# Patient Record
Sex: MALE | Race: WHITE | Employment: OTHER | URBAN - METROPOLITAN AREA
[De-identification: names, ages, dates, MRNs, and addresses within clinical notes are randomized per-mention and may not be internally consistent; named-entity substitution may affect disease eponyms.]

---

## 2017-04-10 PROBLEM — G47.33 OBSTRUCTIVE SLEEP APNEA SYNDROME: Status: ACTIVE | Noted: 2017-04-10

## 2017-12-13 PROBLEM — E66.01 OBESITY, MORBID (HCC): Status: ACTIVE | Noted: 2017-12-13

## 2018-01-05 PROBLEM — J43.1 PANLOBULAR EMPHYSEMA (HCC): Status: ACTIVE | Noted: 2018-01-05

## 2019-01-01 ENCOUNTER — ANESTHESIA EVENT (OUTPATIENT)
Dept: SURGERY | Age: 54
End: 2019-01-01
Payer: COMMERCIAL

## 2019-01-01 ENCOUNTER — HOSPITAL ENCOUNTER (OUTPATIENT)
Dept: LAB | Age: 54
Discharge: HOME | End: 2019-04-11
Payer: COMMERCIAL

## 2019-01-01 ENCOUNTER — HOSPITAL ENCOUNTER (OUTPATIENT)
Dept: INFUSION THERAPY | Age: 54
Discharge: HOME | End: 2019-06-05
Payer: COMMERCIAL

## 2019-01-01 ENCOUNTER — APPOINTMENT (OUTPATIENT)
Dept: INFUSION THERAPY | Age: 54
End: 2019-01-01

## 2019-01-01 ENCOUNTER — ANESTHESIA (OUTPATIENT)
Dept: SURGERY | Age: 54
End: 2019-01-01
Payer: COMMERCIAL

## 2019-01-01 ENCOUNTER — APPOINTMENT (OUTPATIENT)
Dept: INFUSION THERAPY | Age: 54
End: 2019-01-01
Payer: COMMERCIAL

## 2019-01-01 ENCOUNTER — HOSPITAL ENCOUNTER (OUTPATIENT)
Dept: LAB | Age: 54
Discharge: HOME | End: 2019-08-12
Payer: COMMERCIAL

## 2019-01-01 ENCOUNTER — HOSPITAL ENCOUNTER (OUTPATIENT)
Dept: INFUSION THERAPY | Age: 54
Discharge: HOME | End: 2019-12-30
Payer: COMMERCIAL

## 2019-01-01 ENCOUNTER — HOSPITAL ENCOUNTER (OUTPATIENT)
Dept: LAB | Age: 54
Discharge: HOME | End: 2019-03-07
Payer: COMMERCIAL

## 2019-01-01 ENCOUNTER — HOSPITAL ENCOUNTER (OUTPATIENT)
Dept: INFUSION THERAPY | Age: 54
Discharge: HOME | End: 2019-10-02
Payer: COMMERCIAL

## 2019-01-01 ENCOUNTER — HOSPITAL ENCOUNTER (OUTPATIENT)
Dept: LAB | Age: 54
Discharge: HOME | End: 2019-07-29
Payer: COMMERCIAL

## 2019-01-01 ENCOUNTER — HOSPITAL ENCOUNTER (OUTPATIENT)
Dept: RADIATION ONCOLOGY | Age: 54
Discharge: HOME | End: 2019-12-30
Payer: COMMERCIAL

## 2019-01-01 ENCOUNTER — HOSPITAL ENCOUNTER (OUTPATIENT)
Dept: LAB | Age: 54
Discharge: HOME | End: 2019-07-15
Payer: COMMERCIAL

## 2019-01-01 ENCOUNTER — HOSPITAL ENCOUNTER (OUTPATIENT)
Dept: INFUSION THERAPY | Age: 54
Discharge: HOME | End: 2019-11-13
Payer: COMMERCIAL

## 2019-01-01 ENCOUNTER — HOSPITAL ENCOUNTER (OUTPATIENT)
Dept: RADIATION ONCOLOGY | Age: 54
Discharge: HOME | End: 2019-12-13
Payer: COMMERCIAL

## 2019-01-01 ENCOUNTER — HOSPITAL ENCOUNTER (OUTPATIENT)
Dept: LAB | Age: 54
Discharge: HOME | End: 2019-05-16
Payer: COMMERCIAL

## 2019-01-01 ENCOUNTER — HOSPITAL ENCOUNTER (OUTPATIENT)
Dept: INFUSION THERAPY | Age: 54
End: 2019-01-01

## 2019-01-01 ENCOUNTER — HOSPITAL ENCOUNTER (OUTPATIENT)
Dept: CT IMAGING | Age: 54
Discharge: HOME | End: 2019-09-03
Attending: INTERNAL MEDICINE
Payer: COMMERCIAL

## 2019-01-01 ENCOUNTER — HOSPITAL ENCOUNTER (OUTPATIENT)
Dept: INFUSION THERAPY | Age: 54
Discharge: HOME | End: 2019-08-14
Payer: COMMERCIAL

## 2019-01-01 ENCOUNTER — HOSPITAL ENCOUNTER (OUTPATIENT)
Dept: INFUSION THERAPY | Age: 54
Discharge: HOME | End: 2019-05-04
Payer: COMMERCIAL

## 2019-01-01 ENCOUNTER — HOSPITAL ENCOUNTER (OUTPATIENT)
Dept: LAB | Age: 54
Discharge: HOME | End: 2019-08-26
Payer: COMMERCIAL

## 2019-01-01 ENCOUNTER — HOSPITAL ENCOUNTER (OUTPATIENT)
Dept: INFUSION THERAPY | Age: 54
Discharge: HOME | End: 2019-06-07
Payer: COMMERCIAL

## 2019-01-01 ENCOUNTER — HOSPITAL ENCOUNTER (OUTPATIENT)
Dept: INFUSION THERAPY | Age: 54
Discharge: HOME | End: 2019-10-14
Payer: COMMERCIAL

## 2019-01-01 ENCOUNTER — HOSPITAL ENCOUNTER (OUTPATIENT)
Dept: INFUSION THERAPY | Age: 54
Discharge: HOME | End: 2019-07-15
Payer: COMMERCIAL

## 2019-01-01 ENCOUNTER — HOSPITAL ENCOUNTER (OUTPATIENT)
Dept: INFUSION THERAPY | Age: 54
Discharge: HOME | End: 2019-04-11
Payer: COMMERCIAL

## 2019-01-01 ENCOUNTER — HOSPITAL ENCOUNTER (OUTPATIENT)
Dept: INFUSION THERAPY | Age: 54
Discharge: HOME | End: 2019-11-25
Payer: COMMERCIAL

## 2019-01-01 ENCOUNTER — APPOINTMENT (OUTPATIENT)
Dept: GENERAL RADIOLOGY | Age: 54
DRG: 193 | End: 2019-01-01
Attending: EMERGENCY MEDICINE
Payer: COMMERCIAL

## 2019-01-01 ENCOUNTER — HOSPITAL ENCOUNTER (OUTPATIENT)
Dept: LAB | Age: 54
Discharge: HOME | DRG: 193 | End: 2019-11-25
Payer: COMMERCIAL

## 2019-01-01 ENCOUNTER — DOCUMENTATION ONLY (OUTPATIENT)
Dept: HEMATOLOGY | Age: 54
End: 2019-01-01

## 2019-01-01 ENCOUNTER — HOSPITAL ENCOUNTER (OUTPATIENT)
Dept: INFUSION THERAPY | Age: 54
Discharge: HOME | End: 2019-06-20
Payer: COMMERCIAL

## 2019-01-01 ENCOUNTER — HOSPITAL ENCOUNTER (OUTPATIENT)
Dept: RADIATION ONCOLOGY | Age: 54
Discharge: HOME | End: 2019-03-08
Payer: COMMERCIAL

## 2019-01-01 ENCOUNTER — HOSPITAL ENCOUNTER (OUTPATIENT)
Dept: RADIATION ONCOLOGY | Age: 54
Discharge: HOME | End: 2019-12-20
Payer: COMMERCIAL

## 2019-01-01 ENCOUNTER — HOSPITAL ENCOUNTER (OUTPATIENT)
Dept: LAB | Age: 54
Discharge: HOME | End: 2019-09-09
Payer: COMMERCIAL

## 2019-01-01 ENCOUNTER — HOSPITAL ENCOUNTER (OUTPATIENT)
Dept: INTERVENTIONAL RADIOLOGY/VASCULAR | Age: 54
Discharge: HOME | End: 2019-11-21
Attending: INTERNAL MEDICINE
Payer: COMMERCIAL

## 2019-01-01 ENCOUNTER — HOSPITAL ENCOUNTER (OUTPATIENT)
Dept: INFUSION THERAPY | Age: 54
Discharge: HOME | End: 2019-07-31
Payer: COMMERCIAL

## 2019-01-01 ENCOUNTER — HOSPITAL ENCOUNTER (OUTPATIENT)
Dept: LAB | Age: 54
Discharge: HOME | End: 2019-05-30
Payer: COMMERCIAL

## 2019-01-01 ENCOUNTER — HOSPITAL ENCOUNTER (OUTPATIENT)
Dept: INFUSION THERAPY | Age: 54
Discharge: HOME | End: 2019-05-02
Payer: COMMERCIAL

## 2019-01-01 ENCOUNTER — HOSPITAL ENCOUNTER (OUTPATIENT)
Age: 54
Setting detail: OUTPATIENT SURGERY
Discharge: HOME | End: 2019-03-18
Attending: SURGERY | Admitting: SURGERY
Payer: COMMERCIAL

## 2019-01-01 ENCOUNTER — HOSPITAL ENCOUNTER (OUTPATIENT)
Dept: INFUSION THERAPY | Age: 54
Discharge: HOME | End: 2019-11-19
Payer: COMMERCIAL

## 2019-01-01 ENCOUNTER — HOSPITAL ENCOUNTER (OUTPATIENT)
Dept: INFUSION THERAPY | Age: 54
Discharge: HOME | End: 2019-05-18
Payer: COMMERCIAL

## 2019-01-01 ENCOUNTER — HOSPITAL ENCOUNTER (OUTPATIENT)
Dept: INFUSION THERAPY | Age: 54
Discharge: HOME | End: 2019-11-27
Payer: COMMERCIAL

## 2019-01-01 ENCOUNTER — HOSPITAL ENCOUNTER (OUTPATIENT)
Dept: PET IMAGING | Age: 54
Discharge: HOME | End: 2019-03-05
Payer: COMMERCIAL

## 2019-01-01 ENCOUNTER — HOSPITAL ENCOUNTER (OUTPATIENT)
Dept: LAB | Age: 54
Discharge: HOME | End: 2019-09-23
Payer: COMMERCIAL

## 2019-01-01 ENCOUNTER — HOSPITAL ENCOUNTER (OUTPATIENT)
Dept: INFUSION THERAPY | Age: 54
End: 2019-01-01
Payer: COMMERCIAL

## 2019-01-01 ENCOUNTER — HOSPITAL ENCOUNTER (OUTPATIENT)
Dept: INFUSION THERAPY | Age: 54
Discharge: HOME | End: 2019-09-30
Payer: COMMERCIAL

## 2019-01-01 ENCOUNTER — HOSPITAL ENCOUNTER (OUTPATIENT)
Dept: INFUSION THERAPY | Age: 54
Discharge: HOME | End: 2019-09-11
Payer: COMMERCIAL

## 2019-01-01 ENCOUNTER — HOSPITAL ENCOUNTER (OUTPATIENT)
Dept: LAB | Age: 54
Discharge: HOME | End: 2019-06-20
Payer: COMMERCIAL

## 2019-01-01 ENCOUNTER — HOSPITAL ENCOUNTER (OUTPATIENT)
Dept: INFUSION THERAPY | Age: 54
Discharge: HOME | End: 2019-08-12
Payer: COMMERCIAL

## 2019-01-01 ENCOUNTER — HOSPITAL ENCOUNTER (OUTPATIENT)
Dept: INFUSION THERAPY | Age: 54
Discharge: HOME | End: 2019-05-30
Payer: COMMERCIAL

## 2019-01-01 ENCOUNTER — APPOINTMENT (OUTPATIENT)
Dept: PHYSICAL THERAPY | Age: 54
End: 2019-01-01

## 2019-01-01 ENCOUNTER — HOSPITAL ENCOUNTER (OUTPATIENT)
Dept: LAB | Age: 54
Discharge: HOME | End: 2019-10-14
Payer: COMMERCIAL

## 2019-01-01 ENCOUNTER — HOSPITAL ENCOUNTER (OUTPATIENT)
Dept: INFUSION THERAPY | Age: 54
Discharge: HOME | End: 2019-05-16
Payer: COMMERCIAL

## 2019-01-01 ENCOUNTER — HOSPITAL ENCOUNTER (OUTPATIENT)
Dept: INFUSION THERAPY | Age: 54
Discharge: HOME | End: 2019-07-29
Payer: COMMERCIAL

## 2019-01-01 ENCOUNTER — HOSPITAL ENCOUNTER (OUTPATIENT)
Dept: INFUSION THERAPY | Age: 54
Discharge: HOME | End: 2019-09-23
Payer: COMMERCIAL

## 2019-01-01 ENCOUNTER — HOSPITAL ENCOUNTER (OUTPATIENT)
Dept: LAB | Age: 54
Discharge: HOME | End: 2019-12-11
Payer: COMMERCIAL

## 2019-01-01 ENCOUNTER — HOSPITAL ENCOUNTER (OUTPATIENT)
Dept: LAB | Age: 54
Discharge: HOME | End: 2019-05-02
Payer: COMMERCIAL

## 2019-01-01 ENCOUNTER — HOSPITAL ENCOUNTER (OUTPATIENT)
Dept: INFUSION THERAPY | Age: 54
Discharge: HOME | End: 2019-11-11
Payer: COMMERCIAL

## 2019-01-01 ENCOUNTER — HOSPITAL ENCOUNTER (OUTPATIENT)
Dept: CT IMAGING | Age: 54
Discharge: HOME | End: 2019-12-09
Attending: INTERNAL MEDICINE

## 2019-01-01 ENCOUNTER — HOSPITAL ENCOUNTER (OUTPATIENT)
Dept: INFUSION THERAPY | Age: 54
Discharge: HOME | End: 2019-11-15
Payer: COMMERCIAL

## 2019-01-01 ENCOUNTER — HOSPITAL ENCOUNTER (OUTPATIENT)
Dept: INFUSION THERAPY | Age: 54
Discharge: HOME | End: 2019-09-09
Payer: COMMERCIAL

## 2019-01-01 ENCOUNTER — HOSPITAL ENCOUNTER (INPATIENT)
Age: 54
LOS: 2 days | Discharge: HOME | DRG: 193 | End: 2019-11-29
Attending: EMERGENCY MEDICINE | Admitting: INTERNAL MEDICINE
Payer: COMMERCIAL

## 2019-01-01 ENCOUNTER — HOSPITAL ENCOUNTER (OUTPATIENT)
Dept: INFUSION THERAPY | Age: 54
Discharge: HOME | End: 2019-03-28
Payer: COMMERCIAL

## 2019-01-01 ENCOUNTER — HOSPITAL ENCOUNTER (OUTPATIENT)
Dept: INFUSION THERAPY | Age: 54
Discharge: HOME | End: 2019-07-17
Payer: COMMERCIAL

## 2019-01-01 ENCOUNTER — APPOINTMENT (OUTPATIENT)
Dept: CT IMAGING | Age: 54
DRG: 193 | End: 2019-01-01
Attending: EMERGENCY MEDICINE
Payer: COMMERCIAL

## 2019-01-01 ENCOUNTER — HOSPITAL ENCOUNTER (OUTPATIENT)
Dept: LAB | Age: 54
Discharge: HOME | End: 2019-09-30
Payer: COMMERCIAL

## 2019-01-01 ENCOUNTER — HOSPITAL ENCOUNTER (OUTPATIENT)
Dept: RADIATION ONCOLOGY | Age: 54
Discharge: HOME | End: 2019-12-31
Payer: COMMERCIAL

## 2019-01-01 ENCOUNTER — HOSPITAL ENCOUNTER (OUTPATIENT)
Dept: RADIATION ONCOLOGY | Age: 54
Discharge: HOME | End: 2019-03-21

## 2019-01-01 ENCOUNTER — DOCUMENTATION ONLY (OUTPATIENT)
Dept: ONCOLOGY | Age: 54
End: 2019-01-01

## 2019-01-01 ENCOUNTER — HOSPITAL ENCOUNTER (OUTPATIENT)
Dept: RADIATION ONCOLOGY | Age: 54
Discharge: HOME | End: 2019-12-23
Payer: COMMERCIAL

## 2019-01-01 ENCOUNTER — HOSPITAL ENCOUNTER (OUTPATIENT)
Dept: INFUSION THERAPY | Age: 54
Discharge: HOME | End: 2019-12-13
Payer: COMMERCIAL

## 2019-01-01 ENCOUNTER — HOSPITAL ENCOUNTER (OUTPATIENT)
Dept: LAB | Age: 54
Discharge: HOME | End: 2019-12-30
Payer: COMMERCIAL

## 2019-01-01 ENCOUNTER — HOSPITAL ENCOUNTER (OUTPATIENT)
Dept: INFUSION THERAPY | Age: 54
Discharge: HOME | End: 2019-08-26
Payer: COMMERCIAL

## 2019-01-01 ENCOUNTER — PATIENT OUTREACH (OUTPATIENT)
Dept: CASE MANAGEMENT | Age: 54
End: 2019-01-01

## 2019-01-01 ENCOUNTER — HOSPITAL ENCOUNTER (OUTPATIENT)
Dept: CT IMAGING | Age: 54
Discharge: HOME | End: 2019-06-13
Attending: NURSE PRACTITIONER
Payer: COMMERCIAL

## 2019-01-01 ENCOUNTER — HOSPITAL ENCOUNTER (OUTPATIENT)
Dept: LAB | Age: 54
Discharge: HOME | End: 2019-11-11
Payer: COMMERCIAL

## 2019-01-01 ENCOUNTER — HOSPITAL ENCOUNTER (OUTPATIENT)
Dept: LAB | Age: 54
Discharge: HOME | End: 2019-03-28
Payer: COMMERCIAL

## 2019-01-01 ENCOUNTER — HOSPITAL ENCOUNTER (OUTPATIENT)
Dept: INFUSION THERAPY | Age: 54
Discharge: HOME | End: 2019-12-18
Payer: COMMERCIAL

## 2019-01-01 ENCOUNTER — HOSPITAL ENCOUNTER (OUTPATIENT)
Dept: RADIATION ONCOLOGY | Age: 54
Discharge: HOME | End: 2019-12-17
Payer: COMMERCIAL

## 2019-01-01 ENCOUNTER — HOSPITAL ENCOUNTER (OUTPATIENT)
Dept: INFUSION THERAPY | Age: 54
Discharge: HOME | End: 2019-04-13
Payer: COMMERCIAL

## 2019-01-01 ENCOUNTER — HOSPITAL ENCOUNTER (OUTPATIENT)
Dept: LAB | Age: 54
Discharge: HOME | End: 2019-12-03
Payer: COMMERCIAL

## 2019-01-01 ENCOUNTER — APPOINTMENT (OUTPATIENT)
Dept: GENERAL RADIOLOGY | Age: 54
End: 2019-01-01
Attending: SURGERY
Payer: COMMERCIAL

## 2019-01-01 ENCOUNTER — HOSPITAL ENCOUNTER (OUTPATIENT)
Dept: INFUSION THERAPY | Age: 54
Discharge: HOME | End: 2019-03-30
Payer: COMMERCIAL

## 2019-01-01 VITALS
SYSTOLIC BLOOD PRESSURE: 108 MMHG | RESPIRATION RATE: 18 BRPM | OXYGEN SATURATION: 95 % | DIASTOLIC BLOOD PRESSURE: 71 MMHG | HEART RATE: 93 BPM | TEMPERATURE: 98 F

## 2019-01-01 VITALS
WEIGHT: 311.8 LBS | DIASTOLIC BLOOD PRESSURE: 68 MMHG | HEART RATE: 68 BPM | RESPIRATION RATE: 20 BRPM | SYSTOLIC BLOOD PRESSURE: 119 MMHG | TEMPERATURE: 97.8 F | HEIGHT: 73 IN | BODY MASS INDEX: 41.32 KG/M2

## 2019-01-01 VITALS
RESPIRATION RATE: 18 BRPM | DIASTOLIC BLOOD PRESSURE: 72 MMHG | WEIGHT: 284.6 LBS | BODY MASS INDEX: 36.54 KG/M2 | OXYGEN SATURATION: 94 % | SYSTOLIC BLOOD PRESSURE: 117 MMHG | TEMPERATURE: 97.9 F | HEART RATE: 56 BPM

## 2019-01-01 VITALS
DIASTOLIC BLOOD PRESSURE: 58 MMHG | RESPIRATION RATE: 18 BRPM | SYSTOLIC BLOOD PRESSURE: 125 MMHG | OXYGEN SATURATION: 92 % | HEART RATE: 64 BPM | TEMPERATURE: 98.4 F

## 2019-01-01 VITALS
DIASTOLIC BLOOD PRESSURE: 66 MMHG | OXYGEN SATURATION: 90 % | TEMPERATURE: 98.1 F | RESPIRATION RATE: 16 BRPM | SYSTOLIC BLOOD PRESSURE: 133 MMHG | HEART RATE: 85 BPM

## 2019-01-01 VITALS
RESPIRATION RATE: 18 BRPM | WEIGHT: 267 LBS | SYSTOLIC BLOOD PRESSURE: 106 MMHG | DIASTOLIC BLOOD PRESSURE: 58 MMHG | TEMPERATURE: 97.3 F | HEART RATE: 59 BPM | OXYGEN SATURATION: 94 % | BODY MASS INDEX: 34.28 KG/M2

## 2019-01-01 VITALS
RESPIRATION RATE: 18 BRPM | OXYGEN SATURATION: 91 % | DIASTOLIC BLOOD PRESSURE: 91 MMHG | SYSTOLIC BLOOD PRESSURE: 127 MMHG | HEART RATE: 94 BPM | TEMPERATURE: 98.3 F

## 2019-01-01 VITALS
DIASTOLIC BLOOD PRESSURE: 62 MMHG | SYSTOLIC BLOOD PRESSURE: 109 MMHG | RESPIRATION RATE: 18 BRPM | HEIGHT: 74 IN | OXYGEN SATURATION: 92 % | HEART RATE: 73 BPM | WEIGHT: 223.9 LBS | BODY MASS INDEX: 28.73 KG/M2 | TEMPERATURE: 97.4 F

## 2019-01-01 VITALS
TEMPERATURE: 98 F | HEART RATE: 110 BPM | SYSTOLIC BLOOD PRESSURE: 124 MMHG | OXYGEN SATURATION: 96 % | RESPIRATION RATE: 18 BRPM | DIASTOLIC BLOOD PRESSURE: 73 MMHG | WEIGHT: 223 LBS | BODY MASS INDEX: 28.63 KG/M2

## 2019-01-01 VITALS
OXYGEN SATURATION: 98 % | DIASTOLIC BLOOD PRESSURE: 60 MMHG | SYSTOLIC BLOOD PRESSURE: 110 MMHG | RESPIRATION RATE: 20 BRPM | TEMPERATURE: 98 F | HEART RATE: 63 BPM

## 2019-01-01 VITALS
OXYGEN SATURATION: 97 % | DIASTOLIC BLOOD PRESSURE: 83 MMHG | RESPIRATION RATE: 18 BRPM | SYSTOLIC BLOOD PRESSURE: 130 MMHG | HEART RATE: 94 BPM | TEMPERATURE: 97.6 F

## 2019-01-01 VITALS
RESPIRATION RATE: 18 BRPM | TEMPERATURE: 98 F | HEART RATE: 59 BPM | OXYGEN SATURATION: 91 % | DIASTOLIC BLOOD PRESSURE: 69 MMHG | SYSTOLIC BLOOD PRESSURE: 107 MMHG

## 2019-01-01 VITALS
OXYGEN SATURATION: 93 % | BODY MASS INDEX: 41.92 KG/M2 | TEMPERATURE: 97.7 F | HEART RATE: 68 BPM | WEIGHT: 313.4 LBS | SYSTOLIC BLOOD PRESSURE: 138 MMHG | RESPIRATION RATE: 18 BRPM | DIASTOLIC BLOOD PRESSURE: 74 MMHG

## 2019-01-01 VITALS
BODY MASS INDEX: 28.04 KG/M2 | HEART RATE: 104 BPM | DIASTOLIC BLOOD PRESSURE: 62 MMHG | SYSTOLIC BLOOD PRESSURE: 122 MMHG | TEMPERATURE: 98.4 F | OXYGEN SATURATION: 97 % | WEIGHT: 218.4 LBS

## 2019-01-01 VITALS
HEIGHT: 74 IN | SYSTOLIC BLOOD PRESSURE: 140 MMHG | OXYGEN SATURATION: 95 % | WEIGHT: 315 LBS | RESPIRATION RATE: 17 BRPM | BODY MASS INDEX: 40.43 KG/M2 | TEMPERATURE: 98.5 F | HEART RATE: 59 BPM | DIASTOLIC BLOOD PRESSURE: 61 MMHG

## 2019-01-01 VITALS
TEMPERATURE: 98.2 F | OXYGEN SATURATION: 96 % | DIASTOLIC BLOOD PRESSURE: 75 MMHG | SYSTOLIC BLOOD PRESSURE: 117 MMHG | BODY MASS INDEX: 37.52 KG/M2 | HEART RATE: 62 BPM | WEIGHT: 292.2 LBS | RESPIRATION RATE: 18 BRPM

## 2019-01-01 VITALS
RESPIRATION RATE: 18 BRPM | OXYGEN SATURATION: 94 % | DIASTOLIC BLOOD PRESSURE: 71 MMHG | SYSTOLIC BLOOD PRESSURE: 126 MMHG | HEART RATE: 63 BPM | TEMPERATURE: 98.1 F

## 2019-01-01 VITALS
OXYGEN SATURATION: 95 % | WEIGHT: 315 LBS | HEART RATE: 55 BPM | BODY MASS INDEX: 49.58 KG/M2 | DIASTOLIC BLOOD PRESSURE: 74 MMHG | SYSTOLIC BLOOD PRESSURE: 123 MMHG | TEMPERATURE: 97.7 F

## 2019-01-01 VITALS
SYSTOLIC BLOOD PRESSURE: 112 MMHG | DIASTOLIC BLOOD PRESSURE: 67 MMHG | TEMPERATURE: 97.8 F | OXYGEN SATURATION: 94 % | HEART RATE: 89 BPM | RESPIRATION RATE: 18 BRPM

## 2019-01-01 VITALS
OXYGEN SATURATION: 93 % | SYSTOLIC BLOOD PRESSURE: 122 MMHG | TEMPERATURE: 98.3 F | RESPIRATION RATE: 18 BRPM | DIASTOLIC BLOOD PRESSURE: 77 MMHG | HEART RATE: 85 BPM

## 2019-01-01 VITALS
SYSTOLIC BLOOD PRESSURE: 104 MMHG | BODY MASS INDEX: 46.86 KG/M2 | RESPIRATION RATE: 20 BRPM | DIASTOLIC BLOOD PRESSURE: 57 MMHG | HEART RATE: 75 BPM | OXYGEN SATURATION: 95 % | TEMPERATURE: 97.8 F | WEIGHT: 315 LBS

## 2019-01-01 VITALS
BODY MASS INDEX: 45.59 KG/M2 | HEART RATE: 62 BPM | RESPIRATION RATE: 20 BRPM | WEIGHT: 315 LBS | TEMPERATURE: 98.9 F | SYSTOLIC BLOOD PRESSURE: 113 MMHG | OXYGEN SATURATION: 90 % | DIASTOLIC BLOOD PRESSURE: 66 MMHG

## 2019-01-01 VITALS — OXYGEN SATURATION: 96 %

## 2019-01-01 DIAGNOSIS — C15.5 MALIGNANT NEOPLASM OF LOWER THIRD OF ESOPHAGUS (HCC): ICD-10-CM

## 2019-01-01 DIAGNOSIS — C78.6 PERITONEAL METASTASES (HCC): ICD-10-CM

## 2019-01-01 DIAGNOSIS — C15.5 MALIGNANT NEOPLASM OF LOWER THIRD OF ESOPHAGUS (HCC): Primary | ICD-10-CM

## 2019-01-01 DIAGNOSIS — R73.09 ELEVATED GLUCOSE: ICD-10-CM

## 2019-01-01 DIAGNOSIS — J44.1 COPD EXACERBATION (HCC): ICD-10-CM

## 2019-01-01 DIAGNOSIS — C15.9 MALIGNANT NEOPLASM OF ESOPHAGUS, UNSPECIFIED LOCATION (HCC): ICD-10-CM

## 2019-01-01 DIAGNOSIS — D70.1 CHEMOTHERAPY-INDUCED NEUTROPENIA (HCC): Primary | ICD-10-CM

## 2019-01-01 DIAGNOSIS — T45.1X5A CHEMOTHERAPY-INDUCED NEUTROPENIA (HCC): ICD-10-CM

## 2019-01-01 DIAGNOSIS — T45.1X5A CHEMOTHERAPY-INDUCED NEUTROPENIA (HCC): Primary | ICD-10-CM

## 2019-01-01 DIAGNOSIS — I10 ESSENTIAL HYPERTENSION WITH GOAL BLOOD PRESSURE LESS THAN 140/90: ICD-10-CM

## 2019-01-01 DIAGNOSIS — J18.9 PNEUMONITIS: Primary | ICD-10-CM

## 2019-01-01 DIAGNOSIS — E78.00 PURE HYPERCHOLESTEROLEMIA: ICD-10-CM

## 2019-01-01 DIAGNOSIS — D70.1 CHEMOTHERAPY-INDUCED NEUTROPENIA (HCC): ICD-10-CM

## 2019-01-01 DIAGNOSIS — R13.10 DYSPHAGIA: ICD-10-CM

## 2019-01-01 DIAGNOSIS — R09.02 HYPOXIA: ICD-10-CM

## 2019-01-01 LAB
ALBUMIN SERPL-MCNC: 2.7 G/DL (ref 3.5–5)
ALBUMIN SERPL-MCNC: 2.7 G/DL (ref 3.5–5)
ALBUMIN SERPL-MCNC: 2.8 G/DL (ref 3.5–5)
ALBUMIN SERPL-MCNC: 2.9 G/DL (ref 3.5–5)
ALBUMIN SERPL-MCNC: 2.9 G/DL (ref 3.5–5)
ALBUMIN SERPL-MCNC: 3 G/DL (ref 3.5–5)
ALBUMIN SERPL-MCNC: 3.1 G/DL (ref 3.5–5)
ALBUMIN SERPL-MCNC: 3.2 G/DL (ref 3.5–5)
ALBUMIN SERPL-MCNC: 3.3 G/DL (ref 3.5–5)
ALBUMIN SERPL-MCNC: 3.3 G/DL (ref 3.5–5)
ALBUMIN SERPL-MCNC: 3.4 G/DL (ref 3.5–5)
ALBUMIN SERPL-MCNC: 3.4 G/DL (ref 3.5–5)
ALBUMIN SERPL-MCNC: 3.7 G/DL (ref 3.5–5)
ALBUMIN/GLOB SERPL: 0.7 {RATIO} (ref 1.2–3.5)
ALBUMIN/GLOB SERPL: 0.8 {RATIO} (ref 1.2–3.5)
ALBUMIN/GLOB SERPL: 0.9 {RATIO} (ref 1.2–3.5)
ALP SERPL-CCNC: 101 U/L (ref 50–136)
ALP SERPL-CCNC: 108 U/L (ref 50–136)
ALP SERPL-CCNC: 114 U/L (ref 50–136)
ALP SERPL-CCNC: 117 U/L (ref 50–136)
ALP SERPL-CCNC: 117 U/L (ref 50–136)
ALP SERPL-CCNC: 118 U/L (ref 50–136)
ALP SERPL-CCNC: 121 U/L (ref 50–136)
ALP SERPL-CCNC: 121 U/L (ref 50–136)
ALP SERPL-CCNC: 124 U/L (ref 50–136)
ALP SERPL-CCNC: 125 U/L (ref 50–136)
ALP SERPL-CCNC: 127 U/L (ref 50–136)
ALP SERPL-CCNC: 129 U/L (ref 50–136)
ALP SERPL-CCNC: 130 U/L (ref 50–136)
ALP SERPL-CCNC: 137 U/L (ref 50–136)
ALP SERPL-CCNC: 138 U/L (ref 50–136)
ALP SERPL-CCNC: 139 U/L (ref 50–136)
ALP SERPL-CCNC: 147 U/L (ref 50–136)
ALP SERPL-CCNC: 151 U/L (ref 50–136)
ALP SERPL-CCNC: 162 U/L (ref 50–136)
ALP SERPL-CCNC: 99 U/L (ref 50–136)
ALT SERPL-CCNC: 14 U/L (ref 12–65)
ALT SERPL-CCNC: 15 U/L (ref 12–65)
ALT SERPL-CCNC: 15 U/L (ref 12–65)
ALT SERPL-CCNC: 17 U/L (ref 12–65)
ALT SERPL-CCNC: 18 U/L (ref 12–65)
ALT SERPL-CCNC: 20 U/L (ref 12–65)
ALT SERPL-CCNC: 21 U/L (ref 12–65)
ALT SERPL-CCNC: 25 U/L (ref 12–65)
ALT SERPL-CCNC: 26 U/L (ref 12–65)
ALT SERPL-CCNC: 28 U/L (ref 12–65)
ALT SERPL-CCNC: 28 U/L (ref 12–65)
ALT SERPL-CCNC: 30 U/L (ref 12–65)
ALT SERPL-CCNC: 33 U/L (ref 12–65)
ALT SERPL-CCNC: 36 U/L (ref 12–65)
ALT SERPL-CCNC: 37 U/L (ref 12–65)
ALT SERPL-CCNC: 38 U/L (ref 12–65)
ALT SERPL-CCNC: 38 U/L (ref 12–65)
ALT SERPL-CCNC: 39 U/L (ref 12–65)
ALT SERPL-CCNC: 46 U/L (ref 12–65)
ALT SERPL-CCNC: 54 U/L (ref 12–65)
ALT SERPL-CCNC: 66 U/L (ref 12–65)
ANION GAP SERPL CALC-SCNC: 10 MMOL/L (ref 7–16)
ANION GAP SERPL CALC-SCNC: 10 MMOL/L (ref 7–16)
ANION GAP SERPL CALC-SCNC: 3 MMOL/L (ref 7–16)
ANION GAP SERPL CALC-SCNC: 4 MMOL/L (ref 7–16)
ANION GAP SERPL CALC-SCNC: 6 MMOL/L (ref 7–16)
ANION GAP SERPL CALC-SCNC: 7 MMOL/L (ref 7–16)
ANION GAP SERPL CALC-SCNC: 8 MMOL/L (ref 7–16)
ANION GAP SERPL CALC-SCNC: 9 MMOL/L (ref 7–16)
ANION GAP SERPL CALC-SCNC: 9 MMOL/L (ref 7–16)
ARTERIAL PATENCY WRIST A: YES
AST SERPL-CCNC: 18 U/L (ref 15–37)
AST SERPL-CCNC: 21 U/L (ref 15–37)
AST SERPL-CCNC: 22 U/L (ref 15–37)
AST SERPL-CCNC: 24 U/L (ref 15–37)
AST SERPL-CCNC: 25 U/L (ref 15–37)
AST SERPL-CCNC: 26 U/L (ref 15–37)
AST SERPL-CCNC: 27 U/L (ref 15–37)
AST SERPL-CCNC: 28 U/L (ref 15–37)
AST SERPL-CCNC: 29 U/L (ref 15–37)
AST SERPL-CCNC: 30 U/L (ref 15–37)
AST SERPL-CCNC: 30 U/L (ref 15–37)
AST SERPL-CCNC: 31 U/L (ref 15–37)
AST SERPL-CCNC: 33 U/L (ref 15–37)
AST SERPL-CCNC: 33 U/L (ref 15–37)
AST SERPL-CCNC: 34 U/L (ref 15–37)
AST SERPL-CCNC: 34 U/L (ref 15–37)
AST SERPL-CCNC: 36 U/L (ref 15–37)
AST SERPL-CCNC: 42 U/L (ref 15–37)
AST SERPL-CCNC: 44 U/L (ref 15–37)
AST SERPL-CCNC: 48 U/L (ref 15–37)
ATRIAL RATE: 101 BPM
BACTERIA SPEC CULT: NORMAL
BACTERIA SPEC CULT: NORMAL
BASE EXCESS BLD CALC-SCNC: 2 MMOL/L
BASOPHILS # BLD: 0 K/UL (ref 0–0.2)
BASOPHILS # BLD: 0.1 K/UL (ref 0–0.2)
BASOPHILS # BLD: 0.1 K/UL (ref 0–0.2)
BASOPHILS NFR BLD: 0 % (ref 0–2)
BASOPHILS NFR BLD: 1 % (ref 0–2)
BDY SITE: ABNORMAL
BILIRUB SERPL-MCNC: 0.3 MG/DL (ref 0.2–1.1)
BILIRUB SERPL-MCNC: 0.4 MG/DL (ref 0.2–1.1)
BILIRUB SERPL-MCNC: 0.5 MG/DL (ref 0.2–1.1)
BILIRUB SERPL-MCNC: 0.6 MG/DL (ref 0.2–1.1)
BILIRUB SERPL-MCNC: 0.7 MG/DL (ref 0.2–1.1)
BILIRUB SERPL-MCNC: 0.8 MG/DL (ref 0.2–1.1)
BILIRUB SERPL-MCNC: 0.9 MG/DL (ref 0.2–1.1)
BODY TEMPERATURE: 98.6
BUN SERPL-MCNC: 10 MG/DL (ref 6–23)
BUN SERPL-MCNC: 10 MG/DL (ref 6–23)
BUN SERPL-MCNC: 12 MG/DL (ref 6–23)
BUN SERPL-MCNC: 12 MG/DL (ref 6–23)
BUN SERPL-MCNC: 14 MG/DL (ref 6–23)
BUN SERPL-MCNC: 15 MG/DL (ref 6–23)
BUN SERPL-MCNC: 16 MG/DL (ref 6–23)
BUN SERPL-MCNC: 18 MG/DL (ref 6–23)
BUN SERPL-MCNC: 19 MG/DL (ref 6–23)
BUN SERPL-MCNC: 20 MG/DL (ref 6–23)
BUN SERPL-MCNC: 20 MG/DL (ref 6–23)
BUN SERPL-MCNC: 23 MG/DL (ref 6–23)
BUN SERPL-MCNC: 5 MG/DL (ref 6–23)
BUN SERPL-MCNC: 5 MG/DL (ref 6–23)
BUN SERPL-MCNC: 6 MG/DL (ref 6–23)
BUN SERPL-MCNC: 6 MG/DL (ref 6–23)
BUN SERPL-MCNC: 7 MG/DL (ref 6–23)
BUN SERPL-MCNC: 8 MG/DL (ref 6–23)
BUN SERPL-MCNC: 9 MG/DL (ref 6–23)
BUN SERPL-MCNC: 9 MG/DL (ref 6–23)
CALCIUM SERPL-MCNC: 8.7 MG/DL (ref 8.3–10.4)
CALCIUM SERPL-MCNC: 8.8 MG/DL (ref 8.3–10.4)
CALCIUM SERPL-MCNC: 9 MG/DL (ref 8.3–10.4)
CALCIUM SERPL-MCNC: 9.1 MG/DL (ref 8.3–10.4)
CALCIUM SERPL-MCNC: 9.2 MG/DL (ref 8.3–10.4)
CALCIUM SERPL-MCNC: 9.3 MG/DL (ref 8.3–10.4)
CALCIUM SERPL-MCNC: 9.4 MG/DL (ref 8.3–10.4)
CALCIUM SERPL-MCNC: 9.5 MG/DL (ref 8.3–10.4)
CALCIUM SERPL-MCNC: 9.6 MG/DL (ref 8.3–10.4)
CALCULATED P AXIS, ECG09: 88 DEGREES
CALCULATED R AXIS, ECG10: 75 DEGREES
CALCULATED T AXIS, ECG11: 87 DEGREES
CANCER AG19-9 SERPL-ACNC: 1243.3 U/ML (ref 2–37)
CANCER AG19-9 SERPL-ACNC: 1707.9 U/ML (ref 2–37)
CANCER AG19-9 SERPL-ACNC: 2040.8 U/ML (ref 2–37)
CANCER AG19-9 SERPL-ACNC: 2295 U/ML (ref 2–37)
CANCER AG19-9 SERPL-ACNC: 360.3 U/ML (ref 2–37)
CANCER AG19-9 SERPL-ACNC: 371 U/ML (ref 0–35)
CANCER AG19-9 SERPL-ACNC: 381.1 U/ML (ref 2–37)
CANCER AG19-9 SERPL-ACNC: 4015.7 U/ML (ref 2–37)
CANCER AG19-9 SERPL-ACNC: 456.9 U/ML (ref 2–37)
CANCER AG19-9 SERPL-ACNC: 564 U/ML (ref 2–37)
CANCER AG19-9 SERPL-ACNC: 587.7 U/ML (ref 2–37)
CANCER AG19-9 SERPL-ACNC: 643.4 U/ML (ref 2–37)
CANCER AG19-9 SERPL-ACNC: 697.9 U/ML (ref 2–37)
CANCER AG19-9 SERPL-ACNC: 698 U/ML (ref 2–37)
CANCER AG19-9 SERPL-ACNC: 809.8 U/ML (ref 2–37)
CEA SERPL-MCNC: 104.3 NG/ML (ref 0–3)
CEA SERPL-MCNC: 112 NG/ML (ref 0–3)
CEA SERPL-MCNC: 120.1 NG/ML (ref 0–3)
CEA SERPL-MCNC: 232.5 NG/ML (ref 0–3)
CEA SERPL-MCNC: 284.2 NG/ML (ref 0–3)
CEA SERPL-MCNC: 286.7 NG/ML (ref 0–3)
CEA SERPL-MCNC: 315.6 NG/ML (ref 0–3)
CEA SERPL-MCNC: 33.7 NG/ML (ref 0–3)
CEA SERPL-MCNC: 52.5 NG/ML (ref 0–3)
CEA SERPL-MCNC: 61.5 NG/ML (ref 0–3)
CEA SERPL-MCNC: 65.7 NG/ML (ref 0–3)
CEA SERPL-MCNC: 72 NG/ML (ref 0–3)
CEA SERPL-MCNC: 78.6 NG/ML (ref 0–3)
CEA SERPL-MCNC: 85.6 NG/ML (ref 0–3)
CEA SERPL-MCNC: 86.4 NG/ML (ref 0–3)
CEA SERPL-MCNC: 93.3 NG/ML (ref 0–3)
CHLORIDE SERPL-SCNC: 100 MMOL/L (ref 98–107)
CHLORIDE SERPL-SCNC: 101 MMOL/L (ref 98–107)
CHLORIDE SERPL-SCNC: 102 MMOL/L (ref 98–107)
CHLORIDE SERPL-SCNC: 103 MMOL/L (ref 98–107)
CHLORIDE SERPL-SCNC: 104 MMOL/L (ref 98–107)
CHLORIDE SERPL-SCNC: 105 MMOL/L (ref 98–107)
CHLORIDE SERPL-SCNC: 106 MMOL/L (ref 98–107)
CHLORIDE SERPL-SCNC: 109 MMOL/L (ref 98–107)
CHLORIDE SERPL-SCNC: 109 MMOL/L (ref 98–107)
CHLORIDE SERPL-SCNC: 111 MMOL/L (ref 98–107)
CO2 BLD-SCNC: 29 MMOL/L
CO2 SERPL-SCNC: 24 MMOL/L (ref 21–32)
CO2 SERPL-SCNC: 25 MMOL/L (ref 21–32)
CO2 SERPL-SCNC: 26 MMOL/L (ref 21–32)
CO2 SERPL-SCNC: 27 MMOL/L (ref 21–32)
CO2 SERPL-SCNC: 28 MMOL/L (ref 21–32)
CO2 SERPL-SCNC: 29 MMOL/L (ref 21–32)
CO2 SERPL-SCNC: 30 MMOL/L (ref 21–32)
CO2 SERPL-SCNC: 31 MMOL/L (ref 21–32)
CO2 SERPL-SCNC: 32 MMOL/L (ref 21–32)
CO2 SERPL-SCNC: 33 MMOL/L (ref 21–32)
COLLECT TIME,HTIME: 2027
CREAT SERPL-MCNC: 0.69 MG/DL (ref 0.8–1.5)
CREAT SERPL-MCNC: 0.77 MG/DL (ref 0.8–1.5)
CREAT SERPL-MCNC: 0.77 MG/DL (ref 0.8–1.5)
CREAT SERPL-MCNC: 0.78 MG/DL (ref 0.8–1.5)
CREAT SERPL-MCNC: 0.8 MG/DL (ref 0.8–1.5)
CREAT SERPL-MCNC: 0.83 MG/DL (ref 0.8–1.5)
CREAT SERPL-MCNC: 0.85 MG/DL (ref 0.8–1.5)
CREAT SERPL-MCNC: 0.86 MG/DL (ref 0.8–1.5)
CREAT SERPL-MCNC: 0.9 MG/DL (ref 0.8–1.5)
CREAT SERPL-MCNC: 0.92 MG/DL (ref 0.8–1.5)
CREAT SERPL-MCNC: 0.92 MG/DL (ref 0.8–1.5)
CREAT SERPL-MCNC: 0.93 MG/DL (ref 0.8–1.5)
CREAT SERPL-MCNC: 0.94 MG/DL (ref 0.8–1.5)
CREAT SERPL-MCNC: 0.95 MG/DL (ref 0.8–1.5)
CREAT SERPL-MCNC: 0.95 MG/DL (ref 0.8–1.5)
CREAT SERPL-MCNC: 0.96 MG/DL (ref 0.8–1.5)
CREAT SERPL-MCNC: 0.98 MG/DL (ref 0.8–1.5)
CREAT SERPL-MCNC: 0.99 MG/DL (ref 0.8–1.5)
CREAT SERPL-MCNC: 1 MG/DL (ref 0.8–1.5)
CREAT SERPL-MCNC: 1 MG/DL (ref 0.8–1.5)
CREAT SERPL-MCNC: 1.04 MG/DL (ref 0.8–1.5)
CREAT SERPL-MCNC: 1.07 MG/DL (ref 0.8–1.5)
CREAT SERPL-MCNC: 1.09 MG/DL (ref 0.8–1.5)
CREAT SERPL-MCNC: 1.28 MG/DL (ref 0.8–1.5)
DIAGNOSIS, 93000: NORMAL
DIFFERENTIAL METHOD BLD: ABNORMAL
EOSINOPHIL # BLD: 0 K/UL (ref 0–0.8)
EOSINOPHIL # BLD: 0.1 K/UL (ref 0–0.8)
EOSINOPHIL # BLD: 0.2 K/UL (ref 0–0.8)
EOSINOPHIL # BLD: 0.3 K/UL (ref 0–0.8)
EOSINOPHIL # BLD: 0.3 K/UL (ref 0–0.8)
EOSINOPHIL NFR BLD: 0 % (ref 0.5–7.8)
EOSINOPHIL NFR BLD: 1 % (ref 0.5–7.8)
EOSINOPHIL NFR BLD: 2 % (ref 0.5–7.8)
EOSINOPHIL NFR BLD: 3 % (ref 0.5–7.8)
EOSINOPHIL NFR BLD: 4 % (ref 0.5–7.8)
EOSINOPHIL NFR BLD: 4 % (ref 0.5–7.8)
ERYTHROCYTE [DISTWIDTH] IN BLOOD BY AUTOMATED COUNT: 12.7 % (ref 11.9–14.6)
ERYTHROCYTE [DISTWIDTH] IN BLOOD BY AUTOMATED COUNT: 12.8 % (ref 11.9–14.6)
ERYTHROCYTE [DISTWIDTH] IN BLOOD BY AUTOMATED COUNT: 13.3 % (ref 11.9–14.6)
ERYTHROCYTE [DISTWIDTH] IN BLOOD BY AUTOMATED COUNT: 14.6 % (ref 11.9–14.6)
ERYTHROCYTE [DISTWIDTH] IN BLOOD BY AUTOMATED COUNT: 14.9 % (ref 11.9–14.6)
ERYTHROCYTE [DISTWIDTH] IN BLOOD BY AUTOMATED COUNT: 14.9 % (ref 11.9–14.6)
ERYTHROCYTE [DISTWIDTH] IN BLOOD BY AUTOMATED COUNT: 15.1 % (ref 11.9–14.6)
ERYTHROCYTE [DISTWIDTH] IN BLOOD BY AUTOMATED COUNT: 15.1 % (ref 11.9–14.6)
ERYTHROCYTE [DISTWIDTH] IN BLOOD BY AUTOMATED COUNT: 15.2 % (ref 11.9–14.6)
ERYTHROCYTE [DISTWIDTH] IN BLOOD BY AUTOMATED COUNT: 15.3 % (ref 11.9–14.6)
ERYTHROCYTE [DISTWIDTH] IN BLOOD BY AUTOMATED COUNT: 15.3 % (ref 11.9–14.6)
ERYTHROCYTE [DISTWIDTH] IN BLOOD BY AUTOMATED COUNT: 15.4 % (ref 11.9–14.6)
ERYTHROCYTE [DISTWIDTH] IN BLOOD BY AUTOMATED COUNT: 15.5 % (ref 11.9–14.6)
ERYTHROCYTE [DISTWIDTH] IN BLOOD BY AUTOMATED COUNT: 15.7 % (ref 11.9–14.6)
ERYTHROCYTE [DISTWIDTH] IN BLOOD BY AUTOMATED COUNT: 15.8 % (ref 11.9–14.6)
ERYTHROCYTE [DISTWIDTH] IN BLOOD BY AUTOMATED COUNT: 15.9 % (ref 11.9–14.6)
ERYTHROCYTE [DISTWIDTH] IN BLOOD BY AUTOMATED COUNT: 16 % (ref 11.9–14.6)
ERYTHROCYTE [DISTWIDTH] IN BLOOD BY AUTOMATED COUNT: 16.1 % (ref 11.9–14.6)
ERYTHROCYTE [DISTWIDTH] IN BLOOD BY AUTOMATED COUNT: 17.6 % (ref 11.9–14.6)
ERYTHROCYTE [DISTWIDTH] IN BLOOD BY AUTOMATED COUNT: 17.7 % (ref 11.9–14.6)
ERYTHROCYTE [DISTWIDTH] IN BLOOD BY AUTOMATED COUNT: 18.2 % (ref 11.9–14.6)
EST. AVERAGE GLUCOSE BLD GHB EST-MCNC: NORMAL MG/DL
FERRITIN SERPL-MCNC: 311 NG/ML (ref 8–388)
FLOW RATE ISTAT,IFRATE: 5 L/MIN
FLUAV AG NPH QL IA: NEGATIVE
FLUBV AG NPH QL IA: NEGATIVE
GAS FLOW.O2 O2 DELIVERY SYS: ABNORMAL L/MIN
GLOBULIN SER CALC-MCNC: 3.5 G/DL (ref 2.3–3.5)
GLOBULIN SER CALC-MCNC: 3.7 G/DL (ref 2.3–3.5)
GLOBULIN SER CALC-MCNC: 3.8 G/DL (ref 2.3–3.5)
GLOBULIN SER CALC-MCNC: 3.8 G/DL (ref 2.3–3.5)
GLOBULIN SER CALC-MCNC: 3.9 G/DL (ref 2.3–3.5)
GLOBULIN SER CALC-MCNC: 4 G/DL (ref 2.3–3.5)
GLOBULIN SER CALC-MCNC: 4.1 G/DL (ref 2.3–3.5)
GLOBULIN SER CALC-MCNC: 4.2 G/DL (ref 2.3–3.5)
GLOBULIN SER CALC-MCNC: 4.3 G/DL (ref 2.3–3.5)
GLOBULIN SER CALC-MCNC: 4.3 G/DL (ref 2.3–3.5)
GLOBULIN SER CALC-MCNC: 4.4 G/DL (ref 2.3–3.5)
GLUCOSE SERPL-MCNC: 100 MG/DL (ref 65–100)
GLUCOSE SERPL-MCNC: 101 MG/DL (ref 65–100)
GLUCOSE SERPL-MCNC: 103 MG/DL (ref 65–100)
GLUCOSE SERPL-MCNC: 103 MG/DL (ref 65–100)
GLUCOSE SERPL-MCNC: 104 MG/DL (ref 65–100)
GLUCOSE SERPL-MCNC: 104 MG/DL (ref 65–100)
GLUCOSE SERPL-MCNC: 108 MG/DL (ref 65–100)
GLUCOSE SERPL-MCNC: 110 MG/DL (ref 65–100)
GLUCOSE SERPL-MCNC: 113 MG/DL (ref 65–100)
GLUCOSE SERPL-MCNC: 113 MG/DL (ref 65–100)
GLUCOSE SERPL-MCNC: 120 MG/DL (ref 65–100)
GLUCOSE SERPL-MCNC: 125 MG/DL (ref 65–100)
GLUCOSE SERPL-MCNC: 132 MG/DL (ref 65–100)
GLUCOSE SERPL-MCNC: 132 MG/DL (ref 65–100)
GLUCOSE SERPL-MCNC: 142 MG/DL (ref 65–100)
GLUCOSE SERPL-MCNC: 160 MG/DL (ref 65–100)
GLUCOSE SERPL-MCNC: 171 MG/DL (ref 65–100)
GLUCOSE SERPL-MCNC: 88 MG/DL (ref 65–100)
GLUCOSE SERPL-MCNC: 90 MG/DL (ref 65–100)
GLUCOSE SERPL-MCNC: 94 MG/DL (ref 65–100)
GLUCOSE SERPL-MCNC: 95 MG/DL (ref 65–100)
GLUCOSE SERPL-MCNC: 97 MG/DL (ref 65–100)
GLUCOSE SERPL-MCNC: 99 MG/DL (ref 65–100)
GLUCOSE SERPL-MCNC: 99 MG/DL (ref 65–100)
HBA1C MFR BLD: 4.9 % (ref 4.8–6)
HCO3 BLD-SCNC: 27.7 MMOL/L (ref 22–26)
HCT VFR BLD AUTO: 30.9 % (ref 41.1–50.3)
HCT VFR BLD AUTO: 32.7 % (ref 41.1–50.3)
HCT VFR BLD AUTO: 33.5 % (ref 41.1–50.3)
HCT VFR BLD AUTO: 33.5 % (ref 41.1–50.3)
HCT VFR BLD AUTO: 34.3 % (ref 41.1–50.3)
HCT VFR BLD AUTO: 34.8 % (ref 41.1–50.3)
HCT VFR BLD AUTO: 35.1 % (ref 41.1–50.3)
HCT VFR BLD AUTO: 35.3 % (ref 41.1–50.3)
HCT VFR BLD AUTO: 35.4 % (ref 41.1–50.3)
HCT VFR BLD AUTO: 35.9 % (ref 41.1–50.3)
HCT VFR BLD AUTO: 36 % (ref 41.1–50.3)
HCT VFR BLD AUTO: 36.7 % (ref 41.1–50.3)
HCT VFR BLD AUTO: 36.9 % (ref 41.1–50.3)
HCT VFR BLD AUTO: 37 % (ref 41.1–50.3)
HCT VFR BLD AUTO: 37.1 % (ref 41.1–50.3)
HCT VFR BLD AUTO: 37.2 % (ref 41.1–50.3)
HCT VFR BLD AUTO: 37.5 % (ref 41.1–50.3)
HCT VFR BLD AUTO: 37.5 % (ref 41.1–50.3)
HCT VFR BLD AUTO: 37.6 % (ref 41.1–50.3)
HCT VFR BLD AUTO: 37.8 % (ref 41.1–50.3)
HCT VFR BLD AUTO: 38 % (ref 41.1–50.3)
HCT VFR BLD AUTO: 38.2 % (ref 41.1–50.3)
HCT VFR BLD AUTO: 38.4 % (ref 41.1–50.3)
HCT VFR BLD AUTO: 38.6 % (ref 41.1–50.3)
HCT VFR BLD AUTO: 41.5 % (ref 41.1–50.3)
HCT VFR BLD AUTO: 43.6 % (ref 41.1–50.3)
HGB BLD-MCNC: 10.5 G/DL (ref 13.6–17.2)
HGB BLD-MCNC: 11 G/DL (ref 13.6–17.2)
HGB BLD-MCNC: 11.1 G/DL (ref 13.6–17.2)
HGB BLD-MCNC: 11.4 G/DL (ref 13.6–17.2)
HGB BLD-MCNC: 11.5 G/DL (ref 13.6–17.2)
HGB BLD-MCNC: 11.8 G/DL (ref 13.6–17.2)
HGB BLD-MCNC: 11.8 G/DL (ref 13.6–17.2)
HGB BLD-MCNC: 11.9 G/DL (ref 13.6–17.2)
HGB BLD-MCNC: 11.9 G/DL (ref 13.6–17.2)
HGB BLD-MCNC: 12.1 G/DL (ref 13.6–17.2)
HGB BLD-MCNC: 12.2 G/DL (ref 13.6–17.2)
HGB BLD-MCNC: 12.3 G/DL (ref 13.6–17.2)
HGB BLD-MCNC: 12.4 G/DL (ref 13.6–17.2)
HGB BLD-MCNC: 12.5 G/DL (ref 13.6–17.2)
HGB BLD-MCNC: 12.5 G/DL (ref 13.6–17.2)
HGB BLD-MCNC: 12.7 G/DL (ref 13.6–17.2)
HGB BLD-MCNC: 12.8 G/DL (ref 13.6–17.2)
HGB BLD-MCNC: 12.9 G/DL (ref 13.6–17.2)
HGB BLD-MCNC: 13.1 G/DL (ref 13.6–17.2)
HGB BLD-MCNC: 13.3 G/DL (ref 13.6–17.2)
HGB BLD-MCNC: 14 G/DL (ref 13.6–17.2)
HGB BLD-MCNC: 14.8 G/DL (ref 13.6–17.2)
HGB RETIC QN AUTO: 34 PG (ref 29–35)
IMM GRANULOCYTES # BLD AUTO: 0 K/UL (ref 0–0.5)
IMM GRANULOCYTES # BLD AUTO: 0.1 K/UL (ref 0–0.5)
IMM GRANULOCYTES # BLD AUTO: 0.1 K/UL (ref 0–0.5)
IMM GRANULOCYTES # BLD AUTO: 0.2 K/UL (ref 0–0.5)
IMM GRANULOCYTES # BLD AUTO: 0.2 K/UL (ref 0–0.5)
IMM GRANULOCYTES # BLD AUTO: 0.3 K/UL (ref 0–0.5)
IMM GRANULOCYTES # BLD AUTO: 0.5 K/UL (ref 0–0.5)
IMM GRANULOCYTES NFR BLD AUTO: 0 % (ref 0–5)
IMM GRANULOCYTES NFR BLD AUTO: 1 % (ref 0–5)
IMM GRANULOCYTES NFR BLD AUTO: 2 % (ref 0–5)
IMM GRANULOCYTES NFR BLD AUTO: 3 % (ref 0–5)
IMM GRANULOCYTES NFR BLD AUTO: 3 % (ref 0–5)
IMM GRANULOCYTES NFR BLD AUTO: 4 % (ref 0–5)
IMM GRANULOCYTES NFR BLD AUTO: 7 % (ref 0–5)
IMM RETICS NFR: 11.6 % (ref 2.3–13.4)
IRON SATN MFR SERPL: 21 %
IRON SERPL-MCNC: 55 UG/DL (ref 35–150)
LACTATE BLD-SCNC: 1.77 MMOL/L (ref 0.5–1.9)
LIPASE SERPL-CCNC: 197 U/L (ref 73–393)
LYMPHOCYTES # BLD: 0.6 K/UL (ref 0.5–4.6)
LYMPHOCYTES # BLD: 1.1 K/UL (ref 0.5–4.6)
LYMPHOCYTES # BLD: 1.1 K/UL (ref 0.5–4.6)
LYMPHOCYTES # BLD: 1.2 K/UL (ref 0.5–4.6)
LYMPHOCYTES # BLD: 1.3 K/UL (ref 0.5–4.6)
LYMPHOCYTES # BLD: 1.4 K/UL (ref 0.5–4.6)
LYMPHOCYTES # BLD: 1.6 K/UL (ref 0.5–4.6)
LYMPHOCYTES # BLD: 1.8 K/UL (ref 0.5–4.6)
LYMPHOCYTES # BLD: 1.8 K/UL (ref 0.5–4.6)
LYMPHOCYTES # BLD: 1.9 K/UL (ref 0.5–4.6)
LYMPHOCYTES # BLD: 2.1 K/UL (ref 0.5–4.6)
LYMPHOCYTES # BLD: 2.1 K/UL (ref 0.5–4.6)
LYMPHOCYTES # BLD: 2.2 K/UL (ref 0.5–4.6)
LYMPHOCYTES # BLD: 2.4 K/UL (ref 0.5–4.6)
LYMPHOCYTES # BLD: 2.5 K/UL (ref 0.5–4.6)
LYMPHOCYTES NFR BLD: 16 % (ref 13–44)
LYMPHOCYTES NFR BLD: 18 % (ref 13–44)
LYMPHOCYTES NFR BLD: 18 % (ref 13–44)
LYMPHOCYTES NFR BLD: 19 % (ref 13–44)
LYMPHOCYTES NFR BLD: 20 % (ref 13–44)
LYMPHOCYTES NFR BLD: 21 % (ref 13–44)
LYMPHOCYTES NFR BLD: 22 % (ref 13–44)
LYMPHOCYTES NFR BLD: 25 % (ref 13–44)
LYMPHOCYTES NFR BLD: 25 % (ref 13–44)
LYMPHOCYTES NFR BLD: 26 % (ref 13–44)
LYMPHOCYTES NFR BLD: 26 % (ref 13–44)
LYMPHOCYTES NFR BLD: 27 % (ref 13–44)
LYMPHOCYTES NFR BLD: 28 % (ref 13–44)
LYMPHOCYTES NFR BLD: 29 % (ref 13–44)
LYMPHOCYTES NFR BLD: 31 % (ref 13–44)
LYMPHOCYTES NFR BLD: 34 % (ref 13–44)
LYMPHOCYTES NFR BLD: 34 % (ref 13–44)
LYMPHOCYTES NFR BLD: 36 % (ref 13–44)
LYMPHOCYTES NFR BLD: 46 % (ref 13–44)
LYMPHOCYTES NFR BLD: 49 % (ref 13–44)
LYMPHOCYTES NFR BLD: 55 % (ref 13–44)
LYMPHOCYTES NFR BLD: 55 % (ref 13–44)
LYMPHOCYTES NFR BLD: 58 % (ref 13–44)
LYMPHOCYTES NFR BLD: 64 % (ref 13–44)
MAGNESIUM SERPL-MCNC: 1.5 MG/DL (ref 1.8–2.4)
MAGNESIUM SERPL-MCNC: 1.8 MG/DL (ref 1.8–2.4)
MAGNESIUM SERPL-MCNC: 1.9 MG/DL (ref 1.8–2.4)
MAGNESIUM SERPL-MCNC: 2 MG/DL (ref 1.8–2.4)
MAGNESIUM SERPL-MCNC: 2.1 MG/DL (ref 1.8–2.4)
MAGNESIUM SERPL-MCNC: 2.2 MG/DL (ref 1.8–2.4)
MAGNESIUM SERPL-MCNC: 2.3 MG/DL (ref 1.8–2.4)
MAGNESIUM SERPL-MCNC: 2.4 MG/DL (ref 1.8–2.4)
MAGNESIUM SERPL-MCNC: 2.4 MG/DL (ref 1.8–2.4)
MCH RBC QN AUTO: 30.1 PG (ref 26.1–32.9)
MCH RBC QN AUTO: 30.2 PG (ref 26.1–32.9)
MCH RBC QN AUTO: 30.4 PG (ref 26.1–32.9)
MCH RBC QN AUTO: 30.4 PG (ref 26.1–32.9)
MCH RBC QN AUTO: 30.7 PG (ref 26.1–32.9)
MCH RBC QN AUTO: 31 PG (ref 26.1–32.9)
MCH RBC QN AUTO: 31.3 PG (ref 26.1–32.9)
MCH RBC QN AUTO: 31.3 PG (ref 26.1–32.9)
MCH RBC QN AUTO: 31.5 PG (ref 26.1–32.9)
MCH RBC QN AUTO: 31.7 PG (ref 26.1–32.9)
MCH RBC QN AUTO: 31.8 PG (ref 26.1–32.9)
MCH RBC QN AUTO: 31.9 PG (ref 26.1–32.9)
MCH RBC QN AUTO: 32 PG (ref 26.1–32.9)
MCH RBC QN AUTO: 32 PG (ref 26.1–32.9)
MCH RBC QN AUTO: 32.1 PG (ref 26.1–32.9)
MCH RBC QN AUTO: 32.2 PG (ref 26.1–32.9)
MCH RBC QN AUTO: 32.3 PG (ref 26.1–32.9)
MCH RBC QN AUTO: 32.3 PG (ref 26.1–32.9)
MCH RBC QN AUTO: 32.4 PG (ref 26.1–32.9)
MCH RBC QN AUTO: 32.5 PG (ref 26.1–32.9)
MCH RBC QN AUTO: 32.6 PG (ref 26.1–32.9)
MCH RBC QN AUTO: 32.7 PG (ref 26.1–32.9)
MCHC RBC AUTO-ENTMCNC: 32.2 G/DL (ref 31.4–35)
MCHC RBC AUTO-ENTMCNC: 32.4 G/DL (ref 31.4–35)
MCHC RBC AUTO-ENTMCNC: 32.7 G/DL (ref 31.4–35)
MCHC RBC AUTO-ENTMCNC: 32.8 G/DL (ref 31.4–35)
MCHC RBC AUTO-ENTMCNC: 33.1 G/DL (ref 31.4–35)
MCHC RBC AUTO-ENTMCNC: 33.2 G/DL (ref 31.4–35)
MCHC RBC AUTO-ENTMCNC: 33.6 G/DL (ref 31.4–35)
MCHC RBC AUTO-ENTMCNC: 33.6 G/DL (ref 31.4–35)
MCHC RBC AUTO-ENTMCNC: 33.7 G/DL (ref 31.4–35)
MCHC RBC AUTO-ENTMCNC: 33.9 G/DL (ref 31.4–35)
MCHC RBC AUTO-ENTMCNC: 34 G/DL (ref 31.4–35)
MCHC RBC AUTO-ENTMCNC: 34 G/DL (ref 31.4–35)
MCHC RBC AUTO-ENTMCNC: 34.1 G/DL (ref 31.4–35)
MCHC RBC AUTO-ENTMCNC: 34.3 G/DL (ref 31.4–35)
MCHC RBC AUTO-ENTMCNC: 34.4 G/DL (ref 31.4–35)
MCHC RBC AUTO-ENTMCNC: 34.5 G/DL (ref 31.4–35)
MCHC RBC AUTO-ENTMCNC: 34.5 G/DL (ref 31.4–35)
MCHC RBC AUTO-ENTMCNC: 34.6 G/DL (ref 31.4–35)
MCHC RBC AUTO-ENTMCNC: 34.6 G/DL (ref 31.4–35)
MCHC RBC AUTO-ENTMCNC: 35.2 G/DL (ref 31.4–35)
MCV RBC AUTO: 87.4 FL (ref 79.6–97.8)
MCV RBC AUTO: 88.9 FL (ref 79.6–97.8)
MCV RBC AUTO: 89.5 FL (ref 79.6–97.8)
MCV RBC AUTO: 89.6 FL (ref 79.6–97.8)
MCV RBC AUTO: 89.6 FL (ref 79.6–97.8)
MCV RBC AUTO: 89.8 FL (ref 79.6–97.8)
MCV RBC AUTO: 91.3 FL (ref 79.6–97.8)
MCV RBC AUTO: 92 FL (ref 79.6–97.8)
MCV RBC AUTO: 94.1 FL (ref 79.6–97.8)
MCV RBC AUTO: 94.3 FL (ref 79.6–97.8)
MCV RBC AUTO: 94.5 FL (ref 79.6–97.8)
MCV RBC AUTO: 95 FL (ref 79.6–97.8)
MCV RBC AUTO: 95.1 FL (ref 79.6–97.8)
MCV RBC AUTO: 95.6 FL (ref 79.6–97.8)
MCV RBC AUTO: 95.9 FL (ref 79.6–97.8)
MCV RBC AUTO: 96.5 FL (ref 79.6–97.8)
MCV RBC AUTO: 97.2 FL (ref 79.6–97.8)
MCV RBC AUTO: 97.2 FL (ref 79.6–97.8)
MCV RBC AUTO: 97.7 FL (ref 79.6–97.8)
MCV RBC AUTO: 97.9 FL (ref 79.6–97.8)
MCV RBC AUTO: 98 FL (ref 79.6–97.8)
MCV RBC AUTO: 98.2 FL (ref 79.6–97.8)
MCV RBC AUTO: 98.5 FL (ref 79.6–97.8)
MCV RBC AUTO: 99.5 FL (ref 79.6–97.8)
MONOCYTES # BLD: 0.2 K/UL (ref 0.1–1.3)
MONOCYTES # BLD: 0.4 K/UL (ref 0.1–1.3)
MONOCYTES # BLD: 0.6 K/UL (ref 0.1–1.3)
MONOCYTES # BLD: 0.7 K/UL (ref 0.1–1.3)
MONOCYTES # BLD: 0.8 K/UL (ref 0.1–1.3)
MONOCYTES # BLD: 1 K/UL (ref 0.1–1.3)
MONOCYTES # BLD: 1.1 K/UL (ref 0.1–1.3)
MONOCYTES # BLD: 1.2 K/UL (ref 0.1–1.3)
MONOCYTES # BLD: 1.3 K/UL (ref 0.1–1.3)
MONOCYTES # BLD: 1.4 K/UL (ref 0.1–1.3)
MONOCYTES # BLD: 1.4 K/UL (ref 0.1–1.3)
MONOCYTES NFR BLD: 10 % (ref 4–12)
MONOCYTES NFR BLD: 11 % (ref 4–12)
MONOCYTES NFR BLD: 11 % (ref 4–12)
MONOCYTES NFR BLD: 12 % (ref 4–12)
MONOCYTES NFR BLD: 12 % (ref 4–12)
MONOCYTES NFR BLD: 13 % (ref 4–12)
MONOCYTES NFR BLD: 14 % (ref 4–12)
MONOCYTES NFR BLD: 16 % (ref 4–12)
MONOCYTES NFR BLD: 17 % (ref 4–12)
MONOCYTES NFR BLD: 17 % (ref 4–12)
MONOCYTES NFR BLD: 21 % (ref 4–12)
MONOCYTES NFR BLD: 22 % (ref 4–12)
MONOCYTES NFR BLD: 22 % (ref 4–12)
MONOCYTES NFR BLD: 23 % (ref 4–12)
MONOCYTES NFR BLD: 24 % (ref 4–12)
MONOCYTES NFR BLD: 5 % (ref 4–12)
MONOCYTES NFR BLD: 6 % (ref 4–12)
MONOCYTES NFR BLD: 9 % (ref 4–12)
NEUTS SEG # BLD: 0.2 K/UL (ref 1.7–8.2)
NEUTS SEG # BLD: 0.5 K/UL (ref 1.7–8.2)
NEUTS SEG # BLD: 0.5 K/UL (ref 1.7–8.2)
NEUTS SEG # BLD: 0.7 K/UL (ref 1.7–8.2)
NEUTS SEG # BLD: 0.8 K/UL (ref 1.7–8.2)
NEUTS SEG # BLD: 1.4 K/UL (ref 1.7–8.2)
NEUTS SEG # BLD: 1.6 K/UL (ref 1.7–8.2)
NEUTS SEG # BLD: 1.6 K/UL (ref 1.7–8.2)
NEUTS SEG # BLD: 2 K/UL (ref 1.7–8.2)
NEUTS SEG # BLD: 2.2 K/UL (ref 1.7–8.2)
NEUTS SEG # BLD: 2.3 K/UL (ref 1.7–8.2)
NEUTS SEG # BLD: 2.4 K/UL (ref 1.7–8.2)
NEUTS SEG # BLD: 2.4 K/UL (ref 1.7–8.2)
NEUTS SEG # BLD: 3.1 K/UL (ref 1.7–8.2)
NEUTS SEG # BLD: 3.4 K/UL (ref 1.7–8.2)
NEUTS SEG # BLD: 3.7 K/UL (ref 1.7–8.2)
NEUTS SEG # BLD: 4.4 K/UL (ref 1.7–8.2)
NEUTS SEG # BLD: 4.4 K/UL (ref 1.7–8.2)
NEUTS SEG # BLD: 5.1 K/UL (ref 1.7–8.2)
NEUTS SEG # BLD: 5.1 K/UL (ref 1.7–8.2)
NEUTS SEG # BLD: 5.8 K/UL (ref 1.7–8.2)
NEUTS SEG # BLD: 6.7 K/UL (ref 1.7–8.2)
NEUTS SEG # BLD: 8.9 K/UL (ref 1.7–8.2)
NEUTS SEG # BLD: 9.2 K/UL (ref 1.7–8.2)
NEUTS SEG NFR BLD: 12 % (ref 43–78)
NEUTS SEG NFR BLD: 16 % (ref 43–78)
NEUTS SEG NFR BLD: 19 % (ref 43–78)
NEUTS SEG NFR BLD: 20 % (ref 43–78)
NEUTS SEG NFR BLD: 25 % (ref 43–78)
NEUTS SEG NFR BLD: 40 % (ref 43–78)
NEUTS SEG NFR BLD: 42 % (ref 43–78)
NEUTS SEG NFR BLD: 43 % (ref 43–78)
NEUTS SEG NFR BLD: 43 % (ref 43–78)
NEUTS SEG NFR BLD: 54 % (ref 43–78)
NEUTS SEG NFR BLD: 55 % (ref 43–78)
NEUTS SEG NFR BLD: 58 % (ref 43–78)
NEUTS SEG NFR BLD: 58 % (ref 43–78)
NEUTS SEG NFR BLD: 62 % (ref 43–78)
NEUTS SEG NFR BLD: 62 % (ref 43–78)
NEUTS SEG NFR BLD: 67 % (ref 43–78)
NEUTS SEG NFR BLD: 68 % (ref 43–78)
NEUTS SEG NFR BLD: 68 % (ref 43–78)
NEUTS SEG NFR BLD: 70 % (ref 43–78)
NEUTS SEG NFR BLD: 70 % (ref 43–78)
NEUTS SEG NFR BLD: 73 % (ref 43–78)
NRBC # BLD: 0 K/UL (ref 0–0.2)
NRBC # BLD: 0.01 K/UL (ref 0–0.2)
NRBC # BLD: 0.02 K/UL (ref 0–0.2)
NRBC # BLD: 0.02 K/UL (ref 0–0.2)
NRBC # BLD: 0.03 K/UL (ref 0–0.2)
NRBC # BLD: 0.04 K/UL (ref 0–0.2)
P-R INTERVAL, ECG05: 132 MS
PCO2 BLD: 44.8 MMHG (ref 35–45)
PH BLD: 7.4 [PH] (ref 7.35–7.45)
PLATELET # BLD AUTO: 100 K/UL (ref 150–450)
PLATELET # BLD AUTO: 104 K/UL (ref 150–450)
PLATELET # BLD AUTO: 109 K/UL (ref 150–450)
PLATELET # BLD AUTO: 111 K/UL (ref 150–450)
PLATELET # BLD AUTO: 113 K/UL (ref 150–450)
PLATELET # BLD AUTO: 119 K/UL (ref 150–450)
PLATELET # BLD AUTO: 143 K/UL (ref 150–450)
PLATELET # BLD AUTO: 148 K/UL (ref 150–450)
PLATELET # BLD AUTO: 156 K/UL (ref 150–450)
PLATELET # BLD AUTO: 175 K/UL (ref 150–450)
PLATELET # BLD AUTO: 200 K/UL (ref 150–450)
PLATELET # BLD AUTO: 50 K/UL (ref 150–450)
PLATELET # BLD AUTO: 58 K/UL (ref 150–450)
PLATELET # BLD AUTO: 67 K/UL (ref 150–450)
PLATELET # BLD AUTO: 69 K/UL (ref 150–450)
PLATELET # BLD AUTO: 72 K/UL (ref 150–450)
PLATELET # BLD AUTO: 74 K/UL (ref 150–450)
PLATELET # BLD AUTO: 76 K/UL (ref 150–450)
PLATELET # BLD AUTO: 77 K/UL (ref 150–450)
PLATELET # BLD AUTO: 80 K/UL (ref 150–450)
PLATELET # BLD AUTO: 82 K/UL (ref 150–450)
PLATELET # BLD AUTO: 87 K/UL (ref 150–450)
PLATELET # BLD AUTO: 87 K/UL (ref 150–450)
PLATELET # BLD AUTO: 90 K/UL (ref 150–450)
PLATELET # BLD AUTO: 92 K/UL (ref 150–450)
PLATELET # BLD AUTO: 93 K/UL (ref 150–450)
PLATELET COMMENTS,PCOM: ABNORMAL
PMV BLD AUTO: 10.3 FL (ref 9.4–12.3)
PMV BLD AUTO: 10.5 FL (ref 9.4–12.3)
PMV BLD AUTO: 10.8 FL (ref 9.4–12.3)
PMV BLD AUTO: 10.8 FL (ref 9.4–12.3)
PMV BLD AUTO: 10.9 FL (ref 9.4–12.3)
PMV BLD AUTO: 10.9 FL (ref 9.4–12.3)
PMV BLD AUTO: 11 FL (ref 9.4–12.3)
PMV BLD AUTO: 11.1 FL (ref 9.4–12.3)
PMV BLD AUTO: 11.2 FL (ref 9.4–12.3)
PMV BLD AUTO: 11.3 FL (ref 9.4–12.3)
PMV BLD AUTO: 11.3 FL (ref 9.4–12.3)
PMV BLD AUTO: 11.5 FL (ref 9.4–12.3)
PMV BLD AUTO: 11.5 FL (ref 9.4–12.3)
PMV BLD AUTO: 11.7 FL (ref 9.4–12.3)
PMV BLD AUTO: 12 FL (ref 9.4–12.3)
PMV BLD AUTO: 12.3 FL (ref 9.4–12.3)
PMV BLD AUTO: 12.5 FL (ref 9.4–12.3)
PMV BLD AUTO: 12.6 FL (ref 9.4–12.3)
PMV BLD AUTO: 12.7 FL (ref 9.4–12.3)
PMV BLD AUTO: 12.8 FL (ref 9.4–12.3)
PMV BLD AUTO: 9.9 FL (ref 9.4–12.3)
PO2 BLD: 62 MMHG (ref 75–100)
POTASSIUM SERPL-SCNC: 3 MMOL/L (ref 3.5–5.1)
POTASSIUM SERPL-SCNC: 3 MMOL/L (ref 3.5–5.1)
POTASSIUM SERPL-SCNC: 3.1 MMOL/L (ref 3.5–5.1)
POTASSIUM SERPL-SCNC: 3.2 MMOL/L (ref 3.5–5.1)
POTASSIUM SERPL-SCNC: 3.3 MMOL/L (ref 3.5–5.1)
POTASSIUM SERPL-SCNC: 3.5 MMOL/L (ref 3.5–5.1)
POTASSIUM SERPL-SCNC: 3.6 MMOL/L (ref 3.5–5.1)
POTASSIUM SERPL-SCNC: 3.7 MMOL/L (ref 3.5–5.1)
POTASSIUM SERPL-SCNC: 3.8 MMOL/L (ref 3.5–5.1)
POTASSIUM SERPL-SCNC: 3.9 MMOL/L (ref 3.5–5.1)
POTASSIUM SERPL-SCNC: 3.9 MMOL/L (ref 3.5–5.1)
POTASSIUM SERPL-SCNC: 4 MMOL/L (ref 3.5–5.1)
POTASSIUM SERPL-SCNC: 4.1 MMOL/L (ref 3.5–5.1)
POTASSIUM SERPL-SCNC: 4.2 MMOL/L (ref 3.5–5.1)
POTASSIUM SERPL-SCNC: 4.6 MMOL/L (ref 3.5–5.1)
PROCALCITONIN SERPL-MCNC: 0.52 NG/ML
PROT SERPL-MCNC: 6.5 G/DL (ref 6.3–8.2)
PROT SERPL-MCNC: 6.5 G/DL (ref 6.3–8.2)
PROT SERPL-MCNC: 6.6 G/DL (ref 6.3–8.2)
PROT SERPL-MCNC: 6.8 G/DL (ref 6.3–8.2)
PROT SERPL-MCNC: 6.8 G/DL (ref 6.3–8.2)
PROT SERPL-MCNC: 6.9 G/DL (ref 6.3–8.2)
PROT SERPL-MCNC: 6.9 G/DL (ref 6.3–8.2)
PROT SERPL-MCNC: 7 G/DL (ref 6.3–8.2)
PROT SERPL-MCNC: 7.1 G/DL (ref 6.3–8.2)
PROT SERPL-MCNC: 7.2 G/DL (ref 6.3–8.2)
PROT SERPL-MCNC: 7.2 G/DL (ref 6.3–8.2)
PROT SERPL-MCNC: 7.3 G/DL (ref 6.3–8.2)
PROT SERPL-MCNC: 7.4 G/DL (ref 6.3–8.2)
PROT SERPL-MCNC: 7.6 G/DL (ref 6.3–8.2)
PROT SERPL-MCNC: 7.7 G/DL (ref 6.3–8.2)
PROT UR-MCNC: 114 MG/DL
Q-T INTERVAL, ECG07: 392 MS
QRS DURATION, ECG06: 86 MS
QTC CALCULATION (BEZET), ECG08: 508 MS
RBC # BLD AUTO: 3.36 M/UL (ref 4.23–5.67)
RBC # BLD AUTO: 3.41 M/UL (ref 4.23–5.6)
RBC # BLD AUTO: 3.43 M/UL (ref 4.23–5.6)
RBC # BLD AUTO: 3.53 M/UL (ref 4.23–5.67)
RBC # BLD AUTO: 3.68 M/UL (ref 4.23–5.67)
RBC # BLD AUTO: 3.69 M/UL (ref 4.23–5.67)
RBC # BLD AUTO: 3.72 M/UL (ref 4.23–5.67)
RBC # BLD AUTO: 3.73 M/UL (ref 4.23–5.67)
RBC # BLD AUTO: 3.76 M/UL (ref 4.23–5.67)
RBC # BLD AUTO: 3.78 M/UL (ref 4.23–5.67)
RBC # BLD AUTO: 3.79 M/UL (ref 4.23–5.67)
RBC # BLD AUTO: 3.81 M/UL (ref 4.23–5.67)
RBC # BLD AUTO: 3.87 M/UL (ref 4.23–5.67)
RBC # BLD AUTO: 3.88 M/UL (ref 4.23–5.6)
RBC # BLD AUTO: 3.89 M/UL (ref 4.23–5.67)
RBC # BLD AUTO: 3.89 M/UL (ref 4.23–5.67)
RBC # BLD AUTO: 3.91 M/UL (ref 4.23–5.67)
RBC # BLD AUTO: 3.94 M/UL (ref 4.23–5.67)
RBC # BLD AUTO: 3.94 M/UL (ref 4.23–5.67)
RBC # BLD AUTO: 4 M/UL (ref 4.23–5.67)
RBC # BLD AUTO: 4.03 M/UL (ref 4.23–5.67)
RBC # BLD AUTO: 4.07 M/UL (ref 4.23–5.67)
RBC # BLD AUTO: 4.11 M/UL (ref 4.23–5.67)
RBC # BLD AUTO: 4.29 M/UL (ref 4.23–5.67)
RBC # BLD AUTO: 4.63 M/UL (ref 4.23–5.67)
RBC # BLD AUTO: 4.87 M/UL (ref 4.23–5.67)
RBC MORPH BLD: ABNORMAL
RETICS # AUTO: 0.13 M/UL (ref 0.03–0.1)
RETICS/RBC NFR AUTO: 2.6 % (ref 0.3–2)
SAO2 % BLD: 91 % (ref 95–98)
SERVICE CMNT-IMP: ABNORMAL
SERVICE CMNT-IMP: ABNORMAL
SERVICE CMNT-IMP: NORMAL
SERVICE CMNT-IMP: NORMAL
SODIUM SERPL-SCNC: 135 MMOL/L (ref 136–145)
SODIUM SERPL-SCNC: 137 MMOL/L (ref 136–145)
SODIUM SERPL-SCNC: 138 MMOL/L (ref 136–145)
SODIUM SERPL-SCNC: 139 MMOL/L (ref 136–145)
SODIUM SERPL-SCNC: 140 MMOL/L (ref 136–145)
SODIUM SERPL-SCNC: 140 MMOL/L (ref 136–145)
SODIUM SERPL-SCNC: 141 MMOL/L (ref 136–145)
SODIUM SERPL-SCNC: 142 MMOL/L (ref 136–145)
SODIUM SERPL-SCNC: 142 MMOL/L (ref 136–145)
SODIUM SERPL-SCNC: 145 MMOL/L (ref 136–145)
SPECIMEN SOURCE: NORMAL
SPECIMEN TYPE: ABNORMAL
TIBC SERPL-MCNC: 259 UG/DL (ref 250–450)
TROPONIN I SERPL-MCNC: <0.02 NG/ML (ref 0.02–0.05)
VENTRICULAR RATE, ECG03: 101 BPM
WBC # BLD AUTO: 12.7 K/UL (ref 4.3–11.1)
WBC # BLD AUTO: 13.1 K/UL (ref 4.3–11.1)
WBC # BLD AUTO: 2 K/UL (ref 4.3–11.1)
WBC # BLD AUTO: 2.5 K/UL (ref 4.3–11.1)
WBC # BLD AUTO: 2.8 K/UL (ref 4.3–11.1)
WBC # BLD AUTO: 3.1 K/UL (ref 4.3–11.1)
WBC # BLD AUTO: 3.2 K/UL (ref 4.3–11.1)
WBC # BLD AUTO: 3.3 K/UL (ref 4.3–11.1)
WBC # BLD AUTO: 3.3 K/UL (ref 4.3–11.1)
WBC # BLD AUTO: 3.5 K/UL (ref 4.3–11.1)
WBC # BLD AUTO: 3.6 K/UL (ref 4.3–11.1)
WBC # BLD AUTO: 3.8 K/UL (ref 4.3–11.1)
WBC # BLD AUTO: 3.8 K/UL (ref 4.3–11.1)
WBC # BLD AUTO: 4 K/UL (ref 4.3–11.1)
WBC # BLD AUTO: 4.1 K/UL (ref 4.3–11.1)
WBC # BLD AUTO: 5.2 K/UL (ref 4.3–11.1)
WBC # BLD AUTO: 5.7 K/UL (ref 4.3–11.1)
WBC # BLD AUTO: 6 K/UL (ref 4.3–11.1)
WBC # BLD AUTO: 6.4 K/UL (ref 4.3–11.1)
WBC # BLD AUTO: 6.5 K/UL (ref 4.3–11.1)
WBC # BLD AUTO: 7 K/UL (ref 4.3–11.1)
WBC # BLD AUTO: 7.7 K/UL (ref 4.3–11.1)
WBC # BLD AUTO: 8 K/UL (ref 4.3–11.1)
WBC # BLD AUTO: 8.3 K/UL (ref 4.3–11.1)
WBC # BLD AUTO: 9.9 K/UL (ref 4.3–11.1)
WBC # BLD AUTO: 9.9 K/UL (ref 4.3–11.1)
WBC MORPH BLD: ABNORMAL

## 2019-01-01 PROCEDURE — 83690 ASSAY OF LIPASE: CPT

## 2019-01-01 PROCEDURE — 74011636320 HC RX REV CODE- 636/320: Performed by: SURGERY

## 2019-01-01 PROCEDURE — 96411 CHEMO IV PUSH ADDL DRUG: CPT

## 2019-01-01 PROCEDURE — 74011250636 HC RX REV CODE- 250/636: Performed by: NURSE PRACTITIONER

## 2019-01-01 PROCEDURE — 74011000258 HC RX REV CODE- 258: Performed by: NURSE PRACTITIONER

## 2019-01-01 PROCEDURE — 83735 ASSAY OF MAGNESIUM: CPT

## 2019-01-01 PROCEDURE — 96365 THER/PROPH/DIAG IV INF INIT: CPT | Performed by: EMERGENCY MEDICINE

## 2019-01-01 PROCEDURE — G0498 CHEMO EXTEND IV INFUS W/PUMP: HCPCS

## 2019-01-01 PROCEDURE — 80053 COMPREHEN METABOLIC PANEL: CPT

## 2019-01-01 PROCEDURE — 74011000258 HC RX REV CODE- 258: Performed by: INTERNAL MEDICINE

## 2019-01-01 PROCEDURE — 82378 CARCINOEMBRYONIC ANTIGEN: CPT

## 2019-01-01 PROCEDURE — 96415 CHEMO IV INFUSION ADDL HR: CPT

## 2019-01-01 PROCEDURE — 85025 COMPLETE CBC W/AUTO DIFF WBC: CPT

## 2019-01-01 PROCEDURE — 74011636320 HC RX REV CODE- 636/320: Performed by: INTERNAL MEDICINE

## 2019-01-01 PROCEDURE — 86301 IMMUNOASSAY TUMOR CA 19-9: CPT

## 2019-01-01 PROCEDURE — 77010033678 HC OXYGEN DAILY

## 2019-01-01 PROCEDURE — 77030035051: Performed by: SURGERY

## 2019-01-01 PROCEDURE — 74011250636 HC RX REV CODE- 250/636: Performed by: INTERNAL MEDICINE

## 2019-01-01 PROCEDURE — 84145 PROCALCITONIN (PCT): CPT

## 2019-01-01 PROCEDURE — 77030035220 HC TRCR ENDOSC BLNTPRT ANCHR COVD -B: Performed by: SURGERY

## 2019-01-01 PROCEDURE — 94640 AIRWAY INHALATION TREATMENT: CPT

## 2019-01-01 PROCEDURE — 96367 TX/PROPH/DG ADDL SEQ IV INF: CPT

## 2019-01-01 PROCEDURE — 96360 HYDRATION IV INFUSION INIT: CPT

## 2019-01-01 PROCEDURE — 77030035047 HC TRCR ENDOSC VRSPRT BLDLSS LNG COVD -C: Performed by: SURGERY

## 2019-01-01 PROCEDURE — 96375 TX/PRO/DX INJ NEW DRUG ADDON: CPT

## 2019-01-01 PROCEDURE — 77470 SPECIAL RADIATION TREATMENT: CPT

## 2019-01-01 PROCEDURE — 74011250636 HC RX REV CODE- 250/636

## 2019-01-01 PROCEDURE — 77301 RADIOTHERAPY DOSE PLAN IMRT: CPT

## 2019-01-01 PROCEDURE — 96413 CHEMO IV INFUSION 1 HR: CPT

## 2019-01-01 PROCEDURE — 85046 RETICYTE/HGB CONCENTRATE: CPT

## 2019-01-01 PROCEDURE — 74011250637 HC RX REV CODE- 250/637: Performed by: ANESTHESIOLOGY

## 2019-01-01 PROCEDURE — 74011000250 HC RX REV CODE- 250: Performed by: NURSE PRACTITIONER

## 2019-01-01 PROCEDURE — 82803 BLOOD GASES ANY COMBINATION: CPT

## 2019-01-01 PROCEDURE — 96375 TX/PRO/DX INJ NEW DRUG ADDON: CPT | Performed by: EMERGENCY MEDICINE

## 2019-01-01 PROCEDURE — 83036 HEMOGLOBIN GLYCOSYLATED A1C: CPT

## 2019-01-01 PROCEDURE — 74177 CT ABD & PELVIS W/CONTRAST: CPT

## 2019-01-01 PROCEDURE — 74011250637 HC RX REV CODE- 250/637

## 2019-01-01 PROCEDURE — 74011000250 HC RX REV CODE- 250: Performed by: SURGERY

## 2019-01-01 PROCEDURE — 83540 ASSAY OF IRON: CPT

## 2019-01-01 PROCEDURE — 99211 OFF/OP EST MAY X REQ PHY/QHP: CPT

## 2019-01-01 PROCEDURE — 36415 COLL VENOUS BLD VENIPUNCTURE: CPT

## 2019-01-01 PROCEDURE — 77030005103 HC CATH GASTMY BLN HALY -B

## 2019-01-01 PROCEDURE — 87040 BLOOD CULTURE FOR BACTERIA: CPT

## 2019-01-01 PROCEDURE — 74011250636 HC RX REV CODE- 250/636: Performed by: SURGERY

## 2019-01-01 PROCEDURE — 77030018846 HC SOL IRR STRL H20 ICUM -A

## 2019-01-01 PROCEDURE — 74011000258 HC RX REV CODE- 258: Performed by: EMERGENCY MEDICINE

## 2019-01-01 PROCEDURE — 74011000250 HC RX REV CODE- 250

## 2019-01-01 PROCEDURE — 77030019908 HC STETH ESOPH SIMS -A: Performed by: ANESTHESIOLOGY

## 2019-01-01 PROCEDURE — 96368 THER/DIAG CONCURRENT INF: CPT

## 2019-01-01 PROCEDURE — 71046 X-RAY EXAM CHEST 2 VIEWS: CPT

## 2019-01-01 PROCEDURE — 76210000016 HC OR PH I REC 1 TO 1.5 HR: Performed by: SURGERY

## 2019-01-01 PROCEDURE — 83605 ASSAY OF LACTIC ACID: CPT

## 2019-01-01 PROCEDURE — 85027 COMPLETE CBC AUTOMATED: CPT

## 2019-01-01 PROCEDURE — 96523 IRRIG DRUG DELIVERY DEVICE: CPT

## 2019-01-01 PROCEDURE — 74011250636 HC RX REV CODE- 250/636: Performed by: ANESTHESIOLOGY

## 2019-01-01 PROCEDURE — 77030021532 HC CATH ANGI DX IMPRS MRTM -B

## 2019-01-01 PROCEDURE — 74011636320 HC RX REV CODE- 636/320: Performed by: EMERGENCY MEDICINE

## 2019-01-01 PROCEDURE — 77338 DESIGN MLC DEVICE FOR IMRT: CPT

## 2019-01-01 PROCEDURE — 77030018846 HC SOL IRR STRL H20 ICUM -A: Performed by: SURGERY

## 2019-01-01 PROCEDURE — A9552 F18 FDG: HCPCS

## 2019-01-01 PROCEDURE — 74011250637 HC RX REV CODE- 250/637: Performed by: NURSE PRACTITIONER

## 2019-01-01 PROCEDURE — 77030020782 HC GWN BAIR PAWS FLX 3M -B: Performed by: ANESTHESIOLOGY

## 2019-01-01 PROCEDURE — 74011250636 HC RX REV CODE- 250/636: Performed by: EMERGENCY MEDICINE

## 2019-01-01 PROCEDURE — 77386 HC IMRT TRMT DLVR COMPL: CPT

## 2019-01-01 PROCEDURE — 99214 OFFICE O/P EST MOD 30 MIN: CPT | Performed by: INTERNAL MEDICINE

## 2019-01-01 PROCEDURE — 77030005268 HC CATH JEJU HALY -C: Performed by: SURGERY

## 2019-01-01 PROCEDURE — 74011000250 HC RX REV CODE- 250: Performed by: INTERNAL MEDICINE

## 2019-01-01 PROCEDURE — 84484 ASSAY OF TROPONIN QUANT: CPT

## 2019-01-01 PROCEDURE — C1769 GUIDE WIRE: HCPCS

## 2019-01-01 PROCEDURE — 94760 N-INVAS EAR/PLS OXIMETRY 1: CPT

## 2019-01-01 PROCEDURE — 88305 TISSUE EXAM BY PATHOLOGIST: CPT

## 2019-01-01 PROCEDURE — 74011250637 HC RX REV CODE- 250/637: Performed by: INTERNAL MEDICINE

## 2019-01-01 PROCEDURE — C1894 INTRO/SHEATH, NON-LASER: HCPCS

## 2019-01-01 PROCEDURE — 82728 ASSAY OF FERRITIN: CPT

## 2019-01-01 PROCEDURE — 74011000250 HC RX REV CODE- 250: Performed by: EMERGENCY MEDICINE

## 2019-01-01 PROCEDURE — 77030032490 HC SLV COMPR SCD KNE COVD -B: Performed by: SURGERY

## 2019-01-01 PROCEDURE — 77030000038 HC TIP SCIS LAPSCP SURI -B: Performed by: SURGERY

## 2019-01-01 PROCEDURE — 74011636320 HC RX REV CODE- 636/320: Performed by: NURSE PRACTITIONER

## 2019-01-01 PROCEDURE — 74011250636 HC RX REV CODE- 250/636: Performed by: RADIOLOGY

## 2019-01-01 PROCEDURE — 77030031139 HC SUT VCRL2 J&J -A: Performed by: SURGERY

## 2019-01-01 PROCEDURE — 99254 IP/OBS CNSLTJ NEW/EST MOD 60: CPT | Performed by: INTERNAL MEDICINE

## 2019-01-01 PROCEDURE — 77030002996 HC SUT SLK J&J -A: Performed by: SURGERY

## 2019-01-01 PROCEDURE — 77030039425 HC BLD LARYNG TRULITE DISP TELE -A: Performed by: ANESTHESIOLOGY

## 2019-01-01 PROCEDURE — 77030018836 HC SOL IRR NACL ICUM -A

## 2019-01-01 PROCEDURE — 77030035048 HC TRCR ENDOSC OPTCL COVD -B: Performed by: SURGERY

## 2019-01-01 PROCEDURE — 99285 EMERGENCY DEPT VISIT HI MDM: CPT | Performed by: EMERGENCY MEDICINE

## 2019-01-01 PROCEDURE — 77293 RESPIRATOR MOTION MGMT SIMUL: CPT

## 2019-01-01 PROCEDURE — 80048 BASIC METABOLIC PNL TOTAL CA: CPT

## 2019-01-01 PROCEDURE — 77030008522 HC TBNG INSUF LAPRO STRY -B: Performed by: SURGERY

## 2019-01-01 PROCEDURE — 84132 ASSAY OF SERUM POTASSIUM: CPT

## 2019-01-01 PROCEDURE — 77300 RADIATION THERAPY DOSE PLAN: CPT

## 2019-01-01 PROCEDURE — 77030011296 HC FCPS GRSP ENDOSC J&J -B: Performed by: SURGERY

## 2019-01-01 PROCEDURE — 96361 HYDRATE IV INFUSION ADD-ON: CPT | Performed by: EMERGENCY MEDICINE

## 2019-01-01 PROCEDURE — 87804 INFLUENZA ASSAY W/OPTIC: CPT

## 2019-01-01 PROCEDURE — 77030016570 HC BLNKT BAIR HGGR 3M -B: Performed by: ANESTHESIOLOGY

## 2019-01-01 PROCEDURE — 49440 PLACE GASTROSTOMY TUBE PERC: CPT

## 2019-01-01 PROCEDURE — 96417 CHEMO IV INFUS EACH ADDL SEQ: CPT

## 2019-01-01 PROCEDURE — 77030002933 HC SUT MCRYL J&J -A: Performed by: SURGERY

## 2019-01-01 PROCEDURE — 77030037088 HC TUBE ENDOTRACH ORAL NSL COVD-A: Performed by: ANESTHESIOLOGY

## 2019-01-01 PROCEDURE — 84156 ASSAY OF PROTEIN URINE: CPT

## 2019-01-01 PROCEDURE — 65270000029 HC RM PRIVATE

## 2019-01-01 PROCEDURE — 36591 DRAW BLOOD OFF VENOUS DEVICE: CPT

## 2019-01-01 PROCEDURE — 96372 THER/PROPH/DIAG INJ SC/IM: CPT

## 2019-01-01 PROCEDURE — 77399 UNLISTED PX MED RADJ PHYSICS: CPT

## 2019-01-01 PROCEDURE — 93005 ELECTROCARDIOGRAM TRACING: CPT | Performed by: EMERGENCY MEDICINE

## 2019-01-01 PROCEDURE — 76060000036 HC ANESTHESIA 2.5 TO 3 HR: Performed by: SURGERY

## 2019-01-01 PROCEDURE — C1788 PORT, INDWELLING, IMP: HCPCS | Performed by: SURGERY

## 2019-01-01 PROCEDURE — 74011000250 HC RX REV CODE- 250: Performed by: ANESTHESIOLOGY

## 2019-01-01 PROCEDURE — 77030003666 HC NDL SPINAL BD -A: Performed by: SURGERY

## 2019-01-01 PROCEDURE — 74011636320 HC RX REV CODE- 636/320: Performed by: RADIOLOGY

## 2019-01-01 PROCEDURE — 71045 X-RAY EXAM CHEST 1 VIEW: CPT

## 2019-01-01 PROCEDURE — 71260 CT THORAX DX C+: CPT

## 2019-01-01 PROCEDURE — 36600 WITHDRAWAL OF ARTERIAL BLOOD: CPT

## 2019-01-01 PROCEDURE — 74011000250 HC RX REV CODE- 250: Performed by: RADIOLOGY

## 2019-01-01 PROCEDURE — 76010000171 HC OR TIME 2 TO 2.5 HR INTENSV-TIER 1: Performed by: SURGERY

## 2019-01-01 PROCEDURE — 76210000020 HC REC RM PH II FIRST 0.5 HR: Performed by: SURGERY

## 2019-01-01 PROCEDURE — C1894 INTRO/SHEATH, NON-LASER: HCPCS | Performed by: SURGERY

## 2019-01-01 DEVICE — PORT INFUS OD8FR SGL LUMN PLAS FILL N VLV PG BIOFLO [H965440130] [ANGIODYNAMICS INC]: Type: IMPLANTABLE DEVICE | Site: SUBCLAVIAN | Status: FUNCTIONAL

## 2019-01-01 RX ORDER — SODIUM CHLORIDE 0.9 % (FLUSH) 0.9 %
5-40 SYRINGE (ML) INJECTION EVERY 8 HOURS
Status: DISCONTINUED | OUTPATIENT
Start: 2019-01-01 | End: 2019-01-01 | Stop reason: HOSPADM

## 2019-01-01 RX ORDER — POTASSIUM CHLORIDE 29.8 MG/ML
20 INJECTION INTRAVENOUS ONCE
Status: DISPENSED | OUTPATIENT
Start: 2019-01-01 | End: 2019-01-01

## 2019-01-01 RX ORDER — SODIUM CHLORIDE 0.9 % (FLUSH) 0.9 %
10 SYRINGE (ML) INJECTION AS NEEDED
Status: ACTIVE | OUTPATIENT
Start: 2019-01-01 | End: 2019-01-01

## 2019-01-01 RX ORDER — ONDANSETRON 2 MG/ML
8 INJECTION INTRAMUSCULAR; INTRAVENOUS ONCE
Status: COMPLETED | OUTPATIENT
Start: 2019-01-01 | End: 2019-01-01

## 2019-01-01 RX ORDER — DEXTROSE MONOHYDRATE 50 MG/ML
25 INJECTION, SOLUTION INTRAVENOUS CONTINUOUS
Status: ACTIVE | OUTPATIENT
Start: 2019-01-01 | End: 2019-01-01

## 2019-01-01 RX ORDER — DEXTROSE MONOHYDRATE 50 MG/ML
1000 INJECTION, SOLUTION INTRAVENOUS ONCE
Status: COMPLETED | OUTPATIENT
Start: 2019-01-01 | End: 2019-01-01

## 2019-01-01 RX ORDER — FOSAPREPITANT 150 MG/5ML
150 INJECTION, POWDER, LYOPHILIZED, FOR SOLUTION INTRAVENOUS ONCE
Status: DISCONTINUED | OUTPATIENT
Start: 2019-01-01 | End: 2019-01-01 | Stop reason: SDUPTHER

## 2019-01-01 RX ORDER — SODIUM CHLORIDE 0.9 % (FLUSH) 0.9 %
10 SYRINGE (ML) INJECTION AS NEEDED
Status: DISCONTINUED | OUTPATIENT
Start: 2019-01-01 | End: 2019-01-01 | Stop reason: HOSPADM

## 2019-01-01 RX ORDER — SODIUM CHLORIDE 0.9 % (FLUSH) 0.9 %
10 SYRINGE (ML) INJECTION
Status: COMPLETED | OUTPATIENT
Start: 2019-01-01 | End: 2019-01-01

## 2019-01-01 RX ORDER — SODIUM CHLORIDE 0.9 % (FLUSH) 0.9 %
10-40 SYRINGE (ML) INJECTION AS NEEDED
Status: DISCONTINUED | OUTPATIENT
Start: 2019-01-01 | End: 2019-01-01 | Stop reason: HOSPADM

## 2019-01-01 RX ORDER — LIDOCAINE HYDROCHLORIDE 10 MG/ML
0.1 INJECTION INFILTRATION; PERINEURAL AS NEEDED
Status: DISCONTINUED | OUTPATIENT
Start: 2019-01-01 | End: 2019-01-01 | Stop reason: HOSPADM

## 2019-01-01 RX ORDER — ONDANSETRON 2 MG/ML
4 INJECTION INTRAMUSCULAR; INTRAVENOUS
Status: DISCONTINUED | OUTPATIENT
Start: 2019-01-01 | End: 2019-01-01 | Stop reason: HOSPADM

## 2019-01-01 RX ORDER — OXYCODONE AND ACETAMINOPHEN 5; 325 MG/1; MG/1
1 TABLET ORAL
Qty: 25 TAB | Refills: 0 | Status: SHIPPED | OUTPATIENT
Start: 2019-01-01 | End: 2019-01-01

## 2019-01-01 RX ORDER — FLUOROURACIL 50 MG/ML
400 INJECTION, SOLUTION INTRAVENOUS ONCE
Status: COMPLETED | OUTPATIENT
Start: 2019-01-01 | End: 2019-01-01

## 2019-01-01 RX ORDER — HEPARIN 100 UNIT/ML
300-500 SYRINGE INTRAVENOUS AS NEEDED
Status: DISCONTINUED | OUTPATIENT
Start: 2019-01-01 | End: 2019-01-01 | Stop reason: HOSPADM

## 2019-01-01 RX ORDER — DEXAMETHASONE SODIUM PHOSPHATE 100 MG/10ML
10 INJECTION INTRAMUSCULAR; INTRAVENOUS ONCE
Status: COMPLETED | OUTPATIENT
Start: 2019-01-01 | End: 2019-01-01

## 2019-01-01 RX ORDER — DIPHENHYDRAMINE HYDROCHLORIDE 50 MG/ML
50 INJECTION, SOLUTION INTRAMUSCULAR; INTRAVENOUS AS NEEDED
Status: CANCELLED
Start: 2019-01-01

## 2019-01-01 RX ORDER — MIDAZOLAM HYDROCHLORIDE 1 MG/ML
.5-2 INJECTION, SOLUTION INTRAMUSCULAR; INTRAVENOUS
Status: DISCONTINUED | OUTPATIENT
Start: 2019-01-01 | End: 2019-01-01 | Stop reason: HOSPADM

## 2019-01-01 RX ORDER — PREDNISONE 20 MG/1
40 TABLET ORAL
Qty: 14 TAB | Refills: 0 | Status: SHIPPED | OUTPATIENT
Start: 2019-01-01 | End: 2019-01-01

## 2019-01-01 RX ORDER — DIPHENHYDRAMINE HYDROCHLORIDE 50 MG/ML
50 INJECTION, SOLUTION INTRAMUSCULAR; INTRAVENOUS ONCE
Status: COMPLETED | OUTPATIENT
Start: 2019-01-01 | End: 2019-01-01

## 2019-01-01 RX ORDER — SODIUM CHLORIDE 0.9 % (FLUSH) 0.9 %
10-30 SYRINGE (ML) INJECTION AS NEEDED
Status: DISCONTINUED | OUTPATIENT
Start: 2019-01-01 | End: 2019-01-01 | Stop reason: HOSPADM

## 2019-01-01 RX ORDER — ACETAMINOPHEN 325 MG/1
650 TABLET ORAL AS NEEDED
Status: CANCELLED
Start: 2019-01-01

## 2019-01-01 RX ORDER — MAGNESIUM SULFATE HEPTAHYDRATE 40 MG/ML
2 INJECTION, SOLUTION INTRAVENOUS ONCE
Status: COMPLETED | OUTPATIENT
Start: 2019-01-01 | End: 2019-01-01

## 2019-01-01 RX ORDER — HEPARIN 100 UNIT/ML
300 SYRINGE INTRAVENOUS AS NEEDED
Status: DISCONTINUED | OUTPATIENT
Start: 2019-01-01 | End: 2019-01-01 | Stop reason: HOSPADM

## 2019-01-01 RX ORDER — SODIUM CHLORIDE 0.9 % (FLUSH) 0.9 %
5-40 SYRINGE (ML) INJECTION EVERY 8 HOURS
Status: CANCELLED | OUTPATIENT
Start: 2019-01-01

## 2019-01-01 RX ORDER — BUPIVACAINE HYDROCHLORIDE AND EPINEPHRINE 5; 5 MG/ML; UG/ML
INJECTION, SOLUTION EPIDURAL; INTRACAUDAL; PERINEURAL AS NEEDED
Status: DISCONTINUED | OUTPATIENT
Start: 2019-01-01 | End: 2019-01-01 | Stop reason: HOSPADM

## 2019-01-01 RX ORDER — FLUMAZENIL 0.1 MG/ML
0.2 INJECTION INTRAVENOUS
Status: DISCONTINUED | OUTPATIENT
Start: 2019-01-01 | End: 2019-01-01 | Stop reason: HOSPADM

## 2019-01-01 RX ORDER — SODIUM CHLORIDE 0.9 % (FLUSH) 0.9 %
10 SYRINGE (ML) INJECTION EVERY 8 HOURS
Status: DISCONTINUED | OUTPATIENT
Start: 2019-01-01 | End: 2019-01-01 | Stop reason: HOSPADM

## 2019-01-01 RX ORDER — NEOSTIGMINE METHYLSULFATE 1 MG/ML
INJECTION INTRAVENOUS AS NEEDED
Status: DISCONTINUED | OUTPATIENT
Start: 2019-01-01 | End: 2019-01-01 | Stop reason: HOSPADM

## 2019-01-01 RX ORDER — IPRATROPIUM BROMIDE AND ALBUTEROL SULFATE 2.5; .5 MG/3ML; MG/3ML
3 SOLUTION RESPIRATORY (INHALATION)
Status: DISCONTINUED | OUTPATIENT
Start: 2019-01-01 | End: 2019-01-01 | Stop reason: HOSPADM

## 2019-01-01 RX ORDER — ACETAMINOPHEN 325 MG/1
650 TABLET ORAL
Status: DISCONTINUED | OUTPATIENT
Start: 2019-01-01 | End: 2019-01-01 | Stop reason: HOSPADM

## 2019-01-01 RX ORDER — DEXAMETHASONE SODIUM PHOSPHATE 4 MG/ML
INJECTION, SOLUTION INTRA-ARTICULAR; INTRALESIONAL; INTRAMUSCULAR; INTRAVENOUS; SOFT TISSUE AS NEEDED
Status: DISCONTINUED | OUTPATIENT
Start: 2019-01-01 | End: 2019-01-01 | Stop reason: HOSPADM

## 2019-01-01 RX ORDER — LEVOFLOXACIN 5 MG/ML
750 INJECTION, SOLUTION INTRAVENOUS EVERY 24 HOURS
Status: DISCONTINUED | OUTPATIENT
Start: 2019-01-01 | End: 2019-01-01 | Stop reason: HOSPADM

## 2019-01-01 RX ORDER — PROPOFOL 10 MG/ML
INJECTION, EMULSION INTRAVENOUS AS NEEDED
Status: DISCONTINUED | OUTPATIENT
Start: 2019-01-01 | End: 2019-01-01 | Stop reason: HOSPADM

## 2019-01-01 RX ORDER — SODIUM CHLORIDE 0.9 % (FLUSH) 0.9 %
5-40 SYRINGE (ML) INJECTION AS NEEDED
Status: CANCELLED | OUTPATIENT
Start: 2019-01-01

## 2019-01-01 RX ORDER — BUDESONIDE 0.5 MG/2ML
500 INHALANT ORAL
Status: DISCONTINUED | OUTPATIENT
Start: 2019-01-01 | End: 2019-01-01 | Stop reason: HOSPADM

## 2019-01-01 RX ORDER — NALOXONE HYDROCHLORIDE 0.4 MG/ML
0.1 INJECTION, SOLUTION INTRAMUSCULAR; INTRAVENOUS; SUBCUTANEOUS
Status: DISCONTINUED | OUTPATIENT
Start: 2019-01-01 | End: 2019-01-01 | Stop reason: HOSPADM

## 2019-01-01 RX ORDER — HYDROMORPHONE HYDROCHLORIDE 2 MG/ML
0.5 INJECTION, SOLUTION INTRAMUSCULAR; INTRAVENOUS; SUBCUTANEOUS
Status: COMPLETED | OUTPATIENT
Start: 2019-01-01 | End: 2019-01-01

## 2019-01-01 RX ORDER — FOSAPREPITANT 150 MG/5ML
150 INJECTION, POWDER, LYOPHILIZED, FOR SOLUTION INTRAVENOUS ONCE
Status: DISCONTINUED | OUTPATIENT
Start: 2019-01-01 | End: 2019-01-01

## 2019-01-01 RX ORDER — HYDROCORTISONE SODIUM SUCCINATE 100 MG/2ML
100 INJECTION, POWDER, FOR SOLUTION INTRAMUSCULAR; INTRAVENOUS AS NEEDED
Status: CANCELLED | OUTPATIENT
Start: 2019-01-01

## 2019-01-01 RX ORDER — HEPARIN 100 UNIT/ML
500 SYRINGE INTRAVENOUS AS NEEDED
Status: DISCONTINUED | OUTPATIENT
Start: 2019-01-01 | End: 2019-01-01 | Stop reason: HOSPADM

## 2019-01-01 RX ORDER — HYDROCORTISONE SODIUM SUCCINATE 100 MG/2ML
100 INJECTION, POWDER, FOR SOLUTION INTRAMUSCULAR; INTRAVENOUS AS NEEDED
Status: DISCONTINUED | OUTPATIENT
Start: 2019-01-01 | End: 2019-01-01 | Stop reason: HOSPADM

## 2019-01-01 RX ORDER — FENTANYL CITRATE 50 UG/ML
25-50 INJECTION, SOLUTION INTRAMUSCULAR; INTRAVENOUS
Status: DISCONTINUED | OUTPATIENT
Start: 2019-01-01 | End: 2019-01-01 | Stop reason: HOSPADM

## 2019-01-01 RX ORDER — GUAIFENESIN 100 MG/5ML
100 SOLUTION ORAL
Status: DISCONTINUED | OUTPATIENT
Start: 2019-01-01 | End: 2019-01-01 | Stop reason: HOSPADM

## 2019-01-01 RX ORDER — FLUOROURACIL 50 MG/ML
400 INJECTION, SOLUTION INTRAVENOUS ONCE
Status: DISCONTINUED | OUTPATIENT
Start: 2019-01-01 | End: 2019-01-01 | Stop reason: HOSPADM

## 2019-01-01 RX ORDER — SODIUM CHLORIDE 9 MG/ML
25 INJECTION, SOLUTION INTRAVENOUS CONTINUOUS
Status: ACTIVE | OUTPATIENT
Start: 2019-01-01 | End: 2019-01-01

## 2019-01-01 RX ORDER — OXYCODONE HYDROCHLORIDE 5 MG/1
5 TABLET ORAL
Status: COMPLETED | OUTPATIENT
Start: 2019-01-01 | End: 2019-01-01

## 2019-01-01 RX ORDER — SODIUM CHLORIDE 0.9 % (FLUSH) 0.9 %
5-40 SYRINGE (ML) INJECTION AS NEEDED
Status: DISCONTINUED | OUTPATIENT
Start: 2019-01-01 | End: 2019-01-01 | Stop reason: HOSPADM

## 2019-01-01 RX ORDER — ALBUTEROL SULFATE 0.83 MG/ML
2.5 SOLUTION RESPIRATORY (INHALATION) AS NEEDED
Status: DISCONTINUED | OUTPATIENT
Start: 2019-01-01 | End: 2019-01-01 | Stop reason: HOSPADM

## 2019-01-01 RX ORDER — LIDOCAINE HYDROCHLORIDE 20 MG/ML
15 SOLUTION OROPHARYNGEAL ONCE
Status: COMPLETED | OUTPATIENT
Start: 2019-01-01 | End: 2019-01-01

## 2019-01-01 RX ORDER — LEVOFLOXACIN 750 MG/1
750 TABLET ORAL DAILY
Qty: 7 TAB | Refills: 0 | Status: SHIPPED | OUTPATIENT
Start: 2019-01-01 | End: 2019-01-01

## 2019-01-01 RX ORDER — ONDANSETRON 2 MG/ML
INJECTION INTRAMUSCULAR; INTRAVENOUS AS NEEDED
Status: DISCONTINUED | OUTPATIENT
Start: 2019-01-01 | End: 2019-01-01 | Stop reason: HOSPADM

## 2019-01-01 RX ORDER — HEPARIN SODIUM 5000 [USP'U]/ML
INJECTION, SOLUTION INTRAVENOUS; SUBCUTANEOUS AS NEEDED
Status: DISCONTINUED | OUTPATIENT
Start: 2019-01-01 | End: 2019-01-01 | Stop reason: HOSPADM

## 2019-01-01 RX ORDER — DIPHENHYDRAMINE HYDROCHLORIDE 50 MG/ML
50 INJECTION, SOLUTION INTRAMUSCULAR; INTRAVENOUS AS NEEDED
Status: DISCONTINUED | OUTPATIENT
Start: 2019-01-01 | End: 2019-01-01 | Stop reason: HOSPADM

## 2019-01-01 RX ORDER — LIDOCAINE HYDROCHLORIDE 20 MG/ML
INJECTION, SOLUTION EPIDURAL; INFILTRATION; INTRACAUDAL; PERINEURAL AS NEEDED
Status: DISCONTINUED | OUTPATIENT
Start: 2019-01-01 | End: 2019-01-01 | Stop reason: HOSPADM

## 2019-01-01 RX ORDER — SODIUM CHLORIDE, SODIUM LACTATE, POTASSIUM CHLORIDE, CALCIUM CHLORIDE 600; 310; 30; 20 MG/100ML; MG/100ML; MG/100ML; MG/100ML
100 INJECTION, SOLUTION INTRAVENOUS CONTINUOUS
Status: DISCONTINUED | OUTPATIENT
Start: 2019-01-01 | End: 2019-01-01 | Stop reason: HOSPADM

## 2019-01-01 RX ORDER — ALBUTEROL SULFATE 0.83 MG/ML
2.5 SOLUTION RESPIRATORY (INHALATION) AS NEEDED
Status: CANCELLED
Start: 2019-01-01

## 2019-01-01 RX ORDER — SODIUM CHLORIDE 9 MG/ML
10 INJECTION INTRAMUSCULAR; INTRAVENOUS; SUBCUTANEOUS AS NEEDED
Status: DISCONTINUED | OUTPATIENT
Start: 2019-01-01 | End: 2019-01-01 | Stop reason: HOSPADM

## 2019-01-01 RX ORDER — FENTANYL CITRATE 50 UG/ML
INJECTION, SOLUTION INTRAMUSCULAR; INTRAVENOUS AS NEEDED
Status: DISCONTINUED | OUTPATIENT
Start: 2019-01-01 | End: 2019-01-01 | Stop reason: HOSPADM

## 2019-01-01 RX ORDER — EPINEPHRINE 1 MG/ML
0.3 INJECTION, SOLUTION, CONCENTRATE INTRAVENOUS AS NEEDED
Status: DISCONTINUED | OUTPATIENT
Start: 2019-01-01 | End: 2019-01-01 | Stop reason: HOSPADM

## 2019-01-01 RX ORDER — FLUOROURACIL 50 MG/ML
1000 INJECTION, SOLUTION INTRAVENOUS ONCE
Status: COMPLETED | OUTPATIENT
Start: 2019-01-01 | End: 2019-01-01

## 2019-01-01 RX ORDER — MIDAZOLAM HYDROCHLORIDE 1 MG/ML
2 INJECTION, SOLUTION INTRAMUSCULAR; INTRAVENOUS
Status: DISCONTINUED | OUTPATIENT
Start: 2019-01-01 | End: 2019-01-01 | Stop reason: HOSPADM

## 2019-01-01 RX ORDER — DEXTROSE MONOHYDRATE 50 MG/ML
25 INJECTION, SOLUTION INTRAVENOUS CONTINUOUS
Status: DISCONTINUED | OUTPATIENT
Start: 2019-01-01 | End: 2019-01-01 | Stop reason: HOSPADM

## 2019-01-01 RX ORDER — SODIUM CHLORIDE 9 MG/ML
10 INJECTION INTRAMUSCULAR; INTRAVENOUS; SUBCUTANEOUS AS NEEDED
Status: CANCELLED | OUTPATIENT
Start: 2019-01-01

## 2019-01-01 RX ORDER — ONDANSETRON 2 MG/ML
8 INJECTION INTRAMUSCULAR; INTRAVENOUS ONCE
Status: CANCELLED
Start: 2019-01-01

## 2019-01-01 RX ORDER — HYDROCODONE BITARTRATE AND ACETAMINOPHEN 5; 325 MG/1; MG/1
1 TABLET ORAL ONCE
Status: COMPLETED | OUTPATIENT
Start: 2019-01-01 | End: 2019-01-01

## 2019-01-01 RX ORDER — DIPHENHYDRAMINE HYDROCHLORIDE 50 MG/ML
12.5 INJECTION, SOLUTION INTRAMUSCULAR; INTRAVENOUS
Status: DISCONTINUED | OUTPATIENT
Start: 2019-01-01 | End: 2019-01-01 | Stop reason: HOSPADM

## 2019-01-01 RX ORDER — HEPARIN 100 UNIT/ML
500 SYRINGE INTRAVENOUS ONCE
Status: COMPLETED | OUTPATIENT
Start: 2019-01-01 | End: 2019-01-01

## 2019-01-01 RX ORDER — ONDANSETRON 2 MG/ML
8 INJECTION INTRAMUSCULAR; INTRAVENOUS AS NEEDED
Status: CANCELLED | OUTPATIENT
Start: 2019-01-01

## 2019-01-01 RX ORDER — EPINEPHRINE 1 MG/ML
0.3 INJECTION, SOLUTION, CONCENTRATE INTRAVENOUS AS NEEDED
Status: CANCELLED | OUTPATIENT
Start: 2019-01-01

## 2019-01-01 RX ORDER — FUROSEMIDE 10 MG/ML
20 INJECTION INTRAMUSCULAR; INTRAVENOUS ONCE
Status: COMPLETED | OUTPATIENT
Start: 2019-01-01 | End: 2019-01-01

## 2019-01-01 RX ORDER — HEPARIN 100 UNIT/ML
300-500 SYRINGE INTRAVENOUS AS NEEDED
Status: CANCELLED
Start: 2019-01-01

## 2019-01-01 RX ORDER — ALBUTEROL SULFATE 90 UG/1
AEROSOL, METERED RESPIRATORY (INHALATION) AS NEEDED
Status: DISCONTINUED | OUTPATIENT
Start: 2019-01-01 | End: 2019-01-01 | Stop reason: HOSPADM

## 2019-01-01 RX ORDER — SODIUM CHLORIDE 9 MG/ML
25 INJECTION, SOLUTION INTRAVENOUS ONCE
Status: COMPLETED | OUTPATIENT
Start: 2019-01-01 | End: 2019-01-01

## 2019-01-01 RX ORDER — IPRATROPIUM BROMIDE AND ALBUTEROL SULFATE 2.5; .5 MG/3ML; MG/3ML
3 SOLUTION RESPIRATORY (INHALATION)
Status: COMPLETED | OUTPATIENT
Start: 2019-01-01 | End: 2019-01-01

## 2019-01-01 RX ORDER — ALLOPURINOL 100 MG/1
100 TABLET ORAL DAILY
Status: DISCONTINUED | OUTPATIENT
Start: 2019-01-01 | End: 2019-01-01 | Stop reason: HOSPADM

## 2019-01-01 RX ORDER — HYDROCORTISONE SODIUM SUCCINATE 100 MG/2ML
100 INJECTION, POWDER, FOR SOLUTION INTRAMUSCULAR; INTRAVENOUS AS NEEDED
Status: ACTIVE | OUTPATIENT
Start: 2019-01-01 | End: 2019-01-01

## 2019-01-01 RX ORDER — LEVOFLOXACIN 5 MG/ML
750 INJECTION, SOLUTION INTRAVENOUS
Status: COMPLETED | OUTPATIENT
Start: 2019-01-01 | End: 2019-01-01

## 2019-01-01 RX ORDER — GLYCOPYRROLATE 0.2 MG/ML
INJECTION INTRAMUSCULAR; INTRAVENOUS AS NEEDED
Status: DISCONTINUED | OUTPATIENT
Start: 2019-01-01 | End: 2019-01-01 | Stop reason: HOSPADM

## 2019-01-01 RX ORDER — SODIUM CHLORIDE 9 MG/ML
1000 INJECTION INTRAMUSCULAR; INTRAVENOUS; SUBCUTANEOUS ONCE
Status: COMPLETED | OUTPATIENT
Start: 2019-01-01 | End: 2019-01-01

## 2019-01-01 RX ORDER — EZETIMIBE 10 MG/1
10 TABLET ORAL DAILY
Status: CANCELLED | OUTPATIENT
Start: 2019-01-01

## 2019-01-01 RX ORDER — MELATONIN
5000 DAILY
Status: DISCONTINUED | OUTPATIENT
Start: 2019-01-01 | End: 2019-01-01 | Stop reason: HOSPADM

## 2019-01-01 RX ORDER — SODIUM CHLORIDE, SODIUM LACTATE, POTASSIUM CHLORIDE, CALCIUM CHLORIDE 600; 310; 30; 20 MG/100ML; MG/100ML; MG/100ML; MG/100ML
75 INJECTION, SOLUTION INTRAVENOUS CONTINUOUS
Status: DISCONTINUED | OUTPATIENT
Start: 2019-01-01 | End: 2019-01-01 | Stop reason: HOSPADM

## 2019-01-01 RX ORDER — ROCURONIUM BROMIDE 10 MG/ML
INJECTION, SOLUTION INTRAVENOUS AS NEEDED
Status: DISCONTINUED | OUTPATIENT
Start: 2019-01-01 | End: 2019-01-01 | Stop reason: HOSPADM

## 2019-01-01 RX ORDER — IPRATROPIUM BROMIDE AND ALBUTEROL SULFATE 2.5; .5 MG/3ML; MG/3ML
3 SOLUTION RESPIRATORY (INHALATION)
Qty: 30 NEBULE | Refills: 1 | Status: SHIPPED | OUTPATIENT
Start: 2019-01-01 | End: 2020-01-01

## 2019-01-01 RX ORDER — ACETAMINOPHEN 10 MG/ML
1000 INJECTION, SOLUTION INTRAVENOUS
Status: COMPLETED | OUTPATIENT
Start: 2019-01-01 | End: 2019-01-01

## 2019-01-01 RX ORDER — LIDOCAINE HYDROCHLORIDE 20 MG/ML
1-10 INJECTION, SOLUTION INFILTRATION; PERINEURAL ONCE
Status: COMPLETED | OUTPATIENT
Start: 2019-01-01 | End: 2019-01-01

## 2019-01-01 RX ORDER — DIPHENHYDRAMINE HYDROCHLORIDE 50 MG/ML
50 INJECTION, SOLUTION INTRAMUSCULAR; INTRAVENOUS AS NEEDED
Status: ACTIVE | OUTPATIENT
Start: 2019-01-01 | End: 2019-01-01

## 2019-01-01 RX ADMIN — SODIUM CHLORIDE 150 MG: 900 INJECTION, SOLUTION INTRAVENOUS at 14:58

## 2019-01-01 RX ADMIN — ONDANSETRON 8 MG: 2 INJECTION INTRAMUSCULAR; INTRAVENOUS at 10:42

## 2019-01-01 RX ADMIN — DEXAMETHASONE SODIUM PHOSPHATE 12 MG: 4 INJECTION, SOLUTION INTRAMUSCULAR; INTRAVENOUS at 11:04

## 2019-01-01 RX ADMIN — HEPARIN 500 UNITS: 100 SYRINGE at 13:55

## 2019-01-01 RX ADMIN — IOPAMIDOL 100 ML: 755 INJECTION, SOLUTION INTRAVENOUS at 16:05

## 2019-01-01 RX ADMIN — ALBUTEROL SULFATE 2.5 MG: 2.5 SOLUTION RESPIRATORY (INHALATION) at 17:06

## 2019-01-01 RX ADMIN — IOPAMIDOL 8 ML: 612 INJECTION, SOLUTION INTRAVENOUS at 13:16

## 2019-01-01 RX ADMIN — PACLITAXEL 184 MG: 6 INJECTION, SOLUTION INTRAVENOUS at 11:45

## 2019-01-01 RX ADMIN — METHYLPREDNISOLONE SODIUM SUCCINATE 125 MG: 125 INJECTION, POWDER, FOR SOLUTION INTRAMUSCULAR; INTRAVENOUS at 19:55

## 2019-01-01 RX ADMIN — ACETAMINOPHEN 1000 MG: 10 INJECTION, SOLUTION INTRAVENOUS at 10:40

## 2019-01-01 RX ADMIN — Medication 10 ML: at 10:15

## 2019-01-01 RX ADMIN — FAMOTIDINE 20 MG: 10 INJECTION, SOLUTION INTRAVENOUS at 10:45

## 2019-01-01 RX ADMIN — HYDROMORPHONE HYDROCHLORIDE 0.5 MG: 2 INJECTION, SOLUTION INTRAMUSCULAR; INTRAVENOUS; SUBCUTANEOUS at 10:12

## 2019-01-01 RX ADMIN — FENTANYL CITRATE 50 MCG: 50 INJECTION, SOLUTION INTRAMUSCULAR; INTRAVENOUS at 12:58

## 2019-01-01 RX ADMIN — SODIUM CHLORIDE 25 ML/HR: 900 INJECTION, SOLUTION INTRAVENOUS at 12:51

## 2019-01-01 RX ADMIN — FENTANYL CITRATE 25 MCG: 50 INJECTION, SOLUTION INTRAMUSCULAR; INTRAVENOUS at 09:52

## 2019-01-01 RX ADMIN — DEXTROSE MONOHYDRATE 25 ML/HR: 5 INJECTION, SOLUTION INTRAVENOUS at 12:50

## 2019-01-01 RX ADMIN — FLUOROURACIL 1176 MG: 50 INJECTION, SOLUTION INTRAVENOUS at 14:40

## 2019-01-01 RX ADMIN — ONDANSETRON 8 MG: 2 INJECTION INTRAMUSCULAR; INTRAVENOUS at 13:16

## 2019-01-01 RX ADMIN — ONDANSETRON 8 MG: 2 INJECTION INTRAMUSCULAR; INTRAVENOUS at 11:54

## 2019-01-01 RX ADMIN — DEXAMETHASONE SODIUM PHOSPHATE 10 MG: 10 INJECTION INTRAMUSCULAR; INTRAVENOUS at 10:51

## 2019-01-01 RX ADMIN — Medication 10 ML: at 13:37

## 2019-01-01 RX ADMIN — DIATRIZOATE MEGLUMINE AND DIATRIZOATE SODIUM 15 ML: 660; 100 LIQUID ORAL; RECTAL at 10:14

## 2019-01-01 RX ADMIN — LEUCOVORIN CALCIUM 936 MG: 350 INJECTION, POWDER, LYOPHILIZED, FOR SUSPENSION INTRAMUSCULAR; INTRAVENOUS at 16:00

## 2019-01-01 RX ADMIN — Medication 10 ML: at 06:00

## 2019-01-01 RX ADMIN — DIATRIZOATE MEGLUMINE AND DIATRIZOATE SODIUM 10 ML: 660; 100 LIQUID ORAL; RECTAL at 13:30

## 2019-01-01 RX ADMIN — ONDANSETRON 8 MG: 2 INJECTION INTRAMUSCULAR; INTRAVENOUS at 11:05

## 2019-01-01 RX ADMIN — DEXTROSE MONOHYDRATE 25 ML/HR: 5 INJECTION, SOLUTION INTRAVENOUS at 10:38

## 2019-01-01 RX ADMIN — BENZOCAINE 2 SPRAY: 200 SPRAY DENTAL; ORAL; PERIODONTAL at 13:04

## 2019-01-01 RX ADMIN — DEXTROSE MONOHYDRATE 25 ML/HR: 5 INJECTION, SOLUTION INTRAVENOUS at 12:10

## 2019-01-01 RX ADMIN — FAMOTIDINE 20 MG: 10 INJECTION, SOLUTION INTRAVENOUS at 13:32

## 2019-01-01 RX ADMIN — FLUOROURACIL 7000 MG: 50 INJECTION, SOLUTION INTRAVENOUS at 14:50

## 2019-01-01 RX ADMIN — LEUCOVORIN CALCIUM 1036 MG: 350 INJECTION, POWDER, LYOPHILIZED, FOR SUSPENSION INTRAMUSCULAR; INTRAVENOUS at 12:32

## 2019-01-01 RX ADMIN — GLYCOPYRROLATE 0.8 MG: 0.2 INJECTION INTRAMUSCULAR; INTRAVENOUS at 09:31

## 2019-01-01 RX ADMIN — FLUOROURACIL 1092 MG: 50 INJECTION, SOLUTION INTRAVENOUS at 13:05

## 2019-01-01 RX ADMIN — FLUOROURACIL 6500 MG: 50 INJECTION, SOLUTION INTRAVENOUS at 13:50

## 2019-01-01 RX ADMIN — IPRATROPIUM BROMIDE AND ALBUTEROL SULFATE 3 ML: .5; 3 SOLUTION RESPIRATORY (INHALATION) at 20:12

## 2019-01-01 RX ADMIN — FLUOROURACIL 1092 MG: 50 INJECTION, SOLUTION INTRAVENOUS at 13:46

## 2019-01-01 RX ADMIN — ONDANSETRON 8 MG: 2 INJECTION INTRAMUSCULAR; INTRAVENOUS at 08:16

## 2019-01-01 RX ADMIN — Medication 10 ML: at 11:50

## 2019-01-01 RX ADMIN — FLUOROURACIL 1092 MG: 50 INJECTION, SOLUTION INTRAVENOUS at 11:11

## 2019-01-01 RX ADMIN — FENTANYL CITRATE 50 MCG: 50 INJECTION, SOLUTION INTRAMUSCULAR; INTRAVENOUS at 13:03

## 2019-01-01 RX ADMIN — DEXTROSE MONOHYDRATE 25 ML/HR: 5 INJECTION, SOLUTION INTRAVENOUS at 09:39

## 2019-01-01 RX ADMIN — Medication 10 ML: at 14:24

## 2019-01-01 RX ADMIN — SODIUM CHLORIDE 150 MG: 900 INJECTION, SOLUTION INTRAVENOUS at 10:45

## 2019-01-01 RX ADMIN — BUDESONIDE 500 MCG: 0.5 INHALANT RESPIRATORY (INHALATION) at 20:12

## 2019-01-01 RX ADMIN — SODIUM CHLORIDE 1000 ML: 900 INJECTION, SOLUTION INTRAVENOUS at 15:58

## 2019-01-01 RX ADMIN — IPRATROPIUM BROMIDE AND ALBUTEROL SULFATE 3 ML: .5; 3 SOLUTION RESPIRATORY (INHALATION) at 14:30

## 2019-01-01 RX ADMIN — LEUCOVORIN CALCIUM 1036 MG: 350 INJECTION, POWDER, LYOPHILIZED, FOR SUSPENSION INTRAMUSCULAR; INTRAVENOUS at 11:09

## 2019-01-01 RX ADMIN — FENTANYL CITRATE 50 MCG: 50 INJECTION, SOLUTION INTRAMUSCULAR; INTRAVENOUS at 12:50

## 2019-01-01 RX ADMIN — OXALIPLATIN 199 MG: 5 INJECTION, SOLUTION, CONCENTRATE INTRAVENOUS at 16:00

## 2019-01-01 RX ADMIN — SODIUM CHLORIDE 150 MG: 900 INJECTION, SOLUTION INTRAVENOUS at 11:26

## 2019-01-01 RX ADMIN — DEXAMETHASONE SODIUM PHOSPHATE 12 MG: 4 INJECTION, SOLUTION INTRAMUSCULAR; INTRAVENOUS at 11:55

## 2019-01-01 RX ADMIN — SODIUM CHLORIDE 1000 ML: 9 INJECTION, SOLUTION INTRAMUSCULAR; INTRAVENOUS; SUBCUTANEOUS at 16:59

## 2019-01-01 RX ADMIN — Medication 10 ML: at 05:27

## 2019-01-01 RX ADMIN — Medication 10 ML: at 11:00

## 2019-01-01 RX ADMIN — FLUOROURACIL 7000 MG: 50 INJECTION, SOLUTION INTRAVENOUS at 14:25

## 2019-01-01 RX ADMIN — PROPOFOL 200 MG: 10 INJECTION, EMULSION INTRAVENOUS at 07:23

## 2019-01-01 RX ADMIN — HEPARIN 500 UNITS: 100 SYRINGE at 13:35

## 2019-01-01 RX ADMIN — DEXAMETHASONE SODIUM PHOSPHATE 12 MG: 4 INJECTION, SOLUTION INTRAMUSCULAR; INTRAVENOUS at 15:19

## 2019-01-01 RX ADMIN — FLUOROURACIL 6000 MG: 50 INJECTION, SOLUTION INTRAVENOUS at 13:15

## 2019-01-01 RX ADMIN — MIDAZOLAM 1 MG: 1 INJECTION INTRAMUSCULAR; INTRAVENOUS at 12:58

## 2019-01-01 RX ADMIN — OXYCODONE HYDROCHLORIDE 5 MG: 5 TABLET ORAL at 10:50

## 2019-01-01 RX ADMIN — BUDESONIDE 500 MCG: 0.5 INHALANT RESPIRATORY (INHALATION) at 07:48

## 2019-01-01 RX ADMIN — IPRATROPIUM BROMIDE AND ALBUTEROL SULFATE 3 ML: .5; 3 SOLUTION RESPIRATORY (INHALATION) at 20:29

## 2019-01-01 RX ADMIN — Medication 10 ML: at 12:47

## 2019-01-01 RX ADMIN — BUDESONIDE 500 MCG: 0.5 INHALANT RESPIRATORY (INHALATION) at 23:57

## 2019-01-01 RX ADMIN — ONDANSETRON 8 MG: 2 INJECTION INTRAMUSCULAR; INTRAVENOUS at 09:49

## 2019-01-01 RX ADMIN — Medication 10 ML: at 10:05

## 2019-01-01 RX ADMIN — ONDANSETRON 8 MG: 2 INJECTION INTRAMUSCULAR; INTRAVENOUS at 10:04

## 2019-01-01 RX ADMIN — HYDROMORPHONE HYDROCHLORIDE 0.5 MG: 2 INJECTION, SOLUTION INTRAMUSCULAR; INTRAVENOUS; SUBCUTANEOUS at 10:20

## 2019-01-01 RX ADMIN — SODIUM CHLORIDE 800 MG: 900 INJECTION, SOLUTION INTRAVENOUS at 12:52

## 2019-01-01 RX ADMIN — MIDAZOLAM 1 MG: 1 INJECTION INTRAMUSCULAR; INTRAVENOUS at 12:50

## 2019-01-01 RX ADMIN — Medication 10 ML: at 14:45

## 2019-01-01 RX ADMIN — POTASSIUM BICARBONATE 20 MEQ: 782 TABLET, EFFERVESCENT ORAL at 08:39

## 2019-01-01 RX ADMIN — SODIUM CHLORIDE 10 ML: 9 INJECTION INTRAMUSCULAR; INTRAVENOUS; SUBCUTANEOUS at 13:40

## 2019-01-01 RX ADMIN — SODIUM CHLORIDE 25 ML/HR: 900 INJECTION, SOLUTION INTRAVENOUS at 12:22

## 2019-01-01 RX ADMIN — ALBUTEROL SULFATE 5 PUFF: 90 AEROSOL, METERED RESPIRATORY (INHALATION) at 07:26

## 2019-01-01 RX ADMIN — FLUOROURACIL 1176 MG: 50 INJECTION, SOLUTION INTRAVENOUS at 14:17

## 2019-01-01 RX ADMIN — Medication 10 ML: at 13:58

## 2019-01-01 RX ADMIN — LEUCOVORIN CALCIUM 1092 MG: 350 INJECTION, POWDER, LYOPHILIZED, FOR SOLUTION INTRAMUSCULAR; INTRAVENOUS at 11:00

## 2019-01-01 RX ADMIN — SODIUM CHLORIDE 100 ML: 900 INJECTION, SOLUTION INTRAVENOUS at 16:05

## 2019-01-01 RX ADMIN — LEUCOVORIN CALCIUM 1092 MG: 350 INJECTION, POWDER, LYOPHILIZED, FOR SOLUTION INTRAMUSCULAR; INTRAVENOUS at 09:11

## 2019-01-01 RX ADMIN — Medication 10 ML: at 09:38

## 2019-01-01 RX ADMIN — Medication 10 ML: at 10:14

## 2019-01-01 RX ADMIN — LEUCOVORIN CALCIUM 1176 MG: 350 INJECTION, POWDER, LYOPHILIZED, FOR SOLUTION INTRAMUSCULAR; INTRAVENOUS at 12:00

## 2019-01-01 RX ADMIN — METHYLPREDNISOLONE SODIUM SUCCINATE 40 MG: 40 INJECTION, POWDER, FOR SOLUTION INTRAMUSCULAR; INTRAVENOUS at 05:16

## 2019-01-01 RX ADMIN — ONDANSETRON 8 MG: 2 INJECTION INTRAMUSCULAR; INTRAVENOUS at 14:47

## 2019-01-01 RX ADMIN — FENTANYL CITRATE 50 MCG: 50 INJECTION, SOLUTION INTRAMUSCULAR; INTRAVENOUS at 13:06

## 2019-01-01 RX ADMIN — Medication 10 ML: at 09:40

## 2019-01-01 RX ADMIN — PACLITAXEL 184 MG: 6 INJECTION, SOLUTION INTRAVENOUS at 15:23

## 2019-01-01 RX ADMIN — FOSAPREPITANT: 150 INJECTION, POWDER, LYOPHILIZED, FOR SOLUTION INTRAVENOUS at 10:40

## 2019-01-01 RX ADMIN — FUROSEMIDE 20 MG: 10 INJECTION, SOLUTION INTRAMUSCULAR; INTRAVENOUS at 17:05

## 2019-01-01 RX ADMIN — SODIUM CHLORIDE: 900 INJECTION, SOLUTION INTRAVENOUS at 08:34

## 2019-01-01 RX ADMIN — ALBUTEROL SULFATE 4 PUFF: 90 AEROSOL, METERED RESPIRATORY (INHALATION) at 09:27

## 2019-01-01 RX ADMIN — IOPAMIDOL 100 ML: 755 INJECTION, SOLUTION INTRAVENOUS at 10:14

## 2019-01-01 RX ADMIN — Medication 10 ML: at 13:15

## 2019-01-01 RX ADMIN — Medication 20 ML: at 14:00

## 2019-01-01 RX ADMIN — METHYLPREDNISOLONE SODIUM SUCCINATE 40 MG: 40 INJECTION, POWDER, FOR SOLUTION INTRAMUSCULAR; INTRAVENOUS at 15:50

## 2019-01-01 RX ADMIN — FLUOROURACIL 6500 MG: 50 INJECTION, SOLUTION INTRAVENOUS at 13:15

## 2019-01-01 RX ADMIN — OXALIPLATIN 232 MG: 5 INJECTION, SOLUTION, CONCENTRATE INTRAVENOUS at 11:48

## 2019-01-01 RX ADMIN — DEXAMETHASONE SODIUM PHOSPHATE 12 MG: 4 INJECTION, SOLUTION INTRAMUSCULAR; INTRAVENOUS at 08:18

## 2019-01-01 RX ADMIN — MIDAZOLAM 1 MG: 1 INJECTION INTRAMUSCULAR; INTRAVENOUS at 13:06

## 2019-01-01 RX ADMIN — DEXAMETHASONE SODIUM PHOSPHATE 4 MG: 4 INJECTION, SOLUTION INTRA-ARTICULAR; INTRALESIONAL; INTRAMUSCULAR; INTRAVENOUS; SOFT TISSUE at 09:34

## 2019-01-01 RX ADMIN — Medication 10 ML: at 14:48

## 2019-01-01 RX ADMIN — ROCURONIUM BROMIDE 5 MG: 10 INJECTION, SOLUTION INTRAVENOUS at 09:15

## 2019-01-01 RX ADMIN — FLUOROURACIL 5500 MG: 50 INJECTION, SOLUTION INTRAVENOUS at 17:28

## 2019-01-01 RX ADMIN — HYDROMORPHONE HYDROCHLORIDE 0.5 MG: 2 INJECTION, SOLUTION INTRAMUSCULAR; INTRAVENOUS; SUBCUTANEOUS at 10:01

## 2019-01-01 RX ADMIN — SODIUM CHLORIDE 150 MG: 900 INJECTION, SOLUTION INTRAVENOUS at 14:35

## 2019-01-01 RX ADMIN — Medication 10 ML: at 13:30

## 2019-01-01 RX ADMIN — FLUOROURACIL 1092 MG: 50 INJECTION, SOLUTION INTRAVENOUS at 13:40

## 2019-01-01 RX ADMIN — ROCURONIUM BROMIDE 10 MG: 10 INJECTION, SOLUTION INTRAVENOUS at 08:16

## 2019-01-01 RX ADMIN — DEXTROSE MONOHYDRATE 25 ML/HR: 5 INJECTION, SOLUTION INTRAVENOUS at 10:28

## 2019-01-01 RX ADMIN — LEUCOVORIN CALCIUM 1092 MG: 350 INJECTION, POWDER, LYOPHILIZED, FOR SUSPENSION INTRAMUSCULAR; INTRAVENOUS at 11:48

## 2019-01-01 RX ADMIN — DIATRIZOATE MEGLUMINE AND DIATRIZOATE SODIUM 15 ML: 600; 100 SOLUTION ORAL; RECTAL at 16:05

## 2019-01-01 RX ADMIN — Medication 10 ML: at 09:45

## 2019-01-01 RX ADMIN — FLUOROURACIL 1000 MG: 50 INJECTION, SOLUTION INTRAVENOUS at 13:32

## 2019-01-01 RX ADMIN — LIDOCAINE HYDROCHLORIDE 60 MG: 20 INJECTION, SOLUTION EPIDURAL; INFILTRATION; INTRACAUDAL; PERINEURAL at 07:23

## 2019-01-01 RX ADMIN — Medication 10 ML: at 16:43

## 2019-01-01 RX ADMIN — ONDANSETRON 8 MG: 2 INJECTION INTRAMUSCULAR; INTRAVENOUS at 11:35

## 2019-01-01 RX ADMIN — MAGNESIUM SULFATE HEPTAHYDRATE 2 G: 40 INJECTION, SOLUTION INTRAVENOUS at 12:32

## 2019-01-01 RX ADMIN — OXALIPLATIN 250 MG: 5 INJECTION, SOLUTION, CONCENTRATE INTRAVENOUS at 12:35

## 2019-01-01 RX ADMIN — LIDOCAINE HYDROCHLORIDE 100 MG: 20 INJECTION, SOLUTION INFILTRATION; PERINEURAL at 13:04

## 2019-01-01 RX ADMIN — LEVOFLOXACIN 750 MG: 5 INJECTION, SOLUTION INTRAVENOUS at 21:09

## 2019-01-01 RX ADMIN — METHYLPREDNISOLONE SODIUM SUCCINATE 40 MG: 40 INJECTION, POWDER, FOR SOLUTION INTRAMUSCULAR; INTRAVENOUS at 22:24

## 2019-01-01 RX ADMIN — Medication 10 ML: at 16:36

## 2019-01-01 RX ADMIN — HEPARIN SODIUM (PORCINE) LOCK FLUSH IV SOLN 100 UNIT/ML 500 UNITS: 100 SOLUTION at 14:47

## 2019-01-01 RX ADMIN — DEXAMETHASONE SODIUM PHOSPHATE 12 MG: 4 INJECTION, SOLUTION INTRAMUSCULAR; INTRAVENOUS at 10:10

## 2019-01-01 RX ADMIN — HYDROCORTISONE SODIUM SUCCINATE 100 MG: 100 INJECTION, POWDER, FOR SOLUTION INTRAMUSCULAR; INTRAVENOUS at 13:40

## 2019-01-01 RX ADMIN — DEXAMETHASONE SODIUM PHOSPHATE 12 MG: 4 INJECTION, SOLUTION INTRAMUSCULAR; INTRAVENOUS at 09:51

## 2019-01-01 RX ADMIN — DEXTROSE MONOHYDRATE 25 ML/HR: 5 INJECTION, SOLUTION INTRAVENOUS at 11:30

## 2019-01-01 RX ADMIN — BUDESONIDE 500 MCG: 0.5 INHALANT RESPIRATORY (INHALATION) at 08:41

## 2019-01-01 RX ADMIN — SODIUM CHLORIDE 100 ML: 900 INJECTION, SOLUTION INTRAVENOUS at 10:14

## 2019-01-01 RX ADMIN — OXALIPLATIN 220 MG: 5 INJECTION, SOLUTION, CONCENTRATE INTRAVENOUS at 11:09

## 2019-01-01 RX ADMIN — HYDROCODONE BITARTRATE AND ACETAMINOPHEN 1 TABLET: 5; 325 TABLET ORAL at 10:03

## 2019-01-01 RX ADMIN — Medication 10 ML: at 13:55

## 2019-01-01 RX ADMIN — DEXAMETHASONE SODIUM PHOSPHATE 12 MG: 4 INJECTION, SOLUTION INTRAMUSCULAR; INTRAVENOUS at 14:16

## 2019-01-01 RX ADMIN — OXALIPLATIN 250 MG: 5 INJECTION, SOLUTION, CONCENTRATE INTRAVENOUS at 14:05

## 2019-01-01 RX ADMIN — FLUOROURACIL 6000 MG: 50 INJECTION, SOLUTION INTRAVENOUS at 14:38

## 2019-01-01 RX ADMIN — ONDANSETRON 8 MG: 2 INJECTION INTRAMUSCULAR; INTRAVENOUS at 11:02

## 2019-01-01 RX ADMIN — LEVOFLOXACIN 750 MG: 5 INJECTION, SOLUTION INTRAVENOUS at 19:38

## 2019-01-01 RX ADMIN — ONDANSETRON 8 MG: 2 INJECTION INTRAMUSCULAR; INTRAVENOUS at 10:06

## 2019-01-01 RX ADMIN — Medication 3 G: at 07:39

## 2019-01-01 RX ADMIN — ONDANSETRON 8 MG: 2 INJECTION INTRAMUSCULAR; INTRAVENOUS at 12:21

## 2019-01-01 RX ADMIN — DEXAMETHASONE SODIUM PHOSPHATE 12 MG: 4 INJECTION, SOLUTION INTRAMUSCULAR; INTRAVENOUS at 11:06

## 2019-01-01 RX ADMIN — LEUCOVORIN CALCIUM 936 MG: 350 INJECTION, POWDER, LYOPHILIZED, FOR SOLUTION INTRAMUSCULAR; INTRAVENOUS at 15:15

## 2019-01-01 RX ADMIN — FAMOTIDINE 20 MG: 10 INJECTION, SOLUTION INTRAVENOUS at 06:08

## 2019-01-01 RX ADMIN — LEUCOVORIN CALCIUM 1036 MG: 350 INJECTION, POWDER, LYOPHILIZED, FOR SOLUTION INTRAMUSCULAR; INTRAVENOUS at 11:22

## 2019-01-01 RX ADMIN — IPRATROPIUM BROMIDE AND ALBUTEROL SULFATE 3 ML: .5; 3 SOLUTION RESPIRATORY (INHALATION) at 07:48

## 2019-01-01 RX ADMIN — FLUOROURACIL 936 MG: 50 INJECTION, SOLUTION INTRAVENOUS at 17:22

## 2019-01-01 RX ADMIN — LEUCOVORIN CALCIUM 1176 MG: 350 INJECTION, POWDER, LYOPHILIZED, FOR SOLUTION INTRAMUSCULAR; INTRAVENOUS at 12:35

## 2019-01-01 RX ADMIN — SODIUM CHLORIDE 500 ML: 900 INJECTION, SOLUTION INTRAVENOUS at 13:59

## 2019-01-01 RX ADMIN — OXALIPLATIN 220 MG: 5 INJECTION, SOLUTION, CONCENTRATE INTRAVENOUS at 11:22

## 2019-01-01 RX ADMIN — METHYLPREDNISOLONE SODIUM SUCCINATE 40 MG: 40 INJECTION, POWDER, FOR SOLUTION INTRAMUSCULAR; INTRAVENOUS at 05:27

## 2019-01-01 RX ADMIN — OXALIPLATIN 232 MG: 5 INJECTION, SOLUTION, CONCENTRATE INTRAVENOUS at 11:45

## 2019-01-01 RX ADMIN — ACETAMINOPHEN 650 MG: 325 TABLET, FILM COATED ORAL at 22:24

## 2019-01-01 RX ADMIN — SODIUM CHLORIDE: 900 INJECTION, SOLUTION INTRAVENOUS at 12:10

## 2019-01-01 RX ADMIN — Medication 10 ML: at 11:30

## 2019-01-01 RX ADMIN — SODIUM CHLORIDE, SODIUM LACTATE, POTASSIUM CHLORIDE, AND CALCIUM CHLORIDE: 600; 310; 30; 20 INJECTION, SOLUTION INTRAVENOUS at 07:32

## 2019-01-01 RX ADMIN — Medication 10 ML: at 16:05

## 2019-01-01 RX ADMIN — DEXAMETHASONE SODIUM PHOSPHATE 10 MG: 10 INJECTION INTRAMUSCULAR; INTRAVENOUS at 13:31

## 2019-01-01 RX ADMIN — LEUCOVORIN CALCIUM 1092 MG: 50 INJECTION, POWDER, LYOPHILIZED, FOR SOLUTION INTRAMUSCULAR; INTRAVENOUS at 11:45

## 2019-01-01 RX ADMIN — DEXTROSE MONOHYDRATE 25 ML/HR: 5 INJECTION, SOLUTION INTRAVENOUS at 08:54

## 2019-01-01 RX ADMIN — Medication 10 ML: at 14:50

## 2019-01-01 RX ADMIN — SODIUM CHLORIDE 10 ML: 9 INJECTION, SOLUTION INTRAMUSCULAR; INTRAVENOUS; SUBCUTANEOUS at 13:45

## 2019-01-01 RX ADMIN — Medication 20 ML: at 15:20

## 2019-01-01 RX ADMIN — Medication 10 ML: at 20:03

## 2019-01-01 RX ADMIN — Medication 10 ML: at 22:33

## 2019-01-01 RX ADMIN — SODIUM CHLORIDE 500 ML: 900 INJECTION, SOLUTION INTRAVENOUS at 19:32

## 2019-01-01 RX ADMIN — OXALIPLATIN 220 MG: 5 INJECTION, SOLUTION, CONCENTRATE INTRAVENOUS at 12:32

## 2019-01-01 RX ADMIN — FLUOROURACIL 6500 MG: 50 INJECTION, SOLUTION INTRAVENOUS at 11:16

## 2019-01-01 RX ADMIN — FENTANYL CITRATE 25 MCG: 50 INJECTION, SOLUTION INTRAMUSCULAR; INTRAVENOUS at 08:23

## 2019-01-01 RX ADMIN — SODIUM CHLORIDE 100 ML: 900 INJECTION, SOLUTION INTRAVENOUS at 20:03

## 2019-01-01 RX ADMIN — FILGRASTIM-SNDZ 480 MCG: 480 INJECTION, SOLUTION INTRAVENOUS; SUBCUTANEOUS at 15:08

## 2019-01-01 RX ADMIN — SODIUM CHLORIDE 500 ML: 900 INJECTION, SOLUTION INTRAVENOUS at 13:42

## 2019-01-01 RX ADMIN — SODIUM CHLORIDE 1000 ML: 900 INJECTION, SOLUTION INTRAVENOUS at 09:40

## 2019-01-01 RX ADMIN — LEUCOVORIN CALCIUM 1176 MG: 350 INJECTION, POWDER, LYOPHILIZED, FOR SOLUTION INTRAMUSCULAR; INTRAVENOUS at 14:05

## 2019-01-01 RX ADMIN — Medication 10 ML: at 13:35

## 2019-01-01 RX ADMIN — DEXAMETHASONE SODIUM PHOSPHATE 12 MG: 4 INJECTION, SOLUTION INTRAMUSCULAR; INTRAVENOUS at 13:10

## 2019-01-01 RX ADMIN — Medication 10 ML: at 14:52

## 2019-01-01 RX ADMIN — FLUOROURACIL 1000 MG: 50 INJECTION, SOLUTION INTRAVENOUS at 13:10

## 2019-01-01 RX ADMIN — ROCURONIUM BROMIDE 50 MG: 10 INJECTION, SOLUTION INTRAVENOUS at 07:24

## 2019-01-01 RX ADMIN — IPRATROPIUM BROMIDE AND ALBUTEROL SULFATE 3 ML: .5; 3 SOLUTION RESPIRATORY (INHALATION) at 08:41

## 2019-01-01 RX ADMIN — FENTANYL CITRATE 50 MCG: 50 INJECTION, SOLUTION INTRAMUSCULAR; INTRAVENOUS at 09:44

## 2019-01-01 RX ADMIN — FENTANYL CITRATE 100 MCG: 50 INJECTION, SOLUTION INTRAMUSCULAR; INTRAVENOUS at 07:23

## 2019-01-01 RX ADMIN — Medication 10 ML: at 11:23

## 2019-01-01 RX ADMIN — DEXTROSE MONOHYDRATE 1000 ML: 5 INJECTION, SOLUTION INTRAVENOUS at 15:10

## 2019-01-01 RX ADMIN — ONDANSETRON 8 MG: 2 INJECTION INTRAMUSCULAR; INTRAVENOUS at 14:00

## 2019-01-01 RX ADMIN — SODIUM CHLORIDE 1000 ML: 9 INJECTION, SOLUTION INTRAMUSCULAR; INTRAVENOUS; SUBCUTANEOUS at 15:57

## 2019-01-01 RX ADMIN — DEXTROSE MONOHYDRATE 25 ML/HR: 5 INJECTION, SOLUTION INTRAVENOUS at 14:45

## 2019-01-01 RX ADMIN — SODIUM CHLORIDE 25 ML/HR: 900 INJECTION, SOLUTION INTRAVENOUS at 09:45

## 2019-01-01 RX ADMIN — Medication 20 ML: at 12:45

## 2019-01-01 RX ADMIN — DEXTROSE MONOHYDRATE 25 ML/HR: 5 INJECTION, SOLUTION INTRAVENOUS at 10:00

## 2019-01-01 RX ADMIN — SODIUM CHLORIDE, SODIUM LACTATE, POTASSIUM CHLORIDE, AND CALCIUM CHLORIDE 100 ML/HR: 600; 310; 30; 20 INJECTION, SOLUTION INTRAVENOUS at 06:07

## 2019-01-01 RX ADMIN — SODIUM CHLORIDE 1000 ML: 900 INJECTION, SOLUTION INTRAVENOUS at 10:05

## 2019-01-01 RX ADMIN — ROCURONIUM BROMIDE 5 MG: 10 INJECTION, SOLUTION INTRAVENOUS at 09:10

## 2019-01-01 RX ADMIN — Medication 10 ML: at 10:38

## 2019-01-01 RX ADMIN — DIPHENHYDRAMINE HYDROCHLORIDE 50 MG: 50 INJECTION, SOLUTION INTRAMUSCULAR; INTRAVENOUS at 11:00

## 2019-01-01 RX ADMIN — Medication 10 ML: at 13:40

## 2019-01-01 RX ADMIN — ONDANSETRON 8 MG: 2 INJECTION INTRAMUSCULAR; INTRAVENOUS at 13:00

## 2019-01-01 RX ADMIN — MIDAZOLAM 1 MG: 1 INJECTION INTRAMUSCULAR; INTRAVENOUS at 13:03

## 2019-01-01 RX ADMIN — FLUOROURACIL 7000 MG: 50 INJECTION, SOLUTION INTRAVENOUS at 16:15

## 2019-01-01 RX ADMIN — ONDANSETRON 4 MG: 2 INJECTION INTRAMUSCULAR; INTRAVENOUS at 09:34

## 2019-01-01 RX ADMIN — Medication 10 ML: at 17:07

## 2019-01-01 RX ADMIN — DEXAMETHASONE SODIUM PHOSPHATE 12 MG: 4 INJECTION, SOLUTION INTRAMUSCULAR; INTRAVENOUS at 10:20

## 2019-01-01 RX ADMIN — FLUOROURACIL 6000 MG: 50 INJECTION, SOLUTION INTRAVENOUS at 13:37

## 2019-01-01 RX ADMIN — IOPAMIDOL 100 ML: 755 INJECTION, SOLUTION INTRAVENOUS at 20:03

## 2019-01-01 RX ADMIN — OXALIPLATIN 232 MG: 5 INJECTION, SOLUTION, CONCENTRATE INTRAVENOUS at 09:11

## 2019-01-01 RX ADMIN — SODIUM CHLORIDE, PRESERVATIVE FREE 500 UNITS: 5 INJECTION INTRAVENOUS at 15:31

## 2019-01-01 RX ADMIN — FLUOROURACIL 1176 MG: 50 INJECTION, SOLUTION INTRAVENOUS at 16:08

## 2019-01-01 RX ADMIN — GLUCAGON HYDROCHLORIDE 1 MG: 1 INJECTION, POWDER, FOR SOLUTION INTRAMUSCULAR; INTRAVENOUS; SUBCUTANEOUS at 12:56

## 2019-01-01 RX ADMIN — LIDOCAINE HYDROCHLORIDE 15 ML: 20 SOLUTION ORAL; TOPICAL at 12:56

## 2019-01-01 RX ADMIN — HYDROMORPHONE HYDROCHLORIDE 0.5 MG: 2 INJECTION, SOLUTION INTRAMUSCULAR; INTRAVENOUS; SUBCUTANEOUS at 10:28

## 2019-01-01 RX ADMIN — OXALIPLATIN 250 MG: 5 INJECTION, SOLUTION, CONCENTRATE INTRAVENOUS at 12:00

## 2019-01-01 RX ADMIN — ROCURONIUM BROMIDE 5 MG: 10 INJECTION, SOLUTION INTRAVENOUS at 08:41

## 2019-01-01 RX ADMIN — SODIUM CHLORIDE 1000 ML: 900 INJECTION, SOLUTION INTRAVENOUS at 13:45

## 2019-01-01 RX ADMIN — FLUOROURACIL 6500 MG: 50 INJECTION, SOLUTION INTRAVENOUS at 13:46

## 2019-01-01 RX ADMIN — SODIUM CHLORIDE 150 MG: 900 INJECTION, SOLUTION INTRAVENOUS at 10:08

## 2019-01-01 RX ADMIN — SODIUM CHLORIDE 150 MG: 900 INJECTION, SOLUTION INTRAVENOUS at 15:40

## 2019-01-01 RX ADMIN — IPRATROPIUM BROMIDE AND ALBUTEROL SULFATE 3 ML: .5; 3 SOLUTION RESPIRATORY (INHALATION) at 23:57

## 2019-01-01 RX ADMIN — FLUOROURACIL 1000 MG: 50 INJECTION, SOLUTION INTRAVENOUS at 14:32

## 2019-01-01 RX ADMIN — NEOSTIGMINE METHYLSULFATE 5 MG: 1 INJECTION INTRAVENOUS at 09:31

## 2019-01-01 RX ADMIN — OXALIPLATIN 232 MG: 5 INJECTION, SOLUTION, CONCENTRATE INTRAVENOUS at 11:00

## 2019-01-01 RX ADMIN — OXALIPLATIN 199 MG: 5 INJECTION, SOLUTION, CONCENTRATE INTRAVENOUS at 15:15

## 2019-01-01 RX ADMIN — DIPHENHYDRAMINE HYDROCHLORIDE 50 MG: 50 INJECTION, SOLUTION INTRAMUSCULAR; INTRAVENOUS at 12:19

## 2019-01-01 RX ADMIN — DEXAMETHASONE SODIUM PHOSPHATE 12 MG: 4 INJECTION, SOLUTION INTRAMUSCULAR; INTRAVENOUS at 11:10

## 2019-01-01 RX ADMIN — DEXTROSE MONOHYDRATE 25 ML/HR: 5 INJECTION, SOLUTION INTRAVENOUS at 11:45

## 2019-01-01 RX ADMIN — Medication 10 ML: at 10:33

## 2019-02-05 ENCOUNTER — ANESTHESIA EVENT (OUTPATIENT)
Dept: ENDOSCOPY | Age: 54
End: 2019-02-05
Payer: COMMERCIAL

## 2019-02-05 ENCOUNTER — HOSPITAL ENCOUNTER (OUTPATIENT)
Age: 54
Setting detail: OUTPATIENT SURGERY
Discharge: HOME | End: 2019-02-05
Attending: INTERNAL MEDICINE | Admitting: INTERNAL MEDICINE
Payer: COMMERCIAL

## 2019-02-05 ENCOUNTER — ANESTHESIA (OUTPATIENT)
Dept: ENDOSCOPY | Age: 54
End: 2019-02-05
Payer: COMMERCIAL

## 2019-02-05 VITALS
OXYGEN SATURATION: 96 % | HEART RATE: 70 BPM | HEIGHT: 75 IN | BODY MASS INDEX: 39.17 KG/M2 | RESPIRATION RATE: 19 BRPM | DIASTOLIC BLOOD PRESSURE: 72 MMHG | SYSTOLIC BLOOD PRESSURE: 166 MMHG | WEIGHT: 315 LBS | TEMPERATURE: 96.8 F

## 2019-02-05 PROCEDURE — 74011250636 HC RX REV CODE- 250/636

## 2019-02-05 PROCEDURE — 76060000032 HC ANESTHESIA 0.5 TO 1 HR: Performed by: INTERNAL MEDICINE

## 2019-02-05 PROCEDURE — 76040000026: Performed by: INTERNAL MEDICINE

## 2019-02-05 PROCEDURE — 74011250636 HC RX REV CODE- 250/636: Performed by: ANESTHESIOLOGY

## 2019-02-05 PROCEDURE — 77030013991 HC SNR POLYP ENDOSC BSC -A: Performed by: INTERNAL MEDICINE

## 2019-02-05 PROCEDURE — 77030009426 HC FCPS BIOP ENDOSC BSC -B: Performed by: INTERNAL MEDICINE

## 2019-02-05 PROCEDURE — 88305 TISSUE EXAM BY PATHOLOGIST: CPT

## 2019-02-05 RX ORDER — SODIUM CHLORIDE 0.9 % (FLUSH) 0.9 %
5-40 SYRINGE (ML) INJECTION AS NEEDED
Status: DISCONTINUED | OUTPATIENT
Start: 2019-02-05 | End: 2019-02-05 | Stop reason: HOSPADM

## 2019-02-05 RX ORDER — MIDAZOLAM HYDROCHLORIDE 1 MG/ML
2 INJECTION, SOLUTION INTRAMUSCULAR; INTRAVENOUS
Status: CANCELLED | OUTPATIENT
Start: 2019-02-05 | End: 2019-02-06

## 2019-02-05 RX ORDER — SODIUM CHLORIDE 0.9 % (FLUSH) 0.9 %
5-40 SYRINGE (ML) INJECTION EVERY 8 HOURS
Status: DISCONTINUED | OUTPATIENT
Start: 2019-02-05 | End: 2019-02-05 | Stop reason: HOSPADM

## 2019-02-05 RX ORDER — SODIUM CHLORIDE, SODIUM LACTATE, POTASSIUM CHLORIDE, CALCIUM CHLORIDE 600; 310; 30; 20 MG/100ML; MG/100ML; MG/100ML; MG/100ML
100 INJECTION, SOLUTION INTRAVENOUS CONTINUOUS
Status: CANCELLED | OUTPATIENT
Start: 2019-02-05 | End: 2019-02-06

## 2019-02-05 RX ORDER — LIDOCAINE HYDROCHLORIDE 20 MG/ML
INJECTION, SOLUTION EPIDURAL; INFILTRATION; INTRACAUDAL; PERINEURAL AS NEEDED
Status: DISCONTINUED | OUTPATIENT
Start: 2019-02-05 | End: 2019-02-05 | Stop reason: HOSPADM

## 2019-02-05 RX ORDER — SODIUM CHLORIDE, SODIUM LACTATE, POTASSIUM CHLORIDE, CALCIUM CHLORIDE 600; 310; 30; 20 MG/100ML; MG/100ML; MG/100ML; MG/100ML
100 INJECTION, SOLUTION INTRAVENOUS CONTINUOUS
Status: DISCONTINUED | OUTPATIENT
Start: 2019-02-05 | End: 2019-02-05 | Stop reason: HOSPADM

## 2019-02-05 RX ORDER — LIDOCAINE HYDROCHLORIDE 10 MG/ML
0.3 INJECTION INFILTRATION; PERINEURAL ONCE
Status: CANCELLED | OUTPATIENT
Start: 2019-02-05 | End: 2019-02-05

## 2019-02-05 RX ORDER — PROPOFOL 10 MG/ML
INJECTION, EMULSION INTRAVENOUS AS NEEDED
Status: DISCONTINUED | OUTPATIENT
Start: 2019-02-05 | End: 2019-02-05 | Stop reason: HOSPADM

## 2019-02-05 RX ORDER — PROPOFOL 10 MG/ML
INJECTION, EMULSION INTRAVENOUS
Status: DISCONTINUED | OUTPATIENT
Start: 2019-02-05 | End: 2019-02-05 | Stop reason: HOSPADM

## 2019-02-05 RX ADMIN — SODIUM CHLORIDE, SODIUM LACTATE, POTASSIUM CHLORIDE, AND CALCIUM CHLORIDE 100 ML/HR: 600; 310; 30; 20 INJECTION, SOLUTION INTRAVENOUS at 12:00

## 2019-02-05 RX ADMIN — PROPOFOL 60 MG: 10 INJECTION, EMULSION INTRAVENOUS at 12:33

## 2019-02-05 RX ADMIN — LIDOCAINE HYDROCHLORIDE 100 MG: 20 INJECTION, SOLUTION EPIDURAL; INFILTRATION; INTRACAUDAL; PERINEURAL at 12:33

## 2019-02-05 RX ADMIN — PROPOFOL 180 MCG/KG/MIN: 10 INJECTION, EMULSION INTRAVENOUS at 12:33

## 2019-02-05 RX ADMIN — PROPOFOL 30 MG: 10 INJECTION, EMULSION INTRAVENOUS at 12:35

## 2019-02-05 NOTE — PROCEDURES
Endoscopic Gastroduodenoscopy Procedure Note Indications: Dysphagia, [facility] gastric cancer Anesthesia/Sedation: MAC IV  
 
Pre-Procedure Physical: 
 
Current Facility-Administered Medications Medication Dose Route Frequency  lactated Ringers infusion  100 mL/hr IntraVENous CONTINUOUS  
 sodium chloride (NS) flush 5-40 mL  5-40 mL IntraVENous Q8H  
 sodium chloride (NS) flush 5-40 mL  5-40 mL IntraVENous PRN Facility-Administered Medications Ordered in Other Encounters Medication Dose Route Frequency  propofol (DIPRIVAN) 10 mg/mL injection    PRN  propofol (DIPRIVAN) 10 mg/mL injection    CONTINUOUS  
 lidocaine (PF) (XYLOCAINE) 20 mg/mL (2 %) injection   IntraVENous PRN Patient has no known allergies. Patient Vitals for the past 8 hrs: 
 BP Temp Pulse Resp SpO2 Height Weight  
02/05/19 1146 112/53        
02/05/19 1140   67 18 93 %    
02/05/19 1106  98.7 °F (37.1 °C)    6' 2.5\" (1.892 m) (!) 176.9 kg (390 lb) Exam 
   
Airway: clear Heart: normal S1and S2 Lungs: clear bilateral 
Abdomen: soft, nontender, bowel sounds present and normal in all quads Mental Status: awake, alert and oriented to person, place and time Procedure Details Informed consent was obtained for the procedure, including conscious sedation. Risks of pancreatitis, infection, perforation, hemorrhage, adverse drug reaction and aspiration were discussed. The patient was placed in the left lateral decubitus position. Based on the pre-procedure assessment, including review of the patient's medical history, medications, allergies, and review of systems, he had been deemed to be an appropriate candidate for conscious sedation; he was therefore sedated with the medications listed below. He was monitored continuously with ECG tracing, pulse oximetry, blood pressure monitoring, and direct observation.  
   
The EGD gastroscope was inserted into the mouth and advanced under direct vision to the second portion of the duodenum. A careful inspection was made as the gastroscope was withdrawn, including a retroflexed view of the proximal stomach; findings and interventions are described below. Appropriate photodocumentation was obtained. Findings:  
Esophagus- Malignant-looking circumferential esophageal mass from 36 to 44 cm from the incisors. Scope could traverse fine. Mass was biopsied. Stomach- Normal. 
Duodenum- Normal. 
 
Therapies: Biopsy Specimens: Esophageal mass Estimated Blood Loss: 0 cc Complications:   None; patient tolerated the procedure well. Attending Attestation:  I performed the procedure. Impression:   
Malignant-looking circumferential distal esophageal mass. Recommendations: 
Await path results CT Chest 
EUS [facility]

## 2019-02-05 NOTE — H&P
[facility] Consult Note Referring Physician:  
 
Consult Date: 2/5/2019 Reason for Consult: Dysphagia, [address] gastric ca, CRC screening History of Present Illness:  Patient is a 62 y.o. male who is seen in consultation for Dysphagia, [address] gastric ca, CRC screening. Past Medical History:  
Diagnosis Date  Appendicitis  Chronic obstructive pulmonary disease (HCC)   
 nebulizer prn and rescue inhaler only  Chronic pain   
 hand, ankle  Depression   
 controlled with med  Erectile disorder due to medical condition in male patient  Gout   
 treated with Allopurinol. Last flareup 2013  Hyperlipidemia  Hypertension   
 managed with meds  Hypotestosteronemia  Morbid obesity ([facility] [address] 02/04/2019 BMI 49.4  Myocardial infarction [facility]) 2014  
 stents x2  RCA- 2014  LAD - 2016  Osteoarthritis  Unspecified sleep apnea   
 wears cpap with 2 LPM o2 Past Surgical History:  
Procedure Laterality Date  HX APPENDECTOMY  2003  HX CARPAL TUNNEL RELEASE Right 2014  HX CORONARY STENT PLACEMENT  2014 RCA x 1  
 HX CORONARY STENT PLACEMENT  2016 PCI of the lesion with a 4x8mm Xience Alpine RONN Family History Problem Relation Age of Onset  Heart Disease Mother  Hypertension Mother  Cancer Father  Heart Disease Father  Cancer Brother Social History Occupational History  Not on file Tobacco Use  Smoking status: Former Smoker Packs/day: 0.15 Years: 35.00 Pack years: 5.25 Last attempt to quit: 2016 Years since quitting: 3.0  Smokeless tobacco: Current User Substance and Sexual Activity  Alcohol use: No  
 Drug use: No  
 Sexual activity: Not on file Hospital Medications: 
Current Facility-Administered Medications Medication Dose Route Frequency  lactated Ringers infusion  100 mL/hr IntraVENous CONTINUOUS  
  sodium chloride (NS) flush 5-40 mL  5-40 mL IntraVENous Q8H  
 sodium chloride (NS) flush 5-40 mL  5-40 mL IntraVENous PRN Allergies: 
No Known Allergies Review of Systems: A comprehensive review of systems was negative except for that written in the History of Present Illness. Objective:  
 
Physical Exam: 
Vitals: 
Visit Vitals /53 Pulse 67 Temp 98.7 °F (37.1 °C) Resp 18 Ht 6' 2.5\" (1.892 m) Wt (!) 176.9 kg (390 lb) SpO2 93% BMI 49.40 kg/m² General: No acute distress. Skin:  Extremities and face reveal no rashes. No dennison erythema. No telangiectasias on the chest wall. HEENT: Sclerae anicteric. No oral ulcers. No abnormal pigmentation of the lips. The neck is supple. Cardiovascular: Regular rate and rhythm. No murmurs, gallops, or rubs. Respiratory:  Comfortable breathing  With no accessory muscle use. Clear breath sounds with no wheezes, rales, or rhonchi. GI:  Abdomen nondistended, soft, and nontender. Normal active bowel sounds. No enlargement of the liver or spleen. No masses palpable. Musculoskeletal:  No pitting edema of the lower legs. Extremities have good range of motion. Neurological:  Gross memory appears intact. Patient is alert and oriented. Psychiatric:  Mood appears appropriate with judgement intact. Lymphatic:  No cervical or supraclavicular adenopathy. Laboratory: No results for input(s): WBC, RBC, HGB, HCT, PLT, HGBEXT, HCTEXT, PLTEXT in the last 72 hours. No results for input(s): GLU, NA, K, CL, CO2, BUN, CREA, CA in the last 72 hours. No results for input(s): PTP, INR, APTT in the last 72 hours. No lab exists for component: INREXT No results for input(s): TBIL, CBIL, SGOT, GPT, AP, ALB, TP, AML, LPSE in the last 72 hours. Assessment: A 62 y.o. male with Dysphagia, [address] gastric ca, CRC screening Plan: 
  
 
EGD and colonoscopy Signed By: Kelley Jama MD   
 February 5, 2019

## 2019-02-05 NOTE — DISCHARGE INSTRUCTIONS
Gastrointestinal Esophagogastroduodenoscopy (EGD) - Upper Exam Discharge Instructions    1. Call Dr. Jackelin Barron for any problems or questions. 2. Contact the doctor's office for follow up appointment as directed. 3. Medication may cause drowsiness for several hours, therefore, do not drive or operate machinery for remainder of the day. 4. No alcohol today. 5. Ordinarily, you may resume regular diet and activity after exam unless otherwise specified by your physician. 6. For mild soreness in your throat you may use Cepacol throat lozenges or warm      Await path results  CT Chest  EUS  [facility]        Gastrointestinal Colonoscopy/Flexible Sigmoidoscopy - Lower Exam Discharge Instructions  1. Call Dr. Jackelin Barron at for any problems or questions. 2. Contact the doctors office for follow up appointment as directed  3. Medication may cause drowsiness for several hours, therefore, do not drive or operate machinery for remainder of the day. 4. No alcohol today. 5. Ordinarily, you may resume regular diet and activity after exam unless otherwise specified by your physician. 6. Because of air put into your colon during exam, you may experience some abdominal distension, relieved by the passage of gas, for several hours. 7. Contact your physician if you have any of the following:  a. Excessive amount of bleeding - large amount when having a bowel movement. Occasional specks of blood with bowel movement would not be unusual.  b. Severe abdominal pain  c. Fever or Chills  d. Polyp Removal - follow these additional instructions  e. No Aspirin, Advil, Aleve, Nuprin, Ibuprofen, or medications that contain these drugs for 2 weeks. Any additional instructions: Follow up with pathology on polyps.  Repeat coloscopy in 3 years

## 2019-02-05 NOTE — ANESTHESIA POSTPROCEDURE EVALUATION
Procedure(s): ESOPHAGOGASTRODUODENOSCOPY (EGD) WITH DILATION 
COLONOSCOPY/ 50 
ESOPHAGOGASTRODUODENAL (EGD) BIOPSY ENDOSCOPIC POLYPECTOMY. Anesthesia Post Evaluation Multimodal analgesia: multimodal analgesia not used between 6 hours prior to anesthesia start to PACU discharge Patient location during evaluation: PACU Patient participation: complete - patient participated Level of consciousness: awake Pain management: adequate Airway patency: patent Anesthetic complications: no 
Cardiovascular status: acceptable Respiratory status: acceptable Hydration status: acceptable Post anesthesia nausea and vomiting:  none Visit Vitals /72 Pulse 70 Temp 36 °C (96.8 °F) Resp 19 Ht 6' 2.5\" (1.892 m) Wt (!) 176.9 kg (390 lb) SpO2 96% BMI 49.40 kg/m²

## 2019-02-05 NOTE — ANESTHESIA PREPROCEDURE EVALUATION
Anesthetic History No history of anesthetic complications Review of Systems / Medical History Patient summary reviewed and pertinent labs reviewed Pulmonary COPD (quit smoking 2016): mild Sleep apnea: CPAP Neuro/Psych Cardiovascular Hypertension CAD and cardiac stents (>1 year, no chest pain, followed by his family physician) Exercise tolerance: >4 METS 
  
GI/Hepatic/Renal 
  
 
 
 
 
 
 Endo/Other Morbid obesity Other Findings Physical Exam 
 
Airway Mallampati: I 
TM Distance: > 6 cm Neck ROM: normal range of motion Mouth opening: Normal 
 
 Cardiovascular Rhythm: regular Rate: normal 
 
 
 
 Dental 
 
Dentition: Edentulous Pulmonary Decreased breath sounds: bilateral 
 
 
 
 
 Abdominal 
 
 
 
 Other Findings Anesthetic Plan ASA: 3 Anesthesia type: total IV anesthesia Induction: Intravenous Anesthetic plan and risks discussed with: Patient

## 2019-02-05 NOTE — PROCEDURES
Colonoscopy Procedure Note Indications: CRC screening Anesthesia/Sedation: MAC IV  
 
Pre-Procedure Physical: 
Current Facility-Administered Medications Medication Dose Route Frequency  lactated Ringers infusion  100 mL/hr IntraVENous CONTINUOUS  
 sodium chloride (NS) flush 5-40 mL  5-40 mL IntraVENous Q8H  
 sodium chloride (NS) flush 5-40 mL  5-40 mL IntraVENous PRN Facility-Administered Medications Ordered in Other Encounters Medication Dose Route Frequency  propofol (DIPRIVAN) 10 mg/mL injection    PRN  propofol (DIPRIVAN) 10 mg/mL injection    CONTINUOUS  
 lidocaine (PF) (XYLOCAINE) 20 mg/mL (2 %) injection   IntraVENous PRN Patient has no known allergies. Patient Vitals for the past 8 hrs: 
 BP Temp Pulse Resp SpO2 Height Weight  
02/05/19 1146 112/53        
02/05/19 1140   67 18 93 %    
02/05/19 1106  98.7 °F (37.1 °C)    6' 2.5\" (1.892 m) (!) 176.9 kg (390 lb) Exam: Airway: clear Heart: normal S1and S2 Lungs: clear bilateral 
Abdomen: soft, nontender, bowel sounds present and normal in all quads Mental Status: awake, alert and oriented to person, place and time Procedure Details Informed consent was obtained for the procedure, including sedation. Risks of perforation, hemorrhage, adverse drug reaction and aspiration were discussed. The patient was placed in the left lateral decubitus position. Based on the pre-procedure assessment, including review of the patient's medical history, medications, allergies, and review of systems, he had been deemed to be an appropriate candidate for conscious sedation; he was therefore sedated with the medications listed below. The patient was monitored continuously with ECG tracing, pulse oximetry, blood pressure monitoring, and direct observations. A rectal examination was performed.  The colonoscope was inserted into the rectum and advanced under direct vision to the cecum. The quality of the colonic preparation was good. A careful inspection was made as the colonoscope was withdrawn, including a retroflexed view of the rectum; findings and interventions are described below. Appropriate photodocumentation was obtained. Findings:  
Single 1 cm sessile polyp removed by hot snare from the ascending colon. Single 8 mm sessile polyp removed by hot snare from the rectum. Specimens: None Estimated Blood Loss: 0 cc Complications: None; patient tolerated the procedure well. Attending Attestation: I performed the procedure. Impression:   
Single 1 cm sessile polyp removed by hot snare from the ascending colon. Single 8 mm sessile polyp removed by hot snare from the rectum. Recommendations:  
Repeat colonoscopy in 3 years.

## 2019-02-12 ENCOUNTER — HOSPITAL ENCOUNTER (OUTPATIENT)
Dept: CT IMAGING | Age: 54
Discharge: HOME | End: 2019-02-12
Attending: INTERNAL MEDICINE
Payer: COMMERCIAL

## 2019-02-12 ENCOUNTER — ANESTHESIA EVENT (OUTPATIENT)
Dept: ENDOSCOPY | Age: 54
End: 2019-02-12
Payer: COMMERCIAL

## 2019-02-12 DIAGNOSIS — K22.89 ESOPHAGEAL MASS: ICD-10-CM

## 2019-02-12 LAB — CREAT BLD-MCNC: 1 MG/DL (ref 0.8–1.5)

## 2019-02-12 PROCEDURE — 74011636320 HC RX REV CODE- 636/320: Performed by: INTERNAL MEDICINE

## 2019-02-12 PROCEDURE — 71260 CT THORAX DX C+: CPT

## 2019-02-12 PROCEDURE — 74011000258 HC RX REV CODE- 258: Performed by: INTERNAL MEDICINE

## 2019-02-12 PROCEDURE — 82565 ASSAY OF CREATININE: CPT

## 2019-02-12 RX ORDER — SODIUM CHLORIDE, SODIUM LACTATE, POTASSIUM CHLORIDE, CALCIUM CHLORIDE 600; 310; 30; 20 MG/100ML; MG/100ML; MG/100ML; MG/100ML
100 INJECTION, SOLUTION INTRAVENOUS CONTINUOUS
Status: CANCELLED | OUTPATIENT
Start: 2019-02-12

## 2019-02-12 RX ORDER — SODIUM CHLORIDE 0.9 % (FLUSH) 0.9 %
5-40 SYRINGE (ML) INJECTION AS NEEDED
Status: CANCELLED | OUTPATIENT
Start: 2019-02-12

## 2019-02-12 RX ORDER — SODIUM CHLORIDE 0.9 % (FLUSH) 0.9 %
10 SYRINGE (ML) INJECTION
Status: COMPLETED | OUTPATIENT
Start: 2019-02-12 | End: 2019-02-12

## 2019-02-12 RX ORDER — SODIUM CHLORIDE 0.9 % (FLUSH) 0.9 %
5-40 SYRINGE (ML) INJECTION EVERY 8 HOURS
Status: CANCELLED | OUTPATIENT
Start: 2019-02-12

## 2019-02-12 RX ADMIN — SODIUM CHLORIDE 100 ML: 900 INJECTION, SOLUTION INTRAVENOUS at 16:31

## 2019-02-12 RX ADMIN — IOPAMIDOL 80 ML: 755 INJECTION, SOLUTION INTRAVENOUS at 16:31

## 2019-02-12 RX ADMIN — Medication 10 ML: at 16:31

## 2019-02-13 ENCOUNTER — HOSPITAL ENCOUNTER (OUTPATIENT)
Age: 54
Setting detail: OUTPATIENT SURGERY
Discharge: HOME | End: 2019-02-13
Attending: INTERNAL MEDICINE | Admitting: INTERNAL MEDICINE
Payer: COMMERCIAL

## 2019-02-13 ENCOUNTER — ANESTHESIA (OUTPATIENT)
Dept: ENDOSCOPY | Age: 54
End: 2019-02-13
Payer: COMMERCIAL

## 2019-02-13 VITALS
WEIGHT: 315 LBS | OXYGEN SATURATION: 94 % | SYSTOLIC BLOOD PRESSURE: 138 MMHG | DIASTOLIC BLOOD PRESSURE: 86 MMHG | TEMPERATURE: 96.8 F | HEART RATE: 63 BPM | RESPIRATION RATE: 18 BRPM | BODY MASS INDEX: 49.4 KG/M2

## 2019-02-13 PROCEDURE — 76060000032 HC ANESTHESIA 0.5 TO 1 HR: Performed by: INTERNAL MEDICINE

## 2019-02-13 PROCEDURE — 77030008969: Performed by: INTERNAL MEDICINE

## 2019-02-13 PROCEDURE — 76040000026: Performed by: INTERNAL MEDICINE

## 2019-02-13 PROCEDURE — 74011250636 HC RX REV CODE- 250/636: Performed by: ANESTHESIOLOGY

## 2019-02-13 PROCEDURE — 74011250636 HC RX REV CODE- 250/636

## 2019-02-13 RX ORDER — PROPOFOL 10 MG/ML
INJECTION, EMULSION INTRAVENOUS
Status: DISCONTINUED | OUTPATIENT
Start: 2019-02-13 | End: 2019-02-13 | Stop reason: HOSPADM

## 2019-02-13 RX ORDER — LIDOCAINE HYDROCHLORIDE 20 MG/ML
INJECTION, SOLUTION EPIDURAL; INFILTRATION; INTRACAUDAL; PERINEURAL AS NEEDED
Status: DISCONTINUED | OUTPATIENT
Start: 2019-02-13 | End: 2019-02-13 | Stop reason: HOSPADM

## 2019-02-13 RX ORDER — SODIUM CHLORIDE, SODIUM LACTATE, POTASSIUM CHLORIDE, CALCIUM CHLORIDE 600; 310; 30; 20 MG/100ML; MG/100ML; MG/100ML; MG/100ML
100 INJECTION, SOLUTION INTRAVENOUS CONTINUOUS
Status: DISCONTINUED | OUTPATIENT
Start: 2019-02-13 | End: 2019-02-13 | Stop reason: HOSPADM

## 2019-02-13 RX ORDER — PROPOFOL 10 MG/ML
INJECTION, EMULSION INTRAVENOUS AS NEEDED
Status: DISCONTINUED | OUTPATIENT
Start: 2019-02-13 | End: 2019-02-13 | Stop reason: HOSPADM

## 2019-02-13 RX ADMIN — PROPOFOL 50 MG: 10 INJECTION, EMULSION INTRAVENOUS at 14:03

## 2019-02-13 RX ADMIN — PROPOFOL 150 MCG/KG/MIN: 10 INJECTION, EMULSION INTRAVENOUS at 14:03

## 2019-02-13 RX ADMIN — SODIUM CHLORIDE, SODIUM LACTATE, POTASSIUM CHLORIDE, AND CALCIUM CHLORIDE 100 ML/HR: 600; 310; 30; 20 INJECTION, SOLUTION INTRAVENOUS at 12:51

## 2019-02-13 RX ADMIN — PROPOFOL 20 MG: 10 INJECTION, EMULSION INTRAVENOUS at 14:14

## 2019-02-13 RX ADMIN — LIDOCAINE HYDROCHLORIDE 100 MG: 20 INJECTION, SOLUTION EPIDURAL; INFILTRATION; INTRACAUDAL; PERINEURAL at 14:03

## 2019-02-13 RX ADMIN — PROPOFOL 30 MG: 10 INJECTION, EMULSION INTRAVENOUS at 14:06

## 2019-02-13 NOTE — DISCHARGE INSTRUCTIONS
Gastrointestinal Esophagogastroduodenoscopy (EGD)/ Endoscopic Ultrasound(EUS)- Upper Exam Discharge Instructions    1. Call Dr. Duane Bhat at [phone]  for any problems or questions. 2. Contact the doctor's office for follow up appointment as directed. 3. Medication may cause drowsiness for several hours, therefore, do not drive or operate machinery for remainder of the day. 4. No alcohol today. 5. Ordinarily, you may resume regular diet and activity after exam unless otherwise specified by your physician. 6. For mild soreness in your throat you may use Cepacol throat lozenges or warm  salt-water gargles as needed. Any additional instructions:   1. Resume diet and current medications.   2. Follow up with Surgery and Oncology as currently scheduled.               Instructions given to Selena Stark and other family members

## 2019-02-13 NOTE — ANESTHESIA POSTPROCEDURE EVALUATION
Procedure(s): ENDOSCOPIC ULTRASOUND (EUS). Anesthesia Post Evaluation Patient location during evaluation: bedside Patient participation: complete - patient participated Level of consciousness: responsive to verbal stimuli Pain management: satisfactory to patient Airway patency: patent Anesthetic complications: no 
Cardiovascular status: hemodynamically stable Respiratory status: spontaneous ventilation Hydration status: stable Visit Vitals /77 Pulse 65 Temp 36 °C (96.8 °F) Resp 20 Wt (!) 176.9 kg (390 lb) SpO2 94% BMI 49.40 kg/m²

## 2019-02-13 NOTE — H&P
History and Physical for Endoscopic Ultrasound Date: 2/13/2019 History of Present Illness:  Patient presents to undergo endoscopic ultrasound for locoregional staging of esophageal cancer. Patient reports dysphagia, but denies any diarrhea, jaundice, fevers or chills. Past Medical History:  
Diagnosis Date  Appendicitis  Chronic obstructive pulmonary disease (HCC)   
 nebulizer prn and rescue inhaler only  Chronic pain   
 hand, ankle  Depression   
 controlled with med  Erectile disorder due to medical condition in male patient  Gout   
 treated with Allopurinol. Last flareup 2013  Hyperlipidemia  Hypertension   
 managed with meds  Hypotestosteronemia  Morbid obesity ([address] 02/04/2019 BMI 49.4  Myocardial infarction [facility]) 2014  
 stents x2  RCA- 2014  LAD - 2016  Osteoarthritis  Unspecified sleep apnea   
 wears cpap with 2 LPM o2 Past Surgical History:  
Procedure Laterality Date  CARDIAC SURG PROCEDURE UNLIST Heart stent x 2  
 COLONOSCOPY N/A 2/5/2019 COLONOSCOPY/ 50 performed by Yash Estrada MD at [facility] HX APPENDECTOMY  2003  HX CARPAL TUNNEL RELEASE Right 2014  HX CORONARY STENT PLACEMENT  2014 RCA x 1  
 HX CORONARY STENT PLACEMENT  2016 PCI of the lesion with a 4x8mm Xience Alpine RONN Family History Problem Relation Age of Onset  Heart Disease Mother  Hypertension Mother  Cancer Father  Heart Disease Father  Cancer Brother Social History Tobacco Use  Smoking status: Former Smoker Packs/day: 0.15 Years: 35.00 Pack years: 5.25 Last attempt to quit: 2016 Years since quitting: 3.1  Smokeless tobacco: Current User Substance Use Topics  Alcohol use: No  
 
  
No Known Allergies Current Facility-Administered Medications Medication Dose Route Frequency  lactated Ringers infusion  100 mL/hr IntraVENous CONTINUOUS  
  
 
 Review of Systems: A detailed 10 organ review of systems is obtained with pertinent positives as listed in the History of Present Illness. All others are negative. Objective:  
 
Physical Exam: 
Visit Vitals /67 Pulse 62 Temp 99.2 °F (37.3 °C) Resp 18 Wt (!) 176.9 kg (390 lb) SpO2 94% BMI 49.40 kg/m² General:  Alert and oriented. Heart: Regular rate and rhythm Lungs:  Clear to auscultation bilaterally Abdomen: Soft, nontender, nondistended Impression/Plan:  
 
Proceed with EUS as planned. I have discussed with the patient the technique, benefits, alternatives, and risks of the procedure, including medication reaction, immediate or delayed bleeding, or perforation of the gastrointestinal tract. Signed By: Kendra Santo MD   
 February 13, 2019

## 2019-02-13 NOTE — ANESTHESIA PREPROCEDURE EVALUATION
Anesthetic History No history of anesthetic complications Review of Systems / Medical History Patient summary reviewed and pertinent labs reviewed Pulmonary COPD (quit smoking 2016): mild Sleep apnea: CPAP Neuro/Psych Psychiatric history Cardiovascular Hypertension Past MI, CAD and cardiac stents (>1 year, no chest pain, followed by his family physician) Exercise tolerance: >4 METS 
  
GI/Hepatic/Renal 
Within defined limits Endo/Other Morbid obesity and arthritis Other Findings Physical Exam 
 
Airway Mallampati: II 
TM Distance: > 6 cm Neck ROM: normal range of motion Mouth opening: Normal 
 
 Cardiovascular Rhythm: regular Rate: normal 
 
 
 
 Dental 
 
Dentition: Edentulous Pulmonary Decreased breath sounds: bilateral 
 
 
 
 
 Abdominal 
 
 
 
 Other Findings Anesthetic Plan ASA: 3 Anesthesia type: total IV anesthesia Induction: Intravenous Anesthetic plan and risks discussed with: Patient

## 2019-02-13 NOTE — OP NOTES
Gastroenterology Procedure Note Procedure:  Endoscopic ultrasound with Doppler Date of Procedure:  2/13/2019 Patient:  Ki Yun     1965 Indication:  Locoregional staging of esophageal cancer Sedation:  MAC Pre-Procedure Physical Exam: 
 
Mental status:  alert and oriented Airway:  normal oropharyngeal airway and neck mobility CV:  regular rate and rhythm Respiratory:  clear to auscultation Procedure: A History and Physical has been performed, and patient medication allergies have been 
reviewed. Risks of perforation, hemorrhage, adverse drug reaction, and aspiration were discussed. Informed consent was obtained for the procedure, including sedation. The patient was placed in the left lateral decubitus position. The heart rate, oxygen saturations, blood pressure, and response to care were monitored throughout the procedure. The radial echoendoscope was passed through the mouth and advanced under direct vision to 42 cm from the incisors. The scope could not safely traverse the mass and therefore was not further advanced. As the scope was slowly withdrawn, detailed endoscopic images were obtained from the surrounding organs. The patient tolerated the procedure well. Findings:  
 
ENDOSCOPIC FINDINGS:  Limited views of the mucosa with the echoendoscope reveals a malignant esophageal stricture. ESOPHAGUS:  At a distance 36 to 42 cm from the incisors, there is severe circumferential wall thickening to 17 mm maximally. There is a hypoechoic, heterogeneous mass that involves the esophageal mucosa, submucosa and muscularis propria. There are numerous small tumor pseudopodia extending through the muscularis propria into the adventitia. There is no visualized invasion of adjacent organs. There is only 1.3 mm of intervening echoplane between the mass and the aortic wall.  Adjacent to the mass there two suspicious lymph nodes. One is 10 x 7 mm, round, hypoechoic and with smooth margins. The other is 14 x 7 mm, oval-shaped, hypoechoic and with smooth margins. As the scope could not safely traverse the mass, further examination was not possible. Endoscopic imaging could not be performed of the distal aspect of the tumor and surrounding tissues. Transgastric imaging could not be performed. Specimen:  No 
 
Estimated Blood Loss:  None Implant:  None Impression: 1. Esophageal cancer. This is staged at least T3N1Mx based on endoscopic ultrasound criteria. As the tumor could not be traversed, the distal extent could not be evaluated for invasion of adjacent structures such as the diaphragm. Thus, the possibility of understaging exists. Plan: 1. Resume diet and current medications. 2. Follow up with Surgery and Oncology as currently scheduled.  
 
Signed: 
Pranav Story MD 
2/13/2019 
2:24 PM

## 2019-02-19 PROBLEM — C15.5 MALIGNANT NEOPLASM OF LOWER THIRD OF ESOPHAGUS (HCC): Status: ACTIVE | Noted: 2019-01-01

## 2019-03-08 NOTE — NURSE NAVIGATOR
Consult Esophageal cancer. Ct chest 2-12-19. Pet scan 3-5-19. Pt states has lost greater then 100 lbs since last year. He was trying. Now difficulty eating certain foods. Denies pain. CT SIM TO BE SCHEDULED AFTER FEEDING TUBE PLACEMENT PER DR. Gita Neil. PT WILL BE SIMMED AT [facility] DUE TO SIZE. Emailed Jodie GO to notify us when FT placement is scheduled.     Joel Max RN

## 2019-03-08 NOTE — PROGRESS NOTES
Patient: Elier Hebert MRN: [id number]  SSN: [id number]    YOB: 1965  Age: 62 y.o. 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 PREVIOUS TREATMENT:  1) None    HISTORY OF PRESENT ILLNESS:  Elier Hebert is a 62 y.o. male who I am seeing at the request of Dr. Elliot Whitman. He does have a pertinent family history of gastric cancer in his father ( at age 72) and brother with \"lymphoid and liver\" cancer ( at age 34). He originally presented to GI with dysphagia. He underwent EGD 19 which revealed a malignant appearing circumferential esophageal mass from 36 to 44 cm from incisors. Screening colonoscopy at the same time showed 2 benign polyps. 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 Her2 testing was negative. CT chest 19 showed a distal esophageal mass extending over approximately 7 cm to the GE junction. A single mildly large celiac axis node was considered suspicious for metastatic disease. EUS 19 showed the mass involving the mucosa, submucosa, and muscularis propria, along with 2 suspicious lymph nodes adjacent to mass seen, T3N1Mx disease. Tumor could not be traversed therefore the distal extend could not be evaluated for invasion below the diaphragm. PET CT 19 confrimed the findings seen on EUS with M0 disease. He then met with Dr. Ana Burris in surgery and referred to Dr. Elliot Whitman who met with him 3/7/19 in consultation. A course of concurrent chemotherapy and radiation preoperatively was recommended and he was referred to our office for discussion of radiation 3/8/19.        PAST MEDICAL HISTORY:    Past Medical History:   Diagnosis Date    Appendicitis     Cancer [facility] [address]     Chronic obstructive pulmonary disease (HCC)     nebulizer prn and rescue inhaler only    Chronic pain     hand, ankle    Depression     controlled with med    Erectile disorder due to medical condition in male patient     Gout     treated with Allopurinol. Last flareup 2013    Hyperlipidemia     Hypertension     managed with meds    Hypotestosteronemia     MI (myocardial infarction) ([facility] [address]     stent x 2    Morbid obesity ([facility] [address] 02/04/2019    BMI 49.4    Myocardial infarction [facility]) 2014    stents x2  RCA- 2014  LAD - 2016    Osteoarthritis     Unspecified sleep apnea     wears cpap with 2 LPM o2        The patient denies history of collagen vascular diseases, pacemaker insertion, prior radiation or prior chemotherapy. PAST SURGICAL HISTORY:   Past Surgical History:   Procedure Laterality Date    CARDIAC SURG PROCEDURE UNLIST      Heart stent x 2    COLONOSCOPY N/A 2/5/2019    COLONOSCOPY/ 50 performed by Duane Crum MD at [place] [address] HX APPENDECTOMY  2003    HX CARPAL TUNNEL RELEASE Right 2014    HX CORONARY STENT PLACEMENT  2014    RCA x 1    HX CORONARY STENT PLACEMENT  2016     PCI of the lesion with a 4x8mm Xience Alpine RONN       MEDICATIONS:     Current Outpatient Medications:     cpap machine kit, by Does Not Apply route., Disp: , Rfl:     umeclidinium-vilanterol (ANORO ELLIPTA) 62.5-25 mcg/actuation inhaler, Take 1 Puff by inhalation daily. , Disp: , Rfl:     Oxygen, Take  by inhalation. , Disp: , Rfl:     LYRICA 75 mg capsule, TAKE 1 CAPSULE BY MOUTH THREE TIMES DAILY. MAX DAILY AMOUNT: 225 MG., Disp: 90 Cap, Rfl: 5    atorvastatin (LIPITOR) 80 mg tablet, Take 80 mg by mouth daily. , Disp: , Rfl:     ezetimibe (ZETIA) 10 mg tablet, TAKE ONE TABLET BY MOUTH ONCE DAILY, Disp: 90 Tab, Rfl: 3    allopurinol (ZYLOPRIM) 100 mg tablet, Take 1 Tab by mouth daily. , Disp: 90 Tab, Rfl: 3    HYDROcodone-acetaminophen (NORCO) 5-325 mg per tablet, Take 1 Tab by mouth every eight (8) hours as needed for Pain for up to 30 days.  Max Daily Amount: 3 Tabs., Disp: 90 Tab, Rfl: 0    metoprolol tartrate (LOPRESSOR) 50 mg tablet, Take 1 Tab by mouth two (2) times a day., Disp: 180 Tab, Rfl: 3    albuterol (PROVENTIL VENTOLIN) 2.5 mg /3 mL (0.083 %) nebulizer solution, 3 mL by Nebulization route every six (6) hours. , Disp: 24 Each, Rfl: 1    lisinopril (PRINIVIL, ZESTRIL) 20 mg tablet, Take 1 Tab by mouth daily. , Disp: 90 Tab, Rfl: 3    albuterol (PROVENTIL HFA, VENTOLIN HFA, PROAIR HFA) 90 mcg/actuation inhaler, Take 1 Puff by inhalation every six (6) hours as needed for Wheezing., Disp: 1 Inhaler, Rfl: 11    aspirin delayed-release 81 mg tablet, Take 81 mg by mouth daily. , Disp: , Rfl:     cholecalciferol, VITAMIN D3, (VITAMIN D3) 5,000 unit tab tablet, Take 5,000 Units by mouth daily. , Disp: , Rfl:     ALLERGIES:   No Known Allergies    SOCIAL HISTORY:   Social History     Socioeconomic History    Marital status: LIFE PARTNER     Spouse name: Not on file    Number of children: Not on file    Years of education: Not on file    Highest education level: Not on file   Social Needs    Financial resource strain: Not on file    Food insecurity - worry: Not on file    Food insecurity - inability: Not on file    Transportation needs - medical: Not on file   [employer] needs - non-medical: Not on file   Occupational History    Not on file   Tobacco Use    Smoking status: Former Smoker     Packs/day: 0.15     Years: 35.00     Pack years: 5.25     Last attempt to quit: 2016     Years since quitting: 3.1    Smokeless tobacco: Current User   Substance and Sexual Activity    Alcohol use: No    Drug use: No    Sexual activity: Not on file   Other Topics Concern     Service Not Asked    Blood Transfusions Not Asked    Caffeine Concern Not Asked    Occupational Exposure Not Asked    Hobby Hazards Not Asked    Sleep Concern Not Asked    Stress Concern Not Asked    Weight Concern Not Asked    Special Diet Not Asked    Back Care Not Asked    Exercise Not Asked    Bike Helmet Not Asked    Seat Belt Not Asked    Self-Exams Not Asked   Social History Narrative    Not on file       FAMILY HISTORY:   Family History   Problem Relation Age of Onset    Heart Disease Mother     Hypertension Mother     Cancer Father     Heart Disease Father     Cancer Brother        REVIEW OF SYSTEMS: Please see the completed review of systems sheet in the chart that I have reviewed today. PHYSICAL EXAMINATION:   ECOG Performance status 1  VITAL SIGNS:   Visit Vitals  /74 (BP 1 Location: Left arm, BP Patient Position: Sitting)   Pulse (!) 55   Temp 97.7 °F (36.5 °C)   Wt (!) 170.5 kg (375 lb 12.8 oz)   SpO2 95%   BMI 49.58 kg/m²        GENERAL: The patient is morbidly obese, ambulatory, alert and in no acute distress. HEENT: Head is normocephalic, atraumatic. Pupils are equal, round and reactive to light and accommodation. Extraocular movement intact. Hearing is intact bilaterally to finger rub. Oral cavity reveals no lesions. Mucous membranes are moist. NECK: Neck is supple with no masses. CARDIOVASCULAR: Heart is regular rate and rhythm. There are no murmurs rubs or gallups. Radial pulses are 2+ RESPIRATORY: Lungs are clear to auscultation and percussion. There is normal respiratory effort. GASTROINTESTINAL: The abdomen is soft, non-tender, nondistended with no hepatospelnomagaly. Digital rectal examination: deferred LYMPHATIC: There is no cervical, supraclavicular or axillary lymphadenopathy bilaterally. MUSCULOSKELETAL: Extremities reveal no cyanosis, clubbing or edema.  is 5+/5. NEURO:  Cranial nerves II-XII grossly intact. Muscular strength and sensation are intact throughout all four extremities. PATHOLOGY:    2/5/19:     DIAGNOSIS   ESOPHAGEAL MASS: FRAGMENTS OF MUCINOUS ADENOCARCINOMA WITH FOCAL SIGNET RING FEATURES, FOCALLY POORLY DIFFERENTIATED.   sms/2/6/2019   Electronically signed out on 2/6/2019 10:20 by KOLBY Peguero M.D.    Procedures/Addenda   STF - ADDENDUM Status: Signed Out KOLBY Scott M.D. on 2/6/2019        Addendum Comment   Results communicated to Dr. Stephenie Barnard on 2/6/19 approximately 0730 hours. STF -MISCELLANEOUS PROCEDURE   Status: Signed Out KOLBY Scott M.D. on 2/13/2019   Interpretation   [employer] HER2 Non-Breast IHC   Results:   HER2 Non-Breast: NEGATIVE   Score: 0       LABORATORY:   Lab Results   Component Value Date/Time    Sodium 137 03/07/2019 01:20 PM    Potassium 4.6 03/07/2019 01:20 PM    Chloride 102 03/07/2019 01:20 PM    CO2 31 03/07/2019 01:20 PM    Anion gap 4 (L) 03/07/2019 01:20 PM    Glucose 90 03/07/2019 01:20 PM    BUN 10 03/07/2019 01:20 PM    Creatinine 0.95 03/07/2019 01:20 PM    GFR est AA >60 03/07/2019 01:20 PM    GFR est non-AA >60 03/07/2019 01:20 PM    Calcium 9.5 03/07/2019 01:20 PM    Albumin 3.7 03/07/2019 01:20 PM    Protein, total 7.7 03/07/2019 01:20 PM    Globulin 4.0 (H) 03/07/2019 01:20 PM    A-G Ratio 0.9 (L) 03/07/2019 01:20 PM    AST (SGOT) 24 03/07/2019 01:20 PM    ALT (SGPT) 37 03/07/2019 01:20 PM     Lab Results   Component Value Date/Time    WBC 9.9 03/07/2019 01:20 PM    HGB 14.8 03/07/2019 01:20 PM    HCT 43.6 03/07/2019 01:20 PM    PLATELET 540 64/45/0766 01:20 PM       RADIOLOGY:    Ct Chest W Cont    Result Date: 2/12/2019  CHEST CT WITH CONTRAST:  CLINICAL HISTORY:  Esophageal mass with dysphagia. TECHNIQUE:  During bolus injection of nonionic intravenous contrast, the chest was scanned with spiral technique, and coronal reformats were produced. Radiation dose reduction was achieved using one or all of the following techniques: automated exposure control, weight-based dosing, iterative reconstruction. COMPARISON:  None. FINDINGS: No pulmonary parenchymal mass is evident. Mass in the distal esophagus with maximum transverse diameter of 4.9 cm which extends over approximately 7 cm in the distal esophagus to the gastroesophageal junction.   No discontinuous mediastinal mass or lymphadenopathy is evident. There is no definite pneumothorax. No abnormal fluid collection. A single celiac axis node measures approximately 2.4 x 1.1 cm. The epigastrium otherwise appears unremarkable as imaged. IMPRESSION:   1. DISTAL ESOPHAGEAL MASS EXTENDS OVER APPROXIMATELY 7 CM TO THE GASTROESOPHAGEAL JUNCTION WITHOUT EVIDENCE OF DISCONTINUOUS MEDIASTINAL METASTATIC DISEASE. 2.  A SINGLE MILDLY-ENLARGED CELIAC AXIS NODE MUST BE CONSIDERED SUSPICIOUS FOR METASTATIC DISEASE UNTIL PROVEN OTHERWISE.  IF FURTHER IMAGING EVALUATION IS CLINICALLY INDICATED AT THIS TIME, THEN PET/CT WOULD BE MOST USEFUL. Pet/ct Tumor Image Skull Thigh W (ini)    Result Date: 3/5/2019  PET/CT INDICATION: Esophagus cancer TECHNIQUE: After oral administration of gastroview and intravenous administration of 19.6 mCi of F18 FDG, noncontrast CT images were obtained for attenuation correction and for fusion with emission PET images. A series of overlapping emission PET images were then obtained beginning 60 minutes after injection of FDG. The area imaged spanned the region from the skull base to the mid thighs. COMPARISON: Chest CT dated 02/12/2019 FINDINGS: Head/Neck: There is no abnormal uptake in the neck. There is no significant adenopathy. Chest: There is focal uptake in the distal esophagus corresponding to the area of abnormal wall thickening noted on the CT images. 9.9 SUV Max. There is no significant mediastinal or axillary adenopathy. There are no pulmonary masses or infiltrates. Abdomen: There is ill-defined hypermetabolic soft tissue in the supraceliac region consistent with local extension of tumor and celiac adenopathy. There are no other areas of abnormal uptake in the abdomen. Specifically, there are no hypermetabolic liver lesions. Pelvis: There is no significant abnormal uptake in the pelvis. There is no significant adenopathy. There are no bony lesions.      IMPRESSION: Distal esophageal tumor with associated gastrohepatic adenopathy. No evidence of distant metastatic disease. IMPRESSION:  Chucky Purcell is a 62 y.o. male with esophageal cancer. We discussed the natural history of esophageal cancer and the implications of various prognostic factors including invasion through the esophageal wall, lymph nodes, surgical resectability and histology. We discussed the early trials including RTOG 8501, which demonstrated the benefit of concurrent chemoradiation compared to radiation alone for patients not surgically resectable. We discussed the negative study published by Woodson Leyden al evaluating the role of dose escalation for these patients. We then discussed theCALGB trial published by Luis et al. and the more recent CROSS trial evaluating the role of try modality with preoperative chemotherapy and radiation followed by esophagectomy. This has shown to improve survival in this patient cohort, specifically mid to distal esophageal cancers with probable squamous cell and adenocarcinoma histology. The pathologic complete response rate, however, was essentially higher with squamous cell carcinomas, nearly 50%. With Mr. Apoorva Ordoñez case, I would advocate for trimodality. I would plan to treat with 45 Gy in 25 fractions to the primary esophagus and to any involved lymph nodes with wide circumferential and longitudinal margins. The gross disease would then be boosted for a further 5.4 Gy for a total of 50.4 Gy. I will coordinate his care with Dr. Paz Izquierdo in medical oncology and Dr. Criss Carlin in surgery. He is planning to have a feeding tube placed along with a PORT. Informed consent will be obtained after reviewing the risks and benefits of therapy and the anticipated toxicities prior to simulation which will be necessary to duplicate his setup and treatment fields over the several weeks of therapy.    With his weight we will have to complete this at [facility] which we will arrange immediately after his feeding tube placement. His treatments however will be delivered at [facility]. Thank you for allowing me to participate in this very pleasant patient's care. I spent 60 minutes with the patient, more than 50% was spent in counseling and coordination of care. Plan:  1. Genetic testing-not indicated  2. Smoking cessation-not indicated  3. Patient will be simulated shortly after feeding tube placement at [facility].   4.  His start date will be coordinated with Dr. Virgie Oakley in medical oncology.       Cecil De Dios MD   March 8, 2019

## 2019-03-18 NOTE — OP NOTES
[facility]  OPERATIVE REPORT    Name:  Elijah Schuster  MR#:  [id number]  :  1965  ACCOUNT #:  [de-identified]  DATE OF SERVICE:  2019      PREOPERATIVE DIAGNOSIS:  Esophageal carcinoma. POSTOPERATIVE DIAGNOSIS:  Esophageal carcinoma. PROCEDURE PERFORMED:  1. LifePort placement under fluoroscopy. 2.  Laparoscopic feeding jejunostomy placement. 3.  Peritoneal biopsy. SURGEON:  Eros Rivas MD    ASSISTANT SURGEON:  Jennifer Waters MD    ASSISTANT:  DUY Centeno    ANESTHESIA:  General.    COMPLICATIONS:  None. COUNTS:  Correct. ESTIMATED BLOOD LOSS:  Minimal.    SPECIMENS REMOVED:  Peritoneal biopsy. IMPLANTS:  LifePort. OPERATIVE PROCEDURE:  After the patient was sedated, the chest and neck regions were prepped and draped in a sterile fashion and 1% Xylocaine was used to infiltrate the subclavicular region on the left. A stick was made into the left subclavian vein with good return of dark pulsatile venous blood. Guidewire was placed under fluoroscopy and seen to enter the superior vena cava. At this time, I infiltrated the chest wall with 1% Xylocaine With Epinephrine. A vertical incision was made from the guidewire insertion site and a pocket was created. Under direct visualization with fluoroscopy, I then placed the dilator and sheath over the guidewire. Dilator and guidewire were removed. The catheter was placed through the sheath, the sheath peeled away externally to the skin. The catheter was cut to the appropriate length as determined by fluoroscopy and hooked to the LifePort. This was seen in the superior vena cava to be in good position. The locking mechanism was instituted. A single 3-0 GI silk used to secure the port to the chest wall. This had excellent blood returned on aspiration. This was flushed with heparinized saline. A 3-0 Vicryl was used to close the subcutaneous tissue and 5-0 subcuticular Vicryl was used to close the skin. Sterile dressing applied. The patient tolerated the procedure well. The patient is stable and will be sent to the recovery room, where a chest x-ray will be performed to rule out pneumothorax and to confirm correct placement. After this was completed, sterile dressing applied and the abdomen was then reprepped and draped in sterile fashion. Timeout was carried out and all were in agreement. An extended length of 5 mm trocar was used to gain entry into the abdominal cavity with Optiview in the right lower quadrant. Insufflation was required. Scope was introduced. A left upper quadrant 5 mm extended trocar was placed under direct visualization; two; one in the midline suprapubic 5 mm trocar placed as well as another midway between the camera port and the suprapubic port was placed as well. At this point, the ligament of Treitz was identified in approximately 25-30 cm from this site was selected. The laparoscopic feeding jejunostomy tube was utilized. The small incision made in the anterior abdominal wall at the appropriate site, left mid to upper quadrant. Needle was inserted as the securing tabs were placed at four quadrants on the jejunum and brought under the skin. At this point, the needle was inserted in the center of this and to the jejunum and guidewire placed. Dilator was then utilized to dilate this up. The dilator was removed leaving the sheath. A 12-Sol[place] Islander feeding jejunostomy tube was then placed into the bowel all the way to the level of the balloon, which was then insufflated with 2 mL of saline. This was then brought up to the anterior abdominal wall and secured. Fasteners were secured. At this point, visualization of the abdomen was carried out, saw a obvious metastatic disease in the left peritoneal sidewall. This was biopsied and this was sent to pathology. At this point, the procedure was terminated. The pneumoperitoneum was released and all trocars were removed.   The trocar site once again was closed with 4-0 Monocryl and Exofin glue. The patient tolerated the procedure well. Jacqueline Cohn was present and assisted during the entire case.       Dayne Varghese MD      [id number]  D:  03/18/2019 10:03  T:  03/18/2019 11:59  JOB #:  [id number]

## 2019-03-18 NOTE — ANESTHESIA PREPROCEDURE EVALUATION
Anesthetic History   No history of anesthetic complications            Review of Systems / Medical History  Patient summary reviewed and pertinent labs reviewed    Pulmonary    COPD: moderate    Sleep apnea (Trilegy and O2 @ 2L q HS):  BiPAP           Neuro/Psych   Within defined limits           Cardiovascular    Hypertension: well controlled          Past MI, CAD, cardiac stents (last in 2016) and hyperlipidemia    Exercise tolerance: >4 METS  Comments: Denies recent CP, SOB or Palpitations   GI/Hepatic/Renal  Within defined limits              Endo/Other        Morbid obesity, arthritis (OA) and cancer (Esophageal Cancer)     Other Findings            Physical Exam    Airway  Mallampati: III  TM Distance: 4 - 6 cm  Neck ROM: normal range of motion, short neck   Mouth opening: Normal     Cardiovascular  Regular rate and rhythm,  S1 and S2 normal,  no murmur, click, rub, or gallop             Dental  No notable dental hx    Comments: No upper teeth   Pulmonary  Breath sounds clear to auscultation               Abdominal  GI exam deferred       Other Findings            Anesthetic Plan    ASA: 3  Anesthesia type: general          Induction: Intravenous  Anesthetic plan and risks discussed with: Patient and Spouse

## 2019-03-18 NOTE — PERIOP NOTES
Pt requesting water at this time, informed pt that he had to remain NPO until after his chest xray results. Explained pt reasoning for this, pt verbalized understanding at this time.

## 2019-03-18 NOTE — DISCHARGE INSTRUCTIONS
Discharge Instructions/Follow-up Plans:   MD Instructions:     Follow-up with Aline Renteria, Physician Assistant for Dr. Asaf Perry in 1 week. Keep incisions clean and dry, may remain uncovered. Do not apply lotions, creams or ointments to incisions.     Diet - as tolerated. You may start tube feeding on 3/19/19 with home health care. No crushed medication through feeding tube. Tube feeds only. Activity - ambulate - as tolerated - no heavy lifting >10lb. May shower - no tub baths or soaking/submerging.     Stop Norco, start Percocet for post op pain. No driving while taking narcotics. Do not drink alcohol while taking narcotics. Resume other home medications.      If problems or questions arise, please call our office at [phone].     After general anesthesia or intravenous sedation, for 24 hours or while taking prescription Narcotics:  · Limit your activities  · Do not drive and operate hazardous machinery  · Do not make important personal or business decisions  · Do  not drink alcoholic beverages  · If you have not urinated within 8 hours after discharge, please contact your surgeon on call. *  Please give a list of your current medications to your Primary Care Provider. *  Please update this list whenever your medications are discontinued, doses are      changed, or new medications (including over-the-counter products) are added. *  Please carry medication information at all times in case of emergency situations. These are general instructions for a healthy lifestyle:  No smoking/ No tobacco products/ Avoid exposure to second hand smoke  Surgeon General's Warning:  Quitting smoking now greatly reduces serious risk to your health.   Obesity, smoking, and sedentary lifestyle greatly increases your risk for illness  A healthy diet, regular physical exercise & weight monitoring are important for maintaining a healthy lifestyle    You may be retaining fluid if you have a history of heart failure or if you experience any of the following symptoms:  Weight gain of 3 pounds or more overnight or 5 pounds in a week, increased swelling in our hands or feet or shortness of breath while lying flat in bed. Please call your doctor as soon as you notice any of these symptoms; do not wait until your next office visit. Recognize signs and symptoms of STROKE:  F-face looks uneven  A-arms unable to move or move unevenly  S-speech slurred or non-existent  T-time-call 911 as soon as signs and symptoms begin-DO NOT go       Back to bed or wait to see if you get better-TIME IS BRAIN.

## 2019-03-18 NOTE — ANESTHESIA POSTPROCEDURE EVALUATION
Procedure(s): INFUSAPORT INSERTION  JEJUNOSTOMY TUBE INSERTION LAPAROSCOPIC.     Anesthesia Post Evaluation        Patient location during evaluation: PACU  Patient participation: complete - patient participated  Level of consciousness: awake  Pain management: adequate  Airway patency: patent  Anesthetic complications: no  Cardiovascular status: acceptable  Respiratory status: spontaneous ventilation and acceptable  Hydration status: acceptable  Post anesthesia nausea and vomiting:  none      Visit Vitals  /61   Pulse (!) 59   Temp 36.9 °C (98.5 °F)   Resp 17   Ht 6' 1.5\" (1.867 m)   Wt (!) 166.7 kg (367 lb 8 oz)   SpO2 95%   BMI 47.83 kg/m²

## 2019-03-18 NOTE — H&P
Surgery History and Physical    Subjective:      Nelda Painter is a 62 y.o. male who presents placement of a feeding jejunostomy and Lifeport. Past Medical History:   Diagnosis Date    Appendicitis     resulted in appendectomy    Chewing tobacco use     Chronic obstructive pulmonary disease (HCC)     nebulizer prn and rescue inhaler only    Chronic pain     pt wife denies any pain    Depression     pt wife denies, no current meds    Erectile disorder due to medical condition in male patient     Esophageal [facility] [address]     Former cigarette smoker     Gout     treated with Allopurinol. Last flareup 2013    H/O heart artery stent     X2- last stent placed 2016    History of MI (myocardial infarction) 2014    stents x2  RCA- 2014  LAD - 2016    Hyperlipidemia     Hypertension     managed with meds    Hypotestosteronemia     Morbid obesity ([facility] [address]     47.6    KORINA (obstructive sleep apnea)     Trilegy and oxygen 2L    Osteoarthritis      Past Surgical History:   Procedure Laterality Date    COLONOSCOPY N/A 2/5/2019    COLONOSCOPY/ 50 performed by Nora Colon MD at [facility] HX APPENDECTOMY  2003    HX CARPAL TUNNEL RELEASE Right 2014    HX CORONARY STENT PLACEMENT  2014    RCA x 1    HX CORONARY STENT PLACEMENT  2016     PCI of the lesion with a 4x8mm Xience Alpine RONN      Family History   Problem Relation Age of Onset    Heart Disease Mother     Hypertension Mother     Cancer Father     Heart Disease Father     Cancer Brother      Social History     Tobacco Use    Smoking status: Former Smoker     Packs/day: 0.15     Years: 35.00     Pack years: 5.25     Last attempt to quit: 2016     Years since quitting: 3.2    Smokeless tobacco: Current User   Substance Use Topics    Alcohol use: No      Prior to Admission medications    Medication Sig Start Date End Date Taking? Authorizing Provider   pregabalin (LYRICA) 75 mg capsule Take 75 mg by mouth two (2) times a day.    Yes Provider, Historical   allopurinol (ZYLOPRIM) 100 mg tablet Take 1 Tab by mouth daily. 19  Yes Mikie King MD   metoprolol tartrate (LOPRESSOR) 50 mg tablet Take 1 Tab by mouth two (2) times a day. 18  Yes Mikie King MD   albuterol (PROVENTIL VENTOLIN) 2.5 mg /3 mL (0.083 %) nebulizer solution 3 mL by Nebulization route every six (6) hours. 18  Yes Mikie King MD   aspirin delayed-release 81 mg tablet Take 81 mg by mouth daily. Yes Provider, Historical   ezetimibe (ZETIA) 10 mg tablet Take 10 mg by mouth daily. Provider, Historical   HYDROcodone-acetaminophen (NORCO) 5-325 mg per tablet Take 1 Tab by mouth every eight (8) hours as needed for Pain for up to 30 days. Max Daily Amount: 3 Tabs. 3/13/19 4/12/19  Mikie King MD   umeclidinium-vilanterol [facility] ELLIPTA) 62.5-25 mcg/actuation inhaler Take 1 Puff by inhalation as needed. Provider, Historical   atorvastatin (LIPITOR) 80 mg tablet Take 80 mg by mouth nightly. Provider, Historical   lisinopril (PRINIVIL, ZESTRIL) 20 mg tablet Take 1 Tab by mouth daily. 18   Mikie King MD   cholecalciferol, VITAMIN D3, (VITAMIN D3) 5,000 unit tab tablet Take 5,000 Units by mouth daily. Provider, Historical      No Known Allergies    Review of Systems   Constitutional: Positive for unexpected weight change. HENT: Positive for trouble swallowing. All other systems reviewed and are negative. Objective:     Patient Vitals for the past 8 hrs:   BP Temp Pulse Resp SpO2 Height Weight   19 0557 114/57 98.2 °F (36.8 °C) 61 18 92 % 6' 1.5\" (1.867 m) (!) 367 lb 8 oz (166.7 kg)       Temp (24hrs), Av.2 °F (36.8 °C), Min:98.2 °F (36.8 °C), Max:98.2 °F (36.8 °C)      Physical Exam   Constitutional: He is oriented to person, place, and time. He appears well-developed and well-nourished. No distress. HENT:   Head: Normocephalic and atraumatic.    Eyes: Conjunctivae and EOM are normal. Pupils are equal, round, and reactive to light. No scleral icterus. Neck: Normal range of motion. Neck supple. No JVD present. Cardiovascular: Normal rate, regular rhythm and normal heart sounds. Pulmonary/Chest: Effort normal and breath sounds normal. No respiratory distress. He has no wheezes. Abdominal: Soft. Bowel sounds are normal. He exhibits no distension and no mass. There is no tenderness. There is no rebound and no guarding. Musculoskeletal: Normal range of motion. Lymphadenopathy:     He has no cervical adenopathy. Neurological: He is alert and oriented to person, place, and time. Skin: Skin is warm and dry. He is not diaphoretic. Psychiatric: He has a normal mood and affect. His behavior is normal. Judgment and thought content normal.       Assessment:     Esophageal cancer    Plan:     Discussed the risk of surgery including but not limited to bleeding,infection,possible open procedure,  and the risks of general anesthetic. The patient understands the risks; any and all questions were answered to the patient's satisfaction. Proceed with laparoscopic feeding jejunostomy placement and Lifeport placement.     Signed By: Yulisa Burnett MD     March 18, 2019

## 2019-03-18 NOTE — BRIEF OP NOTE
BRIEF OPERATIVE NOTE    Date of Procedure: 3/18/2019   Preoperative Diagnosis: Cancer of lower third of esophagus ([facility] [address] [facility] [C15.5]  Postoperative Diagnosis: Cancer of lower third of esophagus ([facility] [address] [C15.5]    Procedure(s): INFUSAPORT INSERTION  JEJUNOSTOMY TUBE INSERTION LAPAROSCOPIC  Surgeon(s) and Role:     * Donna ePck MD - Primary     * Lucila Mcdonald MD - Co-Surgeon         Surgical Assistant: DUY Wei      Surgical Staff:  Maddison-1: Derek Araujo  Physician Assistant: DUY Lama  Radiology Technician: JESUS Blackwell, RT, R, CT  Scrub Tech-1: Andree Ríos  Event Time In Time Out   Incision Start 9613    Incision Close 0935      Anesthesia: General   Estimated Blood Loss: minimal  Specimens:   ID Type Source Tests Collected by Time Destination   1 : Peritoneal Biopsy Preservative Other                  Donna Peck MD 3/18/2019 9254 Pathology      Findings: none   Complications: none  Implants:   Implant Name Type Inv.  Item Serial No.  Lot No. LRB No. Used Action   PORT INFUS SGL PLAS 8FR UNFIL -- BIOFLO - [id number]  PORT INFUS SGL PLAS 8FR UNFIL -- BIOFLO  [employer] [id number] N/A 1 Implanted

## 2019-03-19 NOTE — PROGRESS NOTES
I spoke with Rey Rowland regarding his [employer] coverage. Patient has met his Ded and has  $2,022 remaining on OOP Max. There are no   foundation grants available for his diagnosis. I encouraged him to apply for financial assistance through the hospital.                  Next, I spoke with Mr. Raman Salgado regarding potential oral medication authorizations. I told him that if he ever had any problems getting his oral medications filled              to give the dedicated [address] coordinator Alycia Cheatham a call. Most of the time, it is simply an authorization that needs to be done with the insurance company    Next, I spoke with Mr. Raman Salgado regarding enrolling with [facility] and [facility]. I went over some of the services that [facility] and [facility] offers and the enrollment process. Next, I gave Mr. Chaney a form with various resource organizations that could assist with specific needs (example:  transportation, lodging, preparing meals, home   cleaning)    Lastly, I gave Mr. Chaney  flyers about the free Yoga classes for cancer survivors and the Oncology Massage program.  I let him know that he can get a free 30   minute massage. Faxed Patient Referral form to the [facility] at [phone]. Phone [phone]. Form scanned into chart. Faxed Physician's Statement to the [address] at [phone]. Phone [phone]. Form scanned into chart.

## 2019-03-28 NOTE — PROGRESS NOTES
Reviewed chemotherapy side effects, signs and sx of infection, and instructions to avoid cold while taking oxaliplatin. Pt verbalized understanding.

## 2019-03-28 NOTE — PROGRESS NOTES
3/28/19 saw pt today with Nolan Ruzi NP for pre chemo c1d1 FOLFOX. See pt instructions for symptom management. Chemo education completed. Zofran authorization has been approved for #90, they will  today on the way home. Provided opportunity to ask questions and all were discussed. Encouraged to call with any concerns. Follow up in 2 weeks. We will delay cycle 3 to 3 weeks due to a planned vacation. Navigation will continue to follow.

## 2019-03-28 NOTE — PROGRESS NOTES
Arrived to the [facility]. Folfox completed. Patient tolerated well. Any issues or concerns during appointment: Reinforced chemotherapy education discussing chemotherapy drugs side effects. Discussed neutropenia, neuropathy, and cold sensitivity secondary to Oxaliplatin. Discussed Adrucil chemo bulb and provided written information. Allowed patient and wife to ask questions. Both verbalize understanding of information given. Patient aware of next infusion appointment on 3/30/19 at 1400.   Discharged in [place] [id number]

## 2019-03-30 NOTE — PROGRESS NOTES
Pt. Arrived to [facility] for: chemotherapy discontinuation Any issues or concerns during this appointment: Chemo ball appears completed and removed. Patient aware of next appointment on: 4-3-19 @ 4654 Pt. Discharged: ambulatory home with wife

## 2019-04-11 NOTE — PROGRESS NOTES
Arrived to the [facility]. Assessment completed. Labs reviewed. Patient tolerated chemotherapy well today. The fluorouracil pump was connected to his port today, before discharge. All clamps on the tubing are open. Any issues or concerns during appointment: none. Patient aware of next infusion appointment on 4/13/19 (date) at 56 (time) with IV infusion center, [facility]  Discharged via [address], with wife. Patient instructed to call Dr. Sybil Keene' office immediately for any problems or concerns. He verbalizes understanding.

## 2019-04-13 NOTE — PROGRESS NOTES
Pt arrived ambulatory today at 1254, to have fluorouracil pump removed. Pt tolerated without difficulty. Patient discharged via ambulatory accompanied by spouse. Instructed to notify physician of any problems, questions or concerns. Allowed opportunity for patient/family to ask questions. Verbalized understanding. Next appointment is May 2 at 36 with Keisha Rainey.

## 2019-05-02 NOTE — PROGRESS NOTES
Patient tolerated chemotherapy well. No reactions. Fluorouracil in Elastomeric infusion pump connected to port to infuse over 46 hrs. See MAR. Verified placement of pump with Zak Goncalves RN. Patient/family instructed to notify [facility] on call number [phone] of any problems, questions or concerns with pump. Patient/family reminded to use chemotherapy precautions at home. Pt confirmed that Spill kit is available at home in the event of a chemo spill. Allowed opportunity for patient/family to ask questions. Verbalized understanding of instructions. Patient discharged ambulatory with family. Next appointment is 05/04/19 at 1400 with Goldy.

## 2019-05-04 NOTE — PROGRESS NOTES
Pt arrived ambulatory today at 1337, to have fluorouracil pump removed. Pt tolerated without difficulty. Patient discharged via ambulatory accompanied by spouse. Instructed to notify physician of any problems, questions or concerns. Allowed opportunity for patient/family to ask questions. Verbalized understanding. Next appointment is May 16 at 0930 with Keisha Rainey.

## 2019-05-16 NOTE — PROGRESS NOTES
Patient tolerated chemotherapy well. No reactions. Fluorouracil in Elastomeric infusion pump connected to port to infuse over 46 hrs. See MAR. Patient/family instructed to notify [facility] on call number [phone] of any problems, questions or concerns with pump. Patient/family reminded to use chemotherapy precautions at home. Pt confirmed he has a spill kit at home if needed. Allowed opportunity for patient/family to ask questions. Verbalized understanding of instructions. Patient discharged ambulatory. Next appointment is 05/18/19 at 1400 with Goldy.

## 2019-05-18 NOTE — PROGRESS NOTES
Arrived to the [facility] ambulatory. CI 5fu pump dc'd completed. Patient tolerated well. Any issues or concerns during appointment: no.  Patient aware of next infusion appointment on 5/30 at 0830  . Discharged to home ambulatory ambulatory.

## 2019-06-05 NOTE — PROGRESS NOTES
Pt arrived ambulatory, chemo completed, pt tolerated well, discharged home ambulatory, aware of appointment on Friday at 1800

## 2019-06-07 NOTE — PROGRESS NOTES
Arrived to the [facility]. Chemotherapy pump completed & discontinued. Patient tolerated well. Any issues or concerns during appointment: none. Patient aware of next infusion appointment on 6-20-19 (date) at 56 (time). Discharged via ambulatory.

## 2019-07-17 NOTE — PROGRESS NOTES
Arrived to the [facility]. 5 FU Elastomeric pump completed.  Patient tolerated well  Any issues or concerns during appointment: No  Patient aware of next infusion appointment on [facility] 29th @ 1100  Discharged home ambulatory

## 2019-07-29 NOTE — PROGRESS NOTES
Pt. Discharged ambulatory. Tolerated chemotherapy well. No complaints or distress noted. To call physician with any problems or concerns. Understanding voiced. Elastomeric infusion pump in place and infusing without difficulty. To return to Infusions on 7/31/19.

## 2019-07-31 NOTE — PROGRESS NOTES
[facility] Assessment and d/c pump completed. Patient tolerated well. Any issues or concerns during appointment: none. Patient aware of next infusion appointment on 08/12/2019 (date) at 0900 (time) with infusion center. Discharged ambulatory, with spouse. Patient advise to call Dr. Phani Glaser' office if he has any problems or concerns. Patient verbalized understanding.

## 2019-08-12 NOTE — PROGRESS NOTES
Pt arrived ambulatory to [facility] with port previously accessed. D5 infusing. Pre meds given IV as ordered. Oxaliplatin and leucovorin infused. 5FU given IV push. Pump infusing. Pt aware of next appt on 8/26/19 at 1500. Pt discharged ambulatory.

## 2019-08-14 NOTE — PROGRESS NOTES
Arrived to the [facility]. Continuous chemo pump infused. Chemo pump disconnected and discarded into chemogator. Port flushed and de-access completed. Patient tolerated well. Any issues or concerns during appointment: None. Patient aware of next infusion appointment on 8/26 (date) at 0930 (time). Discharged ambulatory in stable condition.

## 2019-09-09 NOTE — PROGRESS NOTES
Arrived to the [facility]. Chemotherapy completed. Patient tolerated well. Any issues or concerns during appointment: K+ 3.1, orders for patient to get 20 mEq IV. Pt states that he was unaware that it would be 2 extra hours and that he did not have time for it. States that he has oral K+ at home that he has not been taking, but will start taking it today. Yady Martinez RN navigator notified. Per Dr. Phani Glaser, okay to hold K+ today and pt can take oral dose. Instructed pt he needs to be taking it 2x a day. Pt verbalized understanding. Patient aware of next infusion appointment on September 11th at 1400. Discharged ambulatory accompanied by spouse.

## 2019-09-11 NOTE — PROGRESS NOTES
Arrived to the [facility]. 5 FU Elastomeric pump completed.  Patient tolerated well  Any issues or concerns during appointment: No  Patient aware of next infusion appointment on Monday,September 23rd @ 0930  Discharged home ambulatory

## 2019-09-23 NOTE — PROGRESS NOTES
Saw patient with Dr Vincent Ricardo prior to C10 Folfox. We will hold chemo due to platelet count 97O. Pt denies any uncontrolled symptoms at this time. Pt will return in 1 week.  Navigation is following

## 2019-09-30 NOTE — PROGRESS NOTES
Tolerated chemotherapy without difficulty. Back pain decreased to 8-6 on pain scale since Norco given per order this am.  Fluorouracil elastomeric pump connected and secured to skin on chest wall- infusing at 5ml/hr over 46 hours. All clamp s verified as unclamped. Patient discharged via ambulation accompanied by wife. Instructed to notify physician of any problems, questions or concerns after discharge. Next appointment is 10/02/19 at [phone] with Goldy. Patient insistent on arriving prior to scheduled appointment as pump will finish earlier. Instructed to keep appointment time and that he may possibly have to wait as staff may be unable to accommodate an early unscheduled visit.

## 2019-10-02 NOTE — PROGRESS NOTES
Arrived to the [facility]. 5 FU Elastomeric pump completed. Patient tolerated well Any issues or concerns during appointment: No 
Patient aware of next infusion appointment on Monday,October 14th @ 56 Discharged home ambulatory

## 2019-11-11 NOTE — PROGRESS NOTES
Pt arrived ambulatory to [facility] with port previously accessed with good blood return. NS infusing for emend. Pre meds given as ordered. D5 1L infusing. Oxaliplatin infusing. Leucovorin infusing. 5FU given iv push. Hydration complete. Pump infusing. Pt is aware of next appt on 11/13/19 at 1730. Pt wants to come at 1600 due to fluid needs. No slot available. Informed may have to wait if come in earlier. Discharged ambulatory.

## 2019-11-13 NOTE — PROGRESS NOTES
Arrived to the [facility]. Assessment, pump d/c and hydration completed. Patient tolerated well. Any issues or concerns during appointment: none. Patient aware of next infusion appointment on 11/15/2019 (date) at 1430 (time) with infusion center. Discharged ambulatory, with self. Patient advised to call Dr. Yvonne Stewart' office if he has any problems or concerns. Patient verbalized understanding.

## 2019-11-15 NOTE — PROGRESS NOTES
Pt. Discharged ambulatory. Tolerated infusion well. No distress noted. To call physician with any problems or concerns. Understanding voiced. Pt. States that his wife is going to call on Monday to set up future hydration appointments.

## 2019-11-19 NOTE — PROGRESS NOTES
Arrived to the [facility]. 1 liter of NS completed.  Patient tolerated well   Any issues or concerns during appointment: No  Patient aware of next infusion appointment on Thursday,November 21st @ 0930  Discharged home ambulatory

## 2019-11-21 NOTE — PROCEDURES
Department of Interventional Radiology  [phone]        Interventional Radiology Brief Procedure Note    Patient: Thee Coffey MRN: [id number]  SSN: [id number]    YOB: 1965  Age: 62 y.o.   Sex: male      Date of Procedure: 11/21/2019    Pre-Procedure Diagnosis: esophageal cancer    Post-Procedure Diagnosis: SAME    Procedure(s): Gastrostomy    Brief Description of Procedure: as above    Performed By: Nain Rinaldi MD     Assistants: None    Anesthesia:Moderate Sedation    Estimated Blood Loss: Less than 10ml    Specimens:  None    Implants:  18 F feeding tube    Findings: G-tube into body of stomach    Complications: None    Recommendations: Ok to use by tomorrow     Follow Up: in 6 months for exchange    Signed By: Nain Rinaldi MD     November 21, 2019

## 2019-11-21 NOTE — DISCHARGE INSTRUCTIONS
[address]  Department of Interventional Radiology  [phone] Office  [phone] Fax       FEEDING TUBE DISCHARGE INSTRUCTIONS    General Information:      Your doctor has asked us to put a feeding tube in your abdomen. The tube can be used to give you medications or supplemental nutrition. Home Care Instructions: You can resume your regular diet and medication regimen. Do not drink alcohol, drive, or make any important legal decisions in the next 24 hours. Do not lift anything heavier than a gallon of milk, or do anything strenuous for the next 24 hours. You will notice a dressing on your abdomen. Keep the site covered for 24-48 hours. Do no immerse yourself in water while the tube is in place. You will be asked to flush the feeding tube through the gastric and/or jejunal port daily after the first two days. You will flush the  port with 20ml of tap water. You must also flush the tube before and after any medication administration. ALWAYS check with your physician or pharmacist before crushing or dissolving medications. Clean around the tube with warm water or hydrogen peroxide diluted with warm water using a Q-tip. Always rinse the area with warm water after cleaning and pat dry. Call If:     You should call your Physician and/or the Radiology Nurse if you have any bleeding other than a small spot on your bandage. Call if you have any signs of infection including fever, swelling, or increased pain at the site. Call if you have any questions. Follow-Up Instructions: Please see your ordering doctor as he/she has requested.    Interventional Radiology General Nurse Discharge    After general anesthesia or intravenous sedation, for 24 hours or while taking prescription Narcotics:  · Limit your activities  · Do not drive and operate hazardous machinery  · Do not make important personal or business decisions  · Do  not drink alcoholic beverages  · If you have not urinated within 8 hours after discharge, please contact your surgeon on call. * Please give a list of your current medications to your Primary Care Provider. * Please update this list whenever your medications are discontinued, doses are     changed, or new medications (including over-the-counter products) are added. * Please carry medication information at all times in case of emergency situations. These are general instructions for a healthy lifestyle:    No smoking/ No tobacco products/ Avoid exposure to second hand smoke  Surgeon General's Warning:  Quitting smoking now greatly reduces serious risk to your health. Obesity, smoking, and sedentary lifestyle greatly increases your risk for illness  A healthy diet, regular physical exercise & weight monitoring are important for maintaining a healthy lifestyle    You may be retaining fluid if you have a history of heart failure or if you experience any of the following symptoms:  Weight gain of 3 pounds or more overnight or 5 pounds in a week, increased swelling in our hands or feet or shortness of breath while lying flat in bed. Please call your doctor as soon as you notice any of these symptoms; do not wait until your next office visit. Recognize signs and symptoms of STROKE:  F-face looks uneven    A-arms unable to move or move unevenly    S-speech slurred or non-existent    T-time-call 911 as soon as signs and symptoms begin-DO NOT go       Back to bed or wait to see if you get better-TIME IS BRAIN. To Reach Us: If you have any questions about your procedure, please call the Interventional Radiology department at [phone]. After business hours (5pm) and weekends, call the answering service at [phone] and ask for the Radiologist on call to be paged. Si tiene Preguntas acerca del procedimiento, por favor llame al departamento de Radiología Intervencional al [phone].  Después de horas de oficina (5 pm) y los fines de [place], llamar al Yifan Marcelino Reynolds al [phone] y pregunte por el Radiologo de [facility].

## 2019-11-21 NOTE — H&P
Department of Interventional Radiology  [phone]    History and Physical    Patient:  Chantel Lepe MRN:  [id number]  SSN:  [id number]    YOB: 1965  Age:  62 y.o. Sex:  male      Primary Care Provider:  Sherrell Lambert MD  Referring Physician:  Partha Roberts MD    Subjective:     Chief Complaint: feeding difficulties    History of the Present Illness: The patient is a 62 y.o. male who presents for placement of percutaneous feeding tube. Esophageal cancer. NPO. Past Medical History:   Diagnosis Date    Appendicitis     resulted in appendectomy    Chewing tobacco use     Chronic obstructive pulmonary disease (HCC)     nebulizer prn and rescue inhaler only    Chronic pain     pt wife denies any pain    Depression     pt wife denies, no current meds    Erectile disorder due to medical condition in male patient     Es[facility] ([facility] [address]     Former cigarette smoker     Gout     treated with Allopurinol. Last flareup 2013    H/O heart artery stent     X2- last stent placed 2016    History of MI (myocardial infarction) 2014    stents x2  RCA- 2014  LAD - 2016    Hyperlipidemia     Hypertension     managed with meds    Hypotestosteronemia     Morbid obesity ([facility] [address]     47.6    KORINA (obstructive sleep apnea)     Trilegy and oxygen 2L    Osteoarthritis      Past Surgical History:   Procedure Laterality Date    COLONOSCOPY N/A 2/5/2019    COLONOSCOPY/ 50 performed by Guero Nava MD at [facility] HX APPENDECTOMY  2003    HX CARPAL TUNNEL RELEASE Right 2014    HX CORONARY STENT PLACEMENT  2014    RCA x 1    HX CORONARY STENT PLACEMENT  2016     PCI of the lesion with a 4x8mm Xience [place] RONN    HX VASCULAR ACCESS          Review of Systems:    Pertinent items are noted in the History of Present Illness. Prior to Admission medications    Medication Sig Start Date End Date Taking?  Authorizing Provider   HYDROcodone-acetaminophen (NORCO) 5-325 mg per tablet Take 1 Tab by mouth every eight (8) hours as needed for Pain for up to 30 days. Max Daily Amount: 3 Tabs. 10/24/19 11/23/19  Froy Coppola P, DO   potassium chloride (K-DUR, KLOR-CON) 20 mEq tablet Take 1 Tab by mouth two (2) times a day. Indications: low amount of potassium in the blood 9/10/19   Hilda Mcdonald MD   cpap machine kit by Does Not Apply route. Provider, Historical   Oxygen at bedtime as directed for dose pack. Provider, Historical   ezetimibe (ZETIA) 10 mg tablet Take 10 mg by mouth daily. Provider, Historical   allopurinol (ZYLOPRIM) 100 mg tablet Take 1 Tab by mouth daily. 1/29/19   Bruno Hennessy MD   metoprolol tartrate (LOPRESSOR) 50 mg tablet Take 1 Tab by mouth two (2) times a day. 7/5/18   Bruno Hennessy MD   cholecalciferol, VITAMIN D3, (VITAMIN D3) 5,000 unit tab tablet Take 5,000 Units by mouth daily.     Provider, Historical        No Known Allergies    Family History   Problem Relation Age of Onset    Heart Disease Mother     Hypertension Mother     Cancer Father     Heart Disease Father     Cancer Brother      Social History     Tobacco Use    Smoking status: Former Smoker     Packs/day: 0.15     Years: 35.00     Pack years: 5.25     Last attempt to quit: 2016     Years since quitting: 3.8    Smokeless tobacco: Current User   Substance Use Topics    Alcohol use: No        Objective:       Physical Examination:    Vitals:    11/21/19 1127   BP: 143/88   Pulse: 86   Resp: 18   Temp: 98.1 °F (36.7 °C)   SpO2: 97%       Pain Assessment                  HEART: regular rate and rhythm  LUNG: coarse bilaterally  ABDOMEN: soft, nontender, obese  EXTREMITIES: warm, no edema    Laboratory:     Lab Results   Component Value Date/Time    Sodium 139 11/11/2019 12:33 PM    Sodium 140 10/14/2019 09:15 AM    Potassium 3.5 11/11/2019 12:33 PM    Potassium 3.2 (L) 10/14/2019 09:15 AM    Chloride 104 11/11/2019 12:33 PM    Chloride 105 10/14/2019 09:15 AM    CO2 27 11/11/2019 12:33 PM    CO2 29 10/14/2019 09:15 AM    Anion gap 8 11/11/2019 12:33 PM    Anion gap 6 (L) 10/14/2019 09:15 AM    Glucose 94 11/11/2019 12:33 PM    Glucose 99 10/14/2019 09:15 AM    BUN 8 11/11/2019 12:33 PM    BUN 7 10/14/2019 09:15 AM    Creatinine 0.93 11/11/2019 12:33 PM    Creatinine 0.96 10/14/2019 09:15 AM    GFR est AA >60 11/11/2019 12:33 PM    GFR est AA >60 10/14/2019 09:15 AM    GFR est non-AA >60 11/11/2019 12:33 PM    GFR est non-AA >60 10/14/2019 09:15 AM    Calcium 9.3 11/11/2019 12:33 PM    Calcium 9.1 10/14/2019 09:15 AM    Magnesium 1.9 11/11/2019 12:33 PM    Magnesium 1.9 10/14/2019 09:15 AM    Albumin 3.1 (L) 11/11/2019 12:33 PM    Albumin 3.1 (L) 10/14/2019 09:15 AM    Protein, total 7.2 11/11/2019 12:33 PM    Protein, total 7.1 10/14/2019 09:15 AM    Globulin 4.1 (H) 11/11/2019 12:33 PM    Globulin 4.0 (H) 10/14/2019 09:15 AM    A-G Ratio 0.8 (L) 11/11/2019 12:33 PM    A-G Ratio 0.8 (L) 10/14/2019 09:15 AM    AST (SGOT) 24 11/11/2019 12:33 PM    AST (SGOT) 30 10/14/2019 09:15 AM    ALT (SGPT) 15 11/11/2019 12:33 PM    ALT (SGPT) 21 10/14/2019 09:15 AM     Lab Results   Component Value Date/Time    WBC 7.7 11/11/2019 12:33 PM    WBC 2.0 (LL) 10/14/2019 09:15 AM    HGB 12.7 (L) 11/11/2019 12:33 PM    HGB 12.2 (L) 10/14/2019 09:15 AM    HCT 37.8 (L) 11/11/2019 12:33 PM    HCT 35.4 (L) 10/14/2019 09:15 AM    PLATELET 479 (L) 81/40/4784 12:33 PM    PLATELET 69 (L) 90/39/2646 09:15 AM     No results found for: APTT, PTP, INR, INREXT    Assessment:     Esophageal cancer    Hospital Problems  Date Reviewed: 11/11/2019    None          Plan:     Planned Procedure:  G tube placement    Risks, benefits, and alternatives reviewed with patient and he agrees to proceed with the procedure.       Signed By: John Mojica PA-C     November 21, 2019

## 2019-11-25 PROBLEM — T45.1X5A CHEMOTHERAPY-INDUCED NEUTROPENIA (HCC): Status: ACTIVE | Noted: 2019-01-01

## 2019-11-25 PROBLEM — D70.1 CHEMOTHERAPY-INDUCED NEUTROPENIA (HCC): Status: ACTIVE | Noted: 2019-01-01

## 2019-11-25 NOTE — PROGRESS NOTES
Arrived to the [facility]. Zarxio injection completed. Patient and family member educated on use of Claritin with Zarxio. Patient tolerated injection well. Any issues or concerns during appointment: Spoke with Catie Chang RN regarding patient's potassium level of 3.0. Pt to receive IV potassium during Wednesday's appt. Pt requested to leave port needle in for treatment Wednesday. Patient aware of next infusion appointment on 11/27/19 at 1400. Discharged ambulatory from Infusion with a family member.

## 2019-11-27 PROBLEM — J96.01 ACUTE RESPIRATORY FAILURE WITH HYPOXIA (HCC): Status: ACTIVE | Noted: 2019-01-01

## 2019-11-27 PROBLEM — J15.9 BACTERIAL PNEUMONIA: Status: ACTIVE | Noted: 2019-01-01

## 2019-11-27 PROBLEM — J44.1 COPD EXACERBATION (HCC): Status: ACTIVE | Noted: 2019-01-01

## 2019-11-27 NOTE — PROGRESS NOTES
Called to rapid response for acute hypoxia. 62 y.o. M stage IV GEJ adenocarcinoma receiving FOLFOX and developed acute hypoxia with O2 sat in 79' without recovery, exam showed respiratory distress and b/l wheezes, received less than 1000 ml volume, reported h/o COPD but no inhalers at home, h/o CAD but no herceptin exposure and no h/o CHF, noted aspiration on record, no history of PE, no oxygen at home, discussed above ddx need to be addressed but he can not go home with the persistent hypoxia, send to ER to evaluate above issues and possible need admission.

## 2019-11-27 NOTE — ED TRIAGE NOTES
Pt BIB EMS for SOB/productive cough. States that he was receiving chemo and became acutely SOB. EMS found patient to have sats of 85%. [facility] states that he has sats in the 70's. Given duoneb and albuterol with EMS. Afebrile

## 2019-11-27 NOTE — PROGRESS NOTES
Arrived ambulatory to the [facility]. Upon arrival patient with O2 sat 85-88% with reports of continued congestion. Sergio Leslie NP and Catie Chang, [facility] navigator notified and came tto patient chairside to evaluate patient. Orders received to proceed with treatment today and patient to begin taking antibiotic and congestion medication at home. Approximately shelter through chemotherapy treatment, RN went to check on patient and patient was leaned over in the chair. Patient's spouse reports that patient was struggling to breathe but he asked her not to say anything. O2 sats 73% at this time. Chemotherapy treatment stopped. Rapid response called and patients' vitals obtained at this time. Patient placed on 10L non-rebreather. Dr. Babita Mulligan arrived to patient chairside. Orders received to administer Lasix 20 mg IV X1 and administer nebulizer treatment. Patient tolerated well. Per Dr. Babita Mulligan, patient to go to the ER to be evaluated.   911 called and patient transported via ambulance to the hospital.

## 2019-11-28 NOTE — CONSULTS
[facility] Inpatient Hematology / Oncology Consult Note Reason for Consult:  COPD exacerbation ([facility] [address] [J44.1] Referring Physician:  Romelia Villa DO History of Present Illness: 
Mr. Chadwick Merrill is a 62 y.o. male admitted on 11/27/2019 with a primary diagnosis of The primary encounter diagnosis was Pneumonitis. Diagnoses of Hypoxia and Malignant neoplasm of esophagus, unspecified location [facility]) were also pertinent to this visit. 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 He presented to the office yesterday to start FOLFOX. He complained of congestion and infusion nurse noted his oxygen saturations to be in the 80's. Walking sats were 90-91%. He refused oxygen at home and did not want to be admitted to the hospital. He was told to start Mucinex, augmentin, and azithromycin and to go to the ER for further evaluation if his symptoms worsened. During his infusion time, a rapid response was called and he was found to have an oxygen sat in the 70's ,wheezing, and respiratory distress. He was sent to to the ER for further evaluation. In the ER, a CT of the chest was obtained which was concerning for tumor recurrence and multiple pulmonary nodules suspicious for bronchiolitis/pneumonitis. He was admitted by the hospitalist for further management. We are consulted for recommendations for our patient. Review of Systems: 
Constitutional Denies fever, chills, weight loss, appetite changes, fatigue, night sweats. HEENT Denies trauma, blurry vision, hearing loss, ear pain, nosebleeds, sore throat, neck pain and ear discharge. Skin Denies lesions or rashes. Lungs Denies  hemoptysis. + shortness of breath and congestion which is much improved today Cardiovascular Denies chest pain, palpitations, or lower extremity edema.   
Gastrointestinal Denies nausea, vomiting, changes in bowel habits, bloody or black stools, abdominal pain.  Denies dysuria, frequency or hesitancy of urination. Neuro Denies headaches, visual changes or ataxia. Denies dizziness, tingling, tremors, sensory change, speech change, focal weakness or headaches. Hematology Denies easy bruising or bleeding, denies gingival bleeding or epistaxis. Endo Denies heat/cold intolerance, denies diabetes or thyroid abnormalities. MSK Denies back pain, arthralgias, myalgias or frequent falls. Psychiatric/Behavioral Denies depression and substance abuse. The patient is not nervous/anxious. No Known Allergies Past Medical History:  
Diagnosis Date  Appendicitis   
 resulted in appendectomy  Chewing tobacco use  Chronic obstructive pulmonary disease (HCC)   
 nebulizer prn and rescue inhaler only  Chronic pain   
 pt wife denies any pain  Depression   
 pt wife denies, no current meds  Erectile disorder due to medical condition in male patient  Esophageal cancer ([facility] [address]  Former cigarette smoker  Gout   
 treated with Allopurinol. Last flareup 2013  H/O heart artery stent X2- last stent placed 2016  History of MI (myocardial infarction) 2014  
 stents x2  RCA- 2014  LAD - 2016  Hyperlipidemia  Hypertension   
 managed with meds  Hypotestosteronemia  Morbid obesity ([facility] [address] 47.6  KORINA (obstructive sleep apnea) Trilegy and oxygen 2L  Osteoarthritis Past Surgical History:  
Procedure Laterality Date  COLONOSCOPY N/A 2/5/2019 COLONOSCOPY/ 50 performed by Jarred Alcaraz MD at [facility] HX APPENDECTOMY  2003  HX CARPAL TUNNEL RELEASE Right 2014  HX CORONARY STENT PLACEMENT  2014 RCA x 1  
 HX CORONARY STENT PLACEMENT  2016 PCI of the lesion with a 4x8mm Xience Alpine RONN  
 HX VASCULAR ACCESS    
 IR INSERT GASTROSTOMY TUBE [facility]  11/21/2019 Family History Problem Relation Age of Onset  Heart Disease Mother  Hypertension Mother  Cancer Father  Heart Disease Father  Cancer Brother Social History Socioeconomic History  Marital status:  Spouse name: Not on file  Number of children: Not on file  Years of education: Not on file  Highest education level: Not on file Occupational History  Not on file Social Needs  Financial resource strain: Not on file  Food insecurity:  
  Worry: Not on file Inability: Not on file  Transportation needs:  
  Medical: Not on file Non-medical: Not on file Tobacco Use  Smoking status: Former Smoker Packs/day: 0.15 Years: 35.00 Pack years: 5.25 Last attempt to quit: 2016 Years since quitting: 3.9  Smokeless tobacco: Current User Substance and Sexual Activity  Alcohol use: No  
 Drug use: No  
 Sexual activity: Not on file Lifestyle  Physical activity:  
  Days per week: Not on file Minutes per session: Not on file  Stress: Not on file Relationships  Social connections:  
  Talks on phone: Not on file Gets together: Not on file Attends [employer] service: Not on file Active member of club or organization: Not on file Attends meetings of clubs or organizations: Not on file Relationship status: Not on file  Intimate partner violence:  
  Fear of current or ex partner: Not on file Emotionally abused: Not on file Physically abused: Not on file Forced sexual activity: Not on file Other Topics Concern [address] Service Not Asked  Blood Transfusions Not Asked  Caffeine Concern Not Asked  Occupational Exposure Not Asked [place] Levels Hazards Not Asked  Sleep Concern Not Asked  Stress Concern Not Asked  Weight Concern Not Asked  Special Diet Not Asked  Back Care Not Asked  Exercise Not Asked  Bike Helmet Not Asked  Seat Belt Not Asked  Self-Exams Not Asked Social History Narrative  Not on file Current Facility-Administered Medications Medication Dose Route Frequency Provider Last Rate Last Dose  allopurinol (ZYLOPRIM) tablet 100 mg  100 mg Oral DAILY Jasvir Schulte DO      
 cholecalciferol (VITAMIN D3) (1000 Units /25 mcg) tablet 5 Tab  5,000 Units Oral DAILY Jasvir Schulte DO      
 potassium bicarb-citric acid (EFFER-K) tablet 20 mEq  20 mEq Per G Tube DAILY Yulisa Schulte,       
 sodium chloride (NS) flush 5-40 mL  5-40 mL IntraVENous Q8H Yulisa Schulte, DO   10 mL at 19 0600  
 sodium chloride (NS) flush 5-40 mL  5-40 mL IntraVENous PRN Golden OLIVEIRA,       
 acetaminophen (TYLENOL) tablet 650 mg  650 mg Oral Q4H PRN Yulisa Schulte, DO      
 ondansetron (ZOFRAN) injection 4 mg  4 mg IntraVENous Q4H PRN Yulisa Schulte, DO      
 methylPREDNISolone (PF) (SOLU-MEDROL) injection 40 mg  40 mg IntraVENous Q8H Yulisa Schulte, DO   40 mg at 19 0318  levoFLOXacin (LEVAQUIN) 750 mg in D5W IVPB  750 mg IntraVENous Q24H Yulisa Schulte,       
 albuterol-ipratropium (DUO-NEB) 2.5 MG-0.5 MG/3 ML  3 mL Nebulization Q6H RT Yulisa Schulte, DO   3 mL at 19 0841  
 budesonide (PULMICORT) 500 mcg/2 ml nebulizer suspension  500 mcg Nebulization BID RT Prabha Schulte, DO   500 mcg at 19 0841  
 guaiFENesin (ROBITUSSIN) 100 mg/5 mL oral liquid 100 mg  100 mg Per G Tube Q4H PRN Eros Lewis,  OBJECTIVE: 
Patient Vitals for the past 8 hrs: 
 BP Temp Pulse Resp SpO2  
19 1137 107/77 97.8 °F (36.6 °C) 98 18 91 % 19 0841     90 % 19 0736 129/67 97.9 °F (36.6 °C) 85 18 90 % Temp (24hrs), Av.8 °F (36.6 °C), Min:97.6 °F (36.4 °C), Max:98 °F (36.7 °C) No intake/output data recorded. Physical Exam: 
Constitutional: Well developed, well nourished male in no acute distress, sitting comfortably in the hospital bed. HEENT: Normocephalic and atraumatic.  Oropharynx is clear, mucous membranes are moist.  Pupils are equal, round, and reactive to light. Extraocular muscles are intact. Sclerae anicteric. Neck supple without JVD. No thyromegaly present. Lymph node Deferred Skin Warm and dry. No bruising and no rash noted. No erythema. No pallor. Respiratory Wheezing noted on the right side; normal air exchange without accessory muscle use. CVS Normal rate, regular rhythm and normal S1 and S2. No murmurs, gallops, or rubs. Abdomen Soft, nontender and nondistended, normoactive bowel sounds. No palpable mass. No hepatosplenomegaly. Neuro Grossly nonfocal with no obvious sensory or motor deficits. MSK Normal range of motion in general.  No edema and no tenderness. Psych Appropriate mood and affect. Labs:   
Recent Results (from the past 24 hour(s)) CBC WITH AUTOMATED DIFF Collection Time: 11/27/19  1:36 PM  
Result Value Ref Range WBC 5.7 4.3 - 11.1 K/uL  
 RBC 3.87 (L) 4.23 - 5.67 M/uL  
 HGB 12.2 (L) 13.6 - 17.2 g/dL HCT 37.6 (L) 41.1 - 50.3 % MCV 97.2 79.6 - 97.8 FL  
 MCH 31.5 26.1 - 32.9 PG  
 MCHC 32.4 31.4 - 35.0 g/dL  
 RDW 15.4 (H) 11.9 - 14.6 % PLATELET 901 (L) 005 - 450 K/uL MPV 12.8 (H) 9.4 - 12.3 FL ABSOLUTE NRBC 0.03 0.0 - 0.2 K/uL NEUTROPHILS 43 43 - 78 % LYMPHOCYTES 34 13 - 44 % MONOCYTES 17 (H) 4.0 - 12.0 % EOSINOPHILS 2 0.5 - 7.8 % BASOPHILS 1 0.0 - 2.0 % IMMATURE GRANULOCYTES 3 0.0 - 5.0 %  
 ABS. NEUTROPHILS 2.4 1.7 - 8.2 K/UL  
 ABS. LYMPHOCYTES 1.9 0.5 - 4.6 K/UL  
 ABS. MONOCYTES 1.0 0.1 - 1.3 K/UL  
 ABS. EOSINOPHILS 0.1 0.0 - 0.8 K/UL  
 ABS. BASOPHILS 0.0 0.0 - 0.2 K/UL  
 ABS. IMM. GRANS. 0.2 0.0 - 0.5 K/UL  
 DF AUTOMATED    
POTASSIUM Collection Time: 11/27/19  1:53 PM  
Result Value Ref Range Potassium 3.6 3.5 - 5.1 mmol/L PROCALCITONIN Collection Time: 11/27/19  6:39 PM  
Result Value Ref Range Procalcitonin 0.52 ng/mL CBC WITH AUTOMATED DIFF  Collection Time: 11/27/19  6:40 PM  
 Result Value Ref Range WBC 4.1 (L) 4.3 - 11.1 K/uL  
 RBC 3.88 (L) 4.23 - 5.6 M/uL  
 HGB 12.5 (L) 13.6 - 17.2 g/dL HCT 38.2 (L) 41.1 - 50.3 % MCV 98.5 (H) 79.6 - 97.8 FL  
 MCH 32.2 26.1 - 32.9 PG  
 MCHC 32.7 31.4 - 35.0 g/dL  
 RDW 15.3 (H) 11.9 - 14.6 % PLATELET 87 (L) 453 - 450 K/uL MPV 12.3 9.4 - 12.3 FL ABSOLUTE NRBC 0.03 0.0 - 0.2 K/uL NEUTROPHILS 55 43 - 78 % LYMPHOCYTES 27 13 - 44 % MONOCYTES 9 4.0 - 12.0 % EOSINOPHILS 1 0.5 - 7.8 % BASOPHILS 1 0.0 - 2.0 % IMMATURE GRANULOCYTES 7 (H) 0.0 - 5.0 %  
 ABS. NEUTROPHILS 2.3 1.7 - 8.2 K/UL  
 ABS. LYMPHOCYTES 1.1 0.5 - 4.6 K/UL  
 ABS. MONOCYTES 0.4 0.1 - 1.3 K/UL  
 ABS. EOSINOPHILS 0.0 0.0 - 0.8 K/UL  
 ABS. BASOPHILS 0.0 0.0 - 0.2 K/UL  
 ABS. IMM. GRANS. 0.3 0.0 - 0.5 K/UL  
 RBC COMMENTS SLIGHT 
ANISOCYTOSIS + POIKILOCYTOSIS 
    
 RBC COMMENTS OCCASIONAL 
STOMATOCYTES 
    
 RBC COMMENTS OCCASIONAL 
POLYCHROMASIA 
    
 WBC COMMENTS WBC'S APPEAR ABNORMAL/IMMATURE/ATYPICAL PLATELET COMMENTS MARKED    
 DF AUTOMATED METABOLIC PANEL, COMPREHENSIVE Collection Time: 11/27/19  6:40 PM  
Result Value Ref Range Sodium 141 136 - 145 mmol/L Potassium 3.5 3.5 - 5.1 mmol/L Chloride 104 98 - 107 mmol/L  
 CO2 29 21 - 32 mmol/L Anion gap 8 7 - 16 mmol/L Glucose 142 (H) 65 - 100 mg/dL BUN 14 6 - 23 MG/DL Creatinine 0.77 (L) 0.8 - 1.5 MG/DL  
 GFR est AA >60 >60 ml/min/1.73m2 GFR est non-AA >60 >60 ml/min/1.73m2 Calcium 8.7 8.3 - 10.4 MG/DL Bilirubin, total 0.4 0.2 - 1.1 MG/DL  
 ALT (SGPT) 14 12 - 65 U/L  
 AST (SGOT) 24 15 - 37 U/L Alk. phosphatase 121 50 - 136 U/L Protein, total 6.9 6.3 - 8.2 g/dL Albumin 2.8 (L) 3.5 - 5.0 g/dL Globulin 4.1 (H) 2.3 - 3.5 g/dL A-G Ratio 0.7 (L) 1.2 - 3.5 LIPASE Collection Time: 11/27/19  6:40 PM  
Result Value Ref Range Lipase 197 73 - 393 U/L  
TROPONIN I Collection Time: 11/27/19  6:40 PM  
Result Value Ref Range Troponin-I, Qt. <0.02 (L) 0.02 - 0.05 NG/ML  
POC LACTIC ACID Collection Time: 11/27/19  6:47 PM  
Result Value Ref Range Lactic Acid (POC) 1.77 0.5 - 1.9 mmol/L  
CULTURE, BLOOD Collection Time: 11/27/19  7:23 PM  
Result Value Ref Range Special Requests: PORT Culture result: NO GROWTH AFTER 10 HOURS    
CULTURE, BLOOD Collection Time: 11/27/19  7:24 PM  
Result Value Ref Range Special Requests: RIGHT Antecubital 
    
 Culture result: NO GROWTH AFTER 10 HOURS    
EKG, 12 LEAD, INITIAL Collection Time: 11/27/19  7:24 PM  
Result Value Ref Range Ventricular Rate 101 BPM  
 Atrial Rate 101 BPM  
 P-R Interval 132 ms QRS Duration 86 ms  
 Q-T Interval 392 ms QTC Calculation (Bezet) 508 ms Calculated P Axis 88 degrees Calculated R Axis 75 degrees Calculated T Axis 87 degrees Diagnosis    
  !! AGE AND GENDER SPECIFIC ECG ANALYSIS !! Sinus tachycardia with frequent Premature ventricular complexes Otherwise normal ECG No previous ECGs available POC G3 Collection Time: 11/27/19  8:28 PM  
Result Value Ref Range Device: NASAL CANNULA pH (POC) 7.400 7.35 - 7.45    
 pCO2 (POC) 44.8 35 - 45 MMHG  
 pO2 (POC) 62 (L) 75 - 100 MMHG  
 HCO3 (POC) 27.7 (H) 22 - 26 MMOL/L  
 sO2 (POC) 91 (L) 95 - 98 % Base excess (POC) 2 mmol/L Allens test (POC) YES Site LEFT RADIAL Patient temp. 98.6 Specimen type (POC) ARTERIAL Performed by Melchor   
 CO2, POC 29 MMOL/L Flow rate (POC) 5.000 L/min Respiratory comment: PhysicianNotified COLLECT TIME 2,027 INFLUENZA A & B AG (RAPID TEST) Collection Time: 11/27/19 10:04 PM  
Result Value Ref Range Influenza A Ag NEGATIVE  NEG Influenza B Ag NEGATIVE  NEG Source NASOPHARYNGEAL    
CBC W/O DIFF Collection Time: 11/28/19  3:14 AM  
Result Value Ref Range WBC 3.1 (L) 4.3 - 11.1 K/uL  
 RBC 3.43 (L) 4.23 - 5.6 M/uL  
 HGB 11.0 (L) 13.6 - 17.2 g/dL HCT 33.5 (L) 41.1 - 50.3 % MCV 97.7 79.6 - 97.8 FL  
 MCH 32.1 26.1 - 32.9 PG  
 MCHC 32.8 31.4 - 35.0 g/dL  
 RDW 15.2 (H) 11.9 - 14.6 % PLATELET 90 (L) 969 - 450 K/uL MPV 11.7 9.4 - 12.3 FL ABSOLUTE NRBC 0.02 0.0 - 0.2 K/uL METABOLIC PANEL, BASIC Collection Time: 11/28/19  3:14 AM  
Result Value Ref Range Sodium 142 136 - 145 mmol/L Potassium 3.7 3.5 - 5.1 mmol/L Chloride 104 98 - 107 mmol/L  
 CO2 31 21 - 32 mmol/L Anion gap 7 7 - 16 mmol/L Glucose 160 (H) 65 - 100 mg/dL BUN 15 6 - 23 MG/DL Creatinine 0.99 0.8 - 1.5 MG/DL  
 GFR est AA >60 >60 ml/min/1.73m2 GFR est non-AA >60 >60 ml/min/1.73m2 Calcium 8.7 8.3 - 10.4 MG/DL Imaging: 
Study Result HISTORY: Shortness of breath and productive cough. History of esophageal cancer. 
  
Exam: CT chest, PE protocol 
  
Technique: Thin section axial CT images are obtained from the thoracic inlet 
through the upper abdomen. Coronal reformatted images are obtained based on the 
axial data. 100 cc Isovue 370 is administered intraveneously without incident. Radiation dose reduction techniques were used for this study. Our CT scanners 
use one or all of the following: Automated exposure control, adjustment of the 
mA and/or kV according to patient size, use of iterative reconstruction. 
  
Comparison: 9/3/2019 
  
Findings: There is a poor bolus. No central pulmonary embolus demonstrated. There is a new enlarged subcarinal lymph node present, adjacent to the anterior 
margin of the mid-distal esophagus. This measures 2.5 x 5.3 cm (image 71). In 
addition, there has been significant interval enlargement of the distal 
esophagus just above the GE junction, which now measures 3.8 x 4.7 cm 
(previously measuring 3.8 x 3.1 cm). There are groundglass nodular opacities 
scattered within the right lower lobe. There is a subpleural nodule in the right 
middle lobe which is unchanged in size. Stable scarring noted within the right middle lobe and lingula. Clustered pulmonary nodules present within the left 
lower lobe. These are new since the prior exam. There are multiple subpleural 
nodules in the right upper lobe. 
  
Evaluation of the upper abdomen demonstrates a PEG tube. Ill-defined mass within 
the lower pole the right kidney again noted. Cannot exclude malignancy at this 
site. 
  
Bone window evaluation demonstrates no aggressive osseous lesions. 
  
IMPRESSIONS: 
  
1. Findings concerning for tumor recurrence, with interval enlargement of the 
distal esophagus, which now appears as a large soft tissue mass. In addition, 
there is new enlargement of a subcarinal lymph node. 2. Multiple groundglass and clustered pulmonary nodules present within the 
lungs. Findings felt to represent bronchiolitis/pneumonitis. 3. No evidence of pulmonary embolus. 4. Right renal mass. Cannot exclude malignancy. 
  
   
 
 
ASSESSMENT: 
Problem List  Date Reviewed: 11/27/2019 Codes Class Noted COPD exacerbation ([facility] ICD-10-CM: J44.1 ICD-9-CM: 491.21  11/27/2019 Acute respiratory failure with hypoxia [facility]) ICD-10-CM: J96.01 
ICD-9-CM: 518.81  11/27/2019 Bacterial pneumonia ICD-10-CM: J15.9 ICD-9-CM: 482.9  11/27/2019 Chemotherapy-induced neutropenia (HCC) ICD-10-CM: D70.1, T45.1X5A 
ICD-9-CM: 288.03, E933.1  11/25/2019 Malignant neoplasm of lower third of esophagus (HCC) ICD-10-CM: C15.5 ICD-9-CM: 150.5  2/19/2019 Panlobular emphysema ([facility] ICD-10-CM: J43.1 ICD-9-CM: 492.8  1/5/2018 Obesity, morbid ([facility] ICD-10-CM: E66.01 
ICD-9-CM: 278.01  12/13/2017 Obstructive sleep apnea syndrome ICD-10-CM: G47.33 
ICD-9-CM: 327.23  4/10/2017 Pure hypercholesterolemia ICD-10-CM: E78.00 ICD-9-CM: 272.0  8/17/2016 Chronic gout without tophus ICD-10-CM: M1A. 9XX0 
ICD-9-CM: 274.02  8/17/2016 Peripheral polyneuropathy ICD-10-CM: G62.9 ICD-9-CM: 356.9  8/17/2016 Essential hypertension with goal blood pressure less than 140/90 ICD-10-CM: I10 
ICD-9-CM: 401.9  8/17/2016 Hypotestosteronemia ICD-10-CM: E34.9 ICD-9-CM: 259.9  12/29/2015 RECOMMENDATIONS: 
COPD exacerbation 
-on nebs/pulmicort. IV steroids 
-Continue management per primary team 
 
GE junction adenocarcinoma 
-on FOLFOX, last received yesterday. -CT scan with concern for potential pneumonitis/bronchiolitis and progression. Follow up with Dr. Benton Boxer after discharge for further treatment plans. Goals and plan of care reviewed with the patient. All questions answered to the best of our ability. Ok to discharge from hem/onc perspective when ok with primary team. We will sign off at this time. He will need follow up with Dr. Benton Boxer after discharge. Thank you for allowing us to participate in the care of Mr. Chaney. Mely Sandoval, NP [facility] [address] [place] [address] Office : [phone] Fax : [phone] I personally saw, exammed and counselled the patient, and discussed with NP, agree with above history/assessment/plan. 62 y.o.male metastatic GEJ carcinoma admitted for hypoxia, CT ruled out PE but showed disease progression, had COPD exacerbation rx with significant improvement, need to continue with outpt inhalers, follow Dr. Benton Boxer in office to discuss CT result and further plan, call as needed. Veronica Noe M.D. [place] [address] Office : [phone] Fax : [phone]

## 2019-11-28 NOTE — PROGRESS NOTES
Problem: Falls - Risk of 
Goal: *Absence of Falls Description Document Jorge A Foleynce Fall Risk and appropriate interventions in the flowsheet. Outcome: Progressing Towards Goal 
Note: Fall Risk Interventions: 
Mobility Interventions: Communicate number of staff needed for ambulation/transfer, Patient to call before getting OOB Medication Interventions: Patient to call before getting OOB, Teach patient to arise slowly

## 2019-11-28 NOTE — PROGRESS NOTES
11/27/19 5621 Dual Skin Pressure Injury Assessment Dual Skin Pressure Injury Assessment WDL Second Care Provider (Based on [address] [place] [address]) Damon Borges RN Skin Integumentary Skin Integumentary (WDL) WDL Skin Color Appropriate for ethnicity Skin Condition/Temp Warm;Dry Skin Integrity Intact

## 2019-11-28 NOTE — ED NOTES
TRANSFER - OUT REPORT: 
 
Verbal report given to ARTEM Cope(name) on Nurys Haile  being transferred to [facility](unit) for routine progression of care Report consisted of patients Situation, Background, Assessment and  
Recommendations(SBAR). Information from the following report(s) SBAR and ED Summary was reviewed with the receiving nurse. Lines:  
Venous Access Device port Upper chest (subclavicular area), left (Active) Central Line Being Utilized Yes 11/27/2019  1:53 PM  
Criteria for Appropriate Use Long term IV/antibiotic administration 11/27/2019  1:53 PM  
Site Assessment Clean, dry, & intact 11/27/2019  1:53 PM  
Date of Last Dressing Change 11/25/19 11/27/2019  1:53 PM  
Dressing Status Clean, dry, & intact; Occlusive 11/27/2019  1:53 PM  
Dressing Type Disk with Chlorhexadine gluconate (CHG); Transparent 11/27/2019  1:53 PM  
Positive Blood Return (Medial Site) Yes 11/27/2019  1:53 PM  
Action Taken (Medial Site) Flushed 11/27/2019  5:07 PM  
   
Peripheral IV 11/27/19 Right Antecubital (Active) Site Assessment Clean, dry, & intact 11/27/2019  9:20 PM  
Phlebitis Assessment 0 11/27/2019  9:20 PM  
Infiltration Assessment 0 11/27/2019  9:20 PM  
Dressing Status Clean, dry, & intact 11/27/2019  9:20 PM  
Dressing Type 4 X 4 11/27/2019  9:20 PM  
Hub Color/Line Status Pink 11/27/2019  9:20 PM  
Alcohol Cap Used Yes 11/27/2019  9:20 PM  
  
 
Opportunity for questions and clarification was provided. Patient transported with: 
 O2 @ 4 liters

## 2019-11-28 NOTE — PROGRESS NOTES
Hospitalist Progress Note   
2019 Admit Date: 2019  6:19 PM  
NAME: Daisha Grier [facility] :  1965 MRN:  [id number] Attending: Dionisio Martínez DO 
PCP:  Ina Marvin MD 
 
SUBJECTIVE:  
Patient has history of COPD, esophageal cancer extending into the GE J juncture on chemotherapy. He has a G-tube in place for feeding. Overnight shortness of breath is better, cough has improved. No fevers or chills. Still on oxygen supplementation. Review of Systems negative with exception of pertinent positives noted above. PHYSICAL EXAM  
 
Visit Vitals /77 (BP 1 Location: Right arm, BP Patient Position: At rest) Pulse 98 Temp 97.8 °F (36.6 °C) Resp 18 Ht 6' 2\" (1.88 m) Wt 103.2 kg (227 lb 9.6 oz) SpO2 91% BMI 29.22 kg/m² Temp (24hrs), Av.8 °F (36.6 °C), Min:97.6 °F (36.4 °C), Max:98 °F (36.7 °C) Oxygen Therapy O2 Sat (%): 91 % (19 1137) Pulse via Oximetry: 94 beats per minute (19 0841) O2 Device: Nasal cannula (19 0841) O2 Flow Rate (L/min): 4 l/min (19 0841) Intake/Output Summary (Last 24 hours) at 2019 1405 Last data filed at 2019 0374 Gross per 24 hour Intake 0 ml Output 250 ml Net -250 ml General: No acute distress. Alert.   
Lungs:  Diminished breath sounds in the lower lobes. Minimal bibasilar crackles. Heart:  RRR, no murmur, rub, or gallop Abdomen: Soft, non-distended, non-tender, +bs Extremities: No cyanosis, clubbing or edema Neurologic:  No focal deficits. Moves all extremities. LABS AND STUDIES: 
Recent Results (from the past 24 hour(s)) PROCALCITONIN Collection Time: 19  6:39 PM  
Result Value Ref Range Procalcitonin 0.52 ng/mL CBC WITH AUTOMATED DIFF Collection Time: 19  6:40 PM  
Result Value Ref Range WBC 4.1 (L) 4.3 - 11.1 K/uL  
 RBC 3.88 (L) 4.23 - 5.6 M/uL  
 HGB 12.5 (L) 13.6 - 17.2 g/dL HCT 38.2 (L) 41.1 - 50.3 % MCV 98.5 (H) 79.6 - 97.8 FL  
 MCH 32.2 26.1 - 32.9 PG  
 MCHC 32.7 31.4 - 35.0 g/dL  
 RDW 15.3 (H) 11.9 - 14.6 % PLATELET 87 (L) 968 - 450 K/uL MPV 12.3 9.4 - 12.3 FL ABSOLUTE NRBC 0.03 0.0 - 0.2 K/uL NEUTROPHILS 55 43 - 78 % LYMPHOCYTES 27 13 - 44 % MONOCYTES 9 4.0 - 12.0 % EOSINOPHILS 1 0.5 - 7.8 % BASOPHILS 1 0.0 - 2.0 % IMMATURE GRANULOCYTES 7 (H) 0.0 - 5.0 %  
 ABS. NEUTROPHILS 2.3 1.7 - 8.2 K/UL  
 ABS. LYMPHOCYTES 1.1 0.5 - 4.6 K/UL  
 ABS. MONOCYTES 0.4 0.1 - 1.3 K/UL  
 ABS. EOSINOPHILS 0.0 0.0 - 0.8 K/UL  
 ABS. BASOPHILS 0.0 0.0 - 0.2 K/UL  
 ABS. IMM. GRANS. 0.3 0.0 - 0.5 K/UL  
 RBC COMMENTS SLIGHT 
ANISOCYTOSIS + POIKILOCYTOSIS 
    
 RBC COMMENTS OCCASIONAL 
STOMATOCYTES 
    
 RBC COMMENTS OCCASIONAL 
POLYCHROMASIA 
    
 WBC COMMENTS WBC'S APPEAR ABNORMAL/IMMATURE/ATYPICAL PLATELET COMMENTS MARKED    
 DF AUTOMATED METABOLIC PANEL, COMPREHENSIVE Collection Time: 11/27/19  6:40 PM  
Result Value Ref Range Sodium 141 136 - 145 mmol/L Potassium 3.5 3.5 - 5.1 mmol/L Chloride 104 98 - 107 mmol/L  
 CO2 29 21 - 32 mmol/L Anion gap 8 7 - 16 mmol/L Glucose 142 (H) 65 - 100 mg/dL BUN 14 6 - 23 MG/DL Creatinine 0.77 (L) 0.8 - 1.5 MG/DL  
 GFR est AA >60 >60 ml/min/1.73m2 GFR est non-AA >60 >60 ml/min/1.73m2 Calcium 8.7 8.3 - 10.4 MG/DL Bilirubin, total 0.4 0.2 - 1.1 MG/DL  
 ALT (SGPT) 14 12 - 65 U/L  
 AST (SGOT) 24 15 - 37 U/L Alk. phosphatase 121 50 - 136 U/L Protein, total 6.9 6.3 - 8.2 g/dL Albumin 2.8 (L) 3.5 - 5.0 g/dL Globulin 4.1 (H) 2.3 - 3.5 g/dL A-G Ratio 0.7 (L) 1.2 - 3.5 LIPASE Collection Time: 11/27/19  6:40 PM  
Result Value Ref Range Lipase 197 73 - 393 U/L  
TROPONIN I Collection Time: 11/27/19  6:40 PM  
Result Value Ref Range Troponin-I, Qt. <0.02 (L) 0.02 - 0.05 NG/ML  
POC LACTIC ACID Collection Time: 11/27/19  6:47 PM  
Result Value Ref Range Lactic Acid (POC) 1.77 0.5 - 1.9 mmol/L  
CULTURE, BLOOD Collection Time: 11/27/19  7:23 PM  
Result Value Ref Range Special Requests: PORT Culture result: NO GROWTH AFTER 10 HOURS    
CULTURE, BLOOD Collection Time: 11/27/19  7:24 PM  
Result Value Ref Range Special Requests: RIGHT Antecubital 
    
 Culture result: NO GROWTH AFTER 10 HOURS    
EKG, 12 LEAD, INITIAL Collection Time: 11/27/19  7:24 PM  
Result Value Ref Range Ventricular Rate 101 BPM  
 Atrial Rate 101 BPM  
 P-R Interval 132 ms QRS Duration 86 ms  
 Q-T Interval 392 ms QTC Calculation (Bezet) 508 ms Calculated P Axis 88 degrees Calculated R Axis 75 degrees Calculated T Axis 87 degrees Diagnosis    
  !! AGE AND GENDER SPECIFIC ECG ANALYSIS !! Sinus tachycardia with frequent Premature ventricular complexes Otherwise normal ECG No previous ECGs available POC G3 Collection Time: 11/27/19  8:28 PM  
Result Value Ref Range Device: NASAL CANNULA pH (POC) 7.400 7.35 - 7.45    
 pCO2 (POC) 44.8 35 - 45 MMHG  
 pO2 (POC) 62 (L) 75 - 100 MMHG  
 HCO3 (POC) 27.7 (H) 22 - 26 MMOL/L  
 sO2 (POC) 91 (L) 95 - 98 % Base excess (POC) 2 mmol/L Allens test (POC) YES Site LEFT RADIAL Patient temp. 98.6 Specimen type (POC) ARTERIAL Performed by Melchor   
 CO2, POC 29 MMOL/L Flow rate (POC) 5.000 L/min Respiratory comment: PhysicianNotified COLLECT TIME 2,027 INFLUENZA A & B AG (RAPID TEST) Collection Time: 11/27/19 10:04 PM  
Result Value Ref Range Influenza A Ag NEGATIVE  NEG Influenza B Ag NEGATIVE  NEG Source NASOPHARYNGEAL    
CBC W/O DIFF Collection Time: 11/28/19  3:14 AM  
Result Value Ref Range WBC 3.1 (L) 4.3 - 11.1 K/uL  
 RBC 3.43 (L) 4.23 - 5.6 M/uL  
 HGB 11.0 (L) 13.6 - 17.2 g/dL HCT 33.5 (L) 41.1 - 50.3 % MCV 97.7 79.6 - 97.8 FL  
 MCH 32.1 26.1 - 32.9 PG  
 MCHC 32.8 31.4 - 35.0 g/dL  
 RDW 15.2 (H) 11.9 - 14.6 % PLATELET 90 (L) 923 - 450 K/uL MPV 11.7 9.4 - 12.3 FL ABSOLUTE NRBC 0.02 0.0 - 0.2 K/uL METABOLIC PANEL, BASIC Collection Time: 11/28/19  3:14 AM  
Result Value Ref Range Sodium 142 136 - 145 mmol/L Potassium 3.7 3.5 - 5.1 mmol/L Chloride 104 98 - 107 mmol/L  
 CO2 31 21 - 32 mmol/L Anion gap 7 7 - 16 mmol/L Glucose 160 (H) 65 - 100 mg/dL BUN 15 6 - 23 MG/DL Creatinine 0.99 0.8 - 1.5 MG/DL  
 GFR est AA >60 >60 ml/min/1.73m2 GFR est non-AA >60 >60 ml/min/1.73m2 Calcium 8.7 8.3 - 10.4 MG/DL Personally reviewed all labs, meds, and studies for past 24hrs. ASSESSMENT Active Hospital Problems Diagnosis Date Noted  COPD exacerbation ([facility] [address] 11/27/2019  Acute respiratory failure with hypoxia ([facility] [address] 11/27/2019  Bacterial pneumonia 11/27/2019  Malignant neoplasm of lower third of esophagus ([facility] [address] 02/19/2019  Essential hypertension with goal blood pressure less than 140/90 08/17/2016 1. Acute COPD exacerbation with suspected associated community-acquired pneumonia and  Hypoxia 
-CT of the chest reviewed, showing multiple groundglass and clustered pulmonary nodules present within the lungs. Findings felt to represent bronchiolitis/pneumonitis. No evidence of pulmonary embolus. 
-Continue Levaquin, systemic steroids with bronchodilators - Wean him off oxygen 2. Esophageal cancer extending into his GEJ on chemotherapy 
- CT is also showing renal mass and progression of his esophageal cancer - Consult hematology oncology.  
 
 
Sam King MD

## 2019-11-28 NOTE — H&P
HOSPITALIST H&P/CONSULT 
NAME:  Jennifer Badillo [facility] Age:  62 y.o. 
:   1965 MRN:   [id number] PCP: Tung Pickard MD 
Consulting MD: Treatment Team: Primary Nurse: Kamille Jonas HPI:  
62M with pmhx of tob abuse (quit in 2016), COPD, KORINA on CPAP (hasn't worn in last two nights), CAD s/p PCI, DLP, HTN, gout, depression, adenocarcinoma of esophagus/stomach s/p Gtube (pt is NPO except he does drink water) followed by oncology currently on chemo FOLFOX seen in infusion center today for chemo and noted to be hypoxic to 70% on RA. Pt reports 2 weeks of increasing nasal and chest congestion with progressive cough of white sputum. ED workup notable for CT of lungs negative for PE but concerning for scattered ground glass and pulmonary nodules thought to represent bronchiolitis/pneumonitis. WBC 5.7K, procal 0.52. ABG 7.4/44/62/27 Pt denies fever, chills, nausea, vomiting or recent choking episodes. He is currently on 4LNC in ED with o2 sat 97% and conversational w/o dyspnea. Wife at bedside. Hospitalist asked to admit for acute hypoxic resp failure. Complete ROS done and is as stated in HPI or otherwise negative Past Medical History:  
Diagnosis Date  Appendicitis   
 resulted in appendectomy  Chewing tobacco use  Chronic obstructive pulmonary disease (HCC)   
 nebulizer prn and rescue inhaler only  Chronic pain   
 pt wife denies any pain  Depression   
 pt wife denies, no current meds  Erectile disorder due to medical condition in male patient  Esophageal cancer ([facility] [address]  Former cigarette smoker  Gout   
 treated with Allopurinol. Last flareup   H/O heart artery stent X2- last stent placed 2016  History of MI (myocardial infarction) 2014  
 stents x2  RCA- 2014  LAD - 2016  Hyperlipidemia  Hypertension   
 managed with meds  Hypotestosteronemia  Morbid obesity ([address] 47.6  KORINA (obstructive sleep apnea) Trilegy and oxygen 2L  Osteoarthritis Past Surgical History:  
Procedure Laterality Date  COLONOSCOPY N/A 2019 COLONOSCOPY/ 50 performed by Hilda Johns MD at [facility] HX APPENDECTOMY  2003  HX CARPAL TUNNEL RELEASE Right 2014  HX CORONARY STENT PLACEMENT  2014 RCA x 1  
 HX CORONARY STENT PLACEMENT   PCI of the lesion with a 4x8mm Xience Alpine RONN  
 HX VASCULAR ACCESS    
 IR INSERT GASTROSTOMY TUBE [facility]  2019 Prior to Admission Medications Prescriptions Last Dose Informant Patient Reported? Taking? HYDROcodone-acetaminophen (NORCO) 5-325 mg per tablet   No No  
Sig: Take 1 Tab by mouth every eight (8) hours as needed for Pain for up to 30 days. Max Daily Amount: 3 Tabs. Lactose-Free Food with Fiber (JEVITY 1.5 MELITON) 0.06 gram-1.5 kcal/mL liqd   No No  
Si Bottles by PEG Tube route daily for 100 days. Bolus Feeds, goal 6/day to provide 2130 kcal, 91 gm pro, 1080 ml. Needs additional 36 oz/day for hydration. JESSICA > 3 months. Indications: blockage of the esophagus, dysphagia Oxygen   Yes No  
Sig: at bedtime as directed for dose pack. allopurinol (ZYLOPRIM) 100 mg tablet   No No  
Sig: Take 1 Tab by mouth daily. amoxicillin-clavulanate (AUGMENTIN) 250-62.5 mg/5 mL suspension   No No  
Sig: Take 17.5 mL by mouth two (2) times a day for 10 days. azithromycin (ZITHROMAX) 200 mg/5 mL suspension   No No  
Sig: Take 12.5 mL by mouth daily for 3 days. cholecalciferol, VITAMIN D3, (VITAMIN D3) 5,000 unit tab tablet   Yes No  
Sig: Take 5,000 Units by mouth daily. cpap machine kit   Yes No  
Sig: by Does Not Apply route.  
ezetimibe (ZETIA) 10 mg tablet   Yes No  
Sig: Take 10 mg by mouth daily. metoprolol tartrate (LOPRESSOR) 50 mg tablet   No No  
Sig: Take 1 Tab by mouth two (2) times a day.   
potassium chloride (KAON 20%) 40 mEq/15 mL liqd   No No  
Sig: Daily X 2 days through his feeding tube  Indications: prevention of low potassium in the blood Facility-Administered Medications: None No Known Allergies Social History Tobacco Use  Smoking status: Former Smoker Packs/day: 0.15 Years: 35.00 Pack years: 5.25 Last attempt to quit: 2016 Years since quitting: 3.9  Smokeless tobacco: Current User Substance Use Topics  Alcohol use: No  
  
Family History Problem Relation Age of Onset  Heart Disease Mother  Hypertension Mother  Cancer Father  Heart Disease Father  Cancer Brother Objective:  
 
Visit Vitals /65 Pulse 92 Temp 98 °F (36.7 °C) Resp 20 Ht 6' 2\" (1.88 m) Wt 104.8 kg (231 lb) SpO2 97% BMI 29.66 kg/m² Temp (24hrs), Av.8 °F (36.6 °C), Min:97.6 °F (36.4 °C), Max:98 °F (36.7 °C) Oxygen Therapy O2 Sat (%): 97 % (19) Pulse via Oximetry: 77 beats per minute (19) O2 Device: Nasal cannula (19) O2 Flow Rate (L/min): 4 l/min (19) Physical Exam: 
General:    Alert, cooperative, no distress, appears chronically ill Nose:  Nares normal. No drainage or sinus tenderness. Lungs:   Diffuse expiratory wheezing, bibasilar rales Heart:   Regular rate and rhythm,  no murmur, rub or gallop. Abdomen:   Soft, non-tender. Not distended. Bowel sounds normal.  
Extremities: No cyanosis. No edema. No clubbing Skin:     Texture, turgor normal. No rashes or lesions. Not Jaundiced Neurologic: Alert and oriented x 3, no focal deficits Data Review:  
Recent Results (from the past 24 hour(s)) CBC WITH AUTOMATED DIFF Collection Time: 19  1:36 PM  
Result Value Ref Range WBC 5.7 4.3 - 11.1 K/uL  
 RBC 3.87 (L) 4.23 - 5.67 M/uL  
 HGB 12.2 (L) 13.6 - 17.2 g/dL HCT 37.6 (L) 41.1 - 50.3 % MCV 97.2 79.6 - 97.8 FL  
 MCH 31.5 26.1 - 32.9 PG  
 MCHC 32.4 31.4 - 35.0 g/dL  
 RDW 15.4 (H) 11.9 - 14.6 % PLATELET 398 (L) 821 - 450 K/uL  MPV 12.8 (H) 9.4 - 12.3 FL  
 ABSOLUTE NRBC 0.03 0.0 - 0.2 K/uL NEUTROPHILS 43 43 - 78 % LYMPHOCYTES 34 13 - 44 % MONOCYTES 17 (H) 4.0 - 12.0 % EOSINOPHILS 2 0.5 - 7.8 % BASOPHILS 1 0.0 - 2.0 % IMMATURE GRANULOCYTES 3 0.0 - 5.0 %  
 ABS. NEUTROPHILS 2.4 1.7 - 8.2 K/UL  
 ABS. LYMPHOCYTES 1.9 0.5 - 4.6 K/UL  
 ABS. MONOCYTES 1.0 0.1 - 1.3 K/UL  
 ABS. EOSINOPHILS 0.1 0.0 - 0.8 K/UL  
 ABS. BASOPHILS 0.0 0.0 - 0.2 K/UL  
 ABS. IMM. GRANS. 0.2 0.0 - 0.5 K/UL  
 DF AUTOMATED    
POTASSIUM Collection Time: 11/27/19  1:53 PM  
Result Value Ref Range Potassium 3.6 3.5 - 5.1 mmol/L PROCALCITONIN Collection Time: 11/27/19  6:39 PM  
Result Value Ref Range Procalcitonin 0.52 ng/mL CBC WITH AUTOMATED DIFF Collection Time: 11/27/19  6:40 PM  
Result Value Ref Range WBC 4.1 (L) 4.3 - 11.1 K/uL  
 RBC 3.88 (L) 4.23 - 5.6 M/uL  
 HGB 12.5 (L) 13.6 - 17.2 g/dL HCT 38.2 (L) 41.1 - 50.3 % MCV 98.5 (H) 79.6 - 97.8 FL  
 MCH 32.2 26.1 - 32.9 PG  
 MCHC 32.7 31.4 - 35.0 g/dL  
 RDW 15.3 (H) 11.9 - 14.6 % PLATELET 87 (L) 787 - 450 K/uL MPV 12.3 9.4 - 12.3 FL ABSOLUTE NRBC 0.03 0.0 - 0.2 K/uL NEUTROPHILS 55 43 - 78 % LYMPHOCYTES 27 13 - 44 % MONOCYTES 9 4.0 - 12.0 % EOSINOPHILS 1 0.5 - 7.8 % BASOPHILS 1 0.0 - 2.0 % IMMATURE GRANULOCYTES 7 (H) 0.0 - 5.0 %  
 ABS. NEUTROPHILS 2.3 1.7 - 8.2 K/UL  
 ABS. LYMPHOCYTES 1.1 0.5 - 4.6 K/UL  
 ABS. MONOCYTES 0.4 0.1 - 1.3 K/UL  
 ABS. EOSINOPHILS 0.0 0.0 - 0.8 K/UL  
 ABS. BASOPHILS 0.0 0.0 - 0.2 K/UL  
 ABS. IMM. GRANS. 0.3 0.0 - 0.5 K/UL  
 RBC COMMENTS SLIGHT 
ANISOCYTOSIS + POIKILOCYTOSIS 
    
 RBC COMMENTS OCCASIONAL 
STOMATOCYTES 
    
 RBC COMMENTS OCCASIONAL 
POLYCHROMASIA 
    
 WBC COMMENTS WBC'S APPEAR ABNORMAL/IMMATURE/ATYPICAL PLATELET COMMENTS MARKED    
 DF AUTOMATED METABOLIC PANEL, COMPREHENSIVE Collection Time: 11/27/19  6:40 PM  
Result Value Ref Range Sodium 141 136 - 145 mmol/L  Potassium 3.5 3.5 - 5.1 mmol/L  
 Chloride 104 98 - 107 mmol/L  
 CO2 29 21 - 32 mmol/L Anion gap 8 7 - 16 mmol/L Glucose 142 (H) 65 - 100 mg/dL BUN 14 6 - 23 MG/DL Creatinine 0.77 (L) 0.8 - 1.5 MG/DL  
 GFR est AA >60 >60 ml/min/1.73m2 GFR est non-AA >60 >60 ml/min/1.73m2 Calcium 8.7 8.3 - 10.4 MG/DL Bilirubin, total 0.4 0.2 - 1.1 MG/DL  
 ALT (SGPT) 14 12 - 65 U/L  
 AST (SGOT) 24 15 - 37 U/L Alk. phosphatase 121 50 - 136 U/L Protein, total 6.9 6.3 - 8.2 g/dL Albumin 2.8 (L) 3.5 - 5.0 g/dL Globulin 4.1 (H) 2.3 - 3.5 g/dL A-G Ratio 0.7 (L) 1.2 - 3.5 LIPASE Collection Time: 11/27/19  6:40 PM  
Result Value Ref Range Lipase 197 73 - 393 U/L  
TROPONIN I Collection Time: 11/27/19  6:40 PM  
Result Value Ref Range Troponin-I, Qt. <0.02 (L) 0.02 - 0.05 NG/ML  
POC LACTIC ACID Collection Time: 11/27/19  6:47 PM  
Result Value Ref Range Lactic Acid (POC) 1.77 0.5 - 1.9 mmol/L  
EKG, 12 LEAD, INITIAL Collection Time: 11/27/19  7:24 PM  
Result Value Ref Range Ventricular Rate 101 BPM  
 Atrial Rate 101 BPM  
 P-R Interval 132 ms QRS Duration 86 ms  
 Q-T Interval 392 ms QTC Calculation (Bezet) 508 ms Calculated P Axis 88 degrees Calculated R Axis 75 degrees Calculated T Axis 87 degrees Diagnosis    
  !! AGE AND GENDER SPECIFIC ECG ANALYSIS !! Sinus tachycardia with frequent Premature ventricular complexes Otherwise normal ECG No previous ECGs available POC G3 Collection Time: 11/27/19  8:28 PM  
Result Value Ref Range Device: NASAL CANNULA pH (POC) 7.400 7.35 - 7.45    
 pCO2 (POC) 44.8 35 - 45 MMHG  
 pO2 (POC) 62 (L) 75 - 100 MMHG  
 HCO3 (POC) 27.7 (H) 22 - 26 MMOL/L  
 sO2 (POC) 91 (L) 95 - 98 % Base excess (POC) 2 mmol/L Allens test (POC) YES Site LEFT RADIAL Patient temp. 98.6 Specimen type (POC) ARTERIAL Performed by Melchor   
 CO2, POC 29 MMOL/L Flow rate (POC) 5.000 L/min Respiratory comment: PhysicianNotified COLLECT TIME 2,027 INFLUENZA A & B AG (RAPID TEST) Collection Time: 11/27/19 10:04 PM  
Result Value Ref Range Influenza A Ag NEGATIVE  NEG Influenza B Ag NEGATIVE  NEG Source NASOPHARYNGEAL Imaging Elsa Morales Presume Final [99] 11/27/2019 20:02 11/27/2019 20:14 Study Result HISTORY: Shortness of breath and productive cough. History of esophageal cancer. 
  
Exam: CT chest, PE protocol 
  
Technique: Thin section axial CT images are obtained from the thoracic inlet 
through the upper abdomen. Coronal reformatted images are obtained based on the 
axial data. 100 cc Isovue 370 is administered intraveneously without incident. Radiation dose reduction techniques were used for this study. Our CT scanners 
use one or all of the following: Automated exposure control, adjustment of the 
mA and/or kV according to patient size, use of iterative reconstruction. 
  
Comparison: 9/3/2019 
  
Findings: There is a poor bolus. No central pulmonary embolus demonstrated. There is a new enlarged subcarinal lymph node present, adjacent to the anterior 
margin of the mid-distal esophagus. This measures 2.5 x 5.3 cm (image 71). In 
addition, there has been significant interval enlargement of the distal 
esophagus just above the GE junction, which now measures 3.8 x 4.7 cm 
(previously measuring 3.8 x 3.1 cm). There are groundglass nodular opacities 
scattered within the right lower lobe. There is a subpleural nodule in the right 
middle lobe which is unchanged in size. Stable scarring noted within the right 
middle lobe and lingula. Clustered pulmonary nodules present within the left 
lower lobe. These are new since the prior exam. There are multiple subpleural 
nodules in the right upper lobe. 
  
Evaluation of the upper abdomen demonstrates a PEG tube. Ill-defined mass within 
the lower pole the right kidney again noted.  Cannot exclude malignancy at this 
site. 
  
Bone window evaluation demonstrates no aggressive osseous lesions. 
  
IMPRESSIONS: 
  
1. Findings concerning for tumor recurrence, with interval enlargement of the 
distal esophagus, which now appears as a large soft tissue mass. In addition, 
there is new enlargement of a subcarinal lymph node. 2. Multiple groundglass and clustered pulmonary nodules present within the 
lungs. Findings felt to represent bronchiolitis/pneumonitis. 3. No evidence of pulmonary embolus. 4. Right renal mass. Cannot exclude malignancy. 
   
 
 
Assessment and Plan: Active Hospital Problems Diagnosis Date Noted  COPD exacerbation ([facility] [address] 11/27/2019  Acute respiratory failure with hypoxia ([facility] [address] 11/27/2019  Bacterial pneumonia 11/27/2019  Malignant neoplasm of lower third of esophagus ([facility] [address] 02/19/2019  Essential hypertension with goal blood pressure less than 140/90 08/17/2016 A/P 
- Acute on chronic respiratory failure - wean oxygen as tolerated for o2 sats >92%. - Pneumonia - continue levaquin as started in the ED. Sputum/blood cx's 
 
- COPD exacerbation - scheduled duonebs/pulmicort. IV steroids to wean. mucinex through g-tube. - Esophageal/stomach CA - follows with heme/onc on chemotherapy. Has G-tube for nutrition and meds. Code Status: Full code Anticipated discharge: 2-3 days. Signed By: Chalo Bradley DO November 27, 2019

## 2019-11-28 NOTE — ED PROVIDER NOTES
Patient with a history of esophageal cancer with spread to the abdomen. Chemo with last chemo treatment prior to arrival.  Has had 2-week history of shortness of breath. Had a PEG tube placed last week. Has had cough for the past 2 weeks with slight white-yellow sputum. Has any chest pain. While getting chemo tonight became more short of breath with hypoxia and drop of pulse ox to 70%. Placed on oxygen with improvement to 92%. Here for further evaluation. Oncologist is Dr. Ramón Torres. Past history of COPD. The history is provided by the patient. No  was used. Cough This is a new problem. The current episode started more than 1 week ago. The problem occurs constantly. The problem has been gradually worsening. The cough is productive of sputum. There has been no fever. Associated symptoms include shortness of breath. Pertinent negatives include no chest pain, no chills, no sweats, no eye redness, no ear pain, no headaches, no rhinorrhea, no sore throat, no myalgias, no wheezing, no nausea, no vomiting and no confusion. He has tried nothing for the symptoms. His past medical history is significant for COPD and cancer (esophageal). His past medical history does not include CHF. Past Medical History:  
Diagnosis Date  Appendicitis   
 resulted in appendectomy  Chewing tobacco use  Chronic obstructive pulmonary disease (HCC)   
 nebulizer prn and rescue inhaler only  Chronic pain   
 pt wife denies any pain  Depression   
 pt wife denies, no current meds  Erectile disorder due to medical condition in male patient  Esophageal cancer ([facility] [address]  Former cigarette smoker  Gout   
 treated with Allopurinol. Last flareup 2013  H/O heart artery stent X2- last stent placed 2016  History of MI (myocardial infarction) 2014  
 stents x2  RCA- 2014  LAD - 2016  Hyperlipidemia  Hypertension   
 managed with meds  Hypotestosteronemia  Morbid obesity ([facility] [address] 47.6  KORINA (obstructive sleep apnea) Trilegy and oxygen 2L  Osteoarthritis Past Surgical History:  
Procedure Laterality Date  COLONOSCOPY N/A 2/5/2019 COLONOSCOPY/ 50 performed by Kim Navarro MD at [facility] HX APPENDECTOMY  2003  HX CARPAL TUNNEL RELEASE Right 2014  HX CORONARY STENT PLACEMENT  2014 RCA x 1  
 HX CORONARY STENT PLACEMENT  2016 PCI of the lesion with a 4x8mm Xience Alpine RONN  
 HX VASCULAR ACCESS    
 IR INSERT GASTROSTOMY TUBE [facility]  11/21/2019 Family History:  
Problem Relation Age of Onset  Heart Disease Mother  Hypertension Mother  Cancer Father  Heart Disease Father  Cancer Brother Social History Socioeconomic History  Marital status:  Spouse name: Not on file  Number of children: Not on file  Years of education: Not on file  Highest education level: Not on file Occupational History  Not on file Social Needs  Financial resource strain: Not on file  Food insecurity:  
  Worry: Not on file Inability: Not on file  Transportation needs:  
  Medical: Not on file Non-medical: Not on file Tobacco Use  Smoking status: Former Smoker Packs/day: 0.15 Years: 35.00 Pack years: 5.25 Last attempt to quit: 2016 Years since quitting: 3.9  Smokeless tobacco: Current User Substance and Sexual Activity  Alcohol use: No  
 Drug use: No  
 Sexual activity: Not on file Lifestyle  Physical activity:  
  Days per week: Not on file Minutes per session: Not on file  Stress: Not on file Relationships  Social connections:  
  Talks on phone: Not on file Gets together: Not on file Attends Anabaptism service: Not on file Active member of club or organization: Not on file Attends meetings of clubs or organizations: Not on file Relationship status: Not on file  Intimate partner violence:  
  Fear of current or ex partner: Not on file Emotionally abused: Not on file Physically abused: Not on file Forced sexual activity: Not on file Other Topics Concern [address] Service Not Asked  Blood Transfusions Not Asked  Caffeine Concern Not Asked  Occupational Exposure Not Asked [place] Lav[employer] Hazards Not Asked  Sleep Concern Not Asked  Stress Concern Not Asked  Weight Concern Not Asked  Special Diet Not Asked  Back Care Not Asked  Exercise Not Asked  Bike Helmet Not Asked  Seat Belt Not Asked  Self-Exams Not Asked Social History Narrative  Not on file ALLERGIES: Patient has no known allergies. Review of Systems Constitutional: Negative for chills and fever. HENT: Negative for ear pain, rhinorrhea and sore throat. Eyes: Negative for pain and redness. Respiratory: Positive for cough and shortness of breath. Negative for chest tightness and wheezing. Cardiovascular: Negative for chest pain and leg swelling. Gastrointestinal: Negative for abdominal pain, diarrhea, nausea and vomiting. Genitourinary: Negative for dysuria and hematuria. Musculoskeletal: Negative for back pain, gait problem, myalgias, neck pain and neck stiffness. Skin: Negative for color change and rash. Neurological: Negative for weakness, numbness and headaches. Psychiatric/Behavioral: Negative for confusion. Vitals:  
 11/27/19 1825 11/27/19 1901 BP: 144/71 Pulse: 93 (!) 105 Resp: 22 19 Temp: 98 °F (36.7 °C) SpO2: (!) 88% 93% Weight: 104.8 kg (231 lb) Height: 6' 2\" (1.88 m) Physical Exam 
Constitutional:   
   Appearance: Normal appearance. He is well-developed. HENT:  
   Head: Normocephalic and atraumatic. Neck: Musculoskeletal: Normal range of motion and neck supple. Cardiovascular:  
   Rate and Rhythm: Regular rhythm. Tachycardia present. Pulmonary: Effort: Pulmonary effort is normal.  
   Breath sounds: No wheezing. Comments: Mild coarse bilaterally Abdominal:  
   General: Bowel sounds are normal.  
   Palpations: Abdomen is soft. Tenderness: There is no tenderness. Musculoskeletal: Normal range of motion. General: No tenderness. Skin: 
   General: Skin is warm and dry. Neurological:  
   General: No focal deficit present. Mental Status: He is alert and oriented to person, place, and time. MDM Number of Diagnoses or Management Options Diagnosis management comments: Patient with hypoxia and pneumonitis on CT.  5 L of oxygen here in the ER. Esophageal cancer with metastatic spread to the abdomen. Will admit for further treatment and evaluation. Amount and/or Complexity of Data Reviewed Clinical lab tests: ordered and reviewed Tests in the radiology section of CPT®: ordered and reviewed Tests in the medicine section of CPT®: ordered and reviewed Patient Progress Patient progress: stable Procedures EKG: normal sinus rhythm, nonspecific ST and T waves changes. Rate 101. CT CHEST W CONT (Final result) Result time 11/27/19 20:26:33 Final result by Billie Daly MD (11/27/19 20:26:33) Narrative:  
 HISTORY: Shortness of breath and productive cough. History of esophageal cancer. Exam: CT chest, PE protocol Technique: Thin section axial CT images are obtained from the thoracic inlet 
through the upper abdomen. Coronal reformatted images are obtained based on the 
axial data. 100 cc Isovue 370 is administered intraveneously without incident. Radiation dose reduction techniques were used for this study.  Our CT scanners 
use one or all of the following: Automated exposure control, adjustment of the 
mA and/or kV according to patient size, use of iterative reconstruction. Comparison: 9/3/2019 Findings: There is a poor bolus.  No central pulmonary embolus demonstrated. There is a new enlarged subcarinal lymph node present, adjacent to the anterior 
margin of the mid-distal esophagus. This measures 2.5 x 5.3 cm (image 71). In 
addition, there has been significant interval enlargement of the distal 
esophagus just above the GE junction, which now measures 3.8 x 4.7 cm 
(previously measuring 3.8 x 3.1 cm). There are groundglass nodular opacities 
scattered within the right lower lobe. There is a subpleural nodule in the right 
middle lobe which is unchanged in size. Stable scarring noted within the right 
middle lobe and lingula. Clustered pulmonary nodules present within the left 
lower lobe. These are new since the prior exam. There are multiple subpleural 
nodules in the right upper lobe. Evaluation of the upper abdomen demonstrates a PEG tube. Ill-defined mass within 
the lower pole the right kidney again noted. Cannot exclude malignancy at this 
site. Bone window evaluation demonstrates no aggressive osseous lesions. IMPRESSIONS: 
 
1. Findings concerning for tumor recurrence, with interval enlargement of the 
distal esophagus, which now appears as a large soft tissue mass. In addition, 
there is new enlargement of a subcarinal lymph node. 2. Multiple groundglass and clustered pulmonary nodules present within the 
lungs. Findings felt to represent bronchiolitis/pneumonitis. 3. No evidence of pulmonary embolus. 4. Right renal mass. Cannot exclude malignancy. 
 
  
  
  
  
   
   
XR CHEST PA LAT (Final result) Result time 11/27/19 18:56:53 Final result by Twila Santiago MD (11/27/19 18:56:53) Impression:  
 Impression: Stable two-view chest. 
  
  
  
  
Narrative:  
 History: sob cough Two views chest 
 
Comparison: 3/18/2019 Findings: The lungs are well expanded and clear. The cardiac silhouette, and 
mediastinal contour, and osseous structures are stable.  A port overlies left 
chest wall, stable in position.  
  
  
  
   
 Results Include: 
 
Recent Results (from the past 24 hour(s)) CBC WITH AUTOMATED DIFF Collection Time: 11/27/19  1:36 PM  
Result Value Ref Range WBC 5.7 4.3 - 11.1 K/uL  
 RBC 3.87 (L) 4.23 - 5.67 M/uL  
 HGB 12.2 (L) 13.6 - 17.2 g/dL HCT 37.6 (L) 41.1 - 50.3 % MCV 97.2 79.6 - 97.8 FL  
 MCH 31.5 26.1 - 32.9 PG  
 MCHC 32.4 31.4 - 35.0 g/dL  
 RDW 15.4 (H) 11.9 - 14.6 % PLATELET 057 (L) 006 - 450 K/uL MPV 12.8 (H) 9.4 - 12.3 FL ABSOLUTE NRBC 0.03 0.0 - 0.2 K/uL NEUTROPHILS 43 43 - 78 % LYMPHOCYTES 34 13 - 44 % MONOCYTES 17 (H) 4.0 - 12.0 % EOSINOPHILS 2 0.5 - 7.8 % BASOPHILS 1 0.0 - 2.0 % IMMATURE GRANULOCYTES 3 0.0 - 5.0 %  
 ABS. NEUTROPHILS 2.4 1.7 - 8.2 K/UL  
 ABS. LYMPHOCYTES 1.9 0.5 - 4.6 K/UL  
 ABS. MONOCYTES 1.0 0.1 - 1.3 K/UL  
 ABS. EOSINOPHILS 0.1 0.0 - 0.8 K/UL  
 ABS. BASOPHILS 0.0 0.0 - 0.2 K/UL  
 ABS. IMM. GRANS. 0.2 0.0 - 0.5 K/UL  
 DF AUTOMATED    
POTASSIUM Collection Time: 11/27/19  1:53 PM  
Result Value Ref Range Potassium 3.6 3.5 - 5.1 mmol/L PROCALCITONIN Collection Time: 11/27/19  6:39 PM  
Result Value Ref Range Procalcitonin 0.52 ng/mL CBC WITH AUTOMATED DIFF Collection Time: 11/27/19  6:40 PM  
Result Value Ref Range WBC 4.1 (L) 4.3 - 11.1 K/uL  
 RBC 3.88 (L) 4.23 - 5.6 M/uL  
 HGB 12.5 (L) 13.6 - 17.2 g/dL HCT 38.2 (L) 41.1 - 50.3 % MCV 98.5 (H) 79.6 - 97.8 FL  
 MCH 32.2 26.1 - 32.9 PG  
 MCHC 32.7 31.4 - 35.0 g/dL  
 RDW 15.3 (H) 11.9 - 14.6 % PLATELET 87 (L) 684 - 450 K/uL MPV 12.3 9.4 - 12.3 FL ABSOLUTE NRBC 0.03 0.0 - 0.2 K/uL NEUTROPHILS 55 43 - 78 % LYMPHOCYTES 27 13 - 44 % MONOCYTES 9 4.0 - 12.0 % EOSINOPHILS 1 0.5 - 7.8 % BASOPHILS 1 0.0 - 2.0 % IMMATURE GRANULOCYTES 7 (H) 0.0 - 5.0 %  
 ABS. NEUTROPHILS 2.3 1.7 - 8.2 K/UL  
 ABS. LYMPHOCYTES 1.1 0.5 - 4.6 K/UL  
 ABS. MONOCYTES 0.4 0.1 - 1.3 K/UL  
 ABS. EOSINOPHILS 0.0 0.0 - 0.8 K/UL  
 ABS. BASOPHILS 0.0 0.0 - 0.2 K/UL ABS. IMM. GRANS. 0.3 0.0 - 0.5 K/UL  
 RBC COMMENTS SLIGHT 
ANISOCYTOSIS + POIKILOCYTOSIS 
    
 RBC COMMENTS OCCASIONAL 
STOMATOCYTES 
    
 RBC COMMENTS OCCASIONAL 
POLYCHROMASIA 
    
 WBC COMMENTS WBC'S APPEAR ABNORMAL/IMMATURE/ATYPICAL PLATELET COMMENTS MARKED    
 DF AUTOMATED METABOLIC PANEL, COMPREHENSIVE Collection Time: 11/27/19  6:40 PM  
Result Value Ref Range Sodium 141 136 - 145 mmol/L Potassium 3.5 3.5 - 5.1 mmol/L Chloride 104 98 - 107 mmol/L  
 CO2 29 21 - 32 mmol/L Anion gap 8 7 - 16 mmol/L Glucose 142 (H) 65 - 100 mg/dL BUN 14 6 - 23 MG/DL Creatinine 0.77 (L) 0.8 - 1.5 MG/DL  
 GFR est AA >60 >60 ml/min/1.73m2 GFR est non-AA >60 >60 ml/min/1.73m2 Calcium 8.7 8.3 - 10.4 MG/DL Bilirubin, total 0.4 0.2 - 1.1 MG/DL  
 ALT (SGPT) 14 12 - 65 U/L  
 AST (SGOT) 24 15 - 37 U/L Alk. phosphatase 121 50 - 136 U/L Protein, total 6.9 6.3 - 8.2 g/dL Albumin 2.8 (L) 3.5 - 5.0 g/dL Globulin 4.1 (H) 2.3 - 3.5 g/dL A-G Ratio 0.7 (L) 1.2 - 3.5 LIPASE Collection Time: 11/27/19  6:40 PM  
Result Value Ref Range Lipase 197 73 - 393 U/L  
TROPONIN I Collection Time: 11/27/19  6:40 PM  
Result Value Ref Range Troponin-I, Qt. <0.02 (L) 0.02 - 0.05 NG/ML  
POC LACTIC ACID Collection Time: 11/27/19  6:47 PM  
Result Value Ref Range Lactic Acid (POC) 1.77 0.5 - 1.9 mmol/L  
POC G3 Collection Time: 11/27/19  8:28 PM  
Result Value Ref Range Device: NASAL CANNULA pH (POC) 7.400 7.35 - 7.45    
 pCO2 (POC) 44.8 35 - 45 MMHG  
 pO2 (POC) 62 (L) 75 - 100 MMHG  
 HCO3 (POC) 27.7 (H) 22 - 26 MMOL/L  
 sO2 (POC) 91 (L) 95 - 98 % Base excess (POC) 2 mmol/L Allens test (POC) YES Site LEFT RADIAL Patient temp. 98.6 Specimen type (POC) ARTERIAL Performed by Melchor   
 CO2, POC 29 MMOL/L Flow rate (POC) 5.000 L/min Respiratory comment: PhysicianNotified COLLECT TIME 2027

## 2019-11-28 NOTE — PROGRESS NOTES
END OF SHIFT NOTE: 
 
Intake/Output No intake/output data recorded. Voiding: YES Catheter: NO 
Drain: PEG/Gastrostomy Tube (Active) Site Assessment Clean, dry, & intact 11/27/2019 11:45 PM  
Dressing Status Clean, dry, & intact 11/27/2019 11:45 PM  
G Port Status Clamped 11/27/2019 11:45 PM  
 
 
 
 
 
 
Stool:  0 occurrences. Emesis:  0 occurrences. VITAL SIGNS Patient Vitals for the past 12 hrs: 
 Temp Pulse Resp BP SpO2  
11/28/19 0330 97.8 °F (36.6 °C) 98 18 142/76 92 % 11/27/19 2357     96 % 11/27/19 2345 97.6 °F (36.4 °C) 95 18 137/78 94 % 11/27/19 2239  92 20  97 % 11/27/19 2119     92 % 11/27/19 2040  (!) 103 16 124/65 90 % 11/27/19 2029     91 % Pain Assessment Pain 1 Pain Scale 1: Numeric (0 - 10) (11/27/19 2340) Pain Intensity 1: 0 (11/27/19 2340) Patient Stated Pain Goal: 0 (11/27/19 2340) Ambulating Yes Additional Information: VSS. Afebrile. No needs voiced at this time Shift report given to oncoming nurse at the bedside.  
 
Jaison Cabello RN

## 2019-11-28 NOTE — PROGRESS NOTES
Nutrition: 
Reason for assessment: Consult for TF management per nutritional support protocols. ( Dr Bernarda Gasca) Also referral received from Malnutrition Screening Tool for recently lost weight  ( >33 pounds) without trying. BPA received for EN PTA Assessment:  
Diet order: 11-28: NPO, then regular Food/Nutrition History: PMH: COPD, esophageal extending to GEJ. JT placed March 2019 but did not need to be used, was replaced in June 2019, then kept falling out in July so it was not replaced. Pt jusitied weight loss as intentional. Had aspiration noted on CT in August but declined MBS. GT placed by IR 11-21-19. TF started on 11-22-19. Goal was: Bolus feedings of Jevity 1.5, goal of 6 cartons per day to provide 2130 kcal (20 kcal/kg BW), 91 gm protein (1.1 gm/kg IBW), 1080 ml (Free water from TF). Pt will need an additional 34 oz free water daily to meet hydration needs Pt presented from cancer center after rapid response for SOB with  Hypoxia while receiving chemotherapy. Findings of acute on chronic respiratory failure, COPD exacerbation and PNA Pt reports for the first 2 days of his tube feeding he administered 1 can three time a day and then increased to 2 cans three times a day. He flushes his tube with water after each feedings with bottled water and administers a whole bottle over the course of the three feeding. He is unsure what size bottle but thinks it is less than the 20 ounce bottle he has at his bedside. He also administers muscle milk and CIB leroy his GT as he really likes the muscle milk because he feels that because it has more protein that it is superior in nutrition to the Jevity 1.5. he mixes it with the Jevity 1.5 as part of his three feedings. He does have bloating with hs feedings which he says resolves before the best feeding 4 hrs later. Rd explained that Jevity 1.2 is our formulary substitute and he would require 8 bottles per day.  He doesn't feel like he can tolerate any more volume d/t bloating even though RD pointed out that he is getting more volume than 6 cans per day since he also administerr the muscle milk and CIB via his tube. He usually alos drinks 32 ounces of water buy mouth and also eats oatmeal po. He says he consumed oatmeal and coffee this morning so he is good and doesn't need any tube feeding. He also says he is supposed to be going home today and he will resume his TF at home. He has ordered turkey, mashed potatoes and green beans for lunch and thinks if he cuts the turkey into tiny pieces that he can eat all those items. Pertinent Medications: Solumedrol, Effer K, Levaquin Pertinent labs: BMP Glucoses:  142-160 mg/dl, likely d/t steroids. No noted hx of DM. Anthropometrics:Height: 6' 2\" (188 cm), Weight Source: Standing scale (comment), Weight: 103.2 kg (227 lb 9.6 oz), Body mass index is 29.22 kg/m². BMI class of overweight. Weight hx per EMR ( Based on connect care functionality, RD cannot know if these weight are actual versus stated): WT / BMI WEIGHT  
11/25/2019 229 lb 3.2 oz  
11/11/2019 231 lb 8 oz  
10/14/2019 245 lb  
9/30/2019 255 lb 9.6 oz  
9/23/2019 259 lb 6.4 oz  
9/11/2019 267 lb  
9/9/2019 264 lb 8 oz  
8/26/2019 275 lb 4.8 oz  
8/14/2019 284 lb 9.6 oz  
8/12/2019 282 lb 12.8 oz  
7/29/2019 295 lb 9.6 oz  
7/17/2019 299 lb  
7/17/2019 299 lb  
7/17/2019 292 lb 3.2 oz  
7/15/2019 296 lb  
6/20/2019 302 lb 7 oz  
6/13/2019 313 lb  
6/7/2019 313 lb 6.4 oz  
6/5/2019 311 lb 12.8 oz  
6/5/2019 311 lb  
5/30/2019 313 lb  
5/16/2019 321 lb 6.4 oz  
5/4/2019 350 lb 5 oz  
5/2/2019 334 lb  
4/13/2019 350 lb 4.8 oz  
4/11/2019 350 lb 4.8 oz  
3/28/2019 358 lb 14.4 oz  
3/21/2019 368 lb 3.2 oz  
3/18/2019 367 lb 8 oz  
3/14/2019 366 lb  
3/8/2019 375 lb 12.8 oz  
3/7/2019 376 lb 9.6 oz  
2/19/2019 386 lb 4.8 oz  
2/13/2019 390 lb  
2/12/2019 390 lb  
2/5/2019 390 lb  
2/4/2019 390 lb  
1/23/2019 396 lb  
1/3/2019 402 lb 8 oz  
11/6/2018 414 lb  
 45% change in wt within 1 year c/w severe change. Macronutrient needs:using ABW of 103.2kg and IBW of 86.4 kg EER:  9202-3767 kcal /day (20-25 kcal/kg ABW)-overweight, cancer, weight loss, COPD 
EPR:  104-130 grams protein/day (1.2-1.5 grams/kg IBW)-malnutrition, cancer Max CHO:  355 grams/day (55% kcal) Fluid:  1ml/kcal 
Intake/Comparative standards: Intake of only oatmeal and cofee po paul not meet kcal or protein needs. Nutrition Diagnosis: Difficulty swallowing r/t esophageal cancer as evidenced by TF dependent PTA. Intervention: 
Meals and snacks: Regular diet as tolerated. EN: As pt allows, start bolus TF of 2 brick packs of Jevity 1.2 with 60 ml water flush after each feeding 4 times a day to provide 2275 kcal/day (100% of needs), 105 grams protein/day (100% of needs), 319 grams CHO/day (does not exceed max CHO),  and ~ 1800ml free water/day (88% of needs). Discharge nutrition plan: Resume home TF. F/U by oncology RD. Mere Chavez, [address], [facility] [address] [place] [address], [address] [place] [address]

## 2019-11-29 NOTE — DISCHARGE SUMMARY
Hospitalist Discharge Summary Patient ID: 
Dany Ibanez [id number] 
62 y.o. 
1965 Admit date: 11/27/2019  6:19 PM 
Discharge date and time: 11/29/2019 Attending: No att. providers found PCP:  Hever Vera MD 
 
Principal Diagnosis 1. Acute COPD exacerbation 2. Suspected community acquired pneumonia 3. Acute hypoxic respiratory failure likely due to combination of COPD exacerbation and pneumonia 4. Known history of esophageal cancer extending into the GEJ on chemotherapy 5. History of hypertension 6. Renal mass this is being worked up by his oncologist 
7. Dysphagia with G tube in place HPI:  62M with pmhx of tob abuse (quit in 2016), COPD, KORINA on CPAP (hasn't worn in last two nights), CAD s/p PCI, DLP, HTN, gout, depression, adenocarcinoma of esophagus/stomach s/p Gtube (pt is NPO except he does drink water) followed by oncology currently on chemo FOLFOX seen in infusion center today for chemo and noted to be hypoxic to 70% on RA. Pt reports 2 weeks of increasing nasal and chest congestion with progressive cough of white sputum. ED workup notable for CT of lungs negative for PE but concerning for scattered ground glass and pulmonary nodules thought to represent bronchiolitis/pneumonitis. WBC 5.7K, procal 0.52. ABG 7.4/44/62/27. Pt denies fever, chills, nausea, vomiting or recent choking episodes. He is currently on 4LNC in ED with o2 sat 97% and conversational w/o dyspnea. Wife at bedside. Hospital Course: Patient was admitted to the hospital for further evaluation and management. He was treated with empiric antibiotics utilizing Levaquin, bronchodilators and oxygen. In addition he was given systemic steroids with IV methylprednisone. He did better however he remained hypoxic requiring 3 to 4 L via the nasal cannula.   His CT angiogram did not show any PE however there were multiple groundglass and clustered pulmonary nodules within the lungs. Findings felt to represent bronchiolitis/pneumonitis. His CT also showed interval enlargement of his esophageal tumor, patient is to follow-up with his oncologist with regard to these findings currently on chemotherapy. Although he remained hypoxic requiring oxygen supplementation he claimed to have felt better and he was adamant about going home. He stated he has oxygen at home which he does not normally using, and he will now start using it. He was instructed to use oxygen supplementation at least at 3 to 4 L at rest, 5 L with exertion. He was given prescription for nebulizers, oral prednisone and oral antibiotics with Levaquin. Significant Diagnostic Imagin2019 IMPRESSIONS: 
 1. Findings concerning for tumor recurrence, with interval enlargement of the 
distal esophagus, which now appears as a large soft tissue mass. In addition, 
there is new enlargement of a subcarinal lymph node. 2. Multiple groundglass and clustered pulmonary nodules present within the 
lungs. Findings felt to represent bronchiolitis/pneumonitis. 3. No evidence of pulmonary embolus. 4. Right renal mass. Cannot exclude malignancy. Labs: Results:  
   
Chemistry Recent Labs  
  19 
184 * 160* 142*  142 141  
K 4.0 3.7 3.5  104 104 CO2 33* 31 29 BUN 16 15 14 CREA 0.78* 0.99 0.77* CA 8.7 8.7 8.7 AGAP 6* 7 8 AP  --   --  121 TP  --   --  6.9 ALB  --   --  2.8*  
GLOB  --   --  4.1* AGRAT  --   --  0.7* CBC w/Diff Recent Labs  
  19 
1840 19 
1336 WBC 7.0 3.1* 4.1* 5.7  
RBC 3.41* 3.43* 3.88* 3.87* HGB 11.1* 11.0* 12.5* 12.2* HCT 33.5* 33.5* 38.2* 37.6* PLT 92* 90* 87* 100* GRANS  --   --  55 43 LYMPH  --   --  27 34 EOS  --   --  1 2 Cardiac Enzymes No results for input(s): CPK, CKND1, DELL in the last 72 hours. No lab exists for component: [place] Coagulation No results for input(s): PTP, INR, APTT, INREXT in the last 72 hours. Lipid Panel Lab Results Component Value Date/Time Cholesterol, total 93 (L) 01/03/2019 09:11 AM  
 HDL Cholesterol 29 (L) 01/03/2019 09:11 AM  
 LDL, calculated 37 01/03/2019 09:11 AM  
 VLDL, calculated 27 01/03/2019 09:11 AM  
 Triglyceride 133 01/03/2019 09:11 AM  
  
BNP No results for input(s): BNPP in the last 72 hours. Liver Enzymes Recent Labs  
  11/27/19 
1840 TP 6.9 ALB 2.8*  SGOT 24 Thyroid Studies Lab Results Component Value Date/Time TSH 0.889 07/27/2016 10:16 AM  
    
 
 
Discharge Exam: 
Visit Junior Doherty /62 (BP 1 Location: Right arm, BP Patient Position: At rest) Pulse 73 Temp 97.4 °F (36.3 °C) Resp 18 Ht 6' 2\" (1.88 m) Wt 101.6 kg (223 lb 14.4 oz) SpO2 92% BMI 28.75 kg/m² General appearance: alert, cooperative, no distress,  
Lungs: Diminished breath sounds. Scattered wheezing. Extremities: no cyanosis or edema Neurologic: Grossly normal 
 
Disposition: home Discharge Condition: stable Patient Instructions:  
Discharge Medication List as of 11/29/2019 10:08 AM  
  
START taking these medications Details  
levoFLOXacin (LEVAQUIN) 750 mg tablet 1 Tab by Per G Tube route daily for 7 days. , Print, Disp-7 Tab, R-0  
  
predniSONE (DELTASONE) 20 mg tablet 40 mg by Per G Tube route daily (with breakfast) for 7 days. , Print, Disp-14 Tab, R-0  
  
albuterol-ipratropium (DUO-NEB) 2.5 mg-0.5 mg/3 ml nebu 3 mL by Nebulization route every four (4) hours as needed (SOB or wheezing) for up to 60 days. , Print, Disp-30 Nebule, R-1  
  
albuterol sulfate (PROAIR RESPICLICK) 90 mcg/actuation aepb Take 2 Puffs by inhalation every six (6) hours as needed (SOB or wheezing) for up to 60 days. , Print, Disp-1 Inhaler, R-1  
  
  
CONTINUE these medications which have NOT CHANGED Details potassium chloride (KAON 20%) 40 mEq/15 mL liqd Daily X 2 days through his feeding tube  Indications: prevention of low potassium in the blood, Normal, Disp-120 mL, R-0 Lactose-Free Food with Fiber (JEVITY 1.5 MELITON) 0.06 gram-1.5 kcal/mL liqd 6 Bottles by PEG Tube route daily for 100 days. Bolus Feeds, goal 6/day to provide 2130 kcal, 91 gm pro, 1080 ml. Needs additional 36 oz/day for hydration. JESSICA > 3 months. Indications: blockage of the esophagus, dysphagia, Print, Disp-180 Bottle, R-4  
  
HYDROcodone-acetaminophen (NORCO) 5-325 mg per tablet Take 1 Tab by mouth every eight (8) hours as needed for Pain for up to 30 days. Max Daily Amount: 3 Tabs., Print, Disp-90 Tab, R-0  
  
cpap machine kit by Does Not Apply route., Historical Med  
  
Oxygen at bedtime as directed for dose pack., Historical Med  
  
ezetimibe (ZETIA) 10 mg tablet Take 10 mg by mouth daily. , Historical Med  
  
allopurinol (ZYLOPRIM) 100 mg tablet Take 1 Tab by mouth daily. , Normal, Disp-90 Tab, R-3  
  
metoprolol tartrate (LOPRESSOR) 50 mg tablet Take 1 Tab by mouth two (2) times a day., Normal, Disp-180 Tab, R-3  
  
cholecalciferol, VITAMIN D3, (VITAMIN D3) 5,000 unit tab tablet Take 5,000 Units by mouth daily. , Historical Med  
  
  
STOP taking these medications  
  
 amoxicillin-clavulanate (AUGMENTIN) 250-62.5 mg/5 mL suspension Comments:  
Reason for Stopping:   
   
 azithromycin (ZITHROMAX) 200 mg/5 mL suspension Comments:  
Reason for Stopping:   
   
  
 
 
Activity: Activity as tolerated Diet: Regular Diet Follow-up Information Follow up With Specialties Details Why Contact Info Chalino Albarran MD Family Practice  OFFICE [address] 
[address] [facility] [address] 
[phone] James Bean MD Hematology and Oncology, Internal Medicine, Hematology, Oncology On 12/3/2019 LABS @ 9:30 AM  PER SYEDA AND APPT TIME10:00 AM. [id number] [facility] [address] [phone] Time spent in patient discharge planning and coordination 32 minutes.  
Signed: 
Sam King MD 
11/29/2019 
10:38 AM

## 2019-11-29 NOTE — PROGRESS NOTES
Patient was admitted on 11- under inpatient status by hospitalist service due to COPD exacerbation. Patient discharged to home today by hospitalist, Dr. Evelin Marcum, with no needs identified. Care Management Interventions Mode of Transport at Discharge: Other (see comment) Transition of Care Consult (CM Consult): Discharge Planning Discharge Durable Medical Equipment: No 
Physical Therapy Consult: No 
Occupational Therapy Consult: No 
Speech Therapy Consult: No 
Plan discussed with Pt/Family/Caregiver: No(Chart screened.) Discharge Location Discharge Placement: Home

## 2019-11-29 NOTE — DISCHARGE INSTRUCTIONS
Patient Education        Chronic Obstructive Pulmonary Disease (COPD): Care Instructions  Your Care Instructions    Chronic obstructive pulmonary disease (COPD) is a general term for a group of lung diseases, including emphysema and chronic bronchitis. People with COPD have decreased airflow in and out of the lungs, which makes it hard to breathe. The airways also can get clogged with thick mucus. Cigarette smoking is a major cause of COPD. Although there is no cure for COPD, you can slow its progress. Following your treatment plan and taking care of yourself can help you feel better and live longer. Follow-up care is a key part of your treatment and safety. Be sure to make and go to all appointments, and call your doctor if you are having problems. It's also a good idea to know your test results and keep a list of the medicines you take. How can you care for yourself at home?   Staying healthy    · Do not smoke. This is the most important step you can take to prevent more damage to your lungs. If you need help quitting, talk to your doctor about stop-smoking programs and medicines. These can increase your chances of quitting for good.     · Avoid colds and flu. Get a pneumococcal vaccine shot. If you have had one before, ask your doctor whether you need a second dose. Get the flu vaccine every fall. If you must be around people with colds or the flu, wash your hands often.     · Avoid secondhand smoke, air pollution, and high altitudes. Also avoid cold, dry air and hot, humid air. Stay at home with your windows closed when air pollution is bad.    Medicines and oxygen therapy    · Take your medicines exactly as prescribed. Call your doctor if you think you are having a problem with your medicine.     · You may be taking medicines such as:  ? Bronchodilators. These help open your airways and make breathing easier. Bronchodilators are either short-acting (work for 6 to 9 hours) or long-acting (work for 24 hours). You inhale most bronchodilators, so they start to act quickly. Always carry your quick-relief inhaler with you in case you need it while you are away from home. ? Corticosteroids (prednisone, budesonide). These reduce airway inflammation. They come in pill or inhaled form. You must take these medicines every day for them to work well.     · A spacer may help you get more inhaled medicine to your lungs. Ask your doctor or pharmacist if a spacer is right for you. If it is, ask how to use it properly.     · Do not take any vitamins, over-the-counter medicine, or herbal products without talking to your doctor first.     · If your doctor prescribed antibiotics, take them as directed. Do not stop taking them just because you feel better. You need to take the full course of antibiotics.     · Oxygen therapy boosts the amount of oxygen in your blood and helps you breathe easier. Use the flow rate your doctor has recommended, and do not change it without talking to your doctor first.   Activity    · Get regular exercise. Walking is an easy way to get exercise. Start out slowly, and walk a little more each day.     · Pay attention to your breathing. You are exercising too hard if you cannot talk while you are exercising.     · Take short rest breaks when doing household chores and other activities.     · Learn breathing methods--such as breathing through pursed lips--to help you become less short of breath.     · If your doctor has not set you up with a pulmonary rehabilitation program, talk to him or her about whether rehab is right for you. Rehab includes exercise programs, education about your disease and how to manage it, help with diet and other changes, and emotional support. Diet    · Eat regular, healthy meals. Use bronchodilators about 1 hour before you eat to make it easier to eat. Eat several small meals instead of three large ones.  Drink beverages at the end of the meal. Avoid foods that are hard to chew.     · Eat foods that contain protein so that you do not lose muscle mass.     · Talk with your doctor if you gain too much weight or if you lose weight without trying.    Mental health    · Talk to your family, friends, or a therapist about your feelings. It is normal to feel frightened, angry, hopeless, helpless, and even guilty. Talking openly about bad feelings can help you cope. If these feelings last, talk to your doctor. When should you call for help? Call 911 anytime you think you may need emergency care. For example, call if:    · You have severe trouble breathing.    Call your doctor now or seek immediate medical care if:    · You have new or worse trouble breathing.     · You cough up blood.     · You have a fever.    Watch closely for changes in your health, and be sure to contact your doctor if:    · You cough more deeply or more often, especially if you notice more mucus or a change in the color of your mucus.     · You have new or worse swelling in your legs or belly.     · You are not getting better as expected. Where can you learn more? Go to http://reg-marilyn.info/. Yariel Healy in the search box to learn more about \"Chronic Obstructive Pulmonary Disease (COPD): Care Instructions. \"  Current as of: June 9, 2019  Content Version: 12.2  © 5777-5764 [employer]. Care instructions adapted under license by [employer] (which disclaims liability or warranty for this information). If you have questions about a medical condition or this instruction, always ask your healthcare professional. [place] [address] any warranty or liability for your use of this information.          DISCHARGE SUMMARY from Nurse    PATIENT INSTRUCTIONS:    After general anesthesia or intravenous sedation, for 24 hours or while taking prescription Narcotics:  · Limit your activities  · Do not drive and operate hazardous machinery  · Do not make important personal or business decisions  · Do  not drink alcoholic beverages  · If you have not urinated within 8 hours after discharge, please contact your surgeon on call. Report the following to your surgeon:  · Excessive pain, swelling, redness or odor of or around the surgical area  · Temperature over 100.5  · Nausea and vomiting lasting longer than 4 hours or if unable to take medications  · Any signs of decreased circulation or nerve impairment to extremity: change in color, persistent  numbness, tingling, coldness or increase pain  · Any questions    What to do at Home:  Recommended activity: Activity as tolerated,     If you experience any of the following symptoms fever 100.5 or greater, unrelieved pain, uncontrolled nausea/vomiting please follow up with Dr. Evaristo Colin. *  Please give a list of your current medications to your Primary Care Provider. *  Please update this list whenever your medications are discontinued, doses are      changed, or new medications (including over-the-counter products) are added. *  Please carry medication information at all times in case of emergency situations. These are general instructions for a healthy lifestyle:    No smoking/ No tobacco products/ Avoid exposure to second hand smoke  Surgeon General's Warning:  Quitting smoking now greatly reduces serious risk to your health. Obesity, smoking, and sedentary lifestyle greatly increases your risk for illness    A healthy diet, regular physical exercise & weight monitoring are important for maintaining a healthy lifestyle    You may be retaining fluid if you have a history of heart failure or if you experience any of the following symptoms:  Weight gain of 3 pounds or more overnight or 5 pounds in a week, increased swelling in our hands or feet or shortness of breath while lying flat in bed. Please call your doctor as soon as you notice any of these symptoms; do not wait until your next office visit.         The discharge information has been reviewed with the patient and spouse. The patient and spouse verbalized understanding. Discharge medications reviewed with the patient and spouse and appropriate educational materials and side effects teaching were provided.   ___________________________________________________________________________________________________________________________________

## 2019-11-29 NOTE — PROGRESS NOTES
Pt has refused to take any medicine. States I dont take that any more. PEG care done and new dressing applied. Brought in tube feeding supplies and jevity. PT and wife stated we know how to do it. RN watched for a few minutes pt and wife seemed to be doing well. Pt also stated it depended on how much he was able to eat on his own.

## 2019-11-29 NOTE — PROGRESS NOTES
END OF SHIFT NOTE: 
 
Patient rested well until around 4am when, attempting to cluster care, this RN and PCT, Rick Dobbs, went to room for labs and vitals. Patient was angry, using profanity, threatening to leave ama. Refused meds for previous RN. Provides self care for PEG tube and bolus feeding. Remains on 5L NC to maintain sats above 90%. Patient unwilling/unable to provide sputum sample - continues to use smokeless tobacco products in room. Intake/Output 11/28 1901 - 11/29 0700 In: 360 [P.O.:360] Out: 250 [Urine:250] Voiding: YES Catheter: NO 
Drain: PEG/Gastrostomy Tube (Active) Site Assessment Clean, dry, & intact 11/29/2019  1:34 AM  
Dressing Status Clean, dry, & intact 11/29/2019  1:34 AM  
G Port Status Clamped 11/29/2019  1:34 AM  
Gastric Residual (mL) 0 ml 11/28/2019  7:35 PM  
Tube Feeding/Formula Options Jevity 1.2 11/28/2019  7:35 PM  
Water Flush Volume (mL) 60 mL 11/28/2019  2:30 PM  
Intake (ml) 360 ml 11/28/2019  2:30 PM  
 
Stool:  0 occurrences. Emesis:  0 occurrences. VITAL SIGNS Patient Vitals for the past 12 hrs: 
 Temp Pulse Resp BP SpO2  
11/29/19 0416 97.5 °F (36.4 °C) 74 18 117/75 92 % 11/28/19 2224 97.8 °F (36.6 °C) 93 20 115/73 90 % 11/28/19 1934 98.1 °F (36.7 °C) 91 20 118/69 91 % Pain Assessment Pain 1 Pain Scale 1: Visual (11/29/19 0134) Pain Intensity 1: 0 (11/29/19 0134) Patient Stated Pain Goal: 0 (11/29/19 0134) Pain Reassessment 1: Patient resting w/respiratory rate greater than 10 (11/29/19 0134) Pain Onset 1: now (11/28/19 2224) Pain Location 1: Head (11/28/19 2224) Pain Orientation 1: Mid (11/28/19 2224) Pain Description 1: Aching (11/28/19 2224) Pain Intervention(s) 1: Medication (see MAR) (11/28/19 6487) Ambulating Yes Shift report given to oncoming nurse at the bedside. Emerson Fernández

## 2019-11-29 NOTE — PROGRESS NOTES
Problem: Falls - Risk of 
Goal: *Absence of Falls Description Document Jorge Ajaylyn Foleynce Fall Risk and appropriate interventions in the flowsheet. Outcome: Progressing Towards Goal 
Note: Fall Risk Interventions: 
Mobility Interventions: Patient to call before getting OOB Medication Interventions: Patient to call before getting OOB Problem: Patient Education: Go to Patient Education Activity Goal: Patient/Family Education Outcome: Progressing Towards Goal

## 2019-12-13 NOTE — PROGRESS NOTES
Called into pt's room, pt complaining of itching to abdomen and BUE. Pt denies SOB. No angioedema noted, see flow chart for vitals. O2 applied to pt via NC. Cyramza stopped, NS bolus initiated, 10 mg of Decadron and 20 mg of Pepcid administered once, IV.  Chioma Cisneros NP made aware. Verbal order received from LEN Macedo NP to administered an additional 100 mg of Solu-cortef. 100 mg of Solu-cortef administered once IV. Will continue to monitor pt. Addendum 1411: Pt states itching has subsided, VSS (see flowchart), no other complaints at this time. Pt rechallenged at half the ordered rate, per protocol.

## 2019-12-13 NOTE — CONSULTS
Patient: Yelitza Sloan MRN: [id number]  SSN: [id number] YOB: 1965  Age: 62 y.o. Sex: male Other Providers:  Melba Ruano MD 
 
CHIEF COMPLAINT: Dysphagia DIAGNOSIS: Esophageal adenocarcinoma with mucinous features, T3N1M1, Stage IV, GE Junction with peritoneal disease. PREVIOUS TREATMENT: 
1) 3/19:  mFOLFOX x 6 cycles. HISTORY OF PRESENT ILLNESS:  Yelitza Sloan is a 62 y.o. male who I am seeing at the request of Dr. Yulia Martin. He is previously known to me from consultation at initial diagnosis in March 2019 before he was found to be metastatic. He originally presented to GI with the complain of dysphagia. He underwent EGD 2/5/19 revealing malignant appearing circumferential esophageal mass from 36 to 44 cm from incisors, stomach and duodenum described as normal. Screening colonoscopy same time with 2 benign polyps. 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 Her2 testing was negative. CT chest 2/12/19 showed a distal esophageal mass extending over approximately 7 cm to the GE junction. A single mildly large celiac axis node was considered suspicious for metastatic disease also seen. He went on to get EUS 2/13/19 showing a mass involving the mucosa, submucosa, and muscularis propria, 2 suspicious lymph nodes adjacent to mass were seen and he was staged as T3N1Mx disease. Tumor could not be traversed therefore distal extend could not be evaluated for invasion below the diaphragm. Patient has since seen surgery Dr. Katy Muñoz. Given locally advance disease has been referred to us and radiation oncology. PET 2/19/19 showed distal esophageal tumor with associated gastrohepatic adenopathy, no distant metastases seen. He went on to have a J tube placed and actually found to have peritoneal disease at that time and therefore he was considered metastatic and after meeting with Dr. Yulia Martin, went on to begin mFOLFOX.   After 6 cycles he had a favorable response in June 2019. He did reasonably well until November 2019 when he began to lose weight with poor oral intake. He had missed several cycles of chemotherapy. He was admitted in December with hypoxia and CT imaging suggested clear local progression of disease. Systemic therapy was being changed but a referral was made to us in radiation to consider local options considering the local only progression. PAST MEDICAL HISTORY:   
Past Medical History:  
Diagnosis Date  Appendicitis   
 resulted in appendectomy  Chewing tobacco use  Chronic obstructive pulmonary disease (HCC)   
 nebulizer prn and rescue inhaler only  Chronic pain   
 pt wife denies any pain  Depression   
 pt wife denies, no current meds  Erectile disorder due to medical condition in male patient  Esophageal cancer ([address]  Former cigarette smoker  Gout   
 treated with Allopurinol. Last flareup 2013  H/O heart artery stent X2- last stent placed 2016  History of MI (myocardial infarction) 2014  
 stents x2  RCA- 2014  LAD - 2016  Hyperlipidemia  Hypertension   
 managed with meds  Hypotestosteronemia  Morbid obesity ([address] 47.6  KORINA (obstructive sleep apnea) Trilegy and oxygen 2L  Osteoarthritis The patient denies history of collagen vascular diseases, pacemaker insertion, prior radiation or prior chemotherapy predating his esophageal cancer. PAST SURGICAL HISTORY:  
Past Surgical History:  
Procedure Laterality Date  COLONOSCOPY N/A 2/5/2019 COLONOSCOPY/ 50 performed by Addis Moreland MD at [facility] HX APPENDECTOMY  2003  HX CARPAL TUNNEL RELEASE Right 2014  HX CORONARY STENT PLACEMENT  2014 RCA x 1  
 HX CORONARY STENT PLACEMENT  2016 PCI of the lesion with a 4x8mm Xience Alpine RONN  
 HX VASCULAR ACCESS    
 IR INSERT GASTROSTOMY TUBE [facility]  11/21/2019 MEDICATIONS:  
 
Current Outpatient Medications:   albuterol-ipratropium (DUO-NEB) 2.5 mg-0.5 mg/3 ml nebu, 3 mL by Nebulization route every four (4) hours as needed (SOB or wheezing) for up to 60 days. , Disp: 30 Nebule, Rfl: 1 
  albuterol sulfate (PROAIR RESPICLICK) 90 mcg/actuation aepb, Take 2 Puffs by inhalation every six (6) hours as needed (SOB or wheezing) for up to 60 days. , Disp: 1 Inhaler, Rfl: 1 
  potassium chloride (KAON 20%) 40 mEq/15 mL liqd, Daily X 2 days through his feeding tube  Indications: prevention of low potassium in the blood, Disp: 120 mL, Rfl: 0 
  Lactose-Free Food with Fiber (JEVITY 1.5 MELITON) 0.06 gram-1.5 kcal/mL liqd, 6 Bottles by PEG Tube route daily for 100 days. Bolus Feeds, goal 6/day to provide 2130 kcal, 91 gm pro, 1080 ml. Needs additional 36 oz/day for hydration. JESSICA > 3 months. Indications: blockage of the esophagus, dysphagia, Disp: 180 Bottle, Rfl: 4 
  HYDROcodone-acetaminophen (NORCO) 5-325 mg per tablet, Take 1 Tab by mouth every eight (8) hours as needed for Pain for up to 30 days. Max Daily Amount: 3 Tabs., Disp: 90 Tab, Rfl: 0 
  cpap machine kit, by Does Not Apply route., Disp: , Rfl:  
  Oxygen, at bedtime as directed for dose pack. , Disp: , Rfl:  
  ezetimibe (ZETIA) 10 mg tablet, Take 10 mg by mouth daily. , Disp: , Rfl:  
  allopurinol (ZYLOPRIM) 100 mg tablet, Take 1 Tab by mouth daily. , Disp: 90 Tab, Rfl: 3 
  metoprolol tartrate (LOPRESSOR) 50 mg tablet, Take 1 Tab by mouth two (2) times a day., Disp: 180 Tab, Rfl: 3   cholecalciferol, VITAMIN D3, (VITAMIN D3) 5,000 unit tab tablet, Take 5,000 Units by mouth daily. , Disp: , Rfl: ALLERGIES:  
No Known Allergies SOCIAL HISTORY:  
Social History Socioeconomic History  Marital status:  Spouse name: Not on file  Number of children: Not on file  Years of education: Not on file  Highest education level: Not on file Occupational History  Not on file Social Needs  Financial resource strain: Not on file  Food insecurity:  
  Worry: Not on file Inability: Not on file  Transportation needs:  
  Medical: Not on file Non-medical: Not on file Tobacco Use  Smoking status: Former Smoker Packs/day: 0.15 Years: 35.00 Pack years: 5.25 Last attempt to quit: 2016 Years since quitting: 3.9  Smokeless tobacco: Current User Substance and Sexual Activity  Alcohol use: No  
 Drug use: No  
 Sexual activity: Not on file Lifestyle  Physical activity:  
  Days per week: Not on file Minutes per session: Not on file  Stress: Not on file Relationships  Social connections:  
  Talks on phone: Not on file Gets together: Not on file Attends Congregation service: Not on file Active member of club or organization: Not on file Attends meetings of clubs or organizations: Not on file Relationship status: Not on file  Intimate partner violence:  
  Fear of current or ex partner: Not on file Emotionally abused: Not on file Physically abused: Not on file Forced sexual activity: Not on file Other Topics Concern [address] Service Not Asked  Blood Transfusions Not Asked  Caffeine Concern Not Asked  Occupational Exposure Not Asked [place] Hazards Not Asked  Sleep Concern Not Asked  Stress Concern Not Asked  Weight Concern Not Asked  Special Diet Not Asked  Back Care Not Asked  Exercise Not Asked  Bike Helmet Not Asked  Seat Belt Not Asked  Self-Exams Not Asked Social History Narrative  Not on file FAMILY HISTORY:  
Family History Problem Relation Age of Onset  Heart Disease Mother  Hypertension Mother  Cancer Father  Heart Disease Father  Cancer Brother REVIEW OF SYSTEMS: Please see the completed review of systems sheet in the chart that I have reviewed today. PHYSICAL EXAMINATION:  
ECOG Performance status 1 
VITAL SIGNS:  
Visit Vitals /62 (BP 1 Location: Left arm, BP Patient Position: Sitting) Pulse (!) 104 Temp 98.4 °F (36.9 °C) Wt 99.1 kg (218 lb 6.4 oz) SpO2 97% BMI 28.04 kg/m² GENERAL: The patient is well-developed, ambulatory, alert and in no acute distress. HEENT: Head is normocephalic, atraumatic. Pupils are equal, round and reactive to light and accommodation. Extraocular movement intact. Hearing is intact bilaterally to finger rub. Oral cavity reveals no lesions. Mucous membranes are moist. NECK: Neck is supple with no masses. CARDIOVASCULAR: Heart is regular rate and rhythm. There are no murmurs rubs or gallups. Radial pulses are 2+ RESPIRATORY: Lungs are clear to auscultation and percussion. There is normal respiratory effort. GASTROINTESTINAL: The abdomen is soft, non-tender, nondistended with no hepatospelnomagaly. Digital rectal examination: deferred LYMPHATIC: There is no cervical, supraclavicular or axillary lymphadenopathy bilaterally. MUSCULOSKELETAL: Extremities reveal no cyanosis, clubbing or edema.  is 5+/5. NEURO:  Cranial nerves II-XII grossly intact. Muscular strength and sensation are intact throughout all four extremities. PATHOLOGY:   
See HPI 
 
LABORATORY:  
Lab Results Component Value Date/Time Sodium 139 12/11/2019 01:52 PM  
 Potassium 3.6 12/11/2019 01:52 PM  
 Chloride 104 12/11/2019 01:52 PM  
 CO2 31 12/11/2019 01:52 PM  
 Anion gap 4 (L) 12/11/2019 01:52 PM  
 Glucose 110 (H) 12/11/2019 01:52 PM  
 BUN 19 12/11/2019 01:52 PM  
 Creatinine 0.83 12/11/2019 01:52 PM  
 GFR est AA >60 12/11/2019 01:52 PM  
 GFR est non-AA >60 12/11/2019 01:52 PM  
 Calcium 9.3 12/11/2019 01:52 PM  
 Magnesium 2.4 12/11/2019 01:52 PM  
 Albumin 3.1 (L) 12/11/2019 01:52 PM  
 Protein, total 7.4 12/11/2019 01:52 PM  
 Globulin 4.3 (H) 12/11/2019 01:52 PM  
 A-G Ratio 0.7 (L) 12/11/2019 01:52 PM  
 AST (SGOT) 36 12/11/2019 01:52 PM  
 ALT (SGPT) 36 12/11/2019 01:52 PM  
 
Lab Results Component Value Date/Time WBC 9.9 12/11/2019 01:52 PM  
 HGB 12.3 (L) 12/11/2019 01:52 PM  
 HCT 37.1 (L) 12/11/2019 01:52 PM  
 PLATELET 74 (L) 76/35/1210 01:52 PM  
 
 
RADIOLOGY:   
I have personally reviewed the imaging and agree with the reports below. Ct Chest W Cont Result Date: 11/27/2019 HISTORY: Shortness of breath and productive cough. History of esophageal cancer. Exam: CT chest, PE protocol Technique: Thin section axial CT images are obtained from the thoracic inlet through the upper abdomen. Coronal reformatted images are obtained based on the axial data. 100 cc Isovue 370 is administered intraveneously without incident. Radiation dose reduction techniques were used for this study. Our CT scanners use one or all of the following: Automated exposure control, adjustment of the mA and/or kV according to patient size, use of iterative reconstruction. Comparison: 9/3/2019 Findings: There is a poor bolus. No central pulmonary embolus demonstrated. There is a new enlarged subcarinal lymph node present, adjacent to the anterior margin of the mid-distal esophagus. This measures 2.5 x 5.3 cm (image 71). In addition, there has been significant interval enlargement of the distal esophagus just above the GE junction, which now measures 3.8 x 4.7 cm (previously measuring 3.8 x 3.1 cm). There are groundglass nodular opacities scattered within the right lower lobe. There is a subpleural nodule in the right middle lobe which is unchanged in size. Stable scarring noted within the right middle lobe and lingula. Clustered pulmonary nodules present within the left lower lobe. These are new since the prior exam. There are multiple subpleural nodules in the right upper lobe. Evaluation of the upper abdomen demonstrates a PEG tube. Ill-defined mass within the lower pole the right kidney again noted. Cannot exclude malignancy at this site.  Bone window evaluation demonstrates no aggressive osseous lesions. IMPRESSIONS: 1. Findings concerning for tumor recurrence, with interval enlargement of the distal esophagus, which now appears as a large soft tissue mass. In addition, there is new enlargement of a subcarinal lymph node. 2. Multiple groundglass and clustered pulmonary nodules present within the lungs. Findings felt to represent bronchiolitis/pneumonitis. 3. No evidence of pulmonary embolus. 4. Right renal mass. Cannot exclude malignancy. Ct Abd Pelv W Cont Result Date: 12/9/2019 CT ABDOMEN AND PELVIS WITH INTRAVENOUS CONTRAST DATED 12/9/2019. History: Recurrence of distal esophageal cancer. Comparison: CT chest, abdomen, and pelvis 9/3/2019, and CT chest 11/27/2019 Technique:   Multiple contiguous helical CT images reconstructed at 5 mm intervals were obtained from above the diaphragms through the ischial tuberosities following oral and 100 cc Isovue-370 without acute complication. All CT scans performed at this facility use one or all of the following: Automated exposure control, adjustment of the mA and/or kVp according to patient's size, iterative reconstruction. Findings: CT ABDOMEN:  Limited evaluation of the lung bases and base of the mediastinum demonstrates is remarkable for clearly abnormal wall thickening of the distal esophagus as well as paraesophageal nodular densities likely representing metastatic lymph nodes which are not clearly changed from the recent CT scan of the chest. A stable pleural-based nodule is seen in the right middle lobe on image 9 which is unchanged even when compared to the earliest CT scan of the chest at this institution dated 2/12/2019. However, evolving clustered nodules are seen in the right lower lobe best appreciated on axial image 10 which demonstrate associated groundglass opacity which are favored to represent inflammatory nodules. The Liver is homogeneous in attenuation.   The spleen is homogeneous in attenuation. No contour deforming or enhancing mass lesions are seen of the pancreas or adrenal glands. The gallbladder has an unremarkable CT appearance without radiopaque stones or pericholecystic fluid/inflammatory changes. The kidneys enhance symmetrically and no evidence of hydronephrosis is seen. A 6.1 cm x 5.5 cm enhancing mass is seen of the lower pole cortex of the right kidney is appearance is highly concerning for neoplasm such as renal cell carcinoma. A percutaneous feeding tube is seen extending to the distal stomach. Abnormal masslike wall thickening of the distal esophagus is felt to extend down to and involve the gastric cardia best appreciated on axial image 14. The visualized loops of small bowel and colon are normal in caliber. No free air, or abnormal fluid collection is seen. However, inflammatory stranding is seen centered about the cecum and proximal ascending colon best appreciated on axial image 71. Abnormal soft tissue density is seen adjacent to the celiac axis on axial image 16 which could either represent direct extension of distal esophageal tumor, or mesenteric lymphadenopathy at this level. No potential adenopathy is otherwise suggested. The abdominal aorta demonstrates moderate atherosclerotic calcification and mild mid ectatic changes. No aggressive appearing osseous lesion is seen. CT PELVIS: No abnormal pelvic fluid collections or inflammatory changes are present. No pelvic adenopathy is seen. The urinary bladder is unremarkable. No aggressive appearing osseous lesion is seen. IMPRESSION:  1. Abnormal masslike wall thickening of the distal esophagus consistent with recurrent esophageal cancer. Abnormal wall thickening is seen of the directly adjacent gastric cardia for which involvement of the gastric cardia cannot be excluded.  2. Nodular densities adjacent to the distal esophagus whose appearance is concerning for metastatic mediastinal lymph nodes. Abnormal soft tissue density is seen in the upper abdomen adjacent to the celiac axis which could either represent direct extension of distal esophageal tumor or early mesenteric adenopathy. No potential metastatic disease is otherwise seen. 3. 6.1 cm x 5.5 cm enhancing mass off the lower pole cortex of the right kidney series appearance is highly concerning for renal neoplasm such as renal cell carcinoma. 4. Inflammatory mesenteric stranding about the cecum and proximal ascending colon whose appearance is most consistent with a limited colitis. 5.  Evolving cluster nodules in the right lower lobe which are favored to represent benign inflammatory nodules given their appearance, associated ground glass changes, clustered distribution, and rapid evolution with these being new even when compared to the very recent CT scan of the chest dated 11/27/2019. This study has been referred to the Imaging Navigator to communicate the unexpected finding of acute appearing colon inflammatory changes, as well as restate the concern for right renal cortical neoplasm to the ordering physician. DC4 IMPRESSION:  Doreen Little is a 62 y.o. male with esophageal cancer with M1 disease based on peritoneal cancer found during J tube placement with with local only progression after systemic chemotherapy. We discussed the natural history of esophageal cancer and the implications of various prognostic factors including invasion through the esophageal wall, lymph nodes, surgical resectability and histology. We discussed the early trials including RTOG 8501, which demonstrated the benefit of concurrent chemoradiation compared to radiation alone for patients not surgically resectable. With Mr. Allie Linder case, he was felt initially to be a good trimodality candidate but peritoneal disease was found therefore not advisable.   Nonetheless, he has experienced local only progression and therefore an option that provides more durable relief is advisable and what I have recommended. I would plan to treat with 45 Gy in 25 fractions to the primary esophagus and to any involved lymph nodes with wide circumferential and longitudinal margins. The gross disease would then be boosted for a further 5.4 Gy for a total of 50.4 Gy. I will coordinate his care with medical oncology for planned concurrent therapy if Dr. Sidney Castrejon agrees. Informed consent was obtained after reviewing the risks and benefits of therapy and the anticipated toxicities prior to simulation which will be necessary to duplicate his setup and treatment fields over the several weeks of therapy. We will use the same works as done previously. He will be getting chemotherapy today so we will start in 1-2 weeks. Thank you for allowing me to participate in this very pleasant patient's care. I spent 60 minutes with the patient, more than 50% was spent in counseling and coordination of care. Plan: 1. Genetic testing-not indicated 2. Smoking cessation-not indicated 3. Patient will be simulated shortly. 4.  His start date will be coordinated with medical oncology. Sukhdeep Sanchez MD  
December 13, 2019

## 2019-12-13 NOTE — PROGRESS NOTES
Arrived to the [facility]. Assessment complete, labs reviewed. Consent obtained/verified for chemotherapy. D1C1 Cyramza and Taxol completed. Any issues or concerns during appointment: Please see previous note. Pt educated on how to take prescribed antiemetics. Pt instructed to call with any concerns or issues. Pt verbalized understanding. Time for questions allowed. Pt denies any questions at this time. Noted pt's next scheduled appointment did not align with next treatment plan dose. Nicole Harris RN navigator and Carlita root RN emailed and made aware.  to make pt aware of next appointment. Discharged ambulatory with spouse.

## 2019-12-13 NOTE — NURSE NAVIGATOR
Consult for esophageal cancer recurrence. Ct chest 11-27-19. CT A/P 12-9-19. Consult with Dr. Tanmay Staley 3-8-19. Spouse states pt was Inpatient for pneumonia over Thanksgiving. Scheduled for chemo today. Spouse states pt had diarrhea yesterday. Has not taken imodium. No diarrhea today per pt. CONSENTS SIGNED FOR RT. [facility] SCHEDULED Tuesday, 12-17-19 AT 9 AM. APT GIVEN TO PT. 
CONCURRENT CHEMO/RT PLANNED. Adia Frey RN

## 2019-12-18 NOTE — PROGRESS NOTES
Arrived to the [facility] for Taxol. Labs drawn, plt count 58,  Taxol held per Kely Mena NP. Pt with c/o diarrhea since the last treatment. K+ 3.0, Leeanne Santamaria NP notified. Pt instructed to start taking immodium OTC and K+ at home. Discharged ambulatory accompanied by spouse.

## 2019-12-30 NOTE — PROGRESS NOTES
Arrived to the [facility]. Taxol infusion completed. Patient tolerated well. Any issues or concerns during appointment: none. Patient aware of next infusion appointment on 01.10.2020 (date) at 0930 (time). Discharged ambulatory to radiation treatment.

## 2020-01-01 ENCOUNTER — HOSPITAL ENCOUNTER (OUTPATIENT)
Dept: RADIATION ONCOLOGY | Age: 55
Discharge: HOME | End: 2020-01-30
Payer: COMMERCIAL

## 2020-01-01 ENCOUNTER — HOSPITAL ENCOUNTER (OUTPATIENT)
Dept: RADIATION ONCOLOGY | Age: 55
Discharge: HOME | End: 2020-01-24
Payer: COMMERCIAL

## 2020-01-01 ENCOUNTER — APPOINTMENT (OUTPATIENT)
Dept: CT IMAGING | Age: 55
DRG: 102 | End: 2020-01-01
Attending: EMERGENCY MEDICINE
Payer: COMMERCIAL

## 2020-01-01 ENCOUNTER — HOSPITAL ENCOUNTER (OUTPATIENT)
Dept: LAB | Age: 55
Discharge: HOME | End: 2020-01-30
Payer: COMMERCIAL

## 2020-01-01 ENCOUNTER — APPOINTMENT (OUTPATIENT)
Dept: RADIATION ONCOLOGY | Age: 55
End: 2020-01-01
Payer: COMMERCIAL

## 2020-01-01 ENCOUNTER — APPOINTMENT (OUTPATIENT)
Dept: MRI IMAGING | Age: 55
DRG: 102 | End: 2020-01-01
Attending: INTERNAL MEDICINE
Payer: COMMERCIAL

## 2020-01-01 ENCOUNTER — HOSPITAL ENCOUNTER (OUTPATIENT)
Dept: RADIATION ONCOLOGY | Age: 55
Discharge: HOME | End: 2020-01-15
Payer: COMMERCIAL

## 2020-01-01 ENCOUNTER — HOSPITAL ENCOUNTER (OUTPATIENT)
Dept: GENERAL RADIOLOGY | Age: 55
Discharge: HOME | End: 2020-01-30

## 2020-01-01 ENCOUNTER — HOSPITAL ENCOUNTER (OUTPATIENT)
Dept: LAB | Age: 55
Discharge: HOME | End: 2020-01-23
Payer: COMMERCIAL

## 2020-01-01 ENCOUNTER — HOSPITAL ENCOUNTER (OUTPATIENT)
Dept: RADIATION ONCOLOGY | Age: 55
End: 2020-01-01
Payer: COMMERCIAL

## 2020-01-01 ENCOUNTER — APPOINTMENT (OUTPATIENT)
Dept: INFUSION THERAPY | Age: 55
End: 2020-01-01
Payer: COMMERCIAL

## 2020-01-01 ENCOUNTER — HOSPITAL ENCOUNTER (OUTPATIENT)
Dept: RADIATION ONCOLOGY | Age: 55
Discharge: HOME | End: 2020-02-05
Payer: COMMERCIAL

## 2020-01-01 ENCOUNTER — APPOINTMENT (OUTPATIENT)
Dept: CT IMAGING | Age: 55
End: 2020-01-01
Attending: EMERGENCY MEDICINE
Payer: COMMERCIAL

## 2020-01-01 ENCOUNTER — APPOINTMENT (OUTPATIENT)
Dept: GENERAL RADIOLOGY | Age: 55
End: 2020-01-01
Attending: EMERGENCY MEDICINE
Payer: COMMERCIAL

## 2020-01-01 ENCOUNTER — APPOINTMENT (OUTPATIENT)
Dept: MRI IMAGING | Age: 55
End: 2020-01-01
Attending: EMERGENCY MEDICINE
Payer: COMMERCIAL

## 2020-01-01 ENCOUNTER — APPOINTMENT (OUTPATIENT)
Dept: GENERAL RADIOLOGY | Age: 55
DRG: 102 | End: 2020-01-01
Attending: INTERNAL MEDICINE
Payer: COMMERCIAL

## 2020-01-01 ENCOUNTER — HOSPITAL ENCOUNTER (OUTPATIENT)
Dept: RADIATION ONCOLOGY | Age: 55
Discharge: HOME | End: 2020-01-27
Payer: COMMERCIAL

## 2020-01-01 ENCOUNTER — HOSPITAL ENCOUNTER (OUTPATIENT)
Dept: RADIATION ONCOLOGY | Age: 55
Discharge: HOME | End: 2020-01-08
Payer: COMMERCIAL

## 2020-01-01 ENCOUNTER — HOSPITAL ENCOUNTER (OUTPATIENT)
Dept: RADIATION ONCOLOGY | Age: 55
Discharge: HOME | End: 2020-01-23
Payer: COMMERCIAL

## 2020-01-01 ENCOUNTER — HOSPITAL ENCOUNTER (OUTPATIENT)
Dept: RADIATION ONCOLOGY | Age: 55
Discharge: HOME | End: 2020-01-31
Payer: COMMERCIAL

## 2020-01-01 ENCOUNTER — APPOINTMENT (OUTPATIENT)
Dept: CT IMAGING | Age: 55
DRG: 102 | End: 2020-01-01
Attending: PSYCHIATRY & NEUROLOGY
Payer: COMMERCIAL

## 2020-01-01 ENCOUNTER — HOSPITAL ENCOUNTER (OUTPATIENT)
Dept: RADIATION ONCOLOGY | Age: 55
Discharge: HOME | End: 2020-02-03
Payer: COMMERCIAL

## 2020-01-01 ENCOUNTER — HOSPITAL ENCOUNTER (OUTPATIENT)
Dept: RADIATION ONCOLOGY | Age: 55
Discharge: HOME | End: 2020-01-03
Payer: COMMERCIAL

## 2020-01-01 ENCOUNTER — HOSPITAL ENCOUNTER (OUTPATIENT)
Dept: RADIATION ONCOLOGY | Age: 55
Discharge: HOME | End: 2020-01-28
Payer: COMMERCIAL

## 2020-01-01 ENCOUNTER — HOSPITAL ENCOUNTER (OUTPATIENT)
Dept: RADIATION ONCOLOGY | Age: 55
Discharge: HOME | End: 2020-01-14
Payer: COMMERCIAL

## 2020-01-01 ENCOUNTER — HOSPITAL ENCOUNTER (OUTPATIENT)
Dept: RADIATION ONCOLOGY | Age: 55
Discharge: HOME | End: 2020-01-10
Payer: COMMERCIAL

## 2020-01-01 ENCOUNTER — APPOINTMENT (OUTPATIENT)
Dept: GENERAL RADIOLOGY | Age: 55
DRG: 102 | End: 2020-01-01
Attending: FAMILY MEDICINE
Payer: COMMERCIAL

## 2020-01-01 ENCOUNTER — HOSPITAL ENCOUNTER (OUTPATIENT)
Dept: RADIATION ONCOLOGY | Age: 55
Discharge: HOME | End: 2020-01-21
Payer: COMMERCIAL

## 2020-01-01 ENCOUNTER — HOSPITAL ENCOUNTER (OUTPATIENT)
Dept: RADIATION ONCOLOGY | Age: 55
Discharge: HOME | End: 2020-01-06
Payer: COMMERCIAL

## 2020-01-01 ENCOUNTER — HOSPITAL ENCOUNTER (OUTPATIENT)
Dept: RADIATION ONCOLOGY | Age: 55
Discharge: HOME | End: 2020-01-02
Payer: COMMERCIAL

## 2020-01-01 ENCOUNTER — APPOINTMENT (OUTPATIENT)
Dept: CT IMAGING | Age: 55
DRG: 102 | End: 2020-01-01
Attending: INTERNAL MEDICINE
Payer: COMMERCIAL

## 2020-01-01 ENCOUNTER — HOSPITAL ENCOUNTER (OUTPATIENT)
Dept: INFUSION THERAPY | Age: 55
Discharge: HOME | End: 2020-02-03
Payer: COMMERCIAL

## 2020-01-01 ENCOUNTER — HOSPITAL ENCOUNTER (OUTPATIENT)
Dept: CT IMAGING | Age: 55
Discharge: HOME | End: 2020-02-03
Attending: NURSE PRACTITIONER

## 2020-01-01 ENCOUNTER — HOSPITAL ENCOUNTER (OUTPATIENT)
Dept: LAB | Age: 55
Discharge: HOME | End: 2020-01-10
Payer: COMMERCIAL

## 2020-01-01 ENCOUNTER — HOSPITAL ENCOUNTER (INPATIENT)
Age: 55
LOS: 4 days | DRG: 102 | End: 2020-02-11
Attending: EMERGENCY MEDICINE | Admitting: INTERNAL MEDICINE
Payer: COMMERCIAL

## 2020-01-01 ENCOUNTER — HOSPITAL ENCOUNTER (OUTPATIENT)
Dept: INFUSION THERAPY | Age: 55
Discharge: HOME | End: 2020-01-23
Payer: COMMERCIAL

## 2020-01-01 ENCOUNTER — HOSPITAL ENCOUNTER (INPATIENT)
Age: 55
LOS: 6 days | DRG: 102 | End: 2020-02-17
Attending: INTERNAL MEDICINE | Admitting: INTERNAL MEDICINE
Payer: COMMERCIAL

## 2020-01-01 ENCOUNTER — HOSPITAL ENCOUNTER (OUTPATIENT)
Dept: RADIATION ONCOLOGY | Age: 55
Discharge: HOME | End: 2020-01-20
Payer: COMMERCIAL

## 2020-01-01 ENCOUNTER — HOSPITAL ENCOUNTER (OUTPATIENT)
Dept: RADIATION ONCOLOGY | Age: 55
Discharge: HOME | End: 2020-01-13
Payer: COMMERCIAL

## 2020-01-01 ENCOUNTER — HOSPITAL ENCOUNTER (EMERGENCY)
Age: 55
Discharge: HOME | End: 2020-02-04
Attending: EMERGENCY MEDICINE
Payer: COMMERCIAL

## 2020-01-01 ENCOUNTER — HOSPITAL ENCOUNTER (OUTPATIENT)
Dept: RADIATION ONCOLOGY | Age: 55
Discharge: HOME | End: 2020-01-29
Payer: COMMERCIAL

## 2020-01-01 ENCOUNTER — HOSPITAL ENCOUNTER (OUTPATIENT)
Dept: INFUSION THERAPY | Age: 55
Discharge: HOME | End: 2020-01-30
Payer: COMMERCIAL

## 2020-01-01 ENCOUNTER — HOSPITAL ENCOUNTER (OUTPATIENT)
Dept: RADIATION ONCOLOGY | Age: 55
Discharge: HOME | End: 2020-01-22
Payer: COMMERCIAL

## 2020-01-01 ENCOUNTER — HOSPITAL ENCOUNTER (OUTPATIENT)
Dept: RADIATION ONCOLOGY | Age: 55
Discharge: HOME | End: 2020-01-09
Payer: COMMERCIAL

## 2020-01-01 ENCOUNTER — HOSPITAL ENCOUNTER (OUTPATIENT)
Dept: INFUSION THERAPY | Age: 55
Discharge: HOME | End: 2020-01-10
Payer: COMMERCIAL

## 2020-01-01 ENCOUNTER — APPOINTMENT (OUTPATIENT)
Dept: GENERAL RADIOLOGY | Age: 55
DRG: 102 | End: 2020-01-01
Attending: EMERGENCY MEDICINE
Payer: COMMERCIAL

## 2020-01-01 ENCOUNTER — HOSPITAL ENCOUNTER (OUTPATIENT)
Dept: RADIATION ONCOLOGY | Age: 55
Discharge: HOME | End: 2020-01-07
Payer: COMMERCIAL

## 2020-01-01 ENCOUNTER — HOSPITAL ENCOUNTER (OUTPATIENT)
Dept: RADIATION ONCOLOGY | Age: 55
Discharge: HOME | End: 2020-01-17
Payer: COMMERCIAL

## 2020-01-01 VITALS
HEART RATE: 141 BPM | TEMPERATURE: 97.4 F | HEIGHT: 75 IN | DIASTOLIC BLOOD PRESSURE: 96 MMHG | RESPIRATION RATE: 52 BRPM | WEIGHT: 212 LBS | SYSTOLIC BLOOD PRESSURE: 140 MMHG | BODY MASS INDEX: 26.36 KG/M2 | OXYGEN SATURATION: 97 %

## 2020-01-01 VITALS
SYSTOLIC BLOOD PRESSURE: 114 MMHG | OXYGEN SATURATION: 92 % | DIASTOLIC BLOOD PRESSURE: 72 MMHG | TEMPERATURE: 97.3 F | HEIGHT: 74 IN | WEIGHT: 226 LBS | BODY MASS INDEX: 29 KG/M2

## 2020-01-01 VITALS — WEIGHT: 212.5 LBS | BODY MASS INDEX: 27.28 KG/M2

## 2020-01-01 VITALS
HEART RATE: 83 BPM | WEIGHT: 211.8 LBS | DIASTOLIC BLOOD PRESSURE: 68 MMHG | BODY MASS INDEX: 27.18 KG/M2 | SYSTOLIC BLOOD PRESSURE: 112 MMHG | TEMPERATURE: 97.8 F | HEIGHT: 74 IN | OXYGEN SATURATION: 95 % | RESPIRATION RATE: 20 BRPM

## 2020-01-01 VITALS
OXYGEN SATURATION: 99 % | RESPIRATION RATE: 20 BRPM | HEART RATE: 102 BPM | TEMPERATURE: 97.7 F | SYSTOLIC BLOOD PRESSURE: 115 MMHG | DIASTOLIC BLOOD PRESSURE: 69 MMHG

## 2020-01-01 VITALS — BODY MASS INDEX: 27 KG/M2 | WEIGHT: 210.3 LBS
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
## 2020-01-01 DIAGNOSIS — I95.9 HYPOTENSION, UNSPECIFIED HYPOTENSION TYPE: ICD-10-CM
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
## 2020-01-01 DIAGNOSIS — C15.5 MALIGNANT NEOPLASM OF LOWER THIRD OF ESOPHAGUS (HCC): ICD-10-CM

## 2020-01-01 DIAGNOSIS — F07.81 POSTCONCUSSIVE SYNDROME: Primary | ICD-10-CM

## 2020-01-01 DIAGNOSIS — I46.9 CARDIAC ARREST (HCC): ICD-10-CM

## 2020-01-01 DIAGNOSIS — D70.1 CHEMOTHERAPY-INDUCED NEUTROPENIA (HCC): Primary | ICD-10-CM

## 2020-01-01 DIAGNOSIS — R91.8 PULMONARY INFILTRATE: ICD-10-CM

## 2020-01-01 DIAGNOSIS — R51.9 ACUTE INTRACTABLE HEADACHE, UNSPECIFIED HEADACHE TYPE: ICD-10-CM

## 2020-01-01 DIAGNOSIS — C15.9 MALIGNANT NEOPLASM OF ESOPHAGUS, UNSPECIFIED LOCATION (HCC): ICD-10-CM

## 2020-01-01 DIAGNOSIS — D69.6 THROMBOCYTOPENIA (HCC): ICD-10-CM

## 2020-01-01 DIAGNOSIS — E86.0 DEHYDRATION: Primary | ICD-10-CM

## 2020-01-01 DIAGNOSIS — T45.1X5A CHEMOTHERAPY-INDUCED NEUTROPENIA (HCC): ICD-10-CM

## 2020-01-01 DIAGNOSIS — T45.1X5A CHEMOTHERAPY-INDUCED NEUTROPENIA (HCC): Primary | ICD-10-CM

## 2020-01-01 DIAGNOSIS — R13.10 DYSPHAGIA, UNSPECIFIED TYPE: ICD-10-CM

## 2020-01-01 DIAGNOSIS — S20.229A CONTUSION OF BACK, UNSPECIFIED LATERALITY, INITIAL ENCOUNTER: ICD-10-CM

## 2020-01-01 DIAGNOSIS — J96.01 ACUTE RESPIRATORY FAILURE WITH HYPOXIA (HCC): ICD-10-CM

## 2020-01-01 DIAGNOSIS — Z91.81 HISTORY OF FALL: ICD-10-CM

## 2020-01-01 DIAGNOSIS — Z51.5 ENCOUNTER FOR PALLIATIVE CARE: ICD-10-CM

## 2020-01-01 DIAGNOSIS — R40.1 STUPOR: ICD-10-CM

## 2020-01-01 DIAGNOSIS — M54.6 ACUTE MIDLINE THORACIC BACK PAIN: ICD-10-CM

## 2020-01-01 DIAGNOSIS — G47.33 OBSTRUCTIVE SLEEP APNEA SYNDROME: ICD-10-CM

## 2020-01-01 DIAGNOSIS — G93.1 ANOXIC ENCEPHALOPATHY (HCC): ICD-10-CM

## 2020-01-01 DIAGNOSIS — D70.1 CHEMOTHERAPY-INDUCED NEUTROPENIA (HCC): ICD-10-CM

## 2020-01-01 DIAGNOSIS — R56.9 SEIZURE (HCC): ICD-10-CM

## 2020-01-01 DIAGNOSIS — E86.0 MILD DEHYDRATION: ICD-10-CM

## 2020-01-01 DIAGNOSIS — G93.41 ACUTE METABOLIC ENCEPHALOPATHY: ICD-10-CM

## 2020-01-01 DIAGNOSIS — R56.9 NEW ONSET SEIZURE (HCC): Primary | ICD-10-CM

## 2020-01-01 LAB
ABO + RH BLD: NORMAL
ALBUMIN SERPL-MCNC: 2.1 G/DL (ref 3.5–5)
ALBUMIN SERPL-MCNC: 2.8 G/DL (ref 3.5–5)
ALBUMIN SERPL-MCNC: 3 G/DL (ref 3.5–5)
ALBUMIN SERPL-MCNC: 3.1 G/DL (ref 3.5–5)
ALBUMIN SERPL-MCNC: 3.1 G/DL (ref 3.5–5)
ALBUMIN SERPL-MCNC: 3.2 G/DL (ref 3.5–5)
ALBUMIN/GLOB SERPL: 0.6 {RATIO} (ref 1.2–3.5)
ALBUMIN/GLOB SERPL: 0.7 {RATIO} (ref 1.2–3.5)
ALBUMIN/GLOB SERPL: 0.8 {RATIO} (ref 1.2–3.5)
ALP SERPL-CCNC: 101 U/L (ref 50–136)
ALP SERPL-CCNC: 103 U/L (ref 50–136)
ALP SERPL-CCNC: 110 U/L (ref 50–136)
ALP SERPL-CCNC: 111 U/L (ref 50–136)
ALP SERPL-CCNC: 118 U/L (ref 50–136)
ALP SERPL-CCNC: 131 U/L (ref 50–136)
ALT SERPL-CCNC: 17 U/L (ref 12–65)
ALT SERPL-CCNC: 18 U/L (ref 12–65)
ALT SERPL-CCNC: 21 U/L (ref 12–65)
ALT SERPL-CCNC: 21 U/L (ref 12–65)
ALT SERPL-CCNC: 22 U/L (ref 12–65)
ALT SERPL-CCNC: 23 U/L (ref 12–65)
ANION GAP SERPL CALC-SCNC: 11 MMOL/L (ref 7–16)
ANION GAP SERPL CALC-SCNC: 3 MMOL/L (ref 7–16)
ANION GAP SERPL CALC-SCNC: 4 MMOL/L (ref 7–16)
ANION GAP SERPL CALC-SCNC: 4 MMOL/L (ref 7–16)
ANION GAP SERPL CALC-SCNC: 5 MMOL/L (ref 7–16)
ANION GAP SERPL CALC-SCNC: 6 MMOL/L (ref 7–16)
ANION GAP SERPL CALC-SCNC: 8 MMOL/L (ref 7–16)
APTT PPP: 25.2 SEC (ref 24.3–35.4)
APTT PPP: 26 SEC (ref 24.3–35.4)
APTT PPP: 26.6 SEC (ref 24.3–35.4)
ARTERIAL PATENCY WRIST A: ABNORMAL
ARTERIAL PATENCY WRIST A: NO
ARTERIAL PATENCY WRIST A: YES
AST SERPL-CCNC: 26 U/L (ref 15–37)
AST SERPL-CCNC: 28 U/L (ref 15–37)
AST SERPL-CCNC: 29 U/L (ref 15–37)
AST SERPL-CCNC: 31 U/L (ref 15–37)
AST SERPL-CCNC: 32 U/L (ref 15–37)
AST SERPL-CCNC: 37 U/L (ref 15–37)
ATRIAL RATE: 127 BPM
ATRIAL RATE: 141 BPM
ATRIAL RATE: 85 BPM
ATRIAL RATE: 89 BPM
BASE DEFICIT BLD-SCNC: 1 MMOL/L
BASE DEFICIT BLD-SCNC: 2 MMOL/L
BASE DEFICIT BLD-SCNC: 2 MMOL/L
BASE EXCESS BLD CALC-SCNC: 0 MMOL/L
BASE EXCESS BLD CALC-SCNC: 0 MMOL/L
BASE EXCESS BLD CALC-SCNC: 2 MMOL/L
BASOPHILS # BLD: 0 K/UL (ref 0–0.2)
BASOPHILS NFR BLD: 0 % (ref 0–2)
BASOPHILS NFR BLD: 1 % (ref 0–2)
BDY SITE: ABNORMAL
BILIRUB SERPL-MCNC: 0.4 MG/DL (ref 0.2–1.1)
BILIRUB SERPL-MCNC: 0.5 MG/DL (ref 0.2–1.1)
BILIRUB SERPL-MCNC: 0.6 MG/DL (ref 0.2–1.1)
BILIRUB SERPL-MCNC: 0.6 MG/DL (ref 0.2–1.1)
BLD PROD TYP BPU: NORMAL
BLOOD GROUP ANTIBODIES SERPL: NORMAL
BPU ID: NORMAL
BUN SERPL-MCNC: 10 MG/DL (ref 6–23)
BUN SERPL-MCNC: 11 MG/DL (ref 6–23)
BUN SERPL-MCNC: 12 MG/DL (ref 6–23)
BUN SERPL-MCNC: 12 MG/DL (ref 6–23)
BUN SERPL-MCNC: 13 MG/DL (ref 6–23)
BUN SERPL-MCNC: 13 MG/DL (ref 6–23)
BUN SERPL-MCNC: 18 MG/DL (ref 6–23)
BUN SERPL-MCNC: 19 MG/DL (ref 6–23)
BUN SERPL-MCNC: 20 MG/DL (ref 6–23)
BUN SERPL-MCNC: 20 MG/DL (ref 6–23)
BUN SERPL-MCNC: 21 MG/DL (ref 6–23)
BUN SERPL-MCNC: 8 MG/DL (ref 6–23)
BUN SERPL-MCNC: 9 MG/DL (ref 6–23)
CALCIUM SERPL-MCNC: 8.5 MG/DL (ref 8.3–10.4)
CALCIUM SERPL-MCNC: 8.6 MG/DL (ref 8.3–10.4)
CALCIUM SERPL-MCNC: 8.7 MG/DL (ref 8.3–10.4)
CALCIUM SERPL-MCNC: 8.9 MG/DL (ref 8.3–10.4)
CALCIUM SERPL-MCNC: 8.9 MG/DL (ref 8.3–10.4)
CALCIUM SERPL-MCNC: 9 MG/DL (ref 8.3–10.4)
CALCIUM SERPL-MCNC: 9.1 MG/DL (ref 8.3–10.4)
CALCIUM SERPL-MCNC: 9.1 MG/DL (ref 8.3–10.4)
CALCIUM SERPL-MCNC: 9.2 MG/DL (ref 8.3–10.4)
CALCIUM SERPL-MCNC: 9.3 MG/DL (ref 8.3–10.4)
CALCIUM SERPL-MCNC: 9.4 MG/DL (ref 8.3–10.4)
CALCULATED P AXIS, ECG09: 73 DEGREES
CALCULATED P AXIS, ECG09: 75 DEGREES
CALCULATED P AXIS, ECG09: 78 DEGREES
CALCULATED P AXIS, ECG09: 89 DEGREES
CALCULATED R AXIS, ECG10: 56 DEGREES
CALCULATED R AXIS, ECG10: 73 DEGREES
CALCULATED R AXIS, ECG10: 76 DEGREES
CALCULATED R AXIS, ECG10: 81 DEGREES
CALCULATED T AXIS, ECG11: -45 DEGREES
CALCULATED T AXIS, ECG11: 64 DEGREES
CALCULATED T AXIS, ECG11: 74 DEGREES
CALCULATED T AXIS, ECG11: 75 DEGREES
CANCER AG19-9 SERPL-ACNC: 6096.5 U/ML (ref 2–37)
CEA SERPL-MCNC: 293.3 NG/ML (ref 0–3)
CEA SERPL-MCNC: 385.9 NG/ML (ref 0–3)
CHLORIDE SERPL-SCNC: 101 MMOL/L (ref 98–107)
CHLORIDE SERPL-SCNC: 101 MMOL/L (ref 98–107)
CHLORIDE SERPL-SCNC: 102 MMOL/L (ref 98–107)
CHLORIDE SERPL-SCNC: 104 MMOL/L (ref 98–107)
CHLORIDE SERPL-SCNC: 106 MMOL/L (ref 98–107)
CHLORIDE SERPL-SCNC: 109 MMOL/L (ref 98–107)
CHLORIDE SERPL-SCNC: 110 MMOL/L (ref 98–107)
CHLORIDE SERPL-SCNC: 110 MMOL/L (ref 98–107)
CHLORIDE SERPL-SCNC: 114 MMOL/L (ref 98–107)
CHLORIDE SERPL-SCNC: 119 MMOL/L (ref 98–107)
CHLORIDE SERPL-SCNC: 129 MMOL/L (ref 98–107)
CO2 BLD-SCNC: 23 MMOL/L
CO2 BLD-SCNC: 25 MMOL/L
CO2 BLD-SCNC: 25 MMOL/L
CO2 BLD-SCNC: 26 MMOL/L
CO2 BLD-SCNC: 27 MMOL/L
CO2 BLD-SCNC: 29 MMOL/L
CO2 SERPL-SCNC: 23 MMOL/L (ref 21–32)
CO2 SERPL-SCNC: 25 MMOL/L (ref 21–32)
CO2 SERPL-SCNC: 26 MMOL/L (ref 21–32)
CO2 SERPL-SCNC: 26 MMOL/L (ref 21–32)
CO2 SERPL-SCNC: 27 MMOL/L (ref 21–32)
CO2 SERPL-SCNC: 27 MMOL/L (ref 21–32)
CO2 SERPL-SCNC: 28 MMOL/L (ref 21–32)
CO2 SERPL-SCNC: 30 MMOL/L (ref 21–32)
COLLECT TIME,HTIME: 320
COLLECT TIME,HTIME: 403
COLLECT TIME,HTIME: 405
COLLECT TIME,HTIME: 710
COLLECT TIME,HTIME: 745
COLLECT TIME,HTIME: 945
CREAT SERPL-MCNC: 0.49 MG/DL (ref 0.8–1.5)
CREAT SERPL-MCNC: 0.51 MG/DL (ref 0.8–1.5)
CREAT SERPL-MCNC: 0.57 MG/DL (ref 0.8–1.5)
CREAT SERPL-MCNC: 0.59 MG/DL (ref 0.8–1.5)
CREAT SERPL-MCNC: 0.6 MG/DL (ref 0.8–1.5)
CREAT SERPL-MCNC: 0.68 MG/DL (ref 0.8–1.5)
CREAT SERPL-MCNC: 0.69 MG/DL (ref 0.8–1.5)
CREAT SERPL-MCNC: 0.76 MG/DL (ref 0.8–1.5)
CREAT SERPL-MCNC: 0.77 MG/DL (ref 0.8–1.5)
CREAT SERPL-MCNC: 0.85 MG/DL (ref 0.8–1.5)
CREAT SERPL-MCNC: 0.86 MG/DL (ref 0.8–1.5)
CREAT SERPL-MCNC: 0.87 MG/DL (ref 0.8–1.5)
CREAT SERPL-MCNC: 0.9 MG/DL (ref 0.8–1.5)
CRP SERPL-MCNC: 8.1 MG/DL (ref 0–0.9)
D DIMER PPP FEU-MCNC: 16.15 UG/ML(FEU)
DIAGNOSIS, 93000: NORMAL
DIFFERENTIAL METHOD BLD: ABNORMAL
EOSINOPHIL # BLD: 0 K/UL (ref 0–0.8)
EOSINOPHIL # BLD: 0.1 K/UL (ref 0–0.8)
EOSINOPHIL # BLD: 0.2 K/UL (ref 0–0.8)
EOSINOPHIL # BLD: 0.2 K/UL (ref 0–0.8)
EOSINOPHIL NFR BLD: 0 % (ref 0.5–7.8)
EOSINOPHIL NFR BLD: 2 % (ref 0.5–7.8)
EOSINOPHIL NFR BLD: 3 % (ref 0.5–7.8)
ERYTHROCYTE [DISTWIDTH] IN BLOOD BY AUTOMATED COUNT: 14.6 % (ref 11.9–14.6)
ERYTHROCYTE [DISTWIDTH] IN BLOOD BY AUTOMATED COUNT: 14.8 % (ref 11.9–14.6)
ERYTHROCYTE [DISTWIDTH] IN BLOOD BY AUTOMATED COUNT: 14.8 % (ref 11.9–14.6)
ERYTHROCYTE [DISTWIDTH] IN BLOOD BY AUTOMATED COUNT: 14.9 % (ref 11.9–14.6)
ERYTHROCYTE [DISTWIDTH] IN BLOOD BY AUTOMATED COUNT: 15.2 % (ref 11.9–14.6)
ERYTHROCYTE [DISTWIDTH] IN BLOOD BY AUTOMATED COUNT: 15.3 % (ref 11.9–14.6)
ERYTHROCYTE [DISTWIDTH] IN BLOOD BY AUTOMATED COUNT: 15.3 % (ref 11.9–14.6)
ERYTHROCYTE [DISTWIDTH] IN BLOOD BY AUTOMATED COUNT: 15.5 % (ref 11.9–14.6)
ERYTHROCYTE [DISTWIDTH] IN BLOOD BY AUTOMATED COUNT: 15.6 % (ref 11.9–14.6)
ERYTHROCYTE [DISTWIDTH] IN BLOOD BY AUTOMATED COUNT: 15.7 % (ref 11.9–14.6)
ERYTHROCYTE [DISTWIDTH] IN BLOOD BY AUTOMATED COUNT: 16 % (ref 11.9–14.6)
ERYTHROCYTE [DISTWIDTH] IN BLOOD BY AUTOMATED COUNT: 16.1 % (ref 11.9–14.6)
ERYTHROCYTE [SEDIMENTATION RATE] IN BLOOD: 10 MM/HR (ref 0–20)
EXHALED MINUTE VOLUME, VE: 12.4 L/MIN
EXHALED MINUTE VOLUME, VE: 7.4 L/MIN
EXHALED MINUTE VOLUME, VE: 7.8 L/MIN
EXHALED MINUTE VOLUME, VE: 8.1 L/MIN
FIBRINOGEN PPP-MCNC: 168 MG/DL (ref 190–501)
FIBRINOGEN PPP-MCNC: 256 MG/DL (ref 190–501)
FLOW RATE ISTAT,IFRATE: 3 L/MIN
GAS FLOW.O2 O2 DELIVERY SYS: ABNORMAL L/MIN
GAS FLOW.O2 SETTING OXYMISER: 16 BPM
GAS FLOW.O2 SETTING OXYMISER: 18 BPM
GAS FLOW.O2 SETTING OXYMISER: 18 BPM
GAS FLOW.O2 SETTING OXYMISER: 20 BPM
GLOBULIN SER CALC-MCNC: 3.3 G/DL (ref 2.3–3.5)
GLOBULIN SER CALC-MCNC: 3.8 G/DL (ref 2.3–3.5)
GLOBULIN SER CALC-MCNC: 4.2 G/DL (ref 2.3–3.5)
GLOBULIN SER CALC-MCNC: 4.2 G/DL (ref 2.3–3.5)
GLOBULIN SER CALC-MCNC: 4.4 G/DL (ref 2.3–3.5)
GLOBULIN SER CALC-MCNC: 4.5 G/DL (ref 2.3–3.5)
GLUCOSE BLD STRIP.AUTO-MCNC: 111 MG/DL (ref 65–100)
GLUCOSE BLD STRIP.AUTO-MCNC: 157 MG/DL (ref 65–100)
GLUCOSE BLD STRIP.AUTO-MCNC: 169 MG/DL (ref 65–100)
GLUCOSE SERPL-MCNC: 100 MG/DL (ref 65–100)
GLUCOSE SERPL-MCNC: 109 MG/DL (ref 65–100)
GLUCOSE SERPL-MCNC: 135 MG/DL (ref 65–100)
GLUCOSE SERPL-MCNC: 135 MG/DL (ref 65–100)
GLUCOSE SERPL-MCNC: 139 MG/DL (ref 65–100)
GLUCOSE SERPL-MCNC: 142 MG/DL (ref 65–100)
GLUCOSE SERPL-MCNC: 87 MG/DL (ref 65–100)
GLUCOSE SERPL-MCNC: 89 MG/DL (ref 65–100)
GLUCOSE SERPL-MCNC: 91 MG/DL (ref 65–100)
GLUCOSE SERPL-MCNC: 94 MG/DL (ref 65–100)
GLUCOSE SERPL-MCNC: 94 MG/DL (ref 65–100)
GLUCOSE SERPL-MCNC: 96 MG/DL (ref 65–100)
GLUCOSE SERPL-MCNC: 98 MG/DL (ref 65–100)
HAPTOGLOB SERPL-MCNC: 195 MG/DL (ref 30–200)
HCO3 BLD-SCNC: 22.4 MMOL/L (ref 22–26)
HCO3 BLD-SCNC: 23.5 MMOL/L (ref 22–26)
HCO3 BLD-SCNC: 23.8 MMOL/L (ref 22–26)
HCO3 BLD-SCNC: 24.9 MMOL/L (ref 22–26)
HCO3 BLD-SCNC: 25.6 MMOL/L (ref 22–26)
HCO3 BLD-SCNC: 27.8 MMOL/L (ref 22–26)
HCT VFR BLD AUTO: 30.5 % (ref 41.1–50.3)
HCT VFR BLD AUTO: 31.7 % (ref 41.1–50.3)
HCT VFR BLD AUTO: 34.3 % (ref 41.1–50.3)
HCT VFR BLD AUTO: 34.5 % (ref 41.1–50.3)
HCT VFR BLD AUTO: 35.1 % (ref 41.1–50.3)
HCT VFR BLD AUTO: 35.5 % (ref 41.1–50.3)
HCT VFR BLD AUTO: 35.6 % (ref 41.1–50.3)
HCT VFR BLD AUTO: 35.7 % (ref 41.1–50.3)
HCT VFR BLD AUTO: 36.3 % (ref 41.1–50.3)
HCT VFR BLD AUTO: 36.3 % (ref 41.1–50.3)
HCT VFR BLD AUTO: 36.5 % (ref 41.1–50.3)
HCT VFR BLD AUTO: 37.4 % (ref 41.1–50.3)
HCT VFR BLD AUTO: 38.4 % (ref 41.1–50.3)
HCT VFR BLD AUTO: 43.1 % (ref 41.1–50.3)
HGB BLD-MCNC: 10.1 G/DL (ref 13.6–17.2)
HGB BLD-MCNC: 10.6 G/DL (ref 13.6–17.2)
HGB BLD-MCNC: 11.1 G/DL (ref 13.6–17.2)
HGB BLD-MCNC: 11.4 G/DL (ref 13.6–17.2)
HGB BLD-MCNC: 11.6 G/DL (ref 13.6–17.2)
HGB BLD-MCNC: 11.7 G/DL (ref 13.6–17.2)
HGB BLD-MCNC: 12 G/DL (ref 13.6–17.2)
HGB BLD-MCNC: 12 G/DL (ref 13.6–17.2)
HGB BLD-MCNC: 12.4 G/DL (ref 13.6–17.2)
HGB BLD-MCNC: 12.4 G/DL (ref 13.6–17.2)
HGB BLD-MCNC: 13.3 G/DL (ref 13.6–17.2)
HGB BLD-MCNC: 14.8 G/DL (ref 13.6–17.2)
IMM GRANULOCYTES # BLD AUTO: 0 K/UL (ref 0–0.5)
IMM GRANULOCYTES # BLD AUTO: 0.1 K/UL (ref 0–0.5)
IMM GRANULOCYTES # BLD AUTO: 0.1 K/UL (ref 0–0.5)
IMM GRANULOCYTES NFR BLD AUTO: 0 % (ref 0–5)
IMM GRANULOCYTES NFR BLD AUTO: 1 % (ref 0–5)
INR PPP: 0.9
INR PPP: 1
INR PPP: 1.1
INR PPP: 1.1
INR PPP: 1.2
INSPIRATION.DURATION SETTING TIME VENT: 0.85 SEC
INSPIRATION.DURATION SETTING TIME VENT: 0.85 SEC
INSPIRATION.DURATION SETTING TIME VENT: 0.9 SEC
LYMPHOCYTES # BLD: 0.1 K/UL (ref 0.5–4.6)
LYMPHOCYTES # BLD: 0.2 K/UL (ref 0.5–4.6)
LYMPHOCYTES # BLD: 0.3 K/UL (ref 0.5–4.6)
LYMPHOCYTES # BLD: 0.5 K/UL (ref 0.5–4.6)
LYMPHOCYTES NFR BLD: 13 % (ref 13–44)
LYMPHOCYTES NFR BLD: 2 % (ref 13–44)
LYMPHOCYTES NFR BLD: 3 % (ref 13–44)
LYMPHOCYTES NFR BLD: 4 % (ref 13–44)
LYMPHOCYTES NFR BLD: 5 % (ref 13–44)
LYMPHOCYTES NFR BLD: 7 % (ref 13–44)
LYMPHOCYTES NFR BLD: 9 % (ref 13–44)
MAGNESIUM SERPL-MCNC: 1.8 MG/DL (ref 1.8–2.4)
MAGNESIUM SERPL-MCNC: 1.9 MG/DL (ref 1.8–2.4)
MAGNESIUM SERPL-MCNC: 2 MG/DL (ref 1.8–2.4)
MAGNESIUM SERPL-MCNC: 2 MG/DL (ref 1.8–2.4)
MAGNESIUM SERPL-MCNC: 2.1 MG/DL (ref 1.8–2.4)
MAGNESIUM SERPL-MCNC: 2.1 MG/DL (ref 1.8–2.4)
MAGNESIUM SERPL-MCNC: 2.2 MG/DL (ref 1.8–2.4)
MAGNESIUM SERPL-MCNC: 2.3 MG/DL (ref 1.8–2.4)
MAGNESIUM SERPL-MCNC: 2.4 MG/DL (ref 1.8–2.4)
MCH RBC QN AUTO: 31.8 PG (ref 26.1–32.9)
MCH RBC QN AUTO: 32.1 PG (ref 26.1–32.9)
MCH RBC QN AUTO: 32.2 PG (ref 26.1–32.9)
MCH RBC QN AUTO: 32.2 PG (ref 26.1–32.9)
MCH RBC QN AUTO: 32.3 PG (ref 26.1–32.9)
MCH RBC QN AUTO: 32.3 PG (ref 26.1–32.9)
MCH RBC QN AUTO: 32.4 PG (ref 26.1–32.9)
MCH RBC QN AUTO: 32.5 PG (ref 26.1–32.9)
MCH RBC QN AUTO: 32.5 PG (ref 26.1–32.9)
MCH RBC QN AUTO: 32.7 PG (ref 26.1–32.9)
MCH RBC QN AUTO: 32.7 PG (ref 26.1–32.9)
MCH RBC QN AUTO: 32.8 PG (ref 26.1–32.9)
MCHC RBC AUTO-ENTMCNC: 31 G/DL (ref 31.4–35)
MCHC RBC AUTO-ENTMCNC: 31.8 G/DL (ref 31.4–35)
MCHC RBC AUTO-ENTMCNC: 32.4 G/DL (ref 31.4–35)
MCHC RBC AUTO-ENTMCNC: 32.5 G/DL (ref 31.4–35)
MCHC RBC AUTO-ENTMCNC: 32.7 G/DL (ref 31.4–35)
MCHC RBC AUTO-ENTMCNC: 33.1 G/DL (ref 31.4–35)
MCHC RBC AUTO-ENTMCNC: 33.1 G/DL (ref 31.4–35)
MCHC RBC AUTO-ENTMCNC: 33.4 G/DL (ref 31.4–35)
MCHC RBC AUTO-ENTMCNC: 33.7 G/DL (ref 31.4–35)
MCHC RBC AUTO-ENTMCNC: 33.9 G/DL (ref 31.4–35)
MCHC RBC AUTO-ENTMCNC: 34.2 G/DL (ref 31.4–35)
MCHC RBC AUTO-ENTMCNC: 34.3 G/DL (ref 31.4–35)
MCHC RBC AUTO-ENTMCNC: 34.6 G/DL (ref 31.4–35)
MCHC RBC AUTO-ENTMCNC: 34.7 G/DL (ref 31.4–35)
MCV RBC AUTO: 101.1 FL (ref 79.6–97.8)
MCV RBC AUTO: 103.9 FL (ref 79.6–97.8)
MCV RBC AUTO: 93.7 FL (ref 79.6–97.8)
MCV RBC AUTO: 94.2 FL (ref 79.6–97.8)
MCV RBC AUTO: 94.5 FL (ref 79.6–97.8)
MCV RBC AUTO: 95.4 FL (ref 79.6–97.8)
MCV RBC AUTO: 95.8 FL (ref 79.6–97.8)
MCV RBC AUTO: 95.8 FL (ref 79.6–97.8)
MCV RBC AUTO: 97.1 FL (ref 79.6–97.8)
MCV RBC AUTO: 97.4 FL (ref 79.6–97.8)
MCV RBC AUTO: 98.1 FL (ref 79.6–97.8)
MCV RBC AUTO: 98.3 FL (ref 79.6–97.8)
MCV RBC AUTO: 98.3 FL (ref 79.6–97.8)
MCV RBC AUTO: 99.4 FL (ref 79.6–97.8)
MONOCYTES # BLD: 0.4 K/UL (ref 0.1–1.3)
MONOCYTES # BLD: 0.4 K/UL (ref 0.1–1.3)
MONOCYTES # BLD: 0.6 K/UL (ref 0.1–1.3)
MONOCYTES # BLD: 0.7 K/UL (ref 0.1–1.3)
MONOCYTES # BLD: 0.7 K/UL (ref 0.1–1.3)
MONOCYTES # BLD: 1.1 K/UL (ref 0.1–1.3)
MONOCYTES NFR BLD: 10 % (ref 4–12)
MONOCYTES NFR BLD: 11 % (ref 4–12)
MONOCYTES NFR BLD: 12 % (ref 4–12)
MONOCYTES NFR BLD: 13 % (ref 4–12)
MONOCYTES NFR BLD: 14 % (ref 4–12)
MONOCYTES NFR BLD: 16 % (ref 4–12)
MONOCYTES NFR BLD: 16 % (ref 4–12)
MONOCYTES NFR BLD: 17 % (ref 4–12)
MONOCYTES NFR BLD: 7 % (ref 4–12)
NEUTS SEG # BLD: 2.3 K/UL (ref 1.7–8.2)
NEUTS SEG # BLD: 2.8 K/UL (ref 1.7–8.2)
NEUTS SEG # BLD: 2.8 K/UL (ref 1.7–8.2)
NEUTS SEG # BLD: 3.1 K/UL (ref 1.7–8.2)
NEUTS SEG # BLD: 3.1 K/UL (ref 1.7–8.2)
NEUTS SEG # BLD: 3.5 K/UL (ref 1.7–8.2)
NEUTS SEG # BLD: 5.1 K/UL (ref 1.7–8.2)
NEUTS SEG # BLD: 7.7 K/UL (ref 1.7–8.2)
NEUTS SEG # BLD: 7.8 K/UL (ref 1.7–8.2)
NEUTS SEG NFR BLD: 68 % (ref 43–78)
NEUTS SEG NFR BLD: 71 % (ref 43–78)
NEUTS SEG NFR BLD: 75 % (ref 43–78)
NEUTS SEG NFR BLD: 78 % (ref 43–78)
NEUTS SEG NFR BLD: 80 % (ref 43–78)
NEUTS SEG NFR BLD: 81 % (ref 43–78)
NEUTS SEG NFR BLD: 83 % (ref 43–78)
NEUTS SEG NFR BLD: 84 % (ref 43–78)
NEUTS SEG NFR BLD: 90 % (ref 43–78)
NRBC # BLD: 0 K/UL (ref 0–0.2)
NRBC # BLD: 0.03 K/UL (ref 0–0.2)
O2/TOTAL GAS SETTING VFR VENT: 100 %
O2/TOTAL GAS SETTING VFR VENT: 50 %
O2/TOTAL GAS SETTING VFR VENT: 60 %
O2/TOTAL GAS SETTING VFR VENT: 65 %
O2/TOTAL GAS SETTING VFR VENT: 65 %
P-R INTERVAL, ECG05: 130 MS
P-R INTERVAL, ECG05: 134 MS
P-R INTERVAL, ECG05: 146 MS
P-R INTERVAL, ECG05: 152 MS
PCO2 BLD: 29.7 MMHG (ref 35–45)
PCO2 BLD: 34.8 MMHG (ref 35–45)
PCO2 BLD: 40.7 MMHG (ref 35–45)
PCO2 BLD: 45.8 MMHG (ref 35–45)
PCO2 BLD: 49.3 MMHG (ref 35–45)
PCO2 BLD: 55.3 MMHG (ref 35–45)
PEEP RESPIRATORY: 8 CMH2O
PH BLD: 7.27 [PH] (ref 7.35–7.45)
PH BLD: 7.34 [PH] (ref 7.35–7.45)
PH BLD: 7.36 [PH] (ref 7.35–7.45)
PH BLD: 7.37 [PH] (ref 7.35–7.45)
PH BLD: 7.44 [PH] (ref 7.35–7.45)
PH BLD: 7.49 [PH] (ref 7.35–7.45)
PHOSPHATE SERPL-MCNC: 2.4 MG/DL (ref 2.5–4.5)
PHOSPHATE SERPL-MCNC: 3 MG/DL (ref 2.5–4.5)
PLATELET # BLD AUTO: 40 K/UL (ref 150–450)
PLATELET # BLD AUTO: 42 K/UL (ref 150–450)
PLATELET # BLD AUTO: 45 K/UL (ref 150–450)
PLATELET # BLD AUTO: 54 K/UL (ref 150–450)
PLATELET # BLD AUTO: 54 K/UL (ref 150–450)
PLATELET # BLD AUTO: 55 K/UL (ref 150–450)
PLATELET # BLD AUTO: 57 K/UL (ref 150–450)
PLATELET # BLD AUTO: 61 K/UL (ref 150–450)
PLATELET # BLD AUTO: 64 K/UL (ref 150–450)
PLATELET # BLD AUTO: 66 K/UL (ref 150–450)
PLATELET # BLD AUTO: 68 K/UL (ref 150–450)
PLATELET # BLD AUTO: 71 K/UL (ref 150–450)
PLATELET # BLD AUTO: 80 K/UL (ref 150–450)
PLATELET # BLD AUTO: 81 K/UL (ref 150–450)
PMV BLD AUTO: 10.4 FL (ref 9.4–12.3)
PMV BLD AUTO: 10.6 FL (ref 9.4–12.3)
PMV BLD AUTO: 10.9 FL (ref 9.4–12.3)
PMV BLD AUTO: 11 FL (ref 9.4–12.3)
PMV BLD AUTO: 11 FL (ref 9.4–12.3)
PMV BLD AUTO: 11.4 FL (ref 9.4–12.3)
PMV BLD AUTO: 11.6 FL (ref 9.4–12.3)
PMV BLD AUTO: 11.9 FL (ref 9.4–12.3)
PMV BLD AUTO: 11.9 FL (ref 9.4–12.3)
PMV BLD AUTO: 12.1 FL (ref 9.4–12.3)
PMV BLD AUTO: 12.3 FL (ref 9.4–12.3)
PMV BLD AUTO: 12.3 FL (ref 9.4–12.3)
PMV BLD AUTO: 12.4 FL (ref 9.4–12.3)
PMV BLD AUTO: 12.5 FL (ref 9.4–12.3)
PO2 BLD: 142 MMHG (ref 75–100)
PO2 BLD: 267 MMHG (ref 75–100)
PO2 BLD: 62 MMHG (ref 75–100)
PO2 BLD: 66 MMHG (ref 75–100)
PO2 BLD: 75 MMHG (ref 75–100)
PO2 BLD: 91 MMHG (ref 75–100)
POTASSIUM SERPL-SCNC: 3.1 MMOL/L (ref 3.5–5.1)
POTASSIUM SERPL-SCNC: 3.3 MMOL/L (ref 3.5–5.1)
POTASSIUM SERPL-SCNC: 3.4 MMOL/L (ref 3.5–5.1)
POTASSIUM SERPL-SCNC: 3.4 MMOL/L (ref 3.5–5.1)
POTASSIUM SERPL-SCNC: 3.5 MMOL/L (ref 3.5–5.1)
POTASSIUM SERPL-SCNC: 3.6 MMOL/L (ref 3.5–5.1)
POTASSIUM SERPL-SCNC: 3.7 MMOL/L (ref 3.5–5.1)
POTASSIUM SERPL-SCNC: 3.8 MMOL/L (ref 3.5–5.1)
POTASSIUM SERPL-SCNC: 3.9 MMOL/L (ref 3.5–5.1)
POTASSIUM SERPL-SCNC: 4 MMOL/L (ref 3.5–5.1)
POTASSIUM SERPL-SCNC: 4.1 MMOL/L (ref 3.5–5.1)
PRESSURE CONTROL, IPC: 10
PROT SERPL-MCNC: 5.4 G/DL (ref 6.3–8.2)
PROT SERPL-MCNC: 6.6 G/DL (ref 6.3–8.2)
PROT SERPL-MCNC: 7.3 G/DL (ref 6.3–8.2)
PROT SERPL-MCNC: 7.4 G/DL (ref 6.3–8.2)
PROT SERPL-MCNC: 7.5 G/DL (ref 6.3–8.2)
PROT SERPL-MCNC: 7.5 G/DL (ref 6.3–8.2)
PROTHROMBIN TIME: 12.8 SEC (ref 12–14.7)
PROTHROMBIN TIME: 13.7 SEC (ref 12–14.7)
PROTHROMBIN TIME: 14.3 SEC (ref 12–14.7)
PROTHROMBIN TIME: 14.7 SEC (ref 12–14.7)
PROTHROMBIN TIME: 15.4 SEC (ref 12–14.7)
Q-T INTERVAL, ECG07: 266 MS
Q-T INTERVAL, ECG07: 312 MS
Q-T INTERVAL, ECG07: 364 MS
Q-T INTERVAL, ECG07: 368 MS
QRS DURATION, ECG06: 74 MS
QRS DURATION, ECG06: 78 MS
QRS DURATION, ECG06: 80 MS
QRS DURATION, ECG06: 84 MS
QTC CALCULATION (BEZET), ECG08: 407 MS
QTC CALCULATION (BEZET), ECG08: 433 MS
QTC CALCULATION (BEZET), ECG08: 447 MS
QTC CALCULATION (BEZET), ECG08: 453 MS
RBC # BLD AUTO: 3.13 M/UL (ref 4.23–5.6)
RBC # BLD AUTO: 3.23 M/UL (ref 4.23–5.6)
RBC # BLD AUTO: 3.49 M/UL (ref 4.23–5.67)
RBC # BLD AUTO: 3.53 M/UL (ref 4.23–5.67)
RBC # BLD AUTO: 3.6 M/UL (ref 4.23–5.6)
RBC # BLD AUTO: 3.6 M/UL (ref 4.23–5.6)
RBC # BLD AUTO: 3.61 M/UL (ref 4.23–5.6)
RBC # BLD AUTO: 3.61 M/UL (ref 4.23–5.67)
RBC # BLD AUTO: 3.73 M/UL (ref 4.23–5.6)
RBC # BLD AUTO: 3.74 M/UL (ref 4.23–5.6)
RBC # BLD AUTO: 3.79 M/UL (ref 4.23–5.6)
RBC # BLD AUTO: 3.79 M/UL (ref 4.23–5.6)
RBC # BLD AUTO: 4.1 M/UL (ref 4.23–5.6)
RBC # BLD AUTO: 4.56 M/UL (ref 4.23–5.6)
SAO2 % BLD: 100 % (ref 95–98)
SAO2 % BLD: 91 % (ref 95–98)
SAO2 % BLD: 92 % (ref 95–98)
SAO2 % BLD: 96 % (ref 95–98)
SAO2 % BLD: 96 % (ref 95–98)
SAO2 % BLD: 99 % (ref 95–98)
SERVICE CMNT-IMP: ABNORMAL
SODIUM SERPL-SCNC: 132 MMOL/L (ref 136–145)
SODIUM SERPL-SCNC: 135 MMOL/L (ref 136–145)
SODIUM SERPL-SCNC: 135 MMOL/L (ref 136–145)
SODIUM SERPL-SCNC: 138 MMOL/L (ref 136–145)
SODIUM SERPL-SCNC: 140 MMOL/L (ref 136–145)
SODIUM SERPL-SCNC: 140 MMOL/L (ref 136–145)
SODIUM SERPL-SCNC: 142 MMOL/L (ref 136–145)
SODIUM SERPL-SCNC: 143 MMOL/L (ref 136–145)
SODIUM SERPL-SCNC: 144 MMOL/L (ref 136–145)
SODIUM SERPL-SCNC: 144 MMOL/L (ref 136–145)
SODIUM SERPL-SCNC: 146 MMOL/L (ref 136–145)
SODIUM SERPL-SCNC: 151 MMOL/L (ref 136–145)
SODIUM SERPL-SCNC: 160 MMOL/L (ref 136–145)
SPECIMEN EXP DATE BLD: NORMAL
SPECIMEN TYPE: ABNORMAL
STATUS OF UNIT,%ST: NORMAL
UNIT DIVISION, %UDIV: 0
VANCOMYCIN TROUGH SERPL-MCNC: 25 UG/ML (ref 5–20)
VENTILATION MODE VENT: ABNORMAL
VENTRICULAR RATE, ECG03: 127 BPM
VENTRICULAR RATE, ECG03: 141 BPM
VENTRICULAR RATE, ECG03: 85 BPM
VENTRICULAR RATE, ECG03: 89 BPM
VIT B1 BLD-SCNC: 144 NMOL/L (ref 66.5–200)
VIT B12 SERPL-MCNC: 439 PG/ML (ref 193–986)
VT SETTING VENT: 500 ML
WBC # BLD AUTO: 13.2 K/UL (ref 4.3–11.1)
WBC # BLD AUTO: 13.3 K/UL (ref 4.3–11.1)
WBC # BLD AUTO: 3.3 K/UL (ref 4.3–11.1)
WBC # BLD AUTO: 3.5 K/UL (ref 4.3–11.1)
WBC # BLD AUTO: 3.7 K/UL (ref 4.3–11.1)
WBC # BLD AUTO: 4 K/UL (ref 4.3–11.1)
WBC # BLD AUTO: 4.1 K/UL (ref 4.3–11.1)
WBC # BLD AUTO: 4.7 K/UL (ref 4.3–11.1)
WBC # BLD AUTO: 6.1 K/UL (ref 4.3–11.1)
WBC # BLD AUTO: 6.5 K/UL (ref 4.3–11.1)
WBC # BLD AUTO: 6.6 K/UL (ref 4.3–11.1)
WBC # BLD AUTO: 6.8 K/UL (ref 4.3–11.1)
WBC # BLD AUTO: 8.7 K/UL (ref 4.3–11.1)
WBC # BLD AUTO: 9.5 K/UL (ref 4.3–11.1)

## 2020-01-01 PROCEDURE — 96376 TX/PRO/DX INJ SAME DRUG ADON: CPT

## 2020-01-01 PROCEDURE — 77386 HC IMRT TRMT DLVR COMPL: CPT

## 2020-01-01 PROCEDURE — 96375 TX/PRO/DX INJ NEW DRUG ADDON: CPT

## 2020-01-01 PROCEDURE — 85730 THROMBOPLASTIN TIME PARTIAL: CPT

## 2020-01-01 PROCEDURE — 93005 ELECTROCARDIOGRAM TRACING: CPT | Performed by: EMERGENCY MEDICINE

## 2020-01-01 PROCEDURE — 83735 ASSAY OF MAGNESIUM: CPT

## 2020-01-01 PROCEDURE — 74011250637 HC RX REV CODE- 250/637: Performed by: INTERNAL MEDICINE

## 2020-01-01 PROCEDURE — 80048 BASIC METABOLIC PNL TOTAL CA: CPT

## 2020-01-01 PROCEDURE — 77030040830 HC CATH URETH FOL MDII -A

## 2020-01-01 PROCEDURE — 84100 ASSAY OF PHOSPHORUS: CPT

## 2020-01-01 PROCEDURE — 82378 CARCINOEMBRYONIC ANTIGEN: CPT

## 2020-01-01 PROCEDURE — 74011000258 HC RX REV CODE- 258: Performed by: INTERNAL MEDICINE

## 2020-01-01 PROCEDURE — 36415 COLL VENOUS BLD VENIPUNCTURE: CPT

## 2020-01-01 PROCEDURE — 99218 HC RM OBSERVATION: CPT

## 2020-01-01 PROCEDURE — 85027 COMPLETE CBC AUTOMATED: CPT

## 2020-01-01 PROCEDURE — 74011250636 HC RX REV CODE- 250/636: Performed by: INTERNAL MEDICINE

## 2020-01-01 PROCEDURE — 77030020230

## 2020-01-01 PROCEDURE — 65270000029 HC RM PRIVATE

## 2020-01-01 PROCEDURE — 85384 FIBRINOGEN ACTIVITY: CPT

## 2020-01-01 PROCEDURE — 77030003560 HC NDL HUBR BARD -A

## 2020-01-01 PROCEDURE — 74011250636 HC RX REV CODE- 250/636: Performed by: HOSPITALIST

## 2020-01-01 PROCEDURE — 74011000250 HC RX REV CODE- 250: Performed by: INTERNAL MEDICINE

## 2020-01-01 PROCEDURE — 94003 VENT MGMT INPAT SUBQ DAY: CPT

## 2020-01-01 PROCEDURE — 82803 BLOOD GASES ANY COMBINATION: CPT

## 2020-01-01 PROCEDURE — 82962 GLUCOSE BLOOD TEST: CPT

## 2020-01-01 PROCEDURE — 80053 COMPREHEN METABOLIC PANEL: CPT

## 2020-01-01 PROCEDURE — 85379 FIBRIN DEGRADATION QUANT: CPT

## 2020-01-01 PROCEDURE — 71045 X-RAY EXAM CHEST 1 VIEW: CPT

## 2020-01-01 PROCEDURE — 87040 BLOOD CULTURE FOR BACTERIA: CPT

## 2020-01-01 PROCEDURE — 96374 THER/PROPH/DIAG INJ IV PUSH: CPT

## 2020-01-01 PROCEDURE — 51798 US URINE CAPACITY MEASURE: CPT

## 2020-01-01 PROCEDURE — 74011250636 HC RX REV CODE- 250/636: Performed by: FAMILY MEDICINE

## 2020-01-01 PROCEDURE — 99232 SBSQ HOSP IP/OBS MODERATE 35: CPT | Performed by: PSYCHIATRY & NEUROLOGY

## 2020-01-01 PROCEDURE — 72100 X-RAY EXAM L-S SPINE 2/3 VWS: CPT

## 2020-01-01 PROCEDURE — 95822 EEG COMA OR SLEEP ONLY: CPT | Performed by: PSYCHIATRY & NEUROLOGY

## 2020-01-01 PROCEDURE — 96365 THER/PROPH/DIAG IV INF INIT: CPT

## 2020-01-01 PROCEDURE — 85025 COMPLETE CBC W/AUTO DIFF WBC: CPT

## 2020-01-01 PROCEDURE — 99291 CRITICAL CARE FIRST HOUR: CPT | Performed by: INTERNAL MEDICINE

## 2020-01-01 PROCEDURE — 99223 1ST HOSP IP/OBS HIGH 75: CPT | Performed by: INTERNAL MEDICINE

## 2020-01-01 PROCEDURE — 74011250637 HC RX REV CODE- 250/637: Performed by: PSYCHIATRY & NEUROLOGY

## 2020-01-01 PROCEDURE — 74011250637 HC RX REV CODE- 250/637: Performed by: EMERGENCY MEDICINE

## 2020-01-01 PROCEDURE — 70450 CT HEAD/BRAIN W/O DYE: CPT

## 2020-01-01 PROCEDURE — 95816 EEG AWAKE AND DROWSY: CPT | Performed by: INTERNAL MEDICINE

## 2020-01-01 PROCEDURE — 5A1945Z RESPIRATORY VENTILATION, 24-96 CONSECUTIVE HOURS: ICD-10-PCS | Performed by: INTERNAL MEDICINE

## 2020-01-01 PROCEDURE — 74011250637 HC RX REV CODE- 250/637: Performed by: HOSPITALIST

## 2020-01-01 PROCEDURE — 86255 FLUORESCENT ANTIBODY SCREEN: CPT

## 2020-01-01 PROCEDURE — 94760 N-INVAS EAR/PLS OXIMETRY 1: CPT

## 2020-01-01 PROCEDURE — 36600 WITHDRAWAL OF ARTERIAL BLOOD: CPT

## 2020-01-01 PROCEDURE — 77336 RADIATION PHYSICS CONSULT: CPT

## 2020-01-01 PROCEDURE — 77030040922 HC BLNKT HYPOTHRM STRY -A

## 2020-01-01 PROCEDURE — 99356 PR PROLONGED SVC I/P OR OBS SETTING 1ST HOUR: CPT | Performed by: NURSE PRACTITIONER

## 2020-01-01 PROCEDURE — 04HY32Z INSERTION OF MONITORING DEVICE INTO LOWER ARTERY, PERCUTANEOUS APPROACH: ICD-10-PCS | Performed by: INTERNAL MEDICINE

## 2020-01-01 PROCEDURE — 77030040361 HC SLV COMPR DVT MDII -B

## 2020-01-01 PROCEDURE — 77030005402 HC CATH RAD ART LN KT TELE -B

## 2020-01-01 PROCEDURE — 74011250636 HC RX REV CODE- 250/636: Performed by: EMERGENCY MEDICINE

## 2020-01-01 PROCEDURE — 36430 TRANSFUSION BLD/BLD COMPNT: CPT

## 2020-01-01 PROCEDURE — 92610 EVALUATE SWALLOWING FUNCTION: CPT

## 2020-01-01 PROCEDURE — 74011000258 HC RX REV CODE- 258: Performed by: FAMILY MEDICINE

## 2020-01-01 PROCEDURE — 93005 ELECTROCARDIOGRAM TRACING: CPT | Performed by: INTERNAL MEDICINE

## 2020-01-01 PROCEDURE — 96413 CHEMO IV INFUSION 1 HR: CPT

## 2020-01-01 PROCEDURE — P9035 PLATELET PHERES LEUKOREDUCED: HCPCS

## 2020-01-01 PROCEDURE — 65610000001 HC ROOM ICU GENERAL

## 2020-01-01 PROCEDURE — 31500 INSERT EMERGENCY AIRWAY: CPT

## 2020-01-01 PROCEDURE — 81003 URINALYSIS AUTO W/O SCOPE: CPT

## 2020-01-01 PROCEDURE — 96360 HYDRATION IV INFUSION INIT: CPT

## 2020-01-01 PROCEDURE — C9254 INJECTION, LACOSAMIDE: HCPCS | Performed by: INTERNAL MEDICINE

## 2020-01-01 PROCEDURE — 83519 RIA NONANTIBODY: CPT

## 2020-01-01 PROCEDURE — 77030041247 HC PROTECTOR HEEL HEELMEDIX MDII -B

## 2020-01-01 PROCEDURE — 77338 DESIGN MLC DEVICE FOR IMRT: CPT

## 2020-01-01 PROCEDURE — 77030040059 HC PAD COOLING ARCTICGEL BARD -G

## 2020-01-01 PROCEDURE — 94002 VENT MGMT INPAT INIT DAY: CPT

## 2020-01-01 PROCEDURE — 99233 SBSQ HOSP IP/OBS HIGH 50: CPT | Performed by: PSYCHIATRY & NEUROLOGY

## 2020-01-01 PROCEDURE — 99285 EMERGENCY DEPT VISIT HI MDM: CPT

## 2020-01-01 PROCEDURE — 84425 ASSAY OF VITAMIN B-1: CPT

## 2020-01-01 PROCEDURE — 85610 PROTHROMBIN TIME: CPT

## 2020-01-01 PROCEDURE — 36620 INSERTION CATHETER ARTERY: CPT

## 2020-01-01 PROCEDURE — 77030031644 HC PAD COOL ARTICGEL DISP BARD -D

## 2020-01-01 PROCEDURE — 77030019605

## 2020-01-01 PROCEDURE — 74011000250 HC RX REV CODE- 250: Performed by: FAMILY MEDICINE

## 2020-01-01 PROCEDURE — 95816 EEG AWAKE AND DROWSY: CPT | Performed by: NURSE PRACTITIONER

## 2020-01-01 PROCEDURE — 77030013794 HC KT TRNSDUC BLD EDWD -B

## 2020-01-01 PROCEDURE — 82607 VITAMIN B-12: CPT

## 2020-01-01 PROCEDURE — 77010033678 HC OXYGEN DAILY

## 2020-01-01 PROCEDURE — 36620 INSERTION CATHETER ARTERY: CPT | Performed by: INTERNAL MEDICINE

## 2020-01-01 PROCEDURE — 77030040829 HC CATH EXT URINE MDII -B

## 2020-01-01 PROCEDURE — 95819 EEG AWAKE AND ASLEEP: CPT | Performed by: PSYCHIATRY & NEUROLOGY

## 2020-01-01 PROCEDURE — 0042T CT PERF W CBF: CPT

## 2020-01-01 PROCEDURE — 96361 HYDRATE IV INFUSION ADD-ON: CPT

## 2020-01-01 PROCEDURE — 76450000000

## 2020-01-01 PROCEDURE — 86341 ISLET CELL ANTIBODY: CPT

## 2020-01-01 PROCEDURE — 86900 BLOOD TYPING SEROLOGIC ABO: CPT

## 2020-01-01 PROCEDURE — 72125 CT NECK SPINE W/O DYE: CPT

## 2020-01-01 PROCEDURE — 70496 CT ANGIOGRAPHY HEAD: CPT

## 2020-01-01 PROCEDURE — 85652 RBC SED RATE AUTOMATED: CPT

## 2020-01-01 PROCEDURE — 74011250636 HC RX REV CODE- 250/636

## 2020-01-01 PROCEDURE — 0BH17EZ INSERTION OF ENDOTRACHEAL AIRWAY INTO TRACHEA, VIA NATURAL OR ARTIFICIAL OPENING: ICD-10-PCS | Performed by: INTERNAL MEDICINE

## 2020-01-01 PROCEDURE — A9575 INJ GADOTERATE MEGLUMI 0.1ML: HCPCS | Performed by: HOSPITALIST

## 2020-01-01 PROCEDURE — 74011636320 HC RX REV CODE- 636/320: Performed by: HOSPITALIST

## 2020-01-01 PROCEDURE — 70544 MR ANGIOGRAPHY HEAD W/O DYE: CPT

## 2020-01-01 PROCEDURE — 02HV33Z INSERTION OF INFUSION DEVICE INTO SUPERIOR VENA CAVA, PERCUTANEOUS APPROACH: ICD-10-PCS | Performed by: INTERNAL MEDICINE

## 2020-01-01 PROCEDURE — A9575 INJ GADOTERATE MEGLUMI 0.1ML: HCPCS | Performed by: EMERGENCY MEDICINE

## 2020-01-01 PROCEDURE — 74011000258 HC RX REV CODE- 258: Performed by: HOSPITALIST

## 2020-01-01 PROCEDURE — 77300 RADIATION THERAPY DOSE PLAN: CPT

## 2020-01-01 PROCEDURE — 36591 DRAW BLOOD OFF VENOUS DEVICE: CPT

## 2020-01-01 PROCEDURE — 30233R1 TRANSFUSION OF NONAUTOLOGOUS PLATELETS INTO PERIPHERAL VEIN, PERCUTANEOUS APPROACH: ICD-10-PCS | Performed by: FAMILY MEDICINE

## 2020-01-01 PROCEDURE — 5A12012 PERFORMANCE OF CARDIAC OUTPUT, SINGLE, MANUAL: ICD-10-PCS | Performed by: INTERNAL MEDICINE

## 2020-01-01 PROCEDURE — 70553 MRI BRAIN STEM W/O & W/DYE: CPT

## 2020-01-01 PROCEDURE — 86301 IMMUNOASSAY TUMOR CA 19-9: CPT

## 2020-01-01 PROCEDURE — 93886 INTRACRANIAL COMPLETE STUDY: CPT

## 2020-01-01 PROCEDURE — 95816 EEG AWAKE AND DROWSY: CPT | Performed by: HOSPITALIST

## 2020-01-01 PROCEDURE — 99233 SBSQ HOSP IP/OBS HIGH 50: CPT | Performed by: INTERNAL MEDICINE

## 2020-01-01 PROCEDURE — 83010 ASSAY OF HAPTOGLOBIN QUANT: CPT

## 2020-01-01 PROCEDURE — 74011000250 HC RX REV CODE- 250

## 2020-01-01 PROCEDURE — 86140 C-REACTIVE PROTEIN: CPT

## 2020-01-01 PROCEDURE — 77030018846 HC SOL IRR STRL H20 ICUM -A

## 2020-01-01 PROCEDURE — 99284 EMERGENCY DEPT VISIT MOD MDM: CPT

## 2020-01-01 PROCEDURE — 99222 1ST HOSP IP/OBS MODERATE 55: CPT | Performed by: NURSE PRACTITIONER

## 2020-01-01 PROCEDURE — 36556 INSERT NON-TUNNEL CV CATH: CPT

## 2020-01-01 PROCEDURE — 77030034850

## 2020-01-01 PROCEDURE — 80202 ASSAY OF VANCOMYCIN: CPT

## 2020-01-01 PROCEDURE — 77030019905 HC CATH URETH INTMIT MDII -A

## 2020-01-01 PROCEDURE — 74018 RADEX ABDOMEN 1 VIEW: CPT

## 2020-01-01 RX ORDER — ACETAMINOPHEN 325 MG/1
650 TABLET ORAL
Status: CANCELLED | OUTPATIENT
Start: 2020-01-01

## 2020-01-01 RX ORDER — METHYLPREDNISOLONE 4 MG/1
8 TABLET ORAL
Status: DISCONTINUED | OUTPATIENT
Start: 2020-01-01 | End: 2020-01-01 | Stop reason: SDUPTHER

## 2020-01-01 RX ORDER — NALOXONE HYDROCHLORIDE 0.4 MG/ML
INJECTION, SOLUTION INTRAMUSCULAR; INTRAVENOUS; SUBCUTANEOUS
Status: COMPLETED | OUTPATIENT
Start: 2020-01-01 | End: 2020-01-01

## 2020-01-01 RX ORDER — CLONIDINE HYDROCHLORIDE 0.1 MG/1
0.2 TABLET ORAL
Status: CANCELLED | OUTPATIENT
Start: 2020-01-01

## 2020-01-01 RX ORDER — ADHESIVE BANDAGE
30 BANDAGE TOPICAL DAILY PRN
Status: CANCELLED | OUTPATIENT
Start: 2020-01-01

## 2020-01-01 RX ORDER — LEVETIRACETAM 5 MG/ML
500 INJECTION INTRAVASCULAR EVERY 12 HOURS
Status: DISCONTINUED | OUTPATIENT
Start: 2020-01-01 | End: 2020-01-01

## 2020-01-01 RX ORDER — ALLOPURINOL 100 MG/1
100 TABLET ORAL DAILY
Status: DISCONTINUED | OUTPATIENT
Start: 2020-01-01 | End: 2020-01-01

## 2020-01-01 RX ORDER — SODIUM CHLORIDE 0.9 % (FLUSH) 0.9 %
5-40 SYRINGE (ML) INJECTION EVERY 8 HOURS
Status: CANCELLED | OUTPATIENT
Start: 2020-01-01

## 2020-01-01 RX ORDER — DIPHENHYDRAMINE HYDROCHLORIDE 50 MG/ML
25 INJECTION, SOLUTION INTRAMUSCULAR; INTRAVENOUS
Status: DISCONTINUED | OUTPATIENT
Start: 2020-01-01 | End: 2020-01-01

## 2020-01-01 RX ORDER — LORAZEPAM 2 MG/ML
1 INJECTION INTRAMUSCULAR
Status: COMPLETED | OUTPATIENT
Start: 2020-01-01 | End: 2020-01-01

## 2020-01-01 RX ORDER — METHYLPREDNISOLONE 4 MG/1
4 TABLET ORAL
Status: DISCONTINUED | OUTPATIENT
Start: 2020-01-01 | End: 2020-01-01 | Stop reason: SDUPTHER

## 2020-01-01 RX ORDER — FENTANYL CITRATE-0.9 % NACL/PF 25 MCG/ML
0-200 PLASTIC BAG, INJECTION (ML) INJECTION
Status: DISCONTINUED | OUTPATIENT
Start: 2020-01-01 | End: 2020-01-01

## 2020-01-01 RX ORDER — DIPHENHYDRAMINE HYDROCHLORIDE 50 MG/ML
25 INJECTION, SOLUTION INTRAMUSCULAR; INTRAVENOUS EVERY 6 HOURS
Status: DISCONTINUED | OUTPATIENT
Start: 2020-01-01 | End: 2020-01-01

## 2020-01-01 RX ORDER — HYDROMORPHONE HYDROCHLORIDE 1 MG/ML
1 INJECTION, SOLUTION INTRAMUSCULAR; INTRAVENOUS; SUBCUTANEOUS
Status: DISCONTINUED | OUTPATIENT
Start: 2020-01-01 | End: 2020-01-01 | Stop reason: HOSPADM

## 2020-01-01 RX ORDER — SODIUM CHLORIDE 9 MG/ML
100 INJECTION, SOLUTION INTRAVENOUS CONTINUOUS
Status: DISCONTINUED | OUTPATIENT
Start: 2020-01-01 | End: 2020-01-01

## 2020-01-01 RX ORDER — SODIUM CHLORIDE 0.9 % (FLUSH) 0.9 %
10 SYRINGE (ML) INJECTION
Status: COMPLETED | OUTPATIENT
Start: 2020-01-01 | End: 2020-01-01

## 2020-01-01 RX ORDER — LACOSAMIDE 100 MG/1
100 TABLET ORAL EVERY 12 HOURS
Status: CANCELLED | OUTPATIENT
Start: 2020-01-01

## 2020-01-01 RX ORDER — METHYLPREDNISOLONE 4 MG/1
4 TABLET ORAL
Status: DISCONTINUED | OUTPATIENT
Start: 2020-01-01 | End: 2020-01-01

## 2020-01-01 RX ORDER — DIPHENHYDRAMINE HYDROCHLORIDE 50 MG/ML
50 INJECTION, SOLUTION INTRAMUSCULAR; INTRAVENOUS ONCE
Status: COMPLETED | OUTPATIENT
Start: 2020-01-01 | End: 2020-01-01

## 2020-01-01 RX ORDER — IBUPROFEN 200 MG
1 TABLET ORAL
Status: CANCELLED | OUTPATIENT
Start: 2020-01-01

## 2020-01-01 RX ORDER — DEXAMETHASONE SODIUM PHOSPHATE 100 MG/10ML
10 INJECTION INTRAMUSCULAR; INTRAVENOUS ONCE
Status: COMPLETED | OUTPATIENT
Start: 2020-01-01 | End: 2020-01-01

## 2020-01-01 RX ORDER — LEVETIRACETAM 5 MG/ML
500 INJECTION INTRAVASCULAR ONCE
Status: COMPLETED | OUTPATIENT
Start: 2020-01-01 | End: 2020-01-01

## 2020-01-01 RX ORDER — KETOROLAC TROMETHAMINE 30 MG/ML
30 INJECTION, SOLUTION INTRAMUSCULAR; INTRAVENOUS
Status: COMPLETED | OUTPATIENT
Start: 2020-01-01 | End: 2020-01-01

## 2020-01-01 RX ORDER — ONDANSETRON 2 MG/ML
4 INJECTION INTRAMUSCULAR; INTRAVENOUS
Status: CANCELLED | OUTPATIENT
Start: 2020-01-01

## 2020-01-01 RX ORDER — CLONIDINE HYDROCHLORIDE 0.1 MG/1
0.2 TABLET ORAL
Status: DISCONTINUED | OUTPATIENT
Start: 2020-01-01 | End: 2020-01-01

## 2020-01-01 RX ORDER — METOPROLOL TARTRATE 5 MG/5ML
5 INJECTION INTRAVENOUS ONCE
Status: COMPLETED | OUTPATIENT
Start: 2020-01-01 | End: 2020-01-01

## 2020-01-01 RX ORDER — DIPHENHYDRAMINE HYDROCHLORIDE 50 MG/ML
25 INJECTION, SOLUTION INTRAMUSCULAR; INTRAVENOUS
Status: DISCONTINUED | OUTPATIENT
Start: 2020-01-01 | End: 2020-01-01 | Stop reason: HOSPADM

## 2020-01-01 RX ORDER — LORAZEPAM 2 MG/ML
1 INJECTION INTRAMUSCULAR
Status: DISCONTINUED | OUTPATIENT
Start: 2020-01-01 | End: 2020-01-01 | Stop reason: HOSPADM

## 2020-01-01 RX ORDER — ATRACURIUM BESYLATE 10 MG/ML
0.5 INJECTION, SOLUTION INTRAVENOUS ONCE
Status: DISPENSED | OUTPATIENT
Start: 2020-01-01 | End: 2020-01-01

## 2020-01-01 RX ORDER — LACOSAMIDE 100 MG/1
100 TABLET ORAL EVERY 12 HOURS
Status: DISCONTINUED | OUTPATIENT
Start: 2020-01-01 | End: 2020-01-01

## 2020-01-01 RX ORDER — FLUMAZENIL 0.1 MG/ML
INJECTION INTRAVENOUS
Status: COMPLETED | OUTPATIENT
Start: 2020-01-01 | End: 2020-01-01

## 2020-01-01 RX ORDER — IPRATROPIUM BROMIDE AND ALBUTEROL SULFATE 2.5; .5 MG/3ML; MG/3ML
3 SOLUTION RESPIRATORY (INHALATION)
Status: DISCONTINUED | OUTPATIENT
Start: 2020-01-01 | End: 2020-01-01

## 2020-01-01 RX ORDER — MORPHINE SULFATE 2 MG/ML
2 INJECTION, SOLUTION INTRAMUSCULAR; INTRAVENOUS ONCE
Status: COMPLETED | OUTPATIENT
Start: 2020-01-01 | End: 2020-01-01

## 2020-01-01 RX ORDER — KETOROLAC TROMETHAMINE 30 MG/ML
30 INJECTION, SOLUTION INTRAMUSCULAR; INTRAVENOUS ONCE
Status: COMPLETED | OUTPATIENT
Start: 2020-01-01 | End: 2020-01-01

## 2020-01-01 RX ORDER — ALLOPURINOL 100 MG/1
100 TABLET ORAL DAILY
Status: CANCELLED | OUTPATIENT
Start: 2020-01-01

## 2020-01-01 RX ORDER — LORAZEPAM 2 MG/ML
1 INJECTION INTRAMUSCULAR ONCE
Status: COMPLETED | OUTPATIENT
Start: 2020-01-01 | End: 2020-01-01

## 2020-01-01 RX ORDER — POTASSIUM CHLORIDE 20 MEQ/1
40 TABLET, EXTENDED RELEASE ORAL
Status: DISCONTINUED | OUTPATIENT
Start: 2020-01-01 | End: 2020-01-01

## 2020-01-01 RX ORDER — METOPROLOL TARTRATE 5 MG/5ML
INJECTION INTRAVENOUS
Status: COMPLETED
Start: 2020-01-01 | End: 2020-01-01

## 2020-01-01 RX ORDER — MIDAZOLAM HYDROCHLORIDE 1 MG/ML
INJECTION, SOLUTION INTRAMUSCULAR; INTRAVENOUS
Status: COMPLETED
Start: 2020-01-01 | End: 2020-01-01

## 2020-01-01 RX ORDER — OXYCODONE HCL 5 MG/5 ML
5 SOLUTION, ORAL ORAL
Status: DISCONTINUED | OUTPATIENT
Start: 2020-01-01 | End: 2020-01-01

## 2020-01-01 RX ORDER — PROPOFOL 10 MG/ML
INJECTION, EMULSION INTRAVENOUS
Status: COMPLETED
Start: 2020-01-01 | End: 2020-01-01

## 2020-01-01 RX ORDER — HYDROMORPHONE HYDROCHLORIDE 1 MG/ML
1 INJECTION, SOLUTION INTRAMUSCULAR; INTRAVENOUS; SUBCUTANEOUS ONCE
Status: COMPLETED | OUTPATIENT
Start: 2020-01-01 | End: 2020-01-01

## 2020-01-01 RX ORDER — NALOXONE HYDROCHLORIDE 0.4 MG/ML
0.4 INJECTION, SOLUTION INTRAMUSCULAR; INTRAVENOUS; SUBCUTANEOUS AS NEEDED
Status: DISCONTINUED | OUTPATIENT
Start: 2020-01-01 | End: 2020-01-01

## 2020-01-01 RX ORDER — SODIUM CHLORIDE 0.9 % (FLUSH) 0.9 %
5-40 SYRINGE (ML) INJECTION EVERY 8 HOURS
Status: DISCONTINUED | OUTPATIENT
Start: 2020-01-01 | End: 2020-01-01

## 2020-01-01 RX ORDER — SODIUM CHLORIDE 9 MG/ML
25 INJECTION, SOLUTION INTRAVENOUS CONTINUOUS
Status: ACTIVE | OUTPATIENT
Start: 2020-01-01 | End: 2020-01-01

## 2020-01-01 RX ORDER — FENTANYL CITRATE 50 UG/ML
INJECTION, SOLUTION INTRAMUSCULAR; INTRAVENOUS
Status: COMPLETED
Start: 2020-01-01 | End: 2020-01-01

## 2020-01-01 RX ORDER — LEVETIRACETAM 500 MG/1
1000 TABLET ORAL 2 TIMES DAILY
Status: DISCONTINUED | OUTPATIENT
Start: 2020-01-01 | End: 2020-01-01

## 2020-01-01 RX ORDER — SODIUM CHLORIDE 9 MG/ML
100 INJECTION, SOLUTION INTRAVENOUS CONTINUOUS
Status: DISPENSED | OUTPATIENT
Start: 2020-01-01 | End: 2020-01-01

## 2020-01-01 RX ORDER — LORAZEPAM 2 MG/ML
1 INJECTION INTRAMUSCULAR
Status: CANCELLED | OUTPATIENT
Start: 2020-01-01

## 2020-01-01 RX ORDER — VANCOMYCIN 2 GRAM/500 ML IN 0.9 % SODIUM CHLORIDE INTRAVENOUS
2000 EVERY 12 HOURS
Status: DISCONTINUED | OUTPATIENT
Start: 2020-01-01 | End: 2020-01-01

## 2020-01-01 RX ORDER — HYDROMORPHONE HYDROCHLORIDE 1 MG/ML
1 INJECTION, SOLUTION INTRAMUSCULAR; INTRAVENOUS; SUBCUTANEOUS
Status: COMPLETED | OUTPATIENT
Start: 2020-01-01 | End: 2020-01-01

## 2020-01-01 RX ORDER — IPRATROPIUM BROMIDE AND ALBUTEROL SULFATE 2.5; .5 MG/3ML; MG/3ML
3 SOLUTION RESPIRATORY (INHALATION)
Status: CANCELLED | OUTPATIENT
Start: 2020-01-01

## 2020-01-01 RX ORDER — GADOTERATE MEGLUMINE 376.9 MG/ML
20 INJECTION INTRAVENOUS
Status: COMPLETED | OUTPATIENT
Start: 2020-01-01 | End: 2020-01-01

## 2020-01-01 RX ORDER — OXYCODONE HYDROCHLORIDE 5 MG/1
5 TABLET ORAL
Status: DISCONTINUED | OUTPATIENT
Start: 2020-01-01 | End: 2020-01-01

## 2020-01-01 RX ORDER — DEXAMETHASONE SODIUM PHOSPHATE 100 MG/10ML
10 INJECTION INTRAMUSCULAR; INTRAVENOUS EVERY 6 HOURS
Status: DISCONTINUED | OUTPATIENT
Start: 2020-01-01 | End: 2020-01-01

## 2020-01-01 RX ORDER — LORAZEPAM 2 MG/ML
2 INJECTION INTRAMUSCULAR
Status: DISCONTINUED | OUTPATIENT
Start: 2020-01-01 | End: 2020-01-01

## 2020-01-01 RX ORDER — SIMETHICONE 80 MG
80 TABLET,CHEWABLE ORAL
Status: DISCONTINUED | OUTPATIENT
Start: 2020-01-01 | End: 2020-01-01

## 2020-01-01 RX ORDER — NALOXONE HYDROCHLORIDE 0.4 MG/ML
0.4 INJECTION, SOLUTION INTRAMUSCULAR; INTRAVENOUS; SUBCUTANEOUS AS NEEDED
Status: CANCELLED | OUTPATIENT
Start: 2020-01-01

## 2020-01-01 RX ORDER — SODIUM CHLORIDE 0.9 % (FLUSH) 0.9 %
10 SYRINGE (ML) INJECTION AS NEEDED
Status: ACTIVE | OUTPATIENT
Start: 2020-01-01 | End: 2020-01-01

## 2020-01-01 RX ORDER — LEVETIRACETAM 500 MG/1
1000 TABLET ORAL 2 TIMES DAILY
Status: CANCELLED | OUTPATIENT
Start: 2020-01-01

## 2020-01-01 RX ORDER — FLUMAZENIL 0.1 MG/ML
INJECTION INTRAVENOUS
Status: ACTIVE
Start: 2020-01-01 | End: 2020-01-01

## 2020-01-01 RX ORDER — ADHESIVE BANDAGE
30 BANDAGE TOPICAL DAILY PRN
Status: DISCONTINUED | OUTPATIENT
Start: 2020-01-01 | End: 2020-01-01

## 2020-01-01 RX ORDER — NALOXONE HYDROCHLORIDE 0.4 MG/ML
0.4 INJECTION, SOLUTION INTRAMUSCULAR; INTRAVENOUS; SUBCUTANEOUS ONCE
Status: COMPLETED | OUTPATIENT
Start: 2020-01-01 | End: 2020-01-01

## 2020-01-01 RX ORDER — VANCOMYCIN/0.9 % SOD CHLORIDE 1.5G/250ML
1500 PLASTIC BAG, INJECTION (ML) INTRAVENOUS EVERY 12 HOURS
Status: DISCONTINUED | OUTPATIENT
Start: 2020-01-01 | End: 2020-01-01

## 2020-01-01 RX ORDER — HYDROMORPHONE HYDROCHLORIDE 1 MG/ML
1 INJECTION, SOLUTION INTRAMUSCULAR; INTRAVENOUS; SUBCUTANEOUS
Status: CANCELLED | OUTPATIENT
Start: 2020-01-01

## 2020-01-01 RX ORDER — MIDAZOLAM HYDROCHLORIDE 1 MG/ML
3 INJECTION, SOLUTION INTRAMUSCULAR; INTRAVENOUS ONCE
Status: COMPLETED | OUTPATIENT
Start: 2020-01-01 | End: 2020-01-01

## 2020-01-01 RX ORDER — POTASSIUM CHLORIDE 14.9 MG/ML
20 INJECTION INTRAVENOUS
Status: DISCONTINUED | OUTPATIENT
Start: 2020-01-01 | End: 2020-01-01

## 2020-01-01 RX ORDER — LORAZEPAM 2 MG/ML
1 INJECTION INTRAMUSCULAR
Status: DISCONTINUED | OUTPATIENT
Start: 2020-01-01 | End: 2020-01-01

## 2020-01-01 RX ORDER — NOREPINEPHRINE BITARTRATE/D5W 4MG/250ML
.5-3 PLASTIC BAG, INJECTION (ML) INTRAVENOUS
Status: DISCONTINUED | OUTPATIENT
Start: 2020-01-01 | End: 2020-01-01

## 2020-01-01 RX ORDER — POTASSIUM CHLORIDE 14.9 MG/ML
20 INJECTION INTRAVENOUS
Status: DISPENSED | OUTPATIENT
Start: 2020-01-01 | End: 2020-01-01

## 2020-01-01 RX ORDER — LEVETIRACETAM 5 MG/ML
500 INJECTION INTRAVASCULAR
Status: COMPLETED | OUTPATIENT
Start: 2020-01-01 | End: 2020-01-01

## 2020-01-01 RX ORDER — METHYLPREDNISOLONE 4 MG/1
8 TABLET ORAL
Status: COMPLETED | OUTPATIENT
Start: 2020-01-01 | End: 2020-01-01

## 2020-01-01 RX ORDER — HYDROMORPHONE HYDROCHLORIDE 1 MG/ML
1 INJECTION, SOLUTION INTRAMUSCULAR; INTRAVENOUS; SUBCUTANEOUS ONCE
Status: ACTIVE | OUTPATIENT
Start: 2020-01-01 | End: 2020-01-01

## 2020-01-01 RX ORDER — MORPHINE SULFATE 2 MG/ML
2 INJECTION, SOLUTION INTRAMUSCULAR; INTRAVENOUS
Status: DISCONTINUED | OUTPATIENT
Start: 2020-01-01 | End: 2020-01-01 | Stop reason: HOSPADM

## 2020-01-01 RX ORDER — MORPHINE SULFATE 2 MG/ML
2 INJECTION, SOLUTION INTRAMUSCULAR; INTRAVENOUS
Status: DISCONTINUED | OUTPATIENT
Start: 2020-01-01 | End: 2020-01-01

## 2020-01-01 RX ORDER — MORPHINE 10 MG/ML
0.6 TINCTURE ORAL
Status: DISCONTINUED | OUTPATIENT
Start: 2020-01-01 | End: 2020-01-01 | Stop reason: ALTCHOICE

## 2020-01-01 RX ORDER — HYDROCODONE BITARTRATE AND ACETAMINOPHEN 7.5; 325 MG/1; MG/1
1 TABLET ORAL
Status: DISCONTINUED | OUTPATIENT
Start: 2020-01-01 | End: 2020-01-01

## 2020-01-01 RX ORDER — METHYLPREDNISOLONE 4 MG/1
4 TABLET ORAL EVERY EVENING
Status: DISCONTINUED | OUTPATIENT
Start: 2020-01-01 | End: 2020-01-01

## 2020-01-01 RX ORDER — FENTANYL CITRATE 50 UG/ML
50 INJECTION, SOLUTION INTRAMUSCULAR; INTRAVENOUS ONCE
Status: COMPLETED | OUTPATIENT
Start: 2020-01-01 | End: 2020-01-01

## 2020-01-01 RX ORDER — DIPHENHYDRAMINE HCL 25 MG
25 CAPSULE ORAL
Status: DISCONTINUED | OUTPATIENT
Start: 2020-01-01 | End: 2020-01-01

## 2020-01-01 RX ORDER — SODIUM CHLORIDE 9 MG/ML
250 INJECTION, SOLUTION INTRAVENOUS AS NEEDED
Status: DISCONTINUED | OUTPATIENT
Start: 2020-01-01 | End: 2020-01-01

## 2020-01-01 RX ORDER — ONDANSETRON 2 MG/ML
4 INJECTION INTRAMUSCULAR; INTRAVENOUS
Status: DISCONTINUED | OUTPATIENT
Start: 2020-01-01 | End: 2020-01-01

## 2020-01-01 RX ORDER — HYDROMORPHONE HYDROCHLORIDE 1 MG/ML
1 INJECTION, SOLUTION INTRAMUSCULAR; INTRAVENOUS; SUBCUTANEOUS
Status: DISCONTINUED | OUTPATIENT
Start: 2020-01-01 | End: 2020-01-01

## 2020-01-01 RX ORDER — SODIUM CHLORIDE 0.9 % (FLUSH) 0.9 %
10 SYRINGE (ML) INJECTION EVERY 8 HOURS
Status: DISCONTINUED | OUTPATIENT
Start: 2020-01-01 | End: 2020-01-01 | Stop reason: HOSPADM

## 2020-01-01 RX ORDER — METOCLOPRAMIDE HYDROCHLORIDE 5 MG/ML
10 INJECTION INTRAMUSCULAR; INTRAVENOUS EVERY 6 HOURS
Status: DISCONTINUED | OUTPATIENT
Start: 2020-01-01 | End: 2020-01-01

## 2020-01-01 RX ORDER — MORPHINE SULFATE 30 MG/1
30 TABLET, FILM COATED, EXTENDED RELEASE ORAL EVERY 12 HOURS
Status: DISCONTINUED | OUTPATIENT
Start: 2020-01-01 | End: 2020-01-01

## 2020-01-01 RX ORDER — PROPOFOL 10 MG/ML
0-50 VIAL (ML) INTRAVENOUS
Status: DISCONTINUED | OUTPATIENT
Start: 2020-01-01 | End: 2020-01-01

## 2020-01-01 RX ORDER — SODIUM CHLORIDE 0.9 % (FLUSH) 0.9 %
5-40 SYRINGE (ML) INJECTION AS NEEDED
Status: DISCONTINUED | OUTPATIENT
Start: 2020-01-01 | End: 2020-01-01

## 2020-01-01 RX ORDER — IBUPROFEN 200 MG
1 TABLET ORAL
Status: DISCONTINUED | OUTPATIENT
Start: 2020-01-01 | End: 2020-01-01

## 2020-01-01 RX ORDER — VALPROIC ACID 250 MG/5ML
1000 SOLUTION ORAL DAILY
Status: DISCONTINUED | OUTPATIENT
Start: 2020-01-01 | End: 2020-01-01

## 2020-01-01 RX ORDER — ACETAMINOPHEN 325 MG/1
650 TABLET ORAL
Status: DISCONTINUED | OUTPATIENT
Start: 2020-01-01 | End: 2020-01-01

## 2020-01-01 RX ORDER — KETOROLAC TROMETHAMINE 30 MG/ML
15 INJECTION, SOLUTION INTRAMUSCULAR; INTRAVENOUS
Status: COMPLETED | OUTPATIENT
Start: 2020-01-01 | End: 2020-01-01

## 2020-01-01 RX ORDER — SODIUM CHLORIDE 9 MG/ML
75 INJECTION, SOLUTION INTRAVENOUS CONTINUOUS
Status: DISCONTINUED | OUTPATIENT
Start: 2020-01-01 | End: 2020-01-01

## 2020-01-01 RX ORDER — SODIUM CHLORIDE 9 MG/ML
1000 INJECTION, SOLUTION INTRAVENOUS ONCE
Status: COMPLETED | OUTPATIENT
Start: 2020-01-01 | End: 2020-01-01

## 2020-01-01 RX ORDER — SODIUM CHLORIDE 0.9 % (FLUSH) 0.9 %
5-40 SYRINGE (ML) INJECTION AS NEEDED
Status: CANCELLED | OUTPATIENT
Start: 2020-01-01

## 2020-01-01 RX ORDER — ONDANSETRON 2 MG/ML
4 INJECTION INTRAMUSCULAR; INTRAVENOUS
Status: COMPLETED | OUTPATIENT
Start: 2020-01-01 | End: 2020-01-01

## 2020-01-01 RX ORDER — HEPARIN 100 UNIT/ML
300 SYRINGE INTRAVENOUS AS NEEDED
Status: DISCONTINUED | OUTPATIENT
Start: 2020-01-01 | End: 2020-01-01 | Stop reason: HOSPADM

## 2020-01-01 RX ORDER — DEXTROSE MONOHYDRATE 50 MG/ML
125 INJECTION, SOLUTION INTRAVENOUS CONTINUOUS
Status: DISCONTINUED | OUTPATIENT
Start: 2020-01-01 | End: 2020-01-01

## 2020-01-01 RX ORDER — ACETAMINOPHEN 325 MG/1
650 TABLET ORAL
Status: DISCONTINUED | OUTPATIENT
Start: 2020-01-01 | End: 2020-01-01 | Stop reason: SDUPTHER

## 2020-01-01 RX ORDER — METOCLOPRAMIDE HYDROCHLORIDE 5 MG/ML
10 INJECTION INTRAMUSCULAR; INTRAVENOUS
Status: COMPLETED | OUTPATIENT
Start: 2020-01-01 | End: 2020-01-01

## 2020-01-01 RX ORDER — OXYCODONE HYDROCHLORIDE 10 MG/1
10 TABLET ORAL
Qty: 20 TAB | Refills: 0 | Status: SHIPPED | OUTPATIENT
Start: 2020-01-01 | End: 2020-01-01

## 2020-01-01 RX ADMIN — FENTANYL CITRATE 50 MCG: 50 INJECTION, SOLUTION INTRAMUSCULAR; INTRAVENOUS at 23:34

## 2020-01-01 RX ADMIN — SODIUM CHLORIDE 4 MG/HR: 900 INJECTION, SOLUTION INTRAVENOUS at 05:16

## 2020-01-01 RX ADMIN — LEVETIRACETAM 500 MG: 5 INJECTION INTRAVENOUS at 00:32

## 2020-01-01 RX ADMIN — SODIUM CHLORIDE 100 ML: 900 INJECTION, SOLUTION INTRAVENOUS at 09:00

## 2020-01-01 RX ADMIN — OXYCODONE HYDROCHLORIDE 5 MG: 5 SOLUTION ORAL at 21:53

## 2020-01-01 RX ADMIN — MORPHINE SULFATE 2 MG: 2 INJECTION, SOLUTION INTRAMUSCULAR; INTRAVENOUS at 12:58

## 2020-01-01 RX ADMIN — HYDROMORPHONE HYDROCHLORIDE 1 MG: 1 INJECTION, SOLUTION INTRAMUSCULAR; INTRAVENOUS; SUBCUTANEOUS at 11:03

## 2020-01-01 RX ADMIN — LEVETIRACETAM 1000 MG: 100 SOLUTION ORAL at 08:53

## 2020-01-01 RX ADMIN — ALLOPURINOL 100 MG: 100 TABLET ORAL at 09:30

## 2020-01-01 RX ADMIN — Medication 20 ML: at 03:44

## 2020-01-01 RX ADMIN — Medication 10 ML: at 06:00

## 2020-01-01 RX ADMIN — Medication 300 UNITS: at 12:31

## 2020-01-01 RX ADMIN — HYDROMORPHONE HYDROCHLORIDE 1 MG: 1 INJECTION, SOLUTION INTRAMUSCULAR; INTRAVENOUS; SUBCUTANEOUS at 23:33

## 2020-01-01 RX ADMIN — DEXTROSE MONOHYDRATE 100 ML/HR: 5 INJECTION, SOLUTION INTRAVENOUS at 05:27

## 2020-01-01 RX ADMIN — Medication 10 ML: at 21:06

## 2020-01-01 RX ADMIN — SODIUM CHLORIDE 1000 ML: 900 INJECTION, SOLUTION INTRAVENOUS at 10:00

## 2020-01-01 RX ADMIN — Medication 10 ML: at 21:11

## 2020-01-01 RX ADMIN — MINERAL OIL AND WHITE PETROLATUM: 150; 830 OINTMENT OPHTHALMIC at 07:06

## 2020-01-01 RX ADMIN — DIPHENHYDRAMINE HYDROCHLORIDE 25 MG: 50 INJECTION, SOLUTION INTRAMUSCULAR; INTRAVENOUS at 10:07

## 2020-01-01 RX ADMIN — DEXAMETHASONE SODIUM PHOSPHATE 10 MG: 10 INJECTION INTRAMUSCULAR; INTRAVENOUS at 06:59

## 2020-01-01 RX ADMIN — LORAZEPAM 1 MG: 2 INJECTION INTRAMUSCULAR; INTRAVENOUS at 20:22

## 2020-01-01 RX ADMIN — VANCOMYCIN HYDROCHLORIDE 2000 MG: 10 INJECTION, POWDER, LYOPHILIZED, FOR SOLUTION INTRAVENOUS at 09:48

## 2020-01-01 RX ADMIN — Medication 5 ML: at 18:07

## 2020-01-01 RX ADMIN — ACYCLOVIR SODIUM 800 MG: 50 INJECTION, SOLUTION INTRAVENOUS at 12:25

## 2020-01-01 RX ADMIN — LEVETIRACETAM 1000 MG: 100 SOLUTION ORAL at 09:16

## 2020-01-01 RX ADMIN — MORPHINE SULFATE 2 MG: 2 INJECTION, SOLUTION INTRAMUSCULAR; INTRAVENOUS at 22:42

## 2020-01-01 RX ADMIN — HYDROMORPHONE HYDROCHLORIDE 1 MG: 1 INJECTION, SOLUTION INTRAMUSCULAR; INTRAVENOUS; SUBCUTANEOUS at 14:03

## 2020-01-01 RX ADMIN — DIPHENHYDRAMINE HYDROCHLORIDE 25 MG: 25 CAPSULE ORAL at 23:12

## 2020-01-01 RX ADMIN — HYDROMORPHONE HYDROCHLORIDE 1 MG: 1 INJECTION, SOLUTION INTRAMUSCULAR; INTRAVENOUS; SUBCUTANEOUS at 04:39

## 2020-01-01 RX ADMIN — LACOSAMIDE 100 MG: 100 TABLET, FILM COATED ORAL at 09:16

## 2020-01-01 RX ADMIN — METHYLPREDNISOLONE 8 MG: 4 TABLET ORAL at 10:25

## 2020-01-01 RX ADMIN — Medication 10 ML: at 13:05

## 2020-01-01 RX ADMIN — AMPICILLIN SODIUM 2 G: 2 INJECTION, POWDER, FOR SOLUTION INTRAMUSCULAR; INTRAVENOUS at 05:12

## 2020-01-01 RX ADMIN — LORAZEPAM 1 MG: 2 INJECTION INTRAMUSCULAR; INTRAVENOUS at 08:18

## 2020-01-01 RX ADMIN — MINERAL OIL AND WHITE PETROLATUM: 150; 830 OINTMENT OPHTHALMIC at 03:43

## 2020-01-01 RX ADMIN — LACOSAMIDE 100 MG: 100 TABLET, FILM COATED ORAL at 21:06

## 2020-01-01 RX ADMIN — LEVETIRACETAM 500 MG: 5 INJECTION INTRAVENOUS at 20:21

## 2020-01-01 RX ADMIN — LEVETIRACETAM 1000 MG: 100 INJECTION, SOLUTION INTRAVENOUS at 09:53

## 2020-01-01 RX ADMIN — CEFTRIAXONE 2 G: 2 INJECTION, POWDER, FOR SOLUTION INTRAMUSCULAR; INTRAVENOUS at 07:06

## 2020-01-01 RX ADMIN — SODIUM CHLORIDE 1000 ML: 900 INJECTION, SOLUTION INTRAVENOUS at 10:40

## 2020-01-01 RX ADMIN — HYDROMORPHONE HYDROCHLORIDE 1 MG: 1 INJECTION, SOLUTION INTRAMUSCULAR; INTRAVENOUS; SUBCUTANEOUS at 18:51

## 2020-01-01 RX ADMIN — SODIUM CHLORIDE 75 ML/HR: 900 INJECTION, SOLUTION INTRAVENOUS at 05:40

## 2020-01-01 RX ADMIN — SODIUM CHLORIDE 75 ML/HR: 900 INJECTION, SOLUTION INTRAVENOUS at 18:27

## 2020-01-01 RX ADMIN — Medication 10 ML: at 16:14

## 2020-01-01 RX ADMIN — MORPHINE SULFATE 30 MG: 30 TABLET, FILM COATED, EXTENDED RELEASE ORAL at 20:34

## 2020-01-01 RX ADMIN — MORPHINE SULFATE 2 MG: 2 INJECTION, SOLUTION INTRAMUSCULAR; INTRAVENOUS at 21:30

## 2020-01-01 RX ADMIN — METOPROLOL TARTRATE 5 MG: 5 INJECTION INTRAVENOUS at 00:08

## 2020-01-01 RX ADMIN — HYDROMORPHONE HYDROCHLORIDE 1 MG: 1 INJECTION, SOLUTION INTRAMUSCULAR; INTRAVENOUS; SUBCUTANEOUS at 00:11

## 2020-01-01 RX ADMIN — ONDANSETRON 4 MG: 2 INJECTION INTRAMUSCULAR; INTRAVENOUS at 20:41

## 2020-01-01 RX ADMIN — Medication 10 ML: at 15:16

## 2020-01-01 RX ADMIN — AMPICILLIN SODIUM 2 G: 2 INJECTION, POWDER, FOR SOLUTION INTRAMUSCULAR; INTRAVENOUS at 21:05

## 2020-01-01 RX ADMIN — SODIUM CHLORIDE 100 ML/HR: 900 INJECTION, SOLUTION INTRAVENOUS at 17:07

## 2020-01-01 RX ADMIN — ONDANSETRON 4 MG: 2 INJECTION INTRAMUSCULAR; INTRAVENOUS at 08:31

## 2020-01-01 RX ADMIN — SODIUM CHLORIDE 250 ML: 900 INJECTION, SOLUTION INTRAVENOUS at 01:37

## 2020-01-01 RX ADMIN — METOPROLOL TARTRATE 5 MG: 5 INJECTION INTRAVENOUS at 00:52

## 2020-01-01 RX ADMIN — IOPAMIDOL 100 ML: 755 INJECTION, SOLUTION INTRAVENOUS at 09:00

## 2020-01-01 RX ADMIN — MINERAL OIL AND WHITE PETROLATUM: 150; 830 OINTMENT OPHTHALMIC at 15:32

## 2020-01-01 RX ADMIN — DIPHENHYDRAMINE HYDROCHLORIDE 25 MG: 50 INJECTION, SOLUTION INTRAMUSCULAR; INTRAVENOUS at 04:05

## 2020-01-01 RX ADMIN — LORAZEPAM 1 MG: 2 INJECTION INTRAMUSCULAR; INTRAVENOUS at 20:29

## 2020-01-01 RX ADMIN — HYDROMORPHONE HYDROCHLORIDE 1 MG: 1 INJECTION, SOLUTION INTRAMUSCULAR; INTRAVENOUS; SUBCUTANEOUS at 16:48

## 2020-01-01 RX ADMIN — Medication 10 ML: at 22:00

## 2020-01-01 RX ADMIN — LEVETIRACETAM 1000 MG: 500 TABLET ORAL at 21:30

## 2020-01-01 RX ADMIN — Medication 10 ML: at 23:06

## 2020-01-01 RX ADMIN — ACYCLOVIR SODIUM 800 MG: 50 INJECTION, SOLUTION INTRAVENOUS at 03:36

## 2020-01-01 RX ADMIN — ALLOPURINOL 100 MG: 100 TABLET ORAL at 09:04

## 2020-01-01 RX ADMIN — HYDROMORPHONE HYDROCHLORIDE 1 MG: 1 INJECTION, SOLUTION INTRAMUSCULAR; INTRAVENOUS; SUBCUTANEOUS at 12:37

## 2020-01-01 RX ADMIN — MORPHINE SULFATE 2 MG: 2 INJECTION, SOLUTION INTRAMUSCULAR; INTRAVENOUS at 07:55

## 2020-01-01 RX ADMIN — SODIUM CHLORIDE 500 ML: 900 INJECTION, SOLUTION INTRAVENOUS at 23:43

## 2020-01-01 RX ADMIN — MORPHINE SULFATE 2 MG: 2 INJECTION, SOLUTION INTRAMUSCULAR; INTRAVENOUS at 16:10

## 2020-01-01 RX ADMIN — DIPHENHYDRAMINE HYDROCHLORIDE 50 MG: 50 INJECTION, SOLUTION INTRAMUSCULAR; INTRAVENOUS at 12:01

## 2020-01-01 RX ADMIN — LORAZEPAM 1 MG: 2 INJECTION INTRAMUSCULAR; INTRAVENOUS at 20:20

## 2020-01-01 RX ADMIN — OXYCODONE 5 MG: 5 TABLET ORAL at 19:37

## 2020-01-01 RX ADMIN — HYDROMORPHONE HYDROCHLORIDE 1 MG: 1 INJECTION, SOLUTION INTRAMUSCULAR; INTRAVENOUS; SUBCUTANEOUS at 03:14

## 2020-01-01 RX ADMIN — ACYCLOVIR SODIUM 800 MG: 50 INJECTION, SOLUTION INTRAVENOUS at 04:33

## 2020-01-01 RX ADMIN — LORAZEPAM 1 MG: 2 INJECTION INTRAMUSCULAR; INTRAVENOUS at 10:43

## 2020-01-01 RX ADMIN — HYDROMORPHONE HYDROCHLORIDE 1 MG: 1 INJECTION, SOLUTION INTRAMUSCULAR; INTRAVENOUS; SUBCUTANEOUS at 03:12

## 2020-01-01 RX ADMIN — LORAZEPAM 1 MG: 2 INJECTION INTRAMUSCULAR; INTRAVENOUS at 01:33

## 2020-01-01 RX ADMIN — MINERAL OIL AND WHITE PETROLATUM: 150; 830 OINTMENT OPHTHALMIC at 05:32

## 2020-01-01 RX ADMIN — IOPAMIDOL 60 ML: 755 INJECTION, SOLUTION INTRAVENOUS at 11:37

## 2020-01-01 RX ADMIN — FENTANYL CITRATE 50 MCG/HR: 50 INJECTION, SOLUTION INTRAMUSCULAR; INTRAVENOUS at 08:10

## 2020-01-01 RX ADMIN — HYDROMORPHONE HYDROCHLORIDE 1 MG: 1 INJECTION, SOLUTION INTRAMUSCULAR; INTRAVENOUS; SUBCUTANEOUS at 19:40

## 2020-01-01 RX ADMIN — AMPICILLIN SODIUM 2 G: 2 INJECTION, POWDER, FOR SOLUTION INTRAMUSCULAR; INTRAVENOUS at 00:46

## 2020-01-01 RX ADMIN — Medication 10 ML: at 13:50

## 2020-01-01 RX ADMIN — METHYLPREDNISOLONE 4 MG: 4 TABLET ORAL at 13:11

## 2020-01-01 RX ADMIN — DEXAMETHASONE SODIUM PHOSPHATE 10 MG: 10 INJECTION INTRAMUSCULAR; INTRAVENOUS at 07:06

## 2020-01-01 RX ADMIN — OXYCODONE HYDROCHLORIDE 5 MG: 5 SOLUTION ORAL at 00:37

## 2020-01-01 RX ADMIN — OXYCODONE HYDROCHLORIDE 5 MG: 5 SOLUTION ORAL at 04:21

## 2020-01-01 RX ADMIN — Medication 100 MCG/HR: at 05:16

## 2020-01-01 RX ADMIN — LEVETIRACETAM 1000 MG: 100 SOLUTION ORAL at 17:48

## 2020-01-01 RX ADMIN — VANCOMYCIN HYDROCHLORIDE 2000 MG: 10 INJECTION, POWDER, LYOPHILIZED, FOR SOLUTION INTRAVENOUS at 21:05

## 2020-01-01 RX ADMIN — LORAZEPAM 1 MG: 2 INJECTION INTRAMUSCULAR; INTRAVENOUS at 01:57

## 2020-01-01 RX ADMIN — FAMOTIDINE 20 MG: 10 INJECTION, SOLUTION INTRAVENOUS at 11:53

## 2020-01-01 RX ADMIN — MINERAL OIL AND WHITE PETROLATUM: 150; 830 OINTMENT OPHTHALMIC at 08:00

## 2020-01-01 RX ADMIN — AMPICILLIN SODIUM 2 G: 2 INJECTION, POWDER, FOR SOLUTION INTRAMUSCULAR; INTRAVENOUS at 17:34

## 2020-01-01 RX ADMIN — METHYLPREDNISOLONE 8 MG: 4 TABLET ORAL at 20:34

## 2020-01-01 RX ADMIN — AMPICILLIN SODIUM 2 G: 2 INJECTION, POWDER, FOR SOLUTION INTRAMUSCULAR; INTRAVENOUS at 13:40

## 2020-01-01 RX ADMIN — LEVETIRACETAM 1000 MG: 100 SOLUTION ORAL at 08:06

## 2020-01-01 RX ADMIN — AMPICILLIN SODIUM 2 G: 2 INJECTION, POWDER, FOR SOLUTION INTRAMUSCULAR; INTRAVENOUS at 17:27

## 2020-01-01 RX ADMIN — SODIUM CHLORIDE 75 ML/HR: 900 INJECTION, SOLUTION INTRAVENOUS at 15:32

## 2020-01-01 RX ADMIN — LEVETIRACETAM 500 MG: 5 INJECTION INTRAVENOUS at 14:01

## 2020-01-01 RX ADMIN — ONDANSETRON 4 MG: 2 INJECTION INTRAMUSCULAR; INTRAVENOUS at 12:08

## 2020-01-01 RX ADMIN — Medication 10 ML: at 11:37

## 2020-01-01 RX ADMIN — OXYCODONE HYDROCHLORIDE 5 MG: 5 SOLUTION ORAL at 17:11

## 2020-01-01 RX ADMIN — ALTEPLASE 1 MG: KIT at 01:16

## 2020-01-01 RX ADMIN — LORAZEPAM 2 MG: 2 INJECTION INTRAMUSCULAR; INTRAVENOUS at 00:32

## 2020-01-01 RX ADMIN — Medication 20 MCG/MIN: at 08:00

## 2020-01-01 RX ADMIN — Medication 9 MCG/MIN: at 16:58

## 2020-01-01 RX ADMIN — LORAZEPAM 1 MG: 2 INJECTION INTRAMUSCULAR; INTRAVENOUS at 21:04

## 2020-01-01 RX ADMIN — Medication 10 ML: at 14:10

## 2020-01-01 RX ADMIN — SODIUM CHLORIDE 100 MG: 9 INJECTION, SOLUTION INTRAVENOUS at 22:00

## 2020-01-01 RX ADMIN — LACOSAMIDE 100 MG: 100 TABLET, FILM COATED ORAL at 09:04

## 2020-01-01 RX ADMIN — MORPHINE SULFATE 2 MG: 2 INJECTION, SOLUTION INTRAMUSCULAR; INTRAVENOUS at 08:23

## 2020-01-01 RX ADMIN — DEXAMETHASONE SODIUM PHOSPHATE 10 MG: 10 INJECTION INTRAMUSCULAR; INTRAVENOUS at 13:01

## 2020-01-01 RX ADMIN — Medication 6 MCG/MIN: at 22:47

## 2020-01-01 RX ADMIN — SODIUM CHLORIDE 100 ML/HR: 900 INJECTION, SOLUTION INTRAVENOUS at 01:28

## 2020-01-01 RX ADMIN — OXYCODONE HYDROCHLORIDE 5 MG: 5 SOLUTION ORAL at 09:08

## 2020-01-01 RX ADMIN — CEFTRIAXONE 2 G: 2 INJECTION, POWDER, FOR SOLUTION INTRAMUSCULAR; INTRAVENOUS at 06:04

## 2020-01-01 RX ADMIN — ALLOPURINOL 100 MG: 100 TABLET ORAL at 08:42

## 2020-01-01 RX ADMIN — MIDAZOLAM HYDROCHLORIDE 3 MG: 1 INJECTION, SOLUTION INTRAMUSCULAR; INTRAVENOUS at 08:34

## 2020-01-01 RX ADMIN — HYDROMORPHONE HYDROCHLORIDE 1 MG: 1 INJECTION, SOLUTION INTRAMUSCULAR; INTRAVENOUS; SUBCUTANEOUS at 03:36

## 2020-01-01 RX ADMIN — DEXTROSE MONOHYDRATE 125 ML/HR: 5 INJECTION, SOLUTION INTRAVENOUS at 13:02

## 2020-01-01 RX ADMIN — ALLOPURINOL 100 MG: 100 TABLET ORAL at 08:33

## 2020-01-01 RX ADMIN — SODIUM CHLORIDE 100 ML/HR: 900 INJECTION, SOLUTION INTRAVENOUS at 18:02

## 2020-01-01 RX ADMIN — SODIUM CHLORIDE 100 MG: 9 INJECTION, SOLUTION INTRAVENOUS at 12:01

## 2020-01-01 RX ADMIN — LEVETIRACETAM 500 MG: 5 INJECTION INTRAVENOUS at 01:01

## 2020-01-01 RX ADMIN — MORPHINE SULFATE 2 MG: 2 INJECTION, SOLUTION INTRAMUSCULAR; INTRAVENOUS at 17:04

## 2020-01-01 RX ADMIN — AMPICILLIN SODIUM 2 G: 2 INJECTION, POWDER, FOR SOLUTION INTRAMUSCULAR; INTRAVENOUS at 21:06

## 2020-01-01 RX ADMIN — PROPOFOL 25 MCG/KG/MIN: 10 INJECTION, EMULSION INTRAVENOUS at 09:56

## 2020-01-01 RX ADMIN — LACOSAMIDE 100 MG: 100 TABLET, FILM COATED ORAL at 21:19

## 2020-01-01 RX ADMIN — LORAZEPAM 1 MG: 2 INJECTION INTRAMUSCULAR; INTRAVENOUS at 02:59

## 2020-01-01 RX ADMIN — AMPICILLIN SODIUM 2 G: 2 INJECTION, POWDER, FOR SOLUTION INTRAMUSCULAR; INTRAVENOUS at 21:30

## 2020-01-01 RX ADMIN — Medication 10 ML: at 13:18

## 2020-01-01 RX ADMIN — SODIUM CHLORIDE 100 ML/HR: 900 INJECTION, SOLUTION INTRAVENOUS at 17:34

## 2020-01-01 RX ADMIN — LEVETIRACETAM 500 MG: 5 INJECTION INTRAVENOUS at 13:11

## 2020-01-01 RX ADMIN — SODIUM CHLORIDE 100 MG: 900 INJECTION, SOLUTION INTRAVENOUS at 23:39

## 2020-01-01 RX ADMIN — HYDROMORPHONE HYDROCHLORIDE 1 MG: 1 INJECTION, SOLUTION INTRAMUSCULAR; INTRAVENOUS; SUBCUTANEOUS at 15:07

## 2020-01-01 RX ADMIN — LACOSAMIDE 100 MG: 100 TABLET, FILM COATED ORAL at 09:30

## 2020-01-01 RX ADMIN — METOCLOPRAMIDE 10 MG: 5 INJECTION, SOLUTION INTRAMUSCULAR; INTRAVENOUS at 03:54

## 2020-01-01 RX ADMIN — AMPICILLIN SODIUM 2 G: 2 INJECTION, POWDER, FOR SOLUTION INTRAMUSCULAR; INTRAVENOUS at 13:01

## 2020-01-01 RX ADMIN — DIPHENHYDRAMINE HYDROCHLORIDE 25 MG: 50 INJECTION, SOLUTION INTRAMUSCULAR; INTRAVENOUS at 10:10

## 2020-01-01 RX ADMIN — LEVETIRACETAM 500 MG: 5 INJECTION INTRAVENOUS at 11:00

## 2020-01-01 RX ADMIN — ACYCLOVIR SODIUM 800 MG: 50 INJECTION, SOLUTION INTRAVENOUS at 03:43

## 2020-01-01 RX ADMIN — HYDROMORPHONE HYDROCHLORIDE 1 MG: 1 INJECTION, SOLUTION INTRAMUSCULAR; INTRAVENOUS; SUBCUTANEOUS at 18:28

## 2020-01-01 RX ADMIN — LORAZEPAM 2 MG: 2 INJECTION INTRAMUSCULAR; INTRAVENOUS at 10:37

## 2020-01-01 RX ADMIN — KETOROLAC TROMETHAMINE 30 MG: 30 INJECTION, SOLUTION INTRAMUSCULAR at 17:14

## 2020-01-01 RX ADMIN — OXYCODONE HYDROCHLORIDE 5 MG: 5 SOLUTION ORAL at 14:15

## 2020-01-01 RX ADMIN — LEVETIRACETAM 1000 MG: 100 SOLUTION ORAL at 09:30

## 2020-01-01 RX ADMIN — MAGNESIUM HYDROXIDE 30 ML: 2400 SUSPENSION ORAL at 15:15

## 2020-01-01 RX ADMIN — NALOXONE HYDROCHLORIDE 0.4 MG: 0.4 INJECTION, SOLUTION INTRAMUSCULAR; INTRAVENOUS; SUBCUTANEOUS at 07:41

## 2020-01-01 RX ADMIN — AMPICILLIN SODIUM 2 G: 2 INJECTION, POWDER, FOR SOLUTION INTRAMUSCULAR; INTRAVENOUS at 05:26

## 2020-01-01 RX ADMIN — HYDROMORPHONE HYDROCHLORIDE 1 MG: 1 INJECTION, SOLUTION INTRAMUSCULAR; INTRAVENOUS; SUBCUTANEOUS at 15:08

## 2020-01-01 RX ADMIN — LORAZEPAM 2 MG: 2 INJECTION INTRAMUSCULAR; INTRAVENOUS at 09:43

## 2020-01-01 RX ADMIN — HYDROMORPHONE HYDROCHLORIDE 1 MG: 1 INJECTION, SOLUTION INTRAMUSCULAR; INTRAVENOUS; SUBCUTANEOUS at 10:05

## 2020-01-01 RX ADMIN — DEXAMETHASONE SODIUM PHOSPHATE 10 MG: 10 INJECTION INTRAMUSCULAR; INTRAVENOUS at 18:12

## 2020-01-01 RX ADMIN — Medication 10 ML: at 08:50

## 2020-01-01 RX ADMIN — Medication 10 ML: at 13:22

## 2020-01-01 RX ADMIN — HYDROMORPHONE HYDROCHLORIDE 1 MG: 1 INJECTION, SOLUTION INTRAMUSCULAR; INTRAVENOUS; SUBCUTANEOUS at 12:08

## 2020-01-01 RX ADMIN — ALLOPURINOL 100 MG: 100 TABLET ORAL at 08:37

## 2020-01-01 RX ADMIN — MORPHINE SULFATE 2 MG: 2 INJECTION, SOLUTION INTRAMUSCULAR; INTRAVENOUS at 04:55

## 2020-01-01 RX ADMIN — METOCLOPRAMIDE 10 MG: 5 INJECTION, SOLUTION INTRAMUSCULAR; INTRAVENOUS at 10:10

## 2020-01-01 RX ADMIN — METOCLOPRAMIDE 10 MG: 5 INJECTION, SOLUTION INTRAMUSCULAR; INTRAVENOUS at 10:25

## 2020-01-01 RX ADMIN — LORAZEPAM 1 MG: 2 INJECTION INTRAMUSCULAR; INTRAVENOUS at 06:40

## 2020-01-01 RX ADMIN — DEXAMETHASONE SODIUM PHOSPHATE 10 MG: 10 INJECTION INTRAMUSCULAR; INTRAVENOUS at 00:08

## 2020-01-01 RX ADMIN — LEVETIRACETAM 1000 MG: 500 TABLET ORAL at 08:33

## 2020-01-01 RX ADMIN — LORAZEPAM 1 MG: 2 INJECTION INTRAMUSCULAR; INTRAVENOUS at 18:13

## 2020-01-01 RX ADMIN — VANCOMYCIN HYDROCHLORIDE 2500 MG: 10 INJECTION, POWDER, LYOPHILIZED, FOR SOLUTION INTRAVENOUS at 22:07

## 2020-01-01 RX ADMIN — METOCLOPRAMIDE 10 MG: 5 INJECTION, SOLUTION INTRAMUSCULAR; INTRAVENOUS at 04:05

## 2020-01-01 RX ADMIN — LACOSAMIDE 100 MG: 100 TABLET, FILM COATED ORAL at 08:53

## 2020-01-01 RX ADMIN — MIDAZOLAM 3 MG: 1 INJECTION INTRAMUSCULAR; INTRAVENOUS at 08:34

## 2020-01-01 RX ADMIN — VANCOMYCIN HYDROCHLORIDE 2000 MG: 10 INJECTION, POWDER, LYOPHILIZED, FOR SOLUTION INTRAVENOUS at 21:40

## 2020-01-01 RX ADMIN — MINERAL OIL AND WHITE PETROLATUM: 150; 830 OINTMENT OPHTHALMIC at 00:09

## 2020-01-01 RX ADMIN — MINERAL OIL AND WHITE PETROLATUM: 150; 830 OINTMENT OPHTHALMIC at 08:50

## 2020-01-01 RX ADMIN — LORAZEPAM 1 MG: 2 INJECTION INTRAMUSCULAR; INTRAVENOUS at 21:33

## 2020-01-01 RX ADMIN — PACLITAXEL 184 MG: 6 INJECTION, SOLUTION INTRAVENOUS at 12:35

## 2020-01-01 RX ADMIN — MORPHINE SULFATE 2 MG: 2 INJECTION, SOLUTION INTRAMUSCULAR; INTRAVENOUS at 12:00

## 2020-01-01 RX ADMIN — LACOSAMIDE 100 MG: 100 TABLET, FILM COATED ORAL at 20:24

## 2020-01-01 RX ADMIN — MINERAL OIL AND WHITE PETROLATUM: 150; 830 OINTMENT OPHTHALMIC at 04:33

## 2020-01-01 RX ADMIN — DEXAMETHASONE SODIUM PHOSPHATE 10 MG: 10 INJECTION INTRAMUSCULAR; INTRAVENOUS at 11:56

## 2020-01-01 RX ADMIN — ACYCLOVIR SODIUM 800 MG: 50 INJECTION, SOLUTION INTRAVENOUS at 19:42

## 2020-01-01 RX ADMIN — POTASSIUM CHLORIDE 20 MEQ: 200 INJECTION, SOLUTION INTRAVENOUS at 17:30

## 2020-01-01 RX ADMIN — LACOSAMIDE 100 MG: 100 TABLET, FILM COATED ORAL at 21:01

## 2020-01-01 RX ADMIN — DEXAMETHASONE SODIUM PHOSPHATE 10 MG: 10 INJECTION INTRAMUSCULAR; INTRAVENOUS at 12:04

## 2020-01-01 RX ADMIN — Medication 10 ML: at 03:06

## 2020-01-01 RX ADMIN — Medication 6 MCG/MIN: at 10:34

## 2020-01-01 RX ADMIN — ACYCLOVIR SODIUM 800 MG: 50 INJECTION, SOLUTION INTRAVENOUS at 11:29

## 2020-01-01 RX ADMIN — Medication 10 ML: at 11:50

## 2020-01-01 RX ADMIN — SODIUM CHLORIDE 100 ML/HR: 900 INJECTION, SOLUTION INTRAVENOUS at 07:25

## 2020-01-01 RX ADMIN — AMPICILLIN SODIUM 2 G: 2 INJECTION, POWDER, FOR SOLUTION INTRAMUSCULAR; INTRAVENOUS at 05:31

## 2020-01-01 RX ADMIN — METOCLOPRAMIDE 10 MG: 5 INJECTION, SOLUTION INTRAMUSCULAR; INTRAVENOUS at 17:04

## 2020-01-01 RX ADMIN — DEXAMETHASONE SODIUM PHOSPHATE 10 MG: 10 INJECTION INTRAMUSCULAR; INTRAVENOUS at 19:40

## 2020-01-01 RX ADMIN — MORPHINE SULFATE 2 MG: 2 INJECTION, SOLUTION INTRAMUSCULAR; INTRAVENOUS at 15:19

## 2020-01-01 RX ADMIN — Medication 16 MCG/MIN: at 08:48

## 2020-01-01 RX ADMIN — METHYLPREDNISOLONE 4 MG: 4 TABLET ORAL at 09:32

## 2020-01-01 RX ADMIN — METHYLPREDNISOLONE 4 MG: 4 TABLET ORAL at 12:58

## 2020-01-01 RX ADMIN — AMPICILLIN SODIUM 2 G: 2 INJECTION, POWDER, FOR SOLUTION INTRAMUSCULAR; INTRAVENOUS at 21:19

## 2020-01-01 RX ADMIN — KETOROLAC TROMETHAMINE 30 MG: 30 INJECTION, SOLUTION INTRAMUSCULAR at 09:31

## 2020-01-01 RX ADMIN — Medication 10 ML: at 15:08

## 2020-01-01 RX ADMIN — HYDROMORPHONE HYDROCHLORIDE 1 MG: 1 INJECTION, SOLUTION INTRAMUSCULAR; INTRAVENOUS; SUBCUTANEOUS at 22:49

## 2020-01-01 RX ADMIN — METOPROLOL TARTRATE 5 MG: 5 INJECTION INTRAVENOUS at 00:31

## 2020-01-01 RX ADMIN — GADOTERATE MEGLUMINE 20 ML: 376.9 INJECTION INTRAVENOUS at 13:18

## 2020-01-01 RX ADMIN — DEXAMETHASONE SODIUM PHOSPHATE 10 MG: 10 INJECTION INTRAMUSCULAR; INTRAVENOUS at 12:00

## 2020-01-01 RX ADMIN — DEXAMETHASONE SODIUM PHOSPHATE 10 MG: 10 INJECTION INTRAMUSCULAR; INTRAVENOUS at 00:32

## 2020-01-01 RX ADMIN — MINERAL OIL AND WHITE PETROLATUM: 150; 830 OINTMENT OPHTHALMIC at 19:45

## 2020-01-01 RX ADMIN — ACYCLOVIR SODIUM 800 MG: 50 INJECTION, SOLUTION INTRAVENOUS at 19:38

## 2020-01-01 RX ADMIN — MORPHINE SULFATE 2 MG: 2 INJECTION, SOLUTION INTRAMUSCULAR; INTRAVENOUS at 21:07

## 2020-01-01 RX ADMIN — LORAZEPAM 1 MG: 2 INJECTION INTRAMUSCULAR; INTRAVENOUS at 18:15

## 2020-01-01 RX ADMIN — CEFTRIAXONE 2 G: 2 INJECTION, POWDER, FOR SOLUTION INTRAMUSCULAR; INTRAVENOUS at 19:09

## 2020-01-01 RX ADMIN — DIPHENHYDRAMINE HYDROCHLORIDE 25 MG: 50 INJECTION, SOLUTION INTRAMUSCULAR; INTRAVENOUS at 20:41

## 2020-01-01 RX ADMIN — NALOXONE HYDROCHLORIDE 0.4 MG: 0.4 INJECTION, SOLUTION INTRAMUSCULAR; INTRAVENOUS; SUBCUTANEOUS at 07:36

## 2020-01-01 RX ADMIN — Medication 10 ML: at 05:26

## 2020-01-01 RX ADMIN — Medication 4 MCG/MIN: at 01:59

## 2020-01-01 RX ADMIN — AMPICILLIN SODIUM 2 G: 2 INJECTION, POWDER, FOR SOLUTION INTRAMUSCULAR; INTRAVENOUS at 09:17

## 2020-01-01 RX ADMIN — SODIUM CHLORIDE 1000 ML: 900 INJECTION, SOLUTION INTRAVENOUS at 20:24

## 2020-01-01 RX ADMIN — ALLOPURINOL 100 MG: 100 TABLET ORAL at 09:16

## 2020-01-01 RX ADMIN — MINERAL OIL AND WHITE PETROLATUM: 150; 830 OINTMENT OPHTHALMIC at 15:17

## 2020-01-01 RX ADMIN — LEVETIRACETAM 1000 MG: 100 SOLUTION ORAL at 17:54

## 2020-01-01 RX ADMIN — LEVETIRACETAM 1000 MG: 100 SOLUTION ORAL at 08:36

## 2020-01-01 RX ADMIN — SODIUM CHLORIDE 100 ML/HR: 900 INJECTION, SOLUTION INTRAVENOUS at 23:31

## 2020-01-01 RX ADMIN — MINERAL OIL AND WHITE PETROLATUM: 150; 830 OINTMENT OPHTHALMIC at 12:04

## 2020-01-01 RX ADMIN — LEVETIRACETAM 500 MG: 5 INJECTION INTRAVENOUS at 15:47

## 2020-01-01 RX ADMIN — HYDROMORPHONE HYDROCHLORIDE 1 MG: 1 INJECTION, SOLUTION INTRAMUSCULAR; INTRAVENOUS; SUBCUTANEOUS at 18:56

## 2020-01-01 RX ADMIN — LEVETIRACETAM 1000 MG: 100 SOLUTION ORAL at 17:27

## 2020-01-01 RX ADMIN — HYDROCODONE BITARTRATE AND ACETAMINOPHEN 1 TABLET: 7.5; 325 TABLET ORAL at 23:28

## 2020-01-01 RX ADMIN — OXYCODONE 5 MG: 5 TABLET ORAL at 23:12

## 2020-01-01 RX ADMIN — DEXAMETHASONE SODIUM PHOSPHATE 10 MG: 10 INJECTION INTRAMUSCULAR; INTRAVENOUS at 12:25

## 2020-01-01 RX ADMIN — HYDROMORPHONE HYDROCHLORIDE 1 MG: 1 INJECTION, SOLUTION INTRAMUSCULAR; INTRAVENOUS; SUBCUTANEOUS at 04:59

## 2020-01-01 RX ADMIN — METOCLOPRAMIDE 10 MG: 5 INJECTION, SOLUTION INTRAMUSCULAR; INTRAVENOUS at 08:17

## 2020-01-01 RX ADMIN — HYDROMORPHONE HYDROCHLORIDE 1 MG: 1 INJECTION, SOLUTION INTRAMUSCULAR; INTRAVENOUS; SUBCUTANEOUS at 15:14

## 2020-01-01 RX ADMIN — VANCOMYCIN HYDROCHLORIDE 1500 MG: 10 INJECTION, POWDER, LYOPHILIZED, FOR SOLUTION INTRAVENOUS at 02:16

## 2020-01-01 RX ADMIN — Medication 10 ML: at 06:04

## 2020-01-01 RX ADMIN — SODIUM CHLORIDE 100 ML/HR: 900 INJECTION, SOLUTION INTRAVENOUS at 08:21

## 2020-01-01 RX ADMIN — MINERAL OIL AND WHITE PETROLATUM: 150; 830 OINTMENT OPHTHALMIC at 15:42

## 2020-01-01 RX ADMIN — ACYCLOVIR SODIUM 800 MG: 50 INJECTION, SOLUTION INTRAVENOUS at 04:56

## 2020-01-01 RX ADMIN — AMPICILLIN SODIUM 2 G: 2 INJECTION, POWDER, FOR SOLUTION INTRAMUSCULAR; INTRAVENOUS at 06:04

## 2020-01-01 RX ADMIN — LORAZEPAM 1 MG: 2 INJECTION INTRAMUSCULAR; INTRAVENOUS at 11:01

## 2020-01-01 RX ADMIN — LORAZEPAM 2 MG: 2 INJECTION INTRAMUSCULAR; INTRAVENOUS at 11:31

## 2020-01-01 RX ADMIN — HYDROMORPHONE HYDROCHLORIDE 1 MG: 1 INJECTION, SOLUTION INTRAMUSCULAR; INTRAVENOUS; SUBCUTANEOUS at 11:05

## 2020-01-01 RX ADMIN — MINERAL OIL AND WHITE PETROLATUM: 150; 830 OINTMENT OPHTHALMIC at 07:00

## 2020-01-01 RX ADMIN — MINERAL OIL AND WHITE PETROLATUM: 150; 830 OINTMENT OPHTHALMIC at 19:10

## 2020-01-01 RX ADMIN — MINERAL OIL AND WHITE PETROLATUM: 150; 830 OINTMENT OPHTHALMIC at 11:29

## 2020-01-01 RX ADMIN — METOCLOPRAMIDE 10 MG: 5 INJECTION, SOLUTION INTRAMUSCULAR; INTRAVENOUS at 16:39

## 2020-01-01 RX ADMIN — SODIUM CHLORIDE 100 ML/HR: 900 INJECTION, SOLUTION INTRAVENOUS at 03:43

## 2020-01-01 RX ADMIN — MORPHINE SULFATE 2 MG: 2 INJECTION, SOLUTION INTRAMUSCULAR; INTRAVENOUS at 01:27

## 2020-01-01 RX ADMIN — LACOSAMIDE 100 MG: 100 TABLET, FILM COATED ORAL at 08:06

## 2020-01-01 RX ADMIN — MINERAL OIL AND WHITE PETROLATUM: 150; 830 OINTMENT OPHTHALMIC at 00:08

## 2020-01-01 RX ADMIN — LEVETIRACETAM 1000 MG: 100 SOLUTION ORAL at 17:28

## 2020-01-01 RX ADMIN — LEVETIRACETAM 500 MG: 5 INJECTION INTRAVENOUS at 10:21

## 2020-01-01 RX ADMIN — MORPHINE SULFATE 2 MG: 2 INJECTION, SOLUTION INTRAMUSCULAR; INTRAVENOUS at 07:16

## 2020-01-01 RX ADMIN — HYDROMORPHONE HYDROCHLORIDE 1 MG: 1 INJECTION, SOLUTION INTRAMUSCULAR; INTRAVENOUS; SUBCUTANEOUS at 21:37

## 2020-01-01 RX ADMIN — METHYLPREDNISOLONE 4 MG: 4 TABLET ORAL at 14:01

## 2020-01-01 RX ADMIN — HYDROMORPHONE HYDROCHLORIDE 1 MG: 1 INJECTION, SOLUTION INTRAMUSCULAR; INTRAVENOUS; SUBCUTANEOUS at 01:16

## 2020-01-01 RX ADMIN — Medication 10 ML: at 14:00

## 2020-01-01 RX ADMIN — CEFTRIAXONE 2 G: 2 INJECTION, POWDER, FOR SOLUTION INTRAMUSCULAR; INTRAVENOUS at 08:36

## 2020-01-01 RX ADMIN — DEXAMETHASONE SODIUM PHOSPHATE 10 MG: 10 INJECTION INTRAMUSCULAR; INTRAVENOUS at 00:06

## 2020-01-01 RX ADMIN — VALPROATE SODIUM 1500 MG: 100 INJECTION, SOLUTION INTRAVENOUS at 10:27

## 2020-01-01 RX ADMIN — AMPICILLIN SODIUM 2 G: 2 INJECTION, POWDER, FOR SOLUTION INTRAMUSCULAR; INTRAVENOUS at 01:58

## 2020-01-01 RX ADMIN — LACOSAMIDE 100 MG: 100 TABLET, FILM COATED ORAL at 08:37

## 2020-01-01 RX ADMIN — Medication 10 ML: at 06:25

## 2020-01-01 RX ADMIN — FENTANYL CITRATE 50 MCG: 50 INJECTION, SOLUTION INTRAMUSCULAR; INTRAVENOUS at 08:47

## 2020-01-01 RX ADMIN — ALLOPURINOL 100 MG: 100 TABLET ORAL at 08:53

## 2020-01-01 RX ADMIN — Medication 10 ML: at 05:21

## 2020-01-01 RX ADMIN — MORPHINE SULFATE 2 MG: 2 INJECTION, SOLUTION INTRAMUSCULAR; INTRAVENOUS at 05:38

## 2020-01-01 RX ADMIN — METOPROLOL TARTRATE 5 MG: 5 INJECTION INTRAVENOUS at 10:21

## 2020-01-01 RX ADMIN — Medication 10 ML: at 21:32

## 2020-01-01 RX ADMIN — AMPICILLIN SODIUM 2 G: 2 INJECTION, POWDER, FOR SOLUTION INTRAMUSCULAR; INTRAVENOUS at 00:30

## 2020-01-01 RX ADMIN — ACETAMINOPHEN 650 MG: 325 TABLET, FILM COATED ORAL at 17:48

## 2020-01-01 RX ADMIN — LEVETIRACETAM 500 MG: 5 INJECTION INTRAVENOUS at 20:44

## 2020-01-01 RX ADMIN — LEVETIRACETAM 1000 MG: 100 SOLUTION ORAL at 09:04

## 2020-01-01 RX ADMIN — Medication 10 ML: at 09:00

## 2020-01-01 RX ADMIN — CEFTRIAXONE 2 G: 2 INJECTION, POWDER, FOR SOLUTION INTRAMUSCULAR; INTRAVENOUS at 19:28

## 2020-01-01 RX ADMIN — Medication 10 ML: at 23:32

## 2020-01-01 RX ADMIN — METHYLPREDNISOLONE 8 MG: 4 TABLET ORAL at 20:45

## 2020-01-01 RX ADMIN — CEFTRIAXONE 2 G: 2 INJECTION, POWDER, FOR SOLUTION INTRAMUSCULAR; INTRAVENOUS at 20:56

## 2020-01-01 RX ADMIN — Medication 10 ML: at 20:24

## 2020-01-01 RX ADMIN — GADOTERATE MEGLUMINE 20 ML: 376.9 INJECTION INTRAVENOUS at 08:50

## 2020-01-01 RX ADMIN — METOCLOPRAMIDE 10 MG: 5 INJECTION, SOLUTION INTRAMUSCULAR; INTRAVENOUS at 21:07

## 2020-01-01 RX ADMIN — WATER 20 MG: 1 INJECTION INTRAMUSCULAR; INTRAVENOUS; SUBCUTANEOUS at 04:59

## 2020-01-01 RX ADMIN — VANCOMYCIN HYDROCHLORIDE 2000 MG: 10 INJECTION, POWDER, LYOPHILIZED, FOR SOLUTION INTRAVENOUS at 09:17

## 2020-01-01 RX ADMIN — SODIUM CHLORIDE 2 MG/HR: 900 INJECTION, SOLUTION INTRAVENOUS at 08:11

## 2020-01-01 RX ADMIN — DEXAMETHASONE SODIUM PHOSPHATE 10 MG: 10 INJECTION INTRAMUSCULAR; INTRAVENOUS at 19:28

## 2020-01-01 RX ADMIN — Medication 10 ML: at 08:22

## 2020-01-01 RX ADMIN — Medication 25 MCG/KG/MIN: at 09:56

## 2020-01-01 RX ADMIN — KETOROLAC TROMETHAMINE 30 MG: 30 INJECTION, SOLUTION INTRAMUSCULAR at 19:55

## 2020-01-01 RX ADMIN — LORAZEPAM 1 MG: 2 INJECTION INTRAMUSCULAR; INTRAVENOUS at 03:24

## 2020-01-01 RX ADMIN — METHYLPREDNISOLONE 4 MG: 4 TABLET ORAL at 17:04

## 2020-01-01 RX ADMIN — ACYCLOVIR SODIUM 800 MG: 50 INJECTION, SOLUTION INTRAVENOUS at 11:58

## 2020-01-01 RX ADMIN — OXYCODONE HYDROCHLORIDE 5 MG: 5 SOLUTION ORAL at 05:05

## 2020-01-01 RX ADMIN — KETOROLAC TROMETHAMINE 30 MG: 30 INJECTION, SOLUTION INTRAMUSCULAR at 18:02

## 2020-01-01 RX ADMIN — SODIUM CHLORIDE 100 ML/HR: 900 INJECTION, SOLUTION INTRAVENOUS at 13:37

## 2020-01-01 RX ADMIN — AMPICILLIN SODIUM 2 G: 2 INJECTION, POWDER, FOR SOLUTION INTRAMUSCULAR; INTRAVENOUS at 09:04

## 2020-01-01 RX ADMIN — MINERAL OIL AND WHITE PETROLATUM: 150; 830 OINTMENT OPHTHALMIC at 19:28

## 2020-01-01 RX ADMIN — Medication 10 ML: at 11:01

## 2020-01-01 RX ADMIN — MINERAL OIL AND WHITE PETROLATUM: 150; 830 OINTMENT OPHTHALMIC at 11:19

## 2020-01-01 RX ADMIN — DEXAMETHASONE SODIUM PHOSPHATE 10 MG: 10 INJECTION INTRAMUSCULAR; INTRAVENOUS at 19:38

## 2020-01-01 RX ADMIN — AMPICILLIN SODIUM 2 G: 2 INJECTION, POWDER, FOR SOLUTION INTRAMUSCULAR; INTRAVENOUS at 00:58

## 2020-01-01 RX ADMIN — Medication 4 MCG/MIN: at 09:31

## 2020-01-01 RX ADMIN — DEXAMETHASONE SODIUM PHOSPHATE 10 MG: 10 INJECTION INTRAMUSCULAR; INTRAVENOUS at 00:09

## 2020-01-01 RX ADMIN — AMPICILLIN SODIUM 2 G: 2 INJECTION, POWDER, FOR SOLUTION INTRAMUSCULAR; INTRAVENOUS at 09:28

## 2020-01-01 RX ADMIN — CEFTRIAXONE 2 G: 2 INJECTION, POWDER, FOR SOLUTION INTRAMUSCULAR; INTRAVENOUS at 19:38

## 2020-01-01 RX ADMIN — LORAZEPAM 1 MG: 2 INJECTION INTRAMUSCULAR; INTRAVENOUS at 16:24

## 2020-01-01 RX ADMIN — ACYCLOVIR SODIUM 800 MG: 50 INJECTION, SOLUTION INTRAVENOUS at 19:09

## 2020-01-01 RX ADMIN — NALOXONE HYDROCHLORIDE 0.4 MG: 0.4 INJECTION, SOLUTION INTRAMUSCULAR; INTRAVENOUS; SUBCUTANEOUS at 08:00

## 2020-01-01 RX ADMIN — AMPICILLIN SODIUM 2 G: 2 INJECTION, POWDER, FOR SOLUTION INTRAMUSCULAR; INTRAVENOUS at 17:48

## 2020-01-01 RX ADMIN — ACYCLOVIR SODIUM 800 MG: 50 INJECTION, SOLUTION INTRAVENOUS at 12:04

## 2020-01-01 RX ADMIN — MINERAL OIL AND WHITE PETROLATUM: 150; 830 OINTMENT OPHTHALMIC at 23:34

## 2020-01-01 RX ADMIN — MINERAL OIL AND WHITE PETROLATUM: 150; 830 OINTMENT OPHTHALMIC at 15:41

## 2020-01-01 RX ADMIN — LEVETIRACETAM 1000 MG: 100 SOLUTION ORAL at 17:34

## 2020-01-01 RX ADMIN — HYDROMORPHONE HYDROCHLORIDE 1 MG: 1 INJECTION, SOLUTION INTRAMUSCULAR; INTRAVENOUS; SUBCUTANEOUS at 22:51

## 2020-01-01 RX ADMIN — SODIUM CHLORIDE 25 ML/HR: 900 INJECTION, SOLUTION INTRAVENOUS at 11:50

## 2020-01-01 RX ADMIN — SODIUM CHLORIDE 100 ML/HR: 900 INJECTION, SOLUTION INTRAVENOUS at 17:27

## 2020-01-01 RX ADMIN — AMPICILLIN SODIUM 2 G: 2 INJECTION, POWDER, FOR SOLUTION INTRAMUSCULAR; INTRAVENOUS at 09:47

## 2020-01-01 RX ADMIN — DEXAMETHASONE SODIUM PHOSPHATE 10 MG: 10 INJECTION INTRAMUSCULAR; INTRAVENOUS at 08:36

## 2020-01-01 RX ADMIN — LORAZEPAM 1 MG: 2 INJECTION INTRAMUSCULAR; INTRAVENOUS at 01:21

## 2020-01-01 RX ADMIN — HYDROMORPHONE HYDROCHLORIDE 1 MG: 1 INJECTION, SOLUTION INTRAMUSCULAR; INTRAVENOUS; SUBCUTANEOUS at 18:23

## 2020-01-01 RX ADMIN — KETOROLAC TROMETHAMINE 15 MG: 30 INJECTION, SOLUTION INTRAMUSCULAR at 08:15

## 2020-01-01 RX ADMIN — Medication 5 ML: at 05:04

## 2020-01-01 RX ADMIN — CEFTRIAXONE 2 G: 2 INJECTION, POWDER, FOR SOLUTION INTRAMUSCULAR; INTRAVENOUS at 06:59

## 2020-01-01 RX ADMIN — SODIUM CHLORIDE 100 ML: 900 INJECTION, SOLUTION INTRAVENOUS at 11:37

## 2020-01-01 RX ADMIN — VANCOMYCIN HYDROCHLORIDE 2000 MG: 10 INJECTION, POWDER, LYOPHILIZED, FOR SOLUTION INTRAVENOUS at 09:28

## 2020-01-01 RX ADMIN — METHYLPREDNISOLONE 4 MG: 4 TABLET ORAL at 09:08

## 2020-01-01 RX ADMIN — DIPHENHYDRAMINE HYDROCHLORIDE 25 MG: 50 INJECTION, SOLUTION INTRAMUSCULAR; INTRAVENOUS at 21:00

## 2020-01-01 RX ADMIN — METHYLPREDNISOLONE 4 MG: 4 TABLET ORAL at 18:15

## 2020-01-01 RX ADMIN — DEXAMETHASONE SODIUM PHOSPHATE 10 MG: 10 INJECTION INTRAMUSCULAR; INTRAVENOUS at 06:04

## 2020-01-01 RX ADMIN — ACYCLOVIR SODIUM 800 MG: 50 INJECTION, SOLUTION INTRAVENOUS at 20:26

## 2020-01-01 RX ADMIN — HYDROMORPHONE HYDROCHLORIDE 1 MG: 1 INJECTION, SOLUTION INTRAMUSCULAR; INTRAVENOUS; SUBCUTANEOUS at 08:18

## 2020-01-01 RX ADMIN — ALLOPURINOL 100 MG: 100 TABLET ORAL at 08:06

## 2020-01-01 RX ADMIN — HYDROMORPHONE HYDROCHLORIDE 1 MG: 1 INJECTION, SOLUTION INTRAMUSCULAR; INTRAVENOUS; SUBCUTANEOUS at 06:45

## 2020-01-01 RX ADMIN — MINERAL OIL AND WHITE PETROLATUM: 150; 830 OINTMENT OPHTHALMIC at 11:59

## 2020-01-01 RX ADMIN — HYDROMORPHONE HYDROCHLORIDE 1 MG: 1 INJECTION, SOLUTION INTRAMUSCULAR; INTRAVENOUS; SUBCUTANEOUS at 08:19

## 2020-01-01 RX ADMIN — SODIUM CHLORIDE 75 ML/HR: 900 INJECTION, SOLUTION INTRAVENOUS at 17:48

## 2020-01-01 RX ADMIN — LEVETIRACETAM 1000 MG: 100 INJECTION, SOLUTION INTRAVENOUS at 21:38

## 2020-01-01 RX ADMIN — Medication 10 ML: at 13:02

## 2020-01-01 RX ADMIN — Medication 10 ML: at 21:20

## 2020-01-01 RX ADMIN — Medication 10 ML: at 13:38

## 2020-01-01 RX ADMIN — AMPICILLIN SODIUM 2 G: 2 INJECTION, POWDER, FOR SOLUTION INTRAMUSCULAR; INTRAVENOUS at 13:30

## 2020-01-01 RX ADMIN — Medication 10 ML: at 06:21

## 2020-01-01 RX ADMIN — AMPICILLIN SODIUM 2 G: 2 INJECTION, POWDER, FOR SOLUTION INTRAMUSCULAR; INTRAVENOUS at 13:22

## 2020-01-01 RX ADMIN — Medication 10 ML: at 21:33

## 2020-01-01 RX ADMIN — SODIUM CHLORIDE 100 ML/HR: 900 INJECTION, SOLUTION INTRAVENOUS at 03:34

## 2020-01-01 RX ADMIN — FLUMAZENIL 0.5 MG: 0.1 INJECTION, SOLUTION INTRAVENOUS at 07:44

## 2020-01-01 RX ADMIN — Medication 10 ML: at 05:32

## 2020-01-01 RX ADMIN — HYDROMORPHONE HYDROCHLORIDE 1 MG: 1 INJECTION, SOLUTION INTRAMUSCULAR; INTRAVENOUS; SUBCUTANEOUS at 07:58

## 2020-01-01 RX ADMIN — POTASSIUM BICARBONATE 40 MEQ: 782 TABLET, EFFERVESCENT ORAL at 10:40

## 2020-01-01 RX ADMIN — Medication 10 ML: at 01:02

## 2020-01-06 NOTE — PROGRESS NOTES
Patient: Betty Pagan MRN: [id number]  SSN: [id number]    YOB: 1965  Age: 62 y.o. Sex: male      DIAGNOSIS:  Esophageal adenocarcinoma with mucinous features, T3N1M1, Stage IV, GE Junction with peritoneal disease.       PREVIOUS TREATMENT:  1) 3/19:  mFOLFOX x 6 cycles. TREATMENT SITE:  Esophagus    DOSE and FRACTIONATION:  5 of 25 initial fractions, 900 cGy of 4500 cGy planned. 0/8 boost, 0 cGy of 1440 cGy planned. INTERVAL HISTORY:  Betty Pagan is a 62 y.o. male being treated for esophageal cancer. He was doing well without complaints week 1. OBJECTIVE:  No findings week 1. There were no vitals taken for this visit. Lab Results   Component Value Date/Time    Sodium 141 12/30/2019 08:08 AM    Potassium 4.2 12/30/2019 08:08 AM    Chloride 111 (H) 12/30/2019 08:08 AM    CO2 26 12/30/2019 08:08 AM    Anion gap 4 (L) 12/30/2019 08:08 AM    Glucose 88 12/30/2019 08:08 AM    BUN 16 12/30/2019 08:08 AM    Creatinine 0.85 12/30/2019 08:08 AM    GFR est AA >60 12/30/2019 08:08 AM    GFR est non-AA >60 12/30/2019 08:08 AM    Calcium 9.1 12/30/2019 08:08 AM    Magnesium 2.1 12/30/2019 08:08 AM    Albumin 3.0 (L) 12/30/2019 08:08 AM    Protein, total 7.3 12/30/2019 08:08 AM    Globulin 4.3 (H) 12/30/2019 08:08 AM    A-G Ratio 0.7 (L) 12/30/2019 08:08 AM    AST (SGOT) 28 12/30/2019 08:08 AM    ALT (SGPT) 17 12/30/2019 08:08 AM     Lab Results   Component Value Date/Time    WBC 5.2 12/30/2019 08:08 AM    HGB 11.9 (L) 12/30/2019 08:08 AM    HCT 37.0 (L) 12/30/2019 08:08 AM    PLATELET 82 (L) 87/54/1841 08:08 AM       ASSESSMENT and PLAN:  Betty Pagan is tolerating radiation as anticipated for the current dose and fraction. We will continue on as planned with another treatment visit anticipated next week.         Nurys Wang MD   January 6, 2020

## 2020-01-10 NOTE — PROGRESS NOTES
Arrived to the [facility]. Taxol infusion completed. Patient tolerated well. Any issues or concerns during appointment: Jadene Legacy being discontinued for elevated urine protein. Patient aware of next infusion appointment on 01/23/20 (date) at 56 (time). Discharged ambulatory.

## 2020-01-13 NOTE — PROGRESS NOTES
Patient: Sesar Orlando MRN: [id number]  SSN: [id number]    YOB: 1965  Age: 62 y.o. Sex: male      DIAGNOSIS:  Esophageal adenocarcinoma with mucinous features, T3N1M1, Stage IV, GE Junction with peritoneal disease.       PREVIOUS TREATMENT:  1) 3/19:  mFOLFOX x 6 cycles. TREATMENT SITE:  Esophagus    DOSE and FRACTIONATION:  10 of 25 initial fractions, 1800 cGy of 4500 cGy planned. 0/8 boost, 0 cGy of 1440 cGy planned. INTERVAL HISTORY:  Sesar Orlando is a 62 y.o. male being treated for esophageal cancer. He was doing well without complaints week 1. Actually gained a few lbs week 2. Weight 212.5 lbs. OBJECTIVE:  No findings week 1. Visit Vitals  Wt 96.4 kg (212 lb 8 oz)   BMI 27.28 kg/m²       Lab Results   Component Value Date/Time    Sodium 144 01/10/2020 09:06 AM    Potassium 3.9 01/10/2020 09:06 AM    Chloride 109 (H) 01/10/2020 09:06 AM    CO2 30 01/10/2020 09:06 AM    Anion gap 5 (L) 01/10/2020 09:06 AM    Glucose 94 01/10/2020 09:06 AM    BUN 20 01/10/2020 09:06 AM    Creatinine 0.85 01/10/2020 09:06 AM    GFR est AA >60 01/10/2020 09:06 AM    GFR est non-AA >60 01/10/2020 09:06 AM    Calcium 9.1 01/10/2020 09:06 AM    Magnesium 2.3 01/10/2020 09:06 AM    Albumin 3.0 (L) 01/10/2020 09:06 AM    Protein, total 7.5 01/10/2020 09:06 AM    Globulin 4.5 (H) 01/10/2020 09:06 AM    A-G Ratio 0.7 (L) 01/10/2020 09:06 AM    AST (SGOT) 28 01/10/2020 09:06 AM    ALT (SGPT) 17 01/10/2020 09:06 AM     Lab Results   Component Value Date/Time    WBC 4.0 (L) 01/10/2020 09:06 AM    HGB 11.4 (L) 01/10/2020 09:06 AM    HCT 35.1 (L) 01/10/2020 09:06 AM    PLATELET 80 (L) 17/82/5738 09:06 AM       ASSESSMENT and PLAN:  Sesar Orlando is tolerating radiation as anticipated for the current dose and fraction. We will continue on as planned with another treatment visit anticipated next week.         Nicole Villalba MD   January 13, 2020

## 2020-01-20 NOTE — PROGRESS NOTES
Patient: Jadon Suarez MRN: [id number]  SSN: [id number] YOB: 1965  Age: 62 y.o. Sex: male DIAGNOSIS:  Esophageal adenocarcinoma with mucinous features, T3N1M1, Stage IV, GE Junction with peritoneal disease.   
  
PREVIOUS TREATMENT: 
1) 3/19:  mFOLFOX x 6 cycles. TREATMENT SITE:  Esophagus DOSE and FRACTIONATION:  14 of 25 initial fractions, 2520 cGy of 4500 cGy planned. 0/8 boost, 0 cGy of 1440 cGy planned. INTERVAL HISTORY:  Jadon Suarez is a 62 y.o. male being treated for esophageal cancer. He was doing well without complaints week 1. Actually gained a few lbs week 2. Weight 212.5 lbs. Stable week 3 in weight. OBJECTIVE:  No findings week 1. Visit Vitals Wt 96.4 kg (212 lb 8 oz) BMI 27.28 kg/m² Lab Results Component Value Date/Time Sodium 144 01/10/2020 09:06 AM  
 Potassium 3.9 01/10/2020 09:06 AM  
 Chloride 109 (H) 01/10/2020 09:06 AM  
 CO2 30 01/10/2020 09:06 AM  
 Anion gap 5 (L) 01/10/2020 09:06 AM  
 Glucose 94 01/10/2020 09:06 AM  
 BUN 20 01/10/2020 09:06 AM  
 Creatinine 0.85 01/10/2020 09:06 AM  
 GFR est AA >60 01/10/2020 09:06 AM  
 GFR est non-AA >60 01/10/2020 09:06 AM  
 Calcium 9.1 01/10/2020 09:06 AM  
 Magnesium 2.3 01/10/2020 09:06 AM  
 Albumin 3.0 (L) 01/10/2020 09:06 AM  
 Protein, total 7.5 01/10/2020 09:06 AM  
 Globulin 4.5 (H) 01/10/2020 09:06 AM  
 A-G Ratio 0.7 (L) 01/10/2020 09:06 AM  
 AST (SGOT) 28 01/10/2020 09:06 AM  
 ALT (SGPT) 17 01/10/2020 09:06 AM  
 
Lab Results Component Value Date/Time WBC 4.0 (L) 01/10/2020 09:06 AM  
 HGB 11.4 (L) 01/10/2020 09:06 AM  
 HCT 35.1 (L) 01/10/2020 09:06 AM  
 PLATELET 80 (L) 13/05/4238 09:06 AM  
 
 
ASSESSMENT and PLAN:  Jadon Suarez is tolerating radiation as anticipated for the current dose and fraction. We will continue on as planned with another treatment visit anticipated next week. Yvrose Casey MD  
January 20, 2020

## 2020-01-27 NOTE — PROGRESS NOTES
Patient: Leonardo Carcamo MRN: [id number]  SSN: [id number] YOB: 1965  Age: 62 y.o. Sex: male DIAGNOSIS:  Esophageal adenocarcinoma with mucinous features, T3N1M1, Stage IV, GE Junction with peritoneal disease.   
  
PREVIOUS TREATMENT: 
1) 3/19:  mFOLFOX x 6 cycles. TREATMENT SITE:  Esophagus DOSE and FRACTIONATION:  19 of 25 initial fractions, 3240 cGy of 4500 cGy planned. 0/8 boost, 0 cGy of 1440 cGy planned. INTERVAL HISTORY:  Leonardo Carcamo is a 62 y.o. male being treated for esophageal cancer. He was doing well without complaints week 1. Actually gained a few lbs week 2. Weight 212.5 lbs. Stable week 3-4 in weight (212.3 lbs). OBJECTIVE:  No findings week 1. There were no vitals taken for this visit. Lab Results Component Value Date/Time Sodium 143 01/23/2020 08:44 AM  
 Potassium 3.8 01/23/2020 08:44 AM  
 Chloride 109 (H) 01/23/2020 08:44 AM  
 CO2 28 01/23/2020 08:44 AM  
 Anion gap 6 (L) 01/23/2020 08:44 AM  
 Glucose 89 01/23/2020 08:44 AM  
 BUN 20 01/23/2020 08:44 AM  
 Creatinine 0.90 01/23/2020 08:44 AM  
 GFR est AA >60 01/23/2020 08:44 AM  
 GFR est non-AA >60 01/23/2020 08:44 AM  
 Calcium 9.2 01/23/2020 08:44 AM  
 Magnesium 2.1 01/23/2020 08:44 AM  
 Albumin 3.2 (L) 01/23/2020 08:44 AM  
 Protein, total 7.4 01/23/2020 08:44 AM  
 Globulin 4.2 (H) 01/23/2020 08:44 AM  
 A-G Ratio 0.8 (L) 01/23/2020 08:44 AM  
 AST (SGOT) 26 01/23/2020 08:44 AM  
 ALT (SGPT) 21 01/23/2020 08:44 AM  
 
Lab Results Component Value Date/Time WBC 3.3 (L) 01/23/2020 08:44 AM  
 HGB 11.1 (L) 01/23/2020 08:44 AM  
 HCT 34.3 (L) 01/23/2020 08:44 AM  
 PLATELET 57 (L) 81/31/8577 08:44 AM  
 
 
ASSESSMENT and PLAN:  Leonardo Carcamo is tolerating radiation as anticipated for the current dose and fraction. We will continue on as planned with another treatment visit anticipated next week. Umang Montes MD  
January 27, 2020

## 2020-02-02 PROBLEM — E86.0 DEHYDRATION: Status: ACTIVE | Noted: 2020-01-01

## 2020-02-03 NOTE — PROGRESS NOTES
Patient: Dior Nicholas MRN: [id number]  SSN: [id number] YOB: 1965  Age: 60 y.o. Sex: male DIAGNOSIS:  Esophageal adenocarcinoma with mucinous features, T3N1M1, Stage IV, GE Junction with peritoneal disease.   
  
PREVIOUS TREATMENT: 
1) 3/19:  mFOLFOX x 6 cycles. TREATMENT SITE:  Esophagus DOSE and FRACTIONATION:  24 of 25 initial fractions, 4320 cGy of 4500 cGy planned. 0/8 boost, 0 cGy of 1440 cGy planned. INTERVAL HISTORY:  Dior Nicholas is a 60 y.o. male being treated for esophageal cancer. He was doing well without complaints week 1. Actually gained a few lbs week 2. Weight 212.5 lbs. Stable week 3-4 in weight (212.3 lbs). Week 5 with progressive headaches with some trouble with balance and falls. OBJECTIVE:  No findings week 1. There were no vitals taken for this visit. Lab Results Component Value Date/Time Sodium 144 01/30/2020 08:33 AM  
 Potassium 3.8 01/30/2020 08:33 AM  
 Chloride 110 (H) 01/30/2020 08:33 AM  
 CO2 28 01/30/2020 08:33 AM  
 Anion gap 6 (L) 01/30/2020 08:33 AM  
 Glucose 94 01/30/2020 08:33 AM  
 BUN 21 01/30/2020 08:33 AM  
 Creatinine 0.87 01/30/2020 08:33 AM  
 GFR est AA >60 01/30/2020 08:33 AM  
 GFR est non-AA >60 01/30/2020 08:33 AM  
 Calcium 9.3 01/30/2020 08:33 AM  
 Magnesium 2.4 01/30/2020 08:33 AM  
 Albumin 3.1 (L) 01/30/2020 08:33 AM  
 Protein, total 7.3 01/30/2020 08:33 AM  
 Globulin 4.2 (H) 01/30/2020 08:33 AM  
 A-G Ratio 0.7 (L) 01/30/2020 08:33 AM  
 AST (SGOT) 37 01/30/2020 08:33 AM  
 ALT (SGPT) 21 01/30/2020 08:33 AM  
 
Lab Results Component Value Date/Time WBC 4.1 (L) 01/30/2020 08:33 AM  
 HGB 11.6 (L) 01/30/2020 08:33 AM  
 HCT 35.5 (L) 01/30/2020 08:33 AM  
 PLATELET 54 (L) 05/80/6933 08:33 AM  
 
 
ASSESSMENT and PLAN:  Dior Nicholas is tolerating radiation as anticipated for the current dose and fraction.   Falls and headaches may be dehydration so giving fluids, but if persists, may consider brain MRI. We will continue on as planned with another treatment visit anticipated next week. Nurys Wang MD  
February 3, 2020

## 2020-02-03 NOTE — PROGRESS NOTES
Arrived to the [facility] via [facility] with his wife. 1LT NS bolus completed. Patient tolerated well. Any issues or concerns during appointment: Pt C/O \"pain to top of head and behind both eyes that comes and goes\" pt discharged to get a CT scan of head today. .  Patient aware of next infusion appointment on  2/6 at 1000. Discharged to CT scan via [facility] with his wife.

## 2020-02-04 NOTE — ED NOTES
Pt here for 2 weeks of dizziness and 3 days of a severe headache. Pt has esophageal cancer and is currently doing chemo and radiation. Pt c/o severe headache. He was at cancer center yesterday and received a liter of fluid. Pt had CT yesterday and has a MRI scheduled for outpatient soon. Pt states his pain meds do not help headache and wife states only an icepack has helped.

## 2020-02-04 NOTE — ED NOTES
I have reviewed discharge instructions with the patient and spouse. The patient and spouse verbalized understanding. Patient left ED via Discharge Method: ambulatory to Home with wife. Opportunity for questions and clarification provided. Patient given 1 scripts. To continue your aftercare when you leave the hospital, you may receive an automated call from our care team to check in on how you are doing. This is a free service and part of our promise to provide the best care and service to meet your aftercare needs.  If you have questions, or wish to unsubscribe from this service please call [phone]. Thank you for Choosing our [facility] Emergency Department.

## 2020-02-04 NOTE — DISCHARGE INSTRUCTIONS
Patient Education   Tylenol 500 mg alternated with ibuprofen 400 mg every 3 hours. Oxycodone every 4-6 hours if needed for breakthrough pain for 3 to 5 days. Increase fluids and advance diet as tolerated. Follow-up with your primary care doctor and oncologist later this week for follow-up and recheck. If not this week or early next week. Return here if any new, worsening or concerning symptoms. Postconcussion Syndrome: Care Instructions  Your Care Instructions    Postconcussion syndrome occurs after a blow to the head or body. Common symptoms are changes in the ability to concentrate, think, remember, or solve problems. Symptoms, which may include headaches, personality changes, and dizziness, may be related to stress from the events surrounding the accident that caused the injury. Follow-up care is a key part of your treatment and safety. Be sure to make and go to all appointments, and call your doctor if you are having problems. It's also a good idea to know your test results and keep a list of the medicines you take. How can you care for yourself at home? Pain  · Rest is the best treatment for postconcussion syndrome. · Do not drive if you have taken a prescription pain medicine. · Rest in a quiet, dark room until your headache is gone. Close your eyes and try to relax or go to sleep. Do not watch TV or read. · Put a cold, moist cloth or cold pack on the painful area for 10 to 20 minutes at a time. Put a thin cloth between the cold pack and your skin. · Have someone gently massage your neck and shoulders. · Take your medicines exactly as prescribed. Call your doctor if you think you are having a problem with your medicine. You will get more details on the specific medicines your doctor prescribes. Stress  · Try to reduce stress. Some ways to do this include:  ? Taking slow, deep breaths. ? Soaking in a warm bath. ? Listening to soothing music. ? Having a massage or back rub.   ? Drinking a warm, nonalcoholic, noncaffeinated beverage. · Get enough sleep. · Eat a healthy, balanced diet. A balanced diet includes whole grains, dairy, fruits and vegetables, and protein. Eat a variety of foods from each of those groups so you get all the nutrients you need. · Avoid alcohol and illegal drugs. · Try relaxation exercises, such as breathing and muscle relaxation exercises. · Talk to your doctor about counseling. It may help you deal with stress from your accident. When should you call for help? Watch closely for changes in your health, and be sure to contact your doctor if:    · You do not get better as expected.     · Your symptoms, such as headaches, trouble concentrating, or changes in mood, get worse. Where can you learn more? Go to http://reg-marilyn.info/. Enter M022 in the search box to learn more about \"Postconcussion Syndrome: Care Instructions. \"  Current as of: March 28, 2019  Content Version: 12.2  © 7407-7385 [employer]. Care instructions adapted under license by [employer] (which disclaims liability or warranty for this information). If you have questions about a medical condition or this instruction, always ask your healthcare professional. [address] any warranty or liability for your use of this information.

## 2020-02-04 NOTE — ED PROVIDER NOTES
The history is provided by the patient. Headache This is a new problem. The current episode started more than 2 days ago. The problem occurs constantly. The problem has not changed since onset. The headache is aggravated by a recent trauma. The quality of the pain is described as throbbing. The pain is severe. Associated symptoms include dizziness and nausea. Pertinent negatives include no fever, no chest pressure, no syncope, no shortness of breath, no weakness, no tingling, no visual change and no vomiting. He has tried nothing for the symptoms. Past Medical History:  
Diagnosis Date  Appendicitis   
 resulted in appendectomy  Chewing tobacco use  Chronic obstructive pulmonary disease (HCC)   
 nebulizer prn and rescue inhaler only  Chronic pain   
 pt wife denies any pain  Depression   
 pt wife denies, no current meds  Erectile disorder due to medical condition in male patient  Esophageal cancer ([facility] [address]  Former cigarette smoker  Gout   
 treated with Allopurinol. Last flareup 2013  H/O heart artery stent X2- last stent placed 2016  History of MI (myocardial infarction) 2014  
 stents x2  RCA- 2014  LAD - 2016  Hyperlipidemia  Hypertension   
 managed with meds  Hypotestosteronemia  Morbid obesity ([address] 47.6  KORINA (obstructive sleep apnea) Trilegy and oxygen 2L  Osteoarthritis Past Surgical History:  
Procedure Laterality Date  COLONOSCOPY N/A 2/5/2019 COLONOSCOPY/ 50 performed by Maximino Bell MD at [address] HX APPENDECTOMY  2003  HX CARPAL TUNNEL RELEASE Right 2014  HX CORONARY STENT PLACEMENT  2014 RCA x 1  
 HX CORONARY STENT PLACEMENT  2016 PCI of the lesion with a 4x8mm Xience [employer]pine RONN  
 HX VASCULAR ACCESS    
 IR INSERT GASTROSTOMY TUBE [facility]  11/21/2019 Family History:  
Problem Relation Age of Onset  Heart Disease Mother  Hypertension Mother  Cancer Father  Heart Disease Father  Cancer Brother Social History Socioeconomic History  Marital status:  Spouse name: Not on file  Number of children: Not on file  Years of education: Not on file  Highest education level: Not on file Occupational History  Not on file Social Needs  Financial resource strain: Not on file  Food insecurity:  
  Worry: Not on file Inability: Not on file  Transportation needs:  
  Medical: Not on file Non-medical: Not on file Tobacco Use  Smoking status: Former Smoker Packs/day: 0.15 Years: 35.00 Pack years: 5.25 Last attempt to quit: 2016 Years since quittin.0  Smokeless tobacco: Current User Substance and Sexual Activity  Alcohol use: No  
 Drug use: No  
 Sexual activity: Not on file Lifestyle  Physical activity:  
  Days per week: Not on file Minutes per session: Not on file  Stress: Not on file Relationships  Social connections:  
  Talks on phone: Not on file Gets together: Not on file Attends Latter day service: Not on file Active member of club or organization: Not on file Attends meetings of clubs or organizations: Not on file Relationship status: Not on file  Intimate partner violence:  
  Fear of current or ex partner: Not on file Emotionally abused: Not on file Physically abused: Not on file Forced sexual activity: Not on file Other Topics Concern [address] Service Not Asked  Blood Transfusions Not Asked  Caffeine Concern Not Asked  Occupational Exposure Not Asked [place] Hazards Not Asked  Sleep Concern Not Asked  Stress Concern Not Asked  Weight Concern Not Asked  Special Diet Not Asked  Back Care Not Asked  Exercise Not Asked  Bike Helmet Not Asked  Seat Belt Not Asked  Self-Exams Not Asked Social History Narrative  Not on file ALLERGIES: Patient has no known allergies. Review of Systems Constitutional: Negative for chills and fever. HENT: Negative for congestion, rhinorrhea and sore throat. Eyes: Negative for photophobia and redness. Respiratory: Negative for cough and shortness of breath. Cardiovascular: Negative for chest pain, leg swelling and syncope. Gastrointestinal: Positive for nausea. Negative for abdominal pain, diarrhea and vomiting. Endocrine: Negative for polydipsia and polyuria. Genitourinary: Negative for dysuria. Musculoskeletal: Positive for back pain and neck pain. Negative for myalgias. Neurological: Positive for dizziness and headaches. Negative for tingling, weakness and numbness. Vitals:  
 02/04/20 9807 02/04/20 0802 02/04/20 8920 02/04/20 1591 BP: 106/64 121/74 109/66 Pulse: 92 87 91 82 Resp:      
Temp:      
SpO2: 98% 97% 99% 97% Weight:      
Height:      
      
 
Physical Exam 
Vitals signs and nursing note reviewed. Constitutional:   
   Appearance: He is well-developed. HENT:  
   Mouth/Throat:  
   Pharynx: No oropharyngeal exudate. Eyes:  
   Conjunctiva/sclera: Conjunctivae normal.  
   Pupils: Pupils are equal, round, and reactive to light. Neck: Musculoskeletal: Neck supple. Muscular tenderness ( Cervical tenderness, mild) present. Cardiovascular:  
   Rate and Rhythm: Normal rate and regular rhythm. Heart sounds: Normal heart sounds. Pulmonary:  
   Effort: Pulmonary effort is normal.  
   Breath sounds: Normal breath sounds. Abdominal:  
   General: Bowel sounds are normal. There is no distension. Palpations: Abdomen is soft. Tenderness: There is no abdominal tenderness. There is no guarding or rebound. Musculoskeletal: Normal range of motion. General: Tenderness ( Some bruising on lower back and there is midline lumbar and mid thoracic and lower thoracic tenderness. Bilateral paraspinous tenderness) present. Lymphadenopathy: Cervical: No cervical adenopathy. Skin: 
   General: Skin is warm and dry. Neurological:  
   General: No focal deficit present. Mental Status: He is alert and oriented to person, place, and time. Cranial Nerves: No cranial nerve deficit. Sensory: No sensory deficit. Motor: No weakness. Coordination: Coordination normal.  
   Deep Tendon Reflexes:  
   Reflex Scores: 
     Patellar reflexes are 2+ on the right side and 2+ on the left side. MDM Number of Diagnoses or Management Options Diagnosis management comments: Patient just had a CT scan yesterday and was scheduled for an MRI at 3 PM today. I will not repeat CT of the head. His trauma was last week and several days before his CT scan yesterday. He is already had his thoracic spine x-rayed and I will image his C-spine with a CT and his lumbar spine with an x-ray. We have checked with MRI and they will do his MRI this morning while he is here in the emergency department. I will medicate and try to treat his pain. No fevers or meningismus to suggest meningitis. 11:20 AM 
Improved and headache is resolved. We will discharge him after IV fluids finished. MRI was normal with and without contrast. 
 
  
Amount and/or Complexity of Data Reviewed Clinical lab tests: ordered and reviewed (Results for orders placed or performed during the hospital encounter of 02/04/20 
-CBC WITH AUTOMATED DIFF Result                      Value             Ref Range WBC                         3.5 (L)           4.3 - 11.1 K* 
     RBC                         3.23 (L)          4.23 - 5.6 M* HGB                         10.6 (L)          13.6 - 17.2 * HCT                         31.7 (L)          41.1 - 50.3 % MCV                         98.1 (H)          79.6 - 97.8 * MCH                         32.8              26.1 - 32.9 * MCHC                        33.4              31.4 - 35.0 * RDW                         15.2 (H)          11.9 - 14.6 % PLATELET                    42 (L)            150 - 450 K/* MPV                         12.3              9.4 - 12.3 FL ABSOLUTE NRBC               0.00              0.0 - 0.2 K/* DF                          AUTOMATED NEUTROPHILS                 80 (H)            43 - 78 % LYMPHOCYTES                 4 (L)             13 - 44 % MONOCYTES                   13 (H)            4.0 - 12.0 % EOSINOPHILS                 3                 0.5 - 7.8 % BASOPHILS                   0                 0.0 - 2.0 % IMMATURE GRANULOCYTES       0                 0.0 - 5.0 %   
     ABS. NEUTROPHILS            2.8               1.7 - 8.2 K/* ABS. LYMPHOCYTES            0.2 (L)           0.5 - 4.6 K/* ABS. MONOCYTES              0.4               0.1 - 1.3 K/* ABS. EOSINOPHILS            0.1               0.0 - 0.8 K/* ABS. BASOPHILS              0.0               0.0 - 0.2 K/* ABS. IMM. GRANS.            0.0               0.0 - 0.5 K/* 
-METABOLIC PANEL, COMPREHENSIVE Result                      Value             Ref Range Sodium                      142               136 - 145 mm* Potassium                   3.4 (L)           3.5 - 5.1 mm* Chloride                    110 (H)           98 - 107 mmo* CO2                         27                21 - 32 mmol* Anion gap                   5 (L)             7 - 16 mmol/L Glucose                     96                65 - 100 mg/* BUN                         19                6 - 23 MG/DL Creatinine                  0.68 (L)          0.8 - 1.5 MG* 
     GFR est AA                  >60               >60 ml/min/1* GFR est non-AA              >60               >60 ml/min/1*      Calcium                     9.0               8.3 - 10.4 M* 
 Bilirubin, total            0.5               0.2 - 1.1 MG* ALT (SGPT)                  18                12 - 65 U/L   
     AST (SGOT)                  29                15 - 37 U/L Alk. phosphatase            111               50 - 136 U/L Protein, total              6.6               6.3 - 8.2 g/* Albumin                     2.8 (L)           3.5 - 5.0 g/* Globulin                    3.8 (H)           2.3 - 3.5 g/* A-G Ratio                   0.7 (L)           1.2 - 3.5     
-PROTHROMBIN TIME + INR Result                      Value             Ref Range Prothrombin time            13.7              12.0 - 14.7 * INR                         1.0                             
-EKG, 12 LEAD, INITIAL Result                      Value             Ref Range Ventricular Rate            89                BPM           
     Atrial Rate                 89                BPM           
     P-R Interval                134               ms            
     QRS Duration                84                ms Q-T Interval                368               ms            
     QTC Calculation (Bezet)     447               ms            
     Calculated P Axis           89                degrees Calculated R Axis           76                degrees Calculated T Axis           64                degrees Diagnosis                                                   
 !! AGE AND GENDER SPECIFIC ECG ANALYSIS !! Sinus rhythm with Possible Premature atrial complexes with Aberrant  
 conduction Otherwise normal ECG When compared with ECG of 27-NOV-2019 19:24, 
 Premature ventricular complexes are no longer Present Aberrant conduction is now Present QT has shortened ) Tests in the radiology section of CPT®: ordered and reviewed Procedures

## 2020-02-05 PROBLEM — E87.6 HYPOKALEMIA: Status: ACTIVE | Noted: 2020-01-01

## 2020-02-05 PROBLEM — R56.9 NEW ONSET SEIZURE (HCC): Status: ACTIVE | Noted: 2020-01-01

## 2020-02-06 PROBLEM — R13.19 OTHER DYSPHAGIA: Status: ACTIVE | Noted: 2020-01-01

## 2020-02-06 NOTE — PROGRESS NOTES
Patient has been given Morphine 2 mg IV, Toradol 30 mg IV, roxicodone per PEG and notes no improvement in his pain control. Requested Dilaudid from Dr. Chay Armendariz. Awaiting orders.

## 2020-02-06 NOTE — PROGRESS NOTES
Interdisciplinary team rounds were held 2/6/2020 with the following team members:Care Management, Nursing, Nutrition, Pharmacy, Physical Therapy and Physician. Plan of care discussed. See clinical pathway and/or care plan for interventions and desired outcomes.

## 2020-02-06 NOTE — PROGRESS NOTES
Pt advised another RN for pain medication . Upon entering into room to assess pain level, pt is sleeping soundly with eyes closed.

## 2020-02-06 NOTE — PROGRESS NOTES
Hospitalist Progress Note 2020 Admit Date: 2020  7:34 PM  
NAME: Timothy Villalba Null :  1965 MRN:  [id number] Attending: Aubrey Otero MD 
PCP:  Vivek Lyles MD 
 
SUBJECTIVE:  
 
Patient is a 60years old male with history of esophegeal cancer, currently on radioRx, COPD and on tubal feeding for 1 year, presents with headaches for last 2-3 days. He had a fall this week and in ED, his MRI brain is negative. He used to be treated for HTN until 3 months ago then his meds were stopped due to continuous hypotension. His BP was initially elevated on arrival. He lost over 200 lb in last 12 months. In ED, the pt had 3 epsisoeds of generalized seizure like activity witnessed by staff including ED physician, w/ urinary incontinence and followed by period of confusion. No known hx of seizures and no alcohol use. Interval History (): patient examined at bedside. Patient with perceived \"seizure-like activity\" by wife at bedside that was preceded by \"warning signs and his headaches that he has been getting. \" He says that his headache \"starts on top\" and \"it comes down on both sides behind my eyes. \" He has been getting pain medications (including a script for oxycodone that was prescribed at previous ER visit a few days ago) but these do not seem to be helping. He denies associated fevers/chills, blurry vision, sensitivity to sound/light, difficulty with speech. He denies chest pain, shortness of breath, abdominal pain, nausea/vomiting, or diarrhea. Review of Systems negative with exception of pertinent positives noted above PHYSICAL EXAM  
 
Visit Vitals /70 (BP 1 Location: Right arm, BP Patient Position: At rest;Supine) Pulse 84 Temp 97.1 °F (36.2 °C) Resp 14 Ht 6' 2.5\" (1.892 m) Wt 96.2 kg (212 lb) SpO2 98% BMI 26.86 kg/m² Temp (24hrs), Av.6 °F (36.4 °C), Min:96.6 °F (35.9 °C), Max:98.3 °F (36.8 °C) Oxygen Therapy O2 Sat (%): 98 % (20 8741) Pulse via Oximetry: 89 beats per minute (02/05/20 2208) O2 Device: Room air (02/06/20 3094) No intake or output data in the 24 hours ending 02/06/20 1607 General: No acute distress   
HEENT:  Noted bruise to back of head Lungs:  CTA Bilaterally. Heart:  Regular rate and rhythm,  No murmur, rub, or gallop Abdomen: Soft, Non distended, Non tender, Positive bowel sounds Extremities: No cyanosis, clubbing or edema Neurologic:  No focal deficits. CN II thru XII intact b/l. ASSESSMENT Active Hospital Problems Diagnosis Date Noted  Other dysphagia 02/06/2020  New onset seizure ([facility] [address] 02/05/2020  Hypokalemia 02/05/2020  Malignant neoplasm of lower third of esophagus ([facility] [address] 02/19/2019  Essential hypertension with goal blood pressure less than 140/90 08/17/2016 Plan: # Seizure-like activity in the setting of recent falls with hitting of his head - ?post-concussive syndrome 
- neurology consulted, low suspicion for seizures 
- EEG ordered and pending 
- continue with Keppra started on admission 
- CT head/neck ordered to evaluate for thunderclap headache 
- continue with pain management - MRI of the head from several days ago is negative for metastases from known esophageal cancer # ? Presyncope 
- noted that patient with significant weight loss and has been deescalated off his blood pressure meds, he has also required midodrine and IVFs at his oncology follow-ups to help with his blood pressure 
- ordered orthostatic vitals 
- start IVFs # Esophageal cancer - currently undergoing radiation therapy 
- needs follow-up with Heme/Onc following hospital discharge # Chronic dysphagia, due to esophageal cancer - patient has been on tube feeds long-term but wants to try oral route 
- ordered speech consult for swallow evaluation F/E/N: maintenance fluids, replete electrolytes as needed, diet per speech evaluation Ppx: SCDs for VTE Code Status: FULL CODE 
 
 Disposition: pending clinical improvement with anticipated discharge in 1-2 days. Discussed at length with patient and wife at bedside. All questions answered. Signed By: Susan Razo DO February 6, 2020

## 2020-02-06 NOTE — ED TRIAGE NOTES
Pt reports headache started yesterday. Pt states he fell 3 times last week and hit his head. Pt was seen here for same yesterday.  
 
Ema Ruth RN

## 2020-02-06 NOTE — PROGRESS NOTES
Patient refuses tube feedings, insist of having regular diet in the AM. Patient in no distress at this time.

## 2020-02-06 NOTE — PROGRESS NOTES
Patient with complaints of severe pain. Medicated with Morphine 2 mg IV. Patient is very verbose in stating he will not comply with testing until he gets food and adequate pain medication.

## 2020-02-06 NOTE — PROGRESS NOTES
EEG on hold due to code black, tech has to travel from [facility],  [place] [email or web address].T

## 2020-02-06 NOTE — ED NOTES
TRANSFER - OUT REPORT: 
 
Verbal report given to Sara Carr RN on [address]  being transferred to [address] [place] [address] for routine progression of care Report consisted of patients Situation, Background, Assessment and  
Recommendations(SBAR). Information from the following report(s) ED Summary, MAR and Recent Results was reviewed with the receiving nurse. Lines:  
Venous Access Device port Upper chest (subclavicular area), left (Active) Peripheral IV 02/05/20 Right Antecubital (Active) Site Assessment Clean, dry, & intact 2/5/2020  8:10 PM  
Phlebitis Assessment 0 2/5/2020  8:10 PM  
Infiltration Assessment 0 2/5/2020  8:10 PM  
Dressing Type Gauze;Tape;Transparent 2/5/2020  8:10 PM  
Hub Color/Line Status Pink;Flushed 2/5/2020  8:10 PM  
Action Taken Blood drawn 2/5/2020  8:10 PM  
  
 
Opportunity for questions and clarification was provided. Patient transported with: 
 [employer]ward

## 2020-02-06 NOTE — PROGRESS NOTES
Problem: Falls - Risk of 
Goal: *Absence of Falls Description Document Aleksandra Brown Fall Risk and appropriate interventions in the flowsheet. Outcome: Progressing Towards Goal 
Note:  
Fall Risk Interventions: 
Mobility Interventions: Bed/chair exit alarm Medication Interventions: Bed/chair exit alarm Elimination Interventions: Bed/chair exit alarm History of Falls Interventions: Bed/chair exit alarm Problem: Patient Education: Go to Patient Education Activity Goal: Patient/Family Education Outcome: Progressing Towards Goal 
  
Problem: Seizure Disorder (Adult) Goal: *STG: Remains free of seizure activity Outcome: Progressing Towards Goal 
Goal: *STG: Maintains lab values within therapeutic range Outcome: Progressing Towards Goal 
Goal: *STG/LTG: Complies with medication therapy Outcome: Progressing Towards Goal 
Goal: *STG: Remains free of injury during seizure activity Outcome: Progressing Towards Goal 
Goal: *STG: Remains safe in hospital 
Outcome: Progressing Towards Goal 
Goal: Interventions Outcome: Progressing Towards Goal 
  
Problem: Patient Education: Go to Patient Education Activity Goal: Patient/Family Education Outcome: Progressing Towards Goal

## 2020-02-06 NOTE — ED PROVIDER NOTES
68-year-old male presents to the ER with a initial chief complaint of headache. Patient's headache have been ongoing for greater than a week. He has had CT and MRI both of which were unremarkable. His past medical history is significant for esophageal cancer. He has tube feeds. He has chronic thrombocytopenia. Upon arrival to the emergency department, the patient had an episode of tremoring and unresponsiveness consistent with a short seizure. Patient spouse states that he has not had a history of seizures. He continues on radiation therapy. His last chemo dose was about 3 weeks ago. The history is provided by the spouse. Headache This is a recurrent problem. The problem occurs constantly. The problem has been gradually worsening. The headache is aggravated by an unknown factor. The quality of the pain is described as throbbing. The pain is severe. Associated symptoms include weakness. Pertinent negatives include no fever and no vomiting. Past Medical History:  
Diagnosis Date  Appendicitis   
 resulted in appendectomy  Chewing tobacco use  Chronic obstructive pulmonary disease (HCC)   
 nebulizer prn and rescue inhaler only  Chronic pain   
 pt wife denies any pain  Depression   
 pt wife denies, no current meds  Erectile disorder due to medical condition in male patient  Esophageal cancer ([address]  Former cigarette smoker  Gout   
 treated with Allopurinol. Last flareup 2013  H/O heart artery stent X2- last stent placed 2016  History of MI (myocardial infarction) 2014  
 stents x2  RCA- 2014  LAD - 2016  Hyperlipidemia  Hypertension   
 managed with meds  Hypotestosteronemia  Morbid obesity ([address] 47.6  KORINA (obstructive sleep apnea) Trilegy and oxygen 2L  Osteoarthritis Past Surgical History:  
Procedure Laterality Date  COLONOSCOPY N/A 2/5/2019 COLONOSCOPY/ 50 performed by Phoenix Orellana MD at [facility] HX APPENDECTOMY  2003  HX CARPAL TUNNEL RELEASE Right 2014  HX CORONARY STENT PLACEMENT   RCA x 1  
 HX CORONARY STENT PLACEMENT   PCI of the lesion with a 4x8mm Xience Alpine RONN  
 HX VASCULAR ACCESS    
 IR INSERT GASTROSTOMY TUBE [facility]  2019 Family History:  
Problem Relation Age of Onset  Heart Disease Mother  Hypertension Mother  Cancer Father  Heart Disease Father  Cancer Brother Social History Socioeconomic History  Marital status:  Spouse name: Not on file  Number of children: Not on file  Years of education: Not on file  Highest education level: Not on file Occupational History  Not on file Social Needs  Financial resource strain: Not on file  Food insecurity:  
  Worry: Not on file Inability: Not on file  Transportation needs:  
  Medical: Not on file Non-medical: Not on file Tobacco Use  Smoking status: Former Smoker Packs/day: 0.15 Years: 35.00 Pack years: 5.25 Last attempt to quit: 2016 Years since quittin.0  Smokeless tobacco: Current User Substance and Sexual Activity  Alcohol use: No  
 Drug use: No  
 Sexual activity: Not on file Lifestyle  Physical activity:  
  Days per week: Not on file Minutes per session: Not on file  Stress: Not on file Relationships  Social connections:  
  Talks on phone: Not on file Gets together: Not on file Attends [employer] service: Not on file Active member of club or organization: Not on file Attends meetings of clubs or organizations: Not on file Relationship status: Not on file  Intimate partner violence:  
  Fear of current or ex partner: Not on file Emotionally abused: Not on file Physically abused: Not on file Forced sexual activity: Not on file Other Topics Concern [address] Service Not Asked  Blood Transfusions Not Asked  Caffeine Concern Not Asked  Occupational Exposure Not Asked [place] Hazards Not Asked  Sleep Concern Not Asked  Stress Concern Not Asked  Weight Concern Not Asked  Special Diet Not Asked  Back Care Not Asked  Exercise Not Asked  Bike Helmet Not Asked  Seat Belt Not Asked  Self-Exams Not Asked Social History Narrative  Not on file ALLERGIES: Patient has no known allergies. Review of Systems Unable to perform ROS: Acuity of condition Constitutional: Negative for fever. Gastrointestinal: Negative for vomiting. Neurological: Positive for weakness and headaches. Vitals:  
 02/05/20 1933 02/05/20 1953 02/05/20 2004 BP: (!) 155/105  (!) 180/99 Pulse: 89 Resp: 16 Temp: 98.3 °F (36.8 °C) SpO2: 98% 99% Weight: 96.2 kg (212 lb) Height: 6' 2.5\" (1.892 m) Physical Exam 
Vitals signs and nursing note reviewed. Constitutional:   
   General: He is in acute distress. Appearance: He is well-developed and normal weight. He is ill-appearing. HENT:  
   Head: Normocephalic and atraumatic. Right Ear: External ear normal.  
   Left Ear: External ear normal.  
   Mouth/Throat:  
   Mouth: Mucous membranes are moist.  
   Pharynx: Oropharynx is clear. No oropharyngeal exudate. Eyes:  
   General: No scleral icterus. Conjunctiva/sclera: Conjunctivae normal.  
   Comments: Pupils are pinpoint Neck: Musculoskeletal: Normal range of motion and neck supple. Thyroid: No thyromegaly. Vascular: No JVD. Cardiovascular:  
   Rate and Rhythm: Normal rate and regular rhythm. Pulses: Normal pulses. Heart sounds: Normal heart sounds. No murmur. No friction rub. No gallop. Pulmonary:  
   Effort: Pulmonary effort is normal. No respiratory distress. Breath sounds: Normal breath sounds. No wheezing. Abdominal: General: Bowel sounds are normal. There is no distension. Palpations: Abdomen is soft. Tenderness: There is no abdominal tenderness. Comments: Feeding tube site in left upper quadrant is without evidence of erythema drainage or induration Musculoskeletal: Normal range of motion. General: No tenderness or deformity. Skin: 
   General: Skin is warm and dry. Capillary Refill: Capillary refill takes less than 2 seconds. Findings: No rash. Neurological:  
   General: No focal deficit present. Mental Status: He is lethargic. Cranial Nerves: No cranial nerve deficit. Sensory: No sensory deficit. Motor: No abnormal muscle tone. Coordination: Coordination normal.  
   Comments: No focal findings on exam 
Patient is postictal appearing Psychiatric:     
   Speech: He is noncommunicative. MDM Number of Diagnoses or Management Options Malignant neoplasm of esophagus, unspecified location [facility]): established and worsening New onset seizure [facility]): new and requires workup Thrombocytopenia ([facility] [address]): established and worsening Diagnosis management comments: 68-year-old male with a history of esophageal cancer currently under chemo and radiation treatments. CT today does not reveal any acute bleeding, swelling or mass Recent MRI reviewed as well EKG and labs are are unremarkable with the exception of a platelet count of 55,202 Hospitalist service consulted for admission. Amount and/or Complexity of Data Reviewed Clinical lab tests: ordered and reviewed Tests in the radiology section of CPT®: ordered and reviewed Tests in the medicine section of CPT®: ordered and reviewed Obtain history from someone other than the patient: yes Review and summarize past medical records: yes Discuss the patient with other providers: yes Independent visualization of images, tracings, or specimens: yes Risk of Complications, Morbidity, and/or Mortality Presenting problems: high Diagnostic procedures: high Management options: high General comments: Elements of this note have been dictated via voice recognition software. Text and phrases may be limited by the accuracy of the software. The chart has been reviewed, but errors may still be present. Critical Care Total time providing critical care: (50) Patient Progress Patient progress: improved Procedures

## 2020-02-06 NOTE — H&P
INTERNAL MEDICINE H&P/CONSULT Subjective:  
 
Patient is a 60years old male with history of esophegeal cancer, currently on radioRx, COPD and on tubal feeding for 1 year, presents with headaches for last 2-3 days. He had a fall this week and in ED, his MRI brain is negative. He used to be treated for HTN until 3 months ago then his meds were stopped due to continuous hypotension. His BP was initially elevated on arrival. He lost over 200 lb in last 12 months. In ED, the pt had 3 epsisoeds of generalized seizure like activity witnessed by staff including ED physician, w/ urinary incontinence and followed by period of confusion. No known hx of seizures and no alcohol use. Past Medical History:  
Diagnosis Date  Appendicitis   
 resulted in appendectomy  Chewing tobacco use  Chronic obstructive pulmonary disease (HCC)   
 nebulizer prn and rescue inhaler only  Chronic pain   
 pt wife denies any pain  Depression   
 pt wife denies, no current meds  Erectile disorder due to medical condition in male patient  Esophageal cancer ([facility] [address]  Former cigarette smoker  Gout   
 treated with Allopurinol. Last flareup 2013  H/O heart artery stent X2- last stent placed 2016  History of MI (myocardial infarction) 2014  
 stents x2  RCA- 2014  LAD - 2016  Hyperlipidemia  Hypertension   
 managed with meds  Hypotestosteronemia  Morbid obesity ([address] 47.6  KORINA (obstructive sleep apnea) Trilegy and oxygen 2L  Osteoarthritis Past Surgical History:  
Procedure Laterality Date  COLONOSCOPY N/A 2/5/2019 COLONOSCOPY/ 50 performed by Karyn Pace MD at [facility] HX APPENDECTOMY  2003  HX CARPAL TUNNEL RELEASE Right 2014  HX CORONARY STENT PLACEMENT  2014 RCA x 1  
 HX CORONARY STENT PLACEMENT  2016 PCI of the lesion with a 4x8mm Xience Alpine RONN  
 HX VASCULAR ACCESS    
 IR INSERT GASTROSTOMY TUBE [facility]  11/21/2019 Prior to Admission medications Medication Sig Start Date End Date Taking? Authorizing Provider  
oxyCODONE IR (ROXICODONE) 10 mg tab immediate release tablet Take 1 Tab by mouth every six (6) hours as needed for Pain for up to 5 days. Max Daily Amount: 40 mg. 2/4/20 2/9/20  Irene South MD  
midodrine (PROAMITINE) 5 mg tablet Take 1 Tab by mouth two (2) times daily (with meals) for 30 days. Indications: a feeling of dizziness upon standing due to a drop in blood pressure 1/30/20 2/29/20  Partha Winter NP  
sucralfate (CARAFATE) 1 gram tablet Take 1 Tab by mouth four (4) times daily. 1/23/20   Millicent Hope NP  
artificial saliva (MOUTH KOTE) spra Take 1 Spray by mouth as needed for Pain. 1/10/20   Justin Lorenzo MD  
acyclovir (ZOVIRAX) 5 % ointment Apply thin layer of cream to effected areas 4-5- times a day 12/30/19   Sidney Livingston NP  
potassium chloride (KAON 20%) 40 mEq/15 mL liqd Daily X 2 days through his feeding tube  Indications: prevention of low potassium in the blood 11/25/19   Justin Lorenzo MD  
Lactose-Free Food with Fiber (JEVITY 1.5 MELITON) 0.06 gram-1.5 kcal/mL liqd 6 Bottles by PEG Tube route daily for 100 days. Bolus Feeds, goal 6/day to provide 2130 kcal, 91 gm pro, 1080 ml. Needs additional 36 oz/day for hydration. JESSICA > 3 months. Indications: blockage of the esophagus, dysphagia 11/21/19 2/29/20  Justin Lorenzo MD  
cpap machine kit by Does Not Apply route. Provider, Historical  
Oxygen at bedtime as directed for dose pack. Provider, Historical  
ezetimibe (ZETIA) 10 mg tablet Take 10 mg by mouth daily. Provider, Historical  
allopurinol (ZYLOPRIM) 100 mg tablet Take 1 Tab by mouth daily. 1/29/19   Lise Castro MD  
metoprolol tartrate (LOPRESSOR) 50 mg tablet Take 1 Tab by mouth two (2) times a day. 7/5/18   Lise Castro MD  
cholecalciferol, VITAMIN D3, (VITAMIN D3) 5,000 unit tab tablet Take 5,000 Units by mouth daily.     Provider, Historical  
 
 No Known Allergies Social History Tobacco Use  Smoking status: Former Smoker Packs/day: 0.15 Years: 35.00 Pack years: 5.25 Last attempt to quit: 2016 Years since quittin.0  Smokeless tobacco: Current User Substance Use Topics  Alcohol use: No  
  
 
Family History: HTN Review of Systems A comprehensive review of systems was negative except for that written in the HPI. Objective: Intake / Output: 
No intake/output data recorded. No intake/output data recorded. Physical Exam: 
Visit Vitals /72 (BP 1 Location: Left arm, BP Patient Position: At rest) Pulse 85 Temp 98.2 °F (36.8 °C) Resp 16 Ht 6' 2.5\" (1.892 m) Wt 96.2 kg (212 lb) SpO2 96% BMI 26.86 kg/m² General appearance: awake, alert, cooperative, moderate distress, appears stated age - Pale, No icterus, Dry mucous membranes Head: Normocephalic, without obvious abnormality, atraumatic Back: symmetric, no curvature. ROM normal. No CVA tenderness. Lungs: clear to auscultation bilaterally - Diminished bs bibasilar. Heart: regular rate and rhythm, S1, S2 normal, no murmur, click, rub or gallop. Abdomen: soft, no tenderness, no distension, normal bowel sound, no masses, no organomegaly. Peg tube in place Extremities: atraumatic, no cyanosis - Bilateral lower limbs edema none. Skin: No rashes or ulceration. Neurologic: Cranial nerves 2-12 intact. Motor 5/5 in 4 limbs. No sensory deficits. No visual field defects. No dysmetria or nystagmus. DTR +2. Babiniski's reflex negative. Romberg's and gait tests are deferred at this time. ECG: sinus rhythm Data Review (Labs):  
Recent Results (from the past 24 hour(s)) CBC WITH AUTOMATED DIFF Collection Time: 20  8:15 PM  
Result Value Ref Range WBC 4.7 4.3 - 11.1 K/uL  
 RBC 3.74 (L) 4.23 - 5.6 M/uL  
 HGB 12.0 (L) 13.6 - 17.2 g/dL HCT 36.3 (L) 41.1 - 50.3 %  MCV 97.1 79.6 - 97.8 FL  
 MCH 32.1 26.1 - 32.9 PG  
 MCHC 33.1 31.4 - 35.0 g/dL  
 RDW 15.5 (H) 11.9 - 14.6 % PLATELET 55 (L) 045 - 450 K/uL MPV 11.9 9.4 - 12.3 FL ABSOLUTE NRBC 0.00 0.0 - 0.2 K/uL  
 DF AUTOMATED NEUTROPHILS 75 43 - 78 % LYMPHOCYTES 7 (L) 13 - 44 % MONOCYTES 16 (H) 4.0 - 12.0 % EOSINOPHILS 2 0.5 - 7.8 % BASOPHILS 0 0.0 - 2.0 % IMMATURE GRANULOCYTES 0 0.0 - 5.0 %  
 ABS. NEUTROPHILS 3.5 1.7 - 8.2 K/UL  
 ABS. LYMPHOCYTES 0.3 (L) 0.5 - 4.6 K/UL  
 ABS. MONOCYTES 0.7 0.1 - 1.3 K/UL  
 ABS. EOSINOPHILS 0.1 0.0 - 0.8 K/UL  
 ABS. BASOPHILS 0.0 0.0 - 0.2 K/UL  
 ABS. IMM. GRANS. 0.0 0.0 - 0.5 K/UL METABOLIC PANEL, COMPREHENSIVE Collection Time: 02/05/20  8:15 PM  
Result Value Ref Range Sodium 140 136 - 145 mmol/L Potassium 3.3 (L) 3.5 - 5.1 mmol/L Chloride 106 98 - 107 mmol/L  
 CO2 28 21 - 32 mmol/L Anion gap 6 (L) 7 - 16 mmol/L Glucose 100 65 - 100 mg/dL BUN 18 6 - 23 MG/DL Creatinine 0.86 0.8 - 1.5 MG/DL  
 GFR est AA >60 >60 ml/min/1.73m2 GFR est non-AA >60 >60 ml/min/1.73m2 Calcium 9.4 8.3 - 10.4 MG/DL Bilirubin, total 0.4 0.2 - 1.1 MG/DL  
 ALT (SGPT) 23 12 - 65 U/L  
 AST (SGOT) 31 15 - 37 U/L Alk. phosphatase 131 50 - 136 U/L Protein, total 7.5 6.3 - 8.2 g/dL Albumin 3.1 (L) 3.5 - 5.0 g/dL Globulin 4.4 (H) 2.3 - 3.5 g/dL A-G Ratio 0.7 (L) 1.2 - 3.5 PROTHROMBIN TIME + INR Collection Time: 02/05/20  8:15 PM  
Result Value Ref Range Prothrombin time 12.8 12.0 - 14.7 sec INR 0.9 MAGNESIUM Collection Time: 02/05/20  8:15 PM  
Result Value Ref Range Magnesium 2.2 1.8 - 2.4 mg/dL PHOSPHORUS Collection Time: 02/05/20  8:15 PM  
Result Value Ref Range Phosphorus 2.4 (L) 2.5 - 4.5 MG/DL  
EKG, 12 LEAD, INITIAL Collection Time: 02/05/20  9:16 PM  
Result Value Ref Range Ventricular Rate 85 BPM  
 Atrial Rate 85 BPM  
 P-R Interval 146 ms  
 QRS Duration 74 ms Q-T Interval 364 ms QTC Calculation (Bezet) 433 ms Calculated P Axis 78 degrees Calculated R Axis 81 degrees Calculated T Axis 75 degrees Diagnosis    
  !! AGE AND GENDER SPECIFIC ECG ANALYSIS !! Sinus rhythm with Premature supraventricular complexes Low voltage QRS Borderline ECG When compared with ECG of 04-FEB-2020 07:42, 
Premature ventricular complexes are no longer Present Premature supraventricular complexes are now Present Assessment:  
 
1- New onset seizure x3, generalized 2- Hypokalemia (2/5/2020) 3- Headache a/w uncontrolled HTN on arrival 
 
 
Plan:  
 
Admission Continue Keppra q12h 
EEG in am 
Benzodiazepines prn Seizure precautions KCl Follow Mg/ Phos Consider informing Oncology ot pt admission in am 
IVF hydration Blood pressure control AM lab as needed Continue essential home medications. Patient is full code. Further management depends on patient progress. Thank you for the oppourtinity to contribute in the care of your patient. Time 35 minutes. Signed By: Maribel Oshea MD   
 February 5, 2020

## 2020-02-06 NOTE — PROGRESS NOTES
Patient sitting up in bed. He yells out to the staff indicating his head is hurting so bad it might \"kill\" him. He suddenly becomes very quiet with eyes deviated to the left in \"dolls eyes\" fashion. Moving his head does not alter his gaze. This condition lasted five minutes as timed by patient's wife. Patient did not lose bladder function during this episode. Patient had smokeless tobacco in his mouth and I was able to remove this from the patient's mouth under protest during the episode. Patient closed his mouth and make a \"no\" sound when trying to remove tobacco. Patient was not confused after the episode. He was alert and oriented x 4 but he immediately complained of severe headache after the episode. Patient was medicated with Ativan 2 mg IV per order. Will continue to follow.

## 2020-02-06 NOTE — PROGRESS NOTES
Admission assessment complete. A/O x4. Respirations present, non-labored. Room air. No signs of distress noted. Left chest port not accessed. Peg tube locked in place. Telemetry in place. PIV infusing w/o difficulty. Safety measures in place. Oriented to room and surroundings. Bed lo, locked, call light in reach and alarm in place. Will continue to monitor.

## 2020-02-06 NOTE — PROGRESS NOTES
Patient with complaints of pain at a level of 7 (0-10 scale) requests pain medication. Medicated with Roxicodone 5 mg per peg.

## 2020-02-06 NOTE — CONSULTS
12-year-old man with a history of esophageal cancer who is being treated with chemotherapy and radiation. He has been experiencing severe sudden onset headaches which are holocephalic. From chart review the patient had 3 seizure-like episodes in the emergency department which were associated with urinary incontinence. The patient states that he has a severe headache and then begins shaking in all 4 limbs. He is awake during this time. He does not has impairment of consciousness. He also had an event in the morning in which he was staring off and \"out of it\" for 6 to 7 minutes. With these events of generalized shaking the patient did have urinary incontinence. CBC and CMP are fairly unremarkable. No leukocytosis. Neurological examination: Limited over computer. Speech and language are normal.  Comprehension is normal.  Face is symmetric. On motor examination he moves all 4 limbs against gravity. Assessment and plan 12-year-old man with seizure-like events in the setting of esophageal cancer. Fortunately, brain MRI does not show metastasis or leptomeningeal carcinomatosis. The events that the patient is having are unusual.  It is very rare for patients to convulse in all 4 limbs and have conscious awareness. His episode in the morning is also very prolonged. The average duration of the seizure is less than 2 minutes. A seizure lasting 6 to 7 minutes would be very unusual.  Differential diagnosis would also include convulsive syncope and disassociative events in the setting of severe pain from headache and anxiety in the setting of cancer. In the setting of cancer, seizures can be from a paraneoplastic cause. I will send for antibodies. We will obtain an EEG to see if the patient has epileptiform discharges. We will also obtain a CTA of the head and neck to investigate thunderclap headache. Continue Keppra.

## 2020-02-06 NOTE — PROGRESS NOTES
Nutrition Assessment for:  
Best Practice Alert for EN/PN PTA Assessment:  
Diet: DIET TUBE FEEDING Jevity DIET REGULAR Food/Nutrition Patient History:  The patient is noted to have a h/o esophageal cancer with PEG tube. He is tube feeding dependent and followed by Nohemi Cantrell outpatient oncology dietitian. Per Rosy's note he was doing bolus feedings of Jevity 1.5 and CIB w/ whole milk - goal 5-6 per day, but pt maintaining weight w/ current intake (3 Jevity 1.5 and 1 CIB). He has refused PEG tube feeds so far this admission and requested a regular diet. He is on a regular diet and ordering food items. Patient is likely maintaining weight outpatient due to consumption of food orally. He is currently admitted due to seizure activity. Neurology is following and MRI revealed brain is free of mets at this time. RD attempted to speak with patient during RD rounds. The patient did not respond to questions and only had a glazed look. During IDT rounds it was discussed that according to the patient's wife he has started consuming food via mouth and would like to continue with this. Speech therapy will be consulted to ensure he is safe to consume foods via oral route. FT in place,  Abdomen WDL. Date of Last BM: unknown. Pertinent Medications: Keppra, Ativan Pertinent labs: 
Lab Results Component Value Date/Time Sodium 140 02/05/2020 08:15 PM  
 Potassium 3.3 (L) 02/05/2020 08:15 PM  
 Chloride 106 02/05/2020 08:15 PM  
 CO2 28 02/05/2020 08:15 PM  
 Anion gap 6 (L) 02/05/2020 08:15 PM  
 Glucose 100 02/05/2020 08:15 PM  
 BUN 18 02/05/2020 08:15 PM  
 Creatinine 0.86 02/05/2020 08:15 PM  
 Calcium 9.4 02/05/2020 08:15 PM  
 Albumin 3.1 (L) 02/05/2020 08:15 PM  
 Phosphorus 2.4 (L) 02/05/2020 08:15 PM  
 
Anthropometrics: 
Height: 6' 2.5\" (189.2 cm), Weight: 96.2 kg (212 lb),  , Body mass index is 26.86 kg/m². BMI class of overweight. Macronutrient needs: (using CBW (Current body weight) unknown 96.2 kg) EER: 0592-5873 kcal/day 20-25 kcal/kg EPR:  g/day 1-1.3 g/kg Max CHO:  331 grams/day (55% kcal) Fluid:  1ml/kcal 
 
Intake/Comparative standards: Too be determined-no meal intakes have been recorded at this time and patient is refusing PEG tube feeds right now. Nutrition Diagnosis:  
Swallowing difficulty related to esophageal cancer  as evidenced by Patient with a PEG tube for majority of nutrition. Intervention: 
Meals and snacks: Continue regular diet however consider consulting speech therapy to ensure patient is safe to swallow. EN:  As pt allows, start bolus TF of 2 brick packs of Jevity 1.2 with 60 ml water flush after each feeding 4 times a day to provide 2275 kcal/day (100% of needs), 105 grams protein/day (100% of needs), 319 grams CHO/day (does not exceed max CHO),  and ~ 1800ml free water/day (93% of needs). Coordination of nutrition care: IDT rounds Discharge Nutrition Plan: Too soon to determine. Rena Cooley 87, 64 [address],   
[phone]

## 2020-02-07 NOTE — PROGRESS NOTES
Assessment done via doc flow sheet. Pt resting quietly in bed, respiraitons easy & regular, no c/o headache made. PEG tube intact. Fall precautions in place. Wife at bedside. Will monitor.

## 2020-02-07 NOTE — CONSULTS
Routine EEG Report Reason for EEG: Seizure-like activity Current Facility-Administered Medications:  
  HYDROmorphone (PF) (DILAUDID) injection 1 mg, 1 mg, IntraVENous, ONCE, Margarito Boss DO 
  diphenhydrAMINE (BENADRYL) injection 25 mg, 25 mg, IntraVENous, Q6H PRN, Margarito Boss DO, 25 mg at 02/07/20 1007   metoclopramide HCl (REGLAN) injection 10 mg, 10 mg, IntraVENous, Q6H, Cyrus Aj MD, 10 mg at 02/07/20 1025 
  [COMPLETED] methylPREDNISolone (MEDROL) tablet 8 mg, 8 mg, Oral, NOW, 8 mg at 02/07/20 1025 **AND** [START ON 2/8/2020] methylPREDNISolone (MEDROL) tablet 4 mg, 4 mg, Oral, DAILY WITH BREAKFAST **AND** methylPREDNISolone (MEDROL) tablet 4 mg, 4 mg, Oral, PCL, 4 mg at 02/07/20 1258 **AND** methylPREDNISolone (MEDROL) tablet 4 mg, 4 mg, Oral, QPM **AND** methylPREDNISolone (MEDROL) tablet 8 mg, 8 mg, Oral, QHS **AND** [START ON 2/9/2020] methylPREDNISolone (MEDROL) tablet 4 mg, 4 mg, Oral, QHS, Margarito Boss DO 
  oxyCODONE (ROXICODONE) 5 mg/5 mL oral solution 5 mg, 5 mg, Oral, Q4H PRN, Margarito Boss DO, 5 mg at 02/06/20 1415   levETIRAcetam (KEPPRA) tablet 1,000 mg, 1,000 mg, Oral, BID, Cyrus Aj MD, 1,000 mg at 02/07/20 3473 
  0.9% sodium chloride infusion, 100 mL/hr, IntraVENous, CONTINUOUS, Margarito Boss DO, Last Rate: 100 mL/hr at 02/07/20 0725, 100 mL/hr at 02/07/20 0725   LORazepam (ATIVAN) injection 2 mg, 2 mg, IntraVENous, Q2H PRN, Russ Lisa MD, 2 mg at 02/06/20 4422   morphine injection 2 mg, 2 mg, IntraVENous, Q4H PRN, Russ Lisa MD, 2 mg at 02/07/20 1258   allopurinoL (ZYLOPRIM) tablet 100 mg, 100 mg, Oral, DAILY, Cyrus Aj MD, 100 mg at 02/07/20 7877 
  albuterol-ipratropium (DUO-NEB) 2.5 MG-0.5 MG/3 ML, 3 mL, Nebulization, Q4H PRN, Russ Lisa MD 
  cloNIDine HCL (CATAPRES) tablet 0.2 mg, 0.2 mg, Oral, BID PRN, Russ Lisa MD 
  sodium chloride (NS) flush 5-40 mL, 5-40 mL, IntraVENous, Q8H, Madai, Beatriz Duke MD, 5 mL at 02/07/20 3919   sodium chloride (NS) flush 5-40 mL, 5-40 mL, IntraVENous, PRN, Aubrey Otero MD 
  acetaminophen (TYLENOL) tablet 650 mg, 650 mg, Oral, Q4H PRN, Aubrey Otero MD 
  HYDROcodone-acetaminophen (NORCO) 7.5-325 mg per tablet 1 Tab, 1 Tab, Oral, Q4H PRN, Aubrey Otero MD, 1 Tab at 02/05/20 2328   naloxone (NARCAN) injection 0.4 mg, 0.4 mg, IntraVENous, PRN, Aubrey Otero MD 
  diphenhydrAMINE (BENADRYL) capsule 25 mg, 25 mg, Oral, Q4H PRN, Aubrey Otero MD 
  ondansetron [facility]) injection 4 mg, 4 mg, IntraVENous, Q4H PRN, Aubrey Otero MD, 4 mg at 02/07/20 0831 
  magnesium hydroxide (MILK OF MAGNESIA) 400 mg/5 mL oral suspension 30 mL, 30 mL, Oral, DAILY PRN, Aubrey Otero MD 
  nicotine (NICODERM CQ) 21 mg/24 hr patch 1 Patch, 1 Patch, TransDERmal, DAILY PRN, Aubrey Otero MD 
 
 
Technical Summary: This EEG was performed with a 32-channel digital EEG machine with electrodes placed according to the international 10-20 system of placement. Background:  
During the maximal awake state a posterior dominant rhythm of 10 Hz was seen. It was well regulated and well sustained, symmetric, and reactive to eye opening. Anterior background consisted of medium amplitude activity in the alpha range with occasional intermixed beta frequencies. Hyperventilation: Hyperventilation was not performed due to medical condition Photic Stimulation: Photic stimulation was not performed due to medical condition Sleep: Stage II non-REM sleep was characterized by symmetric vertex sharp transients and symmetric sleep spindles. Impression: This EEG is normal in the awake and sleep states. No interictal epileptiform discharges or lateralizing features were seen.

## 2020-02-07 NOTE — PROGRESS NOTES
Hospitalist Progress Note 2020 Admit Date: 2020  7:34 PM  
NAME: Raquel Chen :  1965 MRN:  [id number] Attending: Roshni De Leon MD 
PCP:  Jessica Arvizu MD 
 
SUBJECTIVE:  
 
Patient is a 60years old male with history of esophegeal cancer, currently on radioRx, COPD and on tubal feeding for 1 year, presents with headaches for last 2-3 days. He had a fall this week and in ED, his MRI brain is negative. He used to be treated for HTN until 3 months ago then his meds were stopped due to continuous hypotension. His BP was initially elevated on arrival. He lost over 200 lb in last 12 months. In ED, the pt had 3 epsisoeds of generalized seizure like activity witnessed by staff including ED physician, w/ urinary incontinence and followed by period of confusion. No known hx of seizures and no alcohol use. Interval History (): patient examined at bedside. No acute overnight events but patient says he still having \"horrible headaches\" that are the same in presentation. He says \"on a scale of 1 to 10, these are like a 15. \" No associated blurry vision, numbness/tingling/weakness of arms or legs, chest pain, shortness of breath, abdominal pain or diarrhea. Review of Systems negative with exception of pertinent positives noted above PHYSICAL EXAM  
 
Visit Vitals /89 Pulse 70 Temp 96 °F (35.6 °C) Resp 18 Ht 6' 2.5\" (1.892 m) Wt 96.2 kg (212 lb) SpO2 92% BMI 26.86 kg/m² Temp (24hrs), Av.4 °F (36.3 °C), Min:96 °F (35.6 °C), Max:98.4 °F (36.9 °C) Oxygen Therapy O2 Sat (%): 92 % (20 07) Pulse via Oximetry: 89 beats per minute (20) O2 Device: Room air (20) Intake/Output Summary (Last 24 hours) at 2020 1306 Last data filed at 2020 5916 Gross per 24 hour Intake  Output 600 ml Net -600 ml General: No acute distress   
HEENT:  Noted bruise to back of head that is nontender to palpation Lungs:  CTA Bilaterally. Heart:  Regular rate and rhythm,  No murmur, rub, or gallop Abdomen: Soft, Non distended, Non tender, Positive bowel sounds Extremities: No cyanosis, clubbing or edema Neurologic:  No focal deficits. CN II thru XII intact b/l. ASSESSMENT Active Hospital Problems Diagnosis Date Noted  Other dysphagia 02/06/2020  New onset seizure ([facility] [address] 02/05/2020  Hypokalemia 02/05/2020  Malignant neoplasm of lower third of esophagus ([facility] [address] 02/19/2019  Essential hypertension with goal blood pressure less than 140/90 08/17/2016 Plan: # Seizure-like activity in the setting of recent falls with hitting of his head - ?post-concussive syndrome vs complex migraine 
- neurology consulted, low suspicion for seizures 
- will change pain medication to metoclopramide 10 mg IV q6h, Benadryl 25 mg IV q6h, and Medrol dose pack per neurology recs 
- EEG unremarkable 
- stop Keppra - CT head/neck unremarkable 
- continue with pain management as above - MRI of the head from several days ago is negative for metastases from known esophageal cancer # Esophageal cancer - currently undergoing radiation therapy, CT imaging showing spread of cancer 
- needs follow-up with Heme/Onc following hospital discharge # Chronic dysphagia, due to esophageal cancer - patient has been on tube feeds long-term but wants to try oral route 
- ordered speech consult for swallow evaluation F/E/N: maintenance fluids, replete electrolytes as needed, diet per speech evaluation Ppx: SCDs for VTE Code Status: FULL CODE Disposition: pending clinical improvement with anticipated discharge in 1-2 days. Discussed at length with patient and wife at bedside. All questions answered. Signed By: Beverly Smith DO February 7, 2020

## 2020-02-07 NOTE — PROGRESS NOTES
Morphine 2 mg IV given as ordered for c/o headache. Zofran also given for nausea. PEG feeding not done,  pt refused.

## 2020-02-07 NOTE — PROGRESS NOTES
Patient resting in recliner. Spouse at bedside. Respirations present, non-labored. Room air. No signs of distress noted. PEG tube in place. Left chest port in place. PIV infusing w/o difficulty. Safety measures in place. Will continue to monitor.

## 2020-02-07 NOTE — PROGRESS NOTES
Problem: Falls - Risk of 
Goal: *Absence of Falls Description Document Doyle Clarkner Fall Risk and appropriate interventions in the flowsheet. Outcome: Progressing Towards Goal 
Note: Fall Risk Interventions: 
Mobility Interventions: Bed/chair exit alarm, Patient to call before getting OOB Medication Interventions: Bed/chair exit alarm, Teach patient to arise slowly Elimination Interventions: Bed/chair exit alarm, Call light in reach, Patient to call for help with toileting needs History of Falls Interventions: Bed/chair exit alarm, Consult care management for discharge planning, Room close to nurse's station Problem: Seizure Disorder (Adult) Goal: *STG: Maintains lab values within therapeutic range Outcome: Progressing Towards Goal 
Goal: *STG/LTG: Complies with medication therapy Outcome: Progressing Towards Goal 
Goal: *STG: Remains free of injury during seizure activity Outcome: Progressing Towards Goal 
Goal: *STG: Remains safe in hospital 
Outcome: Progressing Towards Goal

## 2020-02-07 NOTE — PROGRESS NOTES
Care Management Interventions Current Support Network: Lives with Spouse Confirm Follow Up Transport: Family [employer] Information Provided?: No 
Discharge Location Discharge Placement: Home SW met spouse in the room while pt was sleeping. Pt has been on tube feeds for 1 year. Spouse states she works and pt has been independent at home up until now. Spouse states she has FMLA which has been approved if needed. Pt aware SW available 7 days a week. SW to follow for any anticipated dc needs. CHERI PabloW

## 2020-02-07 NOTE — PROGRESS NOTES
Initial visit by  to convey care and concern and encourage patient that  services are available if desired. I spoke with patient's wife during the visit. MrWilliam Torres was complaining of a headache according to wife. No spiritual needs were voiced during the visit. Provided 's business card for future reference. Chaplains remain available for support. Berry Null MDiv Board Certified [address] [place] [address]

## 2020-02-08 PROBLEM — R51.9 INTRACTABLE HEADACHE: Status: ACTIVE | Noted: 2020-01-01

## 2020-02-08 NOTE — PROGRESS NOTES
Shift assessment done. Pt in bed, watching tv. Respirations even and unlabored. No acute signs of distress noted. Denies needs this time. Instructed to call for assistance when needed. Bed low and locked. Bed alarm on. Call light in reach. Will continue to monitor.

## 2020-02-08 NOTE — PROGRESS NOTES
Hospitalist Progress Note 2020 Admit Date: 2020  7:34 PM  
NAME: Jeb Nicolas :  1965 MRN:  [id number] Attending: Zenaida Freeman MD 
PCP:  Harley Polo MD 
 
SUBJECTIVE:  
 
Patient is a 60years old male with history of esophegeal cancer, currently on radioRx, COPD and on tubal feeding for 1 year, presents with headaches for last 2-3 days. He had a fall this week and in ED, his MRI brain is negative. He used to be treated for HTN until 3 months ago then his meds were stopped due to continuous hypotension. His BP was initially elevated on arrival. He lost over 200 lb in last 12 months. In ED, the pt had 3 epsisoeds of generalized seizure like activity witnessed by staff including ED physician, w/ urinary incontinence and followed by period of confusion. No known hx of seizures and no alcohol use. Interval History (): patient examined at bedside. No acute events overnight, still having really bad headaches of the same presentation without any changes. Later on in the morning, patient up in chair and was unresponsive again, similar in presentation to several days ago. He had started Depakote a few minutes before (1500 mg IV was started per neurology recommendations for his continued headaches). He had received Reglan/Benadryl combination about 30 minutes prior to the event. Depakote stopped and Ativan 2 mg IV was given after 2 minutes. Code S called and STAT CT negative for acute finding. STAT teleneurology consulted with recommendations to restart Keppra. Review of Systems negative with exception of pertinent positives noted above PHYSICAL EXAM  
 
Visit Vitals /76 (BP 1 Location: Left arm, BP Patient Position: Sitting) Pulse (!) 104 Temp 97 °F (36.1 °C) Resp 18 Ht 6' 2.5\" (1.892 m) Wt 96.2 kg (212 lb) SpO2 95% BMI 26.86 kg/m² Temp (24hrs), Av.8 °F (36.6 °C), Min:97 °F (36.1 °C), Max:98.5 °F (36.9 °C) Oxygen Therapy O2 Sat (%): 95 % (02/08/20 1040) Pulse via Oximetry: 89 beats per minute (02/05/20 2208) O2 Device: Room air (02/07/20 0730) No intake or output data in the 24 hours ending 02/08/20 1237 General: No acute distress, sleepy after receiving Ativan HEENT:  Noted bruise to back of head that is nontender to palpation Lungs:  CTA Bilaterally. Heart:  Regular rate and rhythm,  No murmur, rub, or gallop Abdomen: Soft, Non distended, Non tender, Positive bowel sounds Extremities: No cyanosis, clubbing or edema Neurologic:  No focal deficits. CN II thru XII intact b/l. ASSESSMENT Active Hospital Problems Diagnosis Date Noted  Other dysphagia 02/06/2020  New onset seizure ([facility] [address] 02/05/2020  Hypokalemia 02/05/2020  Malignant neoplasm of lower third of esophagus ([facility] [address] 02/19/2019  Essential hypertension with goal blood pressure less than 140/90 08/17/2016 Plan: # Seizure-like activity in the setting of recent falls with hitting of his head - ?post-concussive syndrome vs complex migraine vs ?seizures 
- teleneurology reconsulted, recommend restarting Keppra 500 mg IV BID 
- repeat STAT CT head negative for acute etiology 
- stop Depakote 
- continue metoclopramide 10 mg IV q6h, Benadryl 25 mg IV q6h, and Medrol dose pack per neurology recs 
- EEG unremarkable but patient with suspected seizure-like activity, repeat teleneurology consult recommends restarting Keppra 
- continue with pain management as above - MRI of the head from several days ago is negative for metastases from known esophageal cancer # Esophageal cancer with interval worsening of mediastinal/neck lymphadenopathy suggestive of spread - currently undergoing radiation therapy, CT imaging showing spread of cancer 
- needs follow-up with Heme/Onc following hospital discharge # Chronic dysphagia, due to esophageal cancer - patient has been on tube feeds long-term but wants to try oral route - ordered speech consult for swallow evaluation F/E/N: stop fluids, replete electrolytes as needed, diet per speech evaluation Ppx: SCDs for VTE Code Status: FULL CODE Disposition: pending clinical improvement with anticipated discharge in 1-2 days. Discussed at length with patient and wife at bedside. All questions answered. Signed By: Susan Razo DO February 8, 2020

## 2020-02-08 NOTE — PROGRESS NOTES
SW met with patient and his wife. Patient lives with his wife at home, they have a son who lives on the same property. Prior to \"seizures\" patient was completely independent and did not use assistive devices. Patient's wife states that he has been able to ambulate to the restroom since being admitted. Unsure of discharge needs at this time, will await additional consults and then meet with patient and wife to discuss recommendations. YAMILET Xie  [facility] [email or web address]

## 2020-02-08 NOTE — PROGRESS NOTES
In pt's room hanging his Depacon. Pt was alert & talking when I came into the room moments prior. Just as I was hanging the Depacon I noted pt appeared to be having a seizure; pt's eyes were open; not responding; appeared \"zoned out\". Called MD Dr. Blanca Merrill to the room; VS taken; 144/76, 104, 18, 95% on room air. . Called a code stroke; 2 mg IV Ativan given @1037. Pt taken down for CT scan @1050. IV Depacon stopped @1035.

## 2020-02-08 NOTE — PROGRESS NOTES
Code \"S\" called. Patient to tx to CT. SW following for new needs. YAMILET Urena  [email or web address]

## 2020-02-08 NOTE — PROGRESS NOTES
Pt having another seizure episode. In the chair with eyes rolled back & mouth open. Does not respond to questions. Wife at bedside. EF=822/88, 97, 97.7, 96%, 18-20 resp. MD notified. Will move into bed when safe & appropriate.

## 2020-02-08 NOTE — PROGRESS NOTES
Problem: Falls - Risk of 
Goal: *Absence of Falls Description Document Ash Josue Fall Risk and appropriate interventions in the flowsheet. Outcome: Progressing Towards Goal 
Note: Fall Risk Interventions: 
Mobility Interventions: Bed/chair exit alarm, Patient to call before getting OOB, PT Consult for mobility concerns, Strengthening exercises (ROM-active/passive) Medication Interventions: Assess postural VS orthostatic hypotension, Bed/chair exit alarm, Patient to call before getting OOB, Teach patient to arise slowly Elimination Interventions: Bed/chair exit alarm, Call light in reach, Patient to call for help with toileting needs, Stay With Me (per policy), Toileting schedule/hourly rounds, Urinal in reach History of Falls Interventions: Bed/chair exit alarm, Door open when patient unattended, Investigate reason for fall, Room close to nurse's station Problem: Patient Education: Go to Patient Education Activity Goal: Patient/Family Education Outcome: Progressing Towards Goal 
  
Problem: Seizure Disorder (Adult) Goal: *STG: Remains free of seizure activity Outcome: Progressing Towards Goal 
Goal: *STG: Maintains lab values within therapeutic range Outcome: Progressing Towards Goal 
Goal: *STG/LTG: Complies with medication therapy Outcome: Progressing Towards Goal 
Goal: *STG: Remains free of injury during seizure activity Outcome: Progressing Towards Goal 
Goal: *STG: Remains safe in hospital 
Outcome: Progressing Towards Goal 
Goal: Interventions Outcome: Progressing Towards Goal 
  
Problem: Patient Education: Go to Patient Education Activity Goal: Patient/Family Education Outcome: Progressing Towards Goal 
  
Problem: Nutrition Deficit Goal: *Optimize nutritional status Outcome: Progressing Towards Goal

## 2020-02-08 NOTE — PROGRESS NOTES
Called to pts room. Pt started having another seizure @1450. VS= 97.7, 100, 18, 96%, 168/114Pt in the recliner with wife at bedside. Dano Rg. MD ordered another 500 mg IV Keppra to be given now & to continue to monitor VS.

## 2020-02-08 NOTE — PROGRESS NOTES
Received pt from ARTEM Mcneal) in stable condition. Pt in bed resting quietly. Resp even & unlabored on room air; no acute signs of distress noted. Bed low & locked; call light in reach; no needs voiced. Wife at bedside.

## 2020-02-08 NOTE — PROGRESS NOTES
Speech Pathology Note: 
 
Orders received and chart reviewed. Patient with complaints of severe headache and not willing to try PO this date. Family present reports patient had \"just started back eating some by mouth, mostly liquids\" due to esophageal cancer treatments. Family reports \"he ate cottage cheese and diced fruit but the fruit got stuck for a while and he couldn't get liquids down. \" Report they mostly utilize the feeding tube as a main source of intake. From family reports, does not appear to be an oropharyngeal dysphagia but rather dysphagia purely related to esophageal dysfunction. Will follow for ability to complete full bedside swallow evaluation. Thank you for this consult. Daina Munoz.  MS Penelope, CCC-SLP

## 2020-02-08 NOTE — PHYSICIAN ADVISORY
[facility] Physician Advisor Recommendation The information in this document is a recommendation to be used for utilization review and utilization management purposes only. This recommendation is not an order. The recommendation is made based on the information reviewed at the time of the referral, is pursuant to the [place] [facility] Conditions of Participation (42 CFR Part 482), and is neither a judgment nor an assessment with regard to the appropriateness or quality of clinical care. Nothing in this document may be used to limit, alter, or affect clinical services provided to the patient named below. The provider of services is ultimately responsible for the submission of a claim that has met all requirements for correct coding, billing, and reimbursement. Letter of Status Determination:  
Recommend hospitalization status upgraded from OBSERVATION  to INPATIENT Status Pt Name:  Sesar Orlando MR#  
 [id number] [facility]#  [id number] [facility]  353/01  @ [address] [place] [address] Hospitalization date  2/5/2020  7:34 PM  
Current Attending Physician  Stephanie Dunham MD  
Principal diagnosis  New onset seizure [facility]) [R56.9] New onset seizure ([facility] [R56.9] Clinicals  Patient is 19  old male with history of esophegeal cancer, currently on radioRx, COPD and on tubal feeding for 1 year, presents with headaches for last 2-3 days. He had a fall this week and in ED, his MRI brain is negative. He used to be treated for HTN until 3 months ago then his meds were stopped due to continuous hypotension. His BP was initially elevated on arrival. He lost over 200 lb in last 12 months. In ED, the pt had 3 epsisoeds of generalized seizure like activity witnessed by staff including ED physician, w/ urinary incontinence and followed by period of confusion. No known hx of seizures and no alcohol use. 
  
Interval History (2/8): patient examined at bedside.  No acute events overnight, still having really bad headaches of the same presentation without any changes. Later on in the morning, patient up in chair and was unresponsive again, similar in presentation to several days ago. He had started Depakote a few minutes before (1500 mg IV was started per neurology recommendations for his continued headaches). He had received Reglan/Benadryl combination about 30 minutes prior to the event. Depakote stopped and Ativan 2 mg IV was given after 2 minutes. Code S called and STAT CT negative for acute finding. STAT teleneurology consulted with recommendations to restart Keppra. STATUS DETERMINATION Additional comments Payor: [facility] / Plan: [address] / Product Type: PPO /   
  
Kari Balderrama MD, DAVID Physician [address]

## 2020-02-09 NOTE — PROGRESS NOTES
Shift assessment done. Pt in bed, resting. Respirations even and unlabored. No active seizure noted. No acute distress noted. Wife at bedside. Call light in reach. Safety measures provided. Will continue to monitor.

## 2020-02-09 NOTE — PROGRESS NOTES
Hospitalist Progress Note   
2020 Admit Date: 2020  7:34 PM  
NAME: Kade Spence Meter :  1965 MRN:  [id number] Attending: Jovani Zhou MD 
PCP:  Munira Carrasco MD 
 
SUBJECTIVE:  
 
Patient is a 60years old male with history of esophegeal cancer, currently on radioRx, COPD and on tubal feeding for 1 year, presents with headaches for last 2-3 days. He had a fall this week and in ED, his MRI brain is negative. He used to be treated for HTN until 3 months ago then his meds were stopped due to continuous hypotension. His BP was initially elevated on arrival. He lost over 200 lb in last 12 months. In ED, the pt had 3 epsisoeds of generalized seizure like activity witnessed by staff including ED physician, w/ urinary incontinence and followed by period of confusion. No known hx of seizures and no alcohol use. Throughout his hospitalization, patient still having really bad headaches of the same presentation without any changes. Patient has had episodes of \"unresponsiveness\" again, similar in presentation to several days ago. Reconsult with teleneurology with recommendations to restart Keppra (he had EEG several days ago that was not suggestive of seizures). It is unclear if it is his headaches causing these symptoms or if he is truly having seizures. His CT and MRI imaging have been unremarkable. Interval History (): patient examined at bedside. No acute overnight events and much more sleepy today compared to yesterday. When he wakes up he immediately starts complaining of headaches. No fevers/chills, chest pain, shortness of breath, or abdominal pain. Review of Systems negative with exception of pertinent positives noted above PHYSICAL EXAM  
 
Visit Vitals /80 (BP 1 Location: Right arm, BP Patient Position: Sitting) Pulse (!) 112 Temp 98.5 °F (36.9 °C) Resp 20 Ht 6' 2.5\" (1.892 m) Wt 96.2 kg (212 lb) SpO2 96% BMI 26.86 kg/m² Temp (24hrs), Av °F (36.7 °C), Min:97.4 °F (36.3 °C), Max:98.7 °F (37.1 °C) Oxygen Therapy O2 Sat (%): 96 % (20 1125) Pulse via Oximetry: 89 beats per minute (20 2208) O2 Device: Room air (20 0730) Intake/Output Summary (Last 24 hours) at 2020 1314 Last data filed at 2020 1836 Gross per 24 hour Intake 200 ml Output  Net 200 ml General: No acute distress, sleepy appearing but awakens to voice HEENT:  Noted bruise to back of head that is nontender to palpation with interval improvement Lungs:  CTA Bilaterally. Heart:  Regular rate and rhythm,  No murmur, rub, or gallop Abdomen: Soft, Non distended, Non tender, Positive bowel sounds Extremities: No cyanosis, clubbing or edema Neurologic:  No focal deficits. CN II thru XII intact b/l. ASSESSMENT Active Hospital Problems Diagnosis Date Noted  Intractable headache 2020  Other dysphagia 2020  New onset seizure ([facility] 2020  Hypokalemia 2020  Malignant neoplasm of lower third of esophagus ([address] 2019  Essential hypertension with goal blood pressure less than 140/90 2016 Plan: # Seizure-like activity in the setting of recent falls with hitting of his head - ?post-concussive syndrome vs complex migraine vs ?seizures 
- teleneurology reconsulted on 2020, recommend restarting Keppra 500 mg IV BID, will continue 
- repeat STAT CT head negative for acute etiology and MRI of the head prior to admission does not show metastases  
- stopped Depakote (was going to use for headaches and not for seizures) 
- stop Reglan, Benadryl, and steroids 
- EEG unremarkable but patient with suspected seizure-like activity, repeat teleneurology consult recommends restarting Keppra 
- continue with pain management as above # Esophageal cancer with interval worsening of mediastinal/neck lymphadenopathy suggestive of spread - currently undergoing radiation therapy, CT imaging showing spread of cancer 
- needs follow-up with Heme/Onc following hospital discharge # Chronic dysphagia, due to esophageal cancer - patient has been on tube feeds long-term but wants to try oral route 
- ordered speech consult for swallow evaluation F/E/N: stop fluids, replete electrolytes as needed, diet per speech evaluation Ppx: SCDs for VTE Code Status: FULL CODE Disposition: pending clinical improvement with anticipated discharge in 1-2 days. Discussed at length with patient and wife at bedside. All questions answered. Signed By: Sam Mo DO February 9, 2020

## 2020-02-09 NOTE — PROGRESS NOTES
Problem: Falls - Risk of 
Goal: *Absence of Falls Description Document Moni Suggs Fall Risk and appropriate interventions in the flowsheet. Outcome: Progressing Towards Goal 
Note: Fall Risk Interventions: 
Mobility Interventions: Bed/chair exit alarm, Patient to call before getting OOB, Utilize walker, cane, or other assistive device Medication Interventions: Bed/chair exit alarm, Patient to call before getting OOB, Teach patient to arise slowly Elimination Interventions: Bed/chair exit alarm, Call light in reach, Patient to call for help with toileting needs, Toileting schedule/hourly rounds History of Falls Interventions: Bed/chair exit alarm, Door open when patient unattended, Room close to nurse's station Problem: Patient Education: Go to Patient Education Activity Goal: Patient/Family Education Outcome: Progressing Towards Goal 
  
Problem: Seizure Disorder (Adult) Goal: *STG: Remains free of seizure activity Outcome: Not Progressing Towards Goal 
Note:  
Pt still having \"seizure\" like episodes. Goal: *STG: Maintains lab values within therapeutic range Outcome: Progressing Towards Goal 
Goal: *STG/LTG: Complies with medication therapy Outcome: Progressing Towards Goal 
Goal: *STG: Remains free of injury during seizure activity Outcome: Progressing Towards Goal 
Goal: *STG: Remains safe in hospital 
Outcome: Progressing Towards Goal 
  
Problem: Nutrition Deficit Goal: *Optimize nutritional status Outcome: Progressing Towards Goal

## 2020-02-09 NOTE — PROGRESS NOTES
Pt in the recliner resting quietly. No episodes of seizure activity noted since Ativan was given. Appears calm & non-anxious. Wife at bedside.

## 2020-02-09 NOTE — PROGRESS NOTES
SPEECH LANGUAGE PATHOLOGY: DYSPHAGIA- Initial Assessment and Discharge NAME/AGE/GENDER: Sarah Michaels is a 60 y.o. male DATE: 2/9/2020 PRIMARY DIAGNOSIS: New onset seizure ([address] [R56.9] New onset seizure ([address] [R56.9] Intractable headache [R51] ICD-10: Treatment Diagnosis: R13.19 Other Dysphagia INTERDISCIPLINARY COLLABORATION: Registered Nurse and Physician PRECAUTIONS/ALLERGIES: Patient has no known allergies. SUBJECTIVE Complains of 12/10 headache pain. History of Present Injury/Illness: Mr. Jarred Hopkins  has a past medical history of Appendicitis, Chewing tobacco use, Chronic obstructive pulmonary disease ([address] Chronic pain, Depression, Erectile disorder due to medical condition in male patient, Esophageal cancer ([address] Former cigarette smoker, Gout, H/O heart artery stent, History of MI (myocardial infarction) (2014), Hyperlipidemia, Hypertension, Hypotestosteronemia, Morbid obesity ([address] KORINA (obstructive sleep apnea), and Osteoarthritis. Manuel Fernandez He also  has a past surgical history that includes hx appendectomy (2003); hx carpal tunnel release (Right, 2014); hx coronary stent placement (2014); hx coronary stent placement (2016); colonoscopy (N/A, 2/5/2019); hx vascular access; and ir insert gastrostomy tube perc (11/21/2019). Problem List:  (Impairments causing functional limitations): 1. Esophageal dysphagia 2. Previous Dysphagia: YES PEG placed due to dysphagia - recently able to \"eat again\" per wife Diet Prior to Evaluation: regular/thin per patient desire Orientation:  
Person Place Time Situation Pain: Pain Scale 1: Numeric (0 - 10) Pain Intensity 1: 10 
Pain Location 1: Head 
Pain Intervention(s) 1: Medication (see MAR) Cognitive-Linguistic Screen: 
? Speech Production:  
o WNL ? Expressive Language: 
o WNL 
o   
? Receptive Language: 
o WNL 
o   
? Cognition:  
o Unable to assess OBJECTIVE Oral Motor:  
· Labial: No impairment · Dentition: Intact · Oral Hygiene: Adequate · Lingual: No impairment Swallow assessment: 
 Patient presented with thin liquids via straw sip. Refused all other trials. Swallow prompt. Single swallow palpated per bolus presentation. Oropharyngeal function appears within normal limits. Wife reports he eats \"regular\" foods and when things get stuck he either waits or brings it back up. Ate a hot dog last night followed by ice cream with some vomiting post.  
  
 
 
ASSESSMENT Patient presents with what appears to be purely esophageal based dysphagia - although he refuses any solids with SLP. Will defer to GI. Patient has PEG which is not utilized often per their report - encouraged to re-assess use as from his/his wife's description he is not taking in adequate nutrition/hydration by mouth. No further acute speech therapy needs identified. Tool Used: Dysphagia Outcome and Severity Scale (GERMÁN) Score Comments Normal Diet  [] 7 With no strategies or extra time needed Functional Swallow  [x] 6 May have mild oral or pharyngeal delay Mild Dysphagia  [] 5 Which may require one diet consistency restricted Mild-Moderate Dysphagia  [] 4 With 1-2 diet consistencies restricted Moderate Dysphagia  [] 3 With 2 or more diet consistencies restricted Moderate-Severe Dysphagia  [] 2 With partial PO strategies (trials with ST only) Severe Dysphagia  [] 1 With inability to tolerate any PO safely Score:  Initial: 6 Most Recent: x (Date 02/09/20 ) Interpretation of Tool: The Dysphagia Outcome and Severity Scale (GERMÁN) is a simple, easy-to-use, 7-point scale developed to systematically rate the functional severity of dysphagia based on objective assessment and make recommendations for diet level, independence level, and type of nutrition. Current Medications: No current facility-administered medications on file prior to encounter. Current Outpatient Medications on File Prior to Encounter Medication Sig Dispense Refill  Lactose-Free Food with Fiber (JEVITY 1.5 MELITON) 0.06 gram-1.5 kcal/mL liqd 6 Bottles by PEG Tube route daily for 100 days. Bolus Feeds, goal 6/day to provide 2130 kcal, 91 gm pro, 1080 ml. Needs additional 36 oz/day for hydration. JESSICA > 3 months. Indications: blockage of the esophagus, dysphagia 180 Bottle 4  
 oxyCODONE IR (ROXICODONE) 10 mg tab immediate release tablet Take 1 Tab by mouth every six (6) hours as needed for Pain for up to 5 days. Max Daily Amount: 40 mg. 20 Tab 0  
 midodrine (PROAMITINE) 5 mg tablet Take 1 Tab by mouth two (2) times daily (with meals) for 30 days. Indications: a feeling of dizziness upon standing due to a drop in blood pressure 60 Tab 1  
 sucralfate (CARAFATE) 1 gram tablet Take 1 Tab by mouth four (4) times daily. 60 Tab 0  
 artificial saliva (MOUTH KOTE) spra Take 1 Spray by mouth as needed for Pain. 240 mL 1  
 acyclovir (ZOVIRAX) 5 % ointment Apply thin layer of cream to effected areas 4-5- times a day 5 g 3  potassium chloride (KAON 20%) 40 mEq/15 mL liqd Daily X 2 days through his feeding tube  Indications: prevention of low potassium in the blood 120 mL 0  
 cpap machine kit by Does Not Apply route.  Oxygen at bedtime as directed for dose pack.  ezetimibe (ZETIA) 10 mg tablet Take 10 mg by mouth daily.  allopurinol (ZYLOPRIM) 100 mg tablet Take 1 Tab by mouth daily. 90 Tab 3  
 metoprolol tartrate (LOPRESSOR) 50 mg tablet Take 1 Tab by mouth two (2) times a day. 180 Tab 3  cholecalciferol, VITAMIN D3, (VITAMIN D3) 5,000 unit tab tablet Take 5,000 Units by mouth daily. PLAN   
FREQUENCY/DURATION: No further speech therapy indicated at this time as oropharyngeal swallow function is within normal limits. - Recommendations for next treatment session: No additional speech therapy indicated at this time.  
  
  
  
 
 
RECOMMENDATIONS  
DIET:  
 ? continue prescribed diet MEDICATIONS: Non-oral 
  
ASPIRATION PRECAUTIONS · Slow rate of intake · Small bites/sips · Upright at 90 degrees during meal 
  
COMPENSATORY STRATEGIES/MODIFICATIONS · Alternate liquids/solids · Small sips and bites EDUCATION: 
· Recommendations discussed with Hospitalist 
· Nursing · Family · Patient RECOMMENDATIONS for CONTINUED SPEECH THERAPY:  
No further speech therapy indicated at this time. SAFETY: 
After treatment position/precautions: · Upright in bed · wife at bedside Total Treatment Duration:  
Time In: 0805 Time Out: 5462 Adarsh Crowder.  MS Penelope, CCC-SLP

## 2020-02-09 NOTE — PROGRESS NOTES
Pt had another \"seizure\" episode in the recliner at bedside. VQ=013/98, 124, 96%, 18. Lasted about 5-6 min per wife. Ativan 2 mg IV given @1129. Pt stable now.

## 2020-02-10 NOTE — CONSULTS
Palliative Care Patient: Jadon Suarez MRN: [id number]  SSN: [id number] YOB: 1965  Age: 60 y.o. Sex: male Date of Request: 2/9/20 Date of Consult:  2/10/2020 Reason for Consult:  pain and symptom management; goals of care Requesting Physician: Dr Kenya Mora 
 
 Assessment/Plan:  
 
Principal Diagnosis:   
Altered Mental Status R41.82 Additional Diagnoses: · Dysphagia  R13.10 · Counseling, Encounter for Medical Advice  Z71.9 
· Encounter for Palliative Care  Z51.5 Palliative Performance Scale (PPS): 
  
Reviewed and summarized notes from admission to present. Discussed case with appropriate providers: Dr Bard Pierce Reviewed laboratory and x-ray data from admission to present. Pt sleeping/lethargy. He opens his eyes briefly to voice and sternal rub, but does not focus or show any other awareness. Ms Ame Rashid said that she hopes for a better understanding of what has occurred, but recognizes that this may not occur. She hopes the results of the MRI will yield some useful information. Ms Ame Rashid states that her immediate goal is that her  recover from this episode of stupor so that he can receive the XRT treatments he has missed. She was not yet prepared to address how she would want to proceed if he does not recover from today's episode. Therefore I did not raise the question of code status. Ms Ame Rashid confirmed that she receives good emotional support from her family. Will continue to follow intermittently. Will discuss findings with members of the interdisciplinary team.   
 
Thank you for this referral.    
 
  
. 
In addition to the E&M described above, more than 50% of a 40-minute prolonged visit, from [phone], was spent on counseling and coordination of care. Subjective:  
 
History obtained from:  Patient, Family, Care Provider and Chart Chief Complaint: Episode of stupor. History of Present Illness:  Mr Palma Villeda is a 59 yo M with a PMH of esophageal mucinous adenocarcinoma  (diagnosed 2/5/19) and other conditions listed below. He is under the care of Dr Salma Balir, and has had chemo and XRT treatments. He presented to the ED on 2/5/20 with c/o severe headaches for 2-3 days. According to his wife, he had 3 falls in the previous 2 weeks, and the headaches began about the time of the third fall. In the ED he had 3 episodes of generalized seizure-like activity associated with urinary incontinence. Since admission the staff reports episodes in which the pt stares off and is not responsive. This morning a Rapid Response was called for a period of unresponsiveness. The pt had received Dilaudid during the night and, according to his wife, had taken some home PO oxycodone that was on the table. A Code S was called, then a Code Blue was called a short time later when the pt was in CT. A CTA of the cervical and intracranial arterial vascular was negative, as was the CT of the head. A CT perfusion study found hypoperfusion of the cerebellar hemispheres. An MRI of the brain with/without contrast showed mild chronic small vessel changes, but was negative for acute infarction. He pt was transferred to the ICU. Advance Directive: No      
Code Status:  Full Code Health Care Power of : No - Patient does not have a [address]. Wife is health care surrogate. Past Medical History:  
Diagnosis Date  Appendicitis   
 resulted in appendectomy  Chewing tobacco use  Chronic obstructive pulmonary disease (HCC)   
 nebulizer prn and rescue inhaler only  Chronic pain   
 pt wife denies any pain  Depression   
 pt wife denies, no current meds  Erectile disorder due to medical condition in male patient  Esophageal cancer ([facility] [address]  Former cigarette smoker  Gout   
 treated with Allopurinol. Last flareup 2013  H/O heart artery stent X2- last stent placed 2016  History of MI (myocardial infarction) 2014  
 stents x2  RCA- 2014  LAD - 2016  Hyperlipidemia  Hypertension   
 managed with meds  Hypotestosteronemia  Morbid obesity ([address] 47.6  KORINA (obstructive sleep apnea) Trilegy and oxygen 2L  Osteoarthritis Past Surgical History:  
Procedure Laterality Date  COLONOSCOPY N/A 2019 COLONOSCOPY/ 50 performed by Lalo Miguel MD at [address] [facility] HX APPENDECTOMY  2003  HX CARPAL TUNNEL RELEASE Right 2014  HX CORONARY STENT PLACEMENT  2014 RCA x 1  
 HX CORONARY STENT PLACEMENT  2016 PCI of the lesion with a 4x8mm Xience Alpine RONN  
 HX VASCULAR ACCESS    
 IR INSERT GASTROSTOMY TUBE [facility]  2019 Family History Problem Relation Age of Onset  Heart Disease Mother  Hypertension Mother  Cancer Father  Heart Disease Father  Cancer Brother Social History Tobacco Use  Smoking status: Former Smoker Packs/day: 0.15 Years: 35.00 Pack years: 5.25 Last attempt to quit: 2016 Years since quittin.1  Smokeless tobacco: Current User Substance Use Topics  Alcohol use: No  
 
Prior to Admission medications Medication Sig Start Date End Date Taking? Authorizing Provider Lactose-Free Food with Fiber (JEVITY 1.5 MELITON) 0.06 gram-1.5 kcal/mL liqd 6 Bottles by PEG Tube route daily for 100 days. Bolus Feeds, goal 6/day to provide 2130 kcal, 91 gm pro, 1080 ml. Needs additional 36 oz/day for hydration. JESSICA > 3 months. Indications: blockage of the esophagus, dysphagia 19 Yes Nuris Ferraro MD  
oxyCODONE IR (ROXICODONE) 10 mg tab immediate release tablet Take 1 Tab by mouth every six (6) hours as needed for Pain for up to 5 days.  Max Daily Amount: 40 mg. 20  Rick South MD  
midodrine (PROAMITINE) 5 mg tablet Take 1 Tab by mouth two (2) times daily (with meals) for 30 days. Indications: a feeling of dizziness upon standing due to a drop in blood pressure 1/30/20 2/29/20  Lisha Villalobos NP  
sucralfate (CARAFATE) 1 gram tablet Take 1 Tab by mouth four (4) times daily. 1/23/20   Lauren CHANTE Smith  
artificial saliva (MOUTH KOTE) spra Take 1 Spray by mouth as needed for Pain. 1/10/20   Cornell Rinne, MD  
acyclovir (ZOVIRAX) 5 % ointment Apply thin layer of cream to effected areas 4-5- times a day 12/30/19   Rosalina Sauceda NP  
potassium chloride (KAON 20%) 40 mEq/15 mL liqd Daily X 2 days through his feeding tube  Indications: prevention of low potassium in the blood 11/25/19   Cornell Rinne, MD  
cpap machine kit by Does Not Apply route. Provider, Historical  
Oxygen at bedtime as directed for dose pack. Provider, Historical  
ezetimibe (ZETIA) 10 mg tablet Take 10 mg by mouth daily. Provider, Historical  
allopurinol (ZYLOPRIM) 100 mg tablet Take 1 Tab by mouth daily. 1/29/19   Adry Gonsales MD  
metoprolol tartrate (LOPRESSOR) 50 mg tablet Take 1 Tab by mouth two (2) times a day. 7/5/18   Adry Gonsales MD  
cholecalciferol, VITAMIN D3, (VITAMIN D3) 5,000 unit tab tablet Take 5,000 Units by mouth daily. Provider, Historical  
 
 
No Known Allergies Review of Systems: 
Review of systems not obtained due to patient factors: Unresponsive. Objective:  
 
Visit Vitals BP (!) 138/100 Pulse 75 Temp 98.9 °F (37.2 °C) Resp 20 Ht 6' 2.5\" (1.892 m) Wt 212 lb (96.2 kg) SpO2 98% BMI 26.86 kg/m² Physical Exam: 
 
General:  Unresponsive. No acute distress. Eyes:  Conjunctivae/corneas clear Nose: Nares normal. Septum midline. Neck: Supple, symmetrical, trachea midline, no JVD Lungs:   Clear to auscultation bilaterally, unlabored Heart:  Regular rate and rhythm, no murmur Abdomen:   Soft, non-tender, non-distended Extremities: Normal, atraumatic, no cyanosis or edema Skin: Multiple bruises on arms and legs from recent falls Neurologic: Nonfocal  
Psych: Stuporous Assessment:  
 
Hospital Problems  Date Reviewed: 1/31/2020 Codes Class Noted POA Intractable headache ICD-10-CM: R51 ICD-9-CM: 784.0  2/8/2020 Unknown Other dysphagia ICD-10-CM: R13.19 ICD-9-CM: 787.29  2/6/2020 Unknown * (Principal) New onset seizure ([facility] [address] ICD-10-CM: R56.9 ICD-9-CM: 780.39  2/5/2020 Unknown Hypokalemia ICD-10-CM: E87.6 ICD-9-CM: 276.8  2/5/2020 Unknown Malignant neoplasm of lower third of esophagus (HCC) ICD-10-CM: C15.5 ICD-9-CM: 150.5  2/19/2019 Yes Essential hypertension with goal blood pressure less than 140/90 ICD-10-CM: I10 
ICD-9-CM: 401.9  8/17/2016 Yes Signed By: Carrillo Gonzalez NP February 10, 2020

## 2020-02-10 NOTE — PROGRESS NOTES
Responded to \"Rapid Response\", \"Code S\" and \"Code Blue\". Patient was in Room 353 and transferred to 371. Support given to wife of patient. Assisted her in moving belongings to ICU. Continued support given. Brenda Wade M.Div.

## 2020-02-10 NOTE — PROGRESS NOTES
Care Management Interventions Last Visit to PCP: 07/17/19 Palliative Care Criteria Met (RRAT>21 & CHF Dx)?: No(Risk 17% Dx ? Seizure activity) Transition of Care Consult (CM Consult): Discharge Planning Physical Therapy Consult: No 
Occupational Therapy Consult: No 
Speech Therapy Consult: No 
Current Support Network: Lives with Spouse Confirm Follow Up Transport: Family [employer] Information Provided?: No 
Discharge Location Discharge Placement: Unable to determine at this time Patient had rapid response, Code S and then in [place] called. Patient transferred to ICU for further care. Prior to admission patient lives with his wife Paul Olivera [phone] and was independent of ADL's. He has history of esophageal cancer and concern for CNS mets. Oncology has been consulted. Palliative Care has seen wife and d/c plan pending clinical work up and progress. CM following.

## 2020-02-10 NOTE — PROGRESS NOTES
Hospitalist Progress Note 2/10/2020 Admit Date: 2020  7:34 PM  
NAME: Adrian Camacho :  1965 MRN:  [id number] Attending: Rosalian Cooney MD 
PCP:  Lori Madden MD 
 
SUBJECTIVE:  
 
Patient is a 60years old male with history of esophegeal cancer, currently on radioRx, COPD and on tubal feeding for 1 year, presents with headaches for last 2-3 days. He had a fall this week and in ED, his MRI brain is negative. He used to be treated for HTN until 3 months ago then his meds were stopped due to continuous hypotension. His BP was initially elevated on arrival. He lost over 200 lb in last 12 months. In ED, the pt had 3 epsisoeds of generalized seizure like activity witnessed by staff including ED physician, w/ urinary incontinence and followed by period of confusion. No known hx of seizures and no alcohol use. Throughout his hospitalization, patient still having really bad headaches of the same presentation without any changes. Patient has had episodes of \"unresponsiveness\" again, similar in presentation to several days ago. Reconsult with teleneurology with recommendations to restart Keppra (he had EEG several days ago that was not suggestive of seizures). It is unclear if it is his headaches causing these symptoms or if he is truly having seizures. His CT and MRI imaging have been unremarkable. Rapid Response (2/10/2020): paged by nursing staff for \"unresponsiveness\", up in bedside chair and does not respond to sternal rub. Rapid Response called. Patient tachycardic with normal BPs, given Narcan 0.4 mg x2 without response. Upon further clarification, patient given Dilaudid last night and patient also took some home oral oxycodone that was on the table, wife is unsure how much he took. He also received Ativan twice, once around 1 AM and again around 6:30 AM. Flumazenil 0.5 mg IV given once. ABG showing 7.48///22.  Code S called and patient transported to CT imaging for CT head where Philipp Hampton called. Upon presentation patient did not lose pulse but had \"erratic breathing\" when physically being transferred from stretcher to CT table. Vitals stable. CT head completed showing nonspecific cerebellar changes. STAT teleneurology consultation with recommendations for repeat MRI head with/without contrast, repeat EEG (had one EEG earlier in the hospitalization that was negative), and lumbar puncture. Patient transferred to ICU for further monitoring. Review of Systems negative with exception of pertinent positives noted above PHYSICAL EXAM  
 
Visit Vitals BP (!) 138/100 Pulse 75 Temp 98.9 °F (37.2 °C) Resp 20 Ht 6' 2.5\" (1.892 m) Wt 96.2 kg (212 lb) SpO2 98% BMI 26.86 kg/m² Temp (24hrs), Av.4 °F (36.9 °C), Min:98.1 °F (36.7 °C), Max:98.9 °F (37.2 °C) Oxygen Therapy O2 Sat (%): 98 % (02/10/20 0746) Pulse via Oximetry: 77 beats per minute (02/10/20 0736) O2 Device: Nasal cannula (02/10/20 0736) O2 Flow Rate (L/min): 3 l/min (02/10/20 0736) Intake/Output Summary (Last 24 hours) at 2/10/2020 1011 Last data filed at [phone] Gross per 24 hour Intake 100 ml Output  Net 100 ml General: No acute distress, not responsive to sternal rub but protecting airway HEENT:  Noted bruise to back of head that is nontender to palpation with interval improvement Lungs:  CTA Bilaterally. Heart:  Tachycardic rate,  No murmur, rub, or gallop Abdomen: Soft, Non distended, Non tender, Positive bowel sounds Extremities: No cyanosis, clubbing or edema Neurologic:  No focal deficits. CN II thru XII intact b/l. ASSESSMENT Active Hospital Problems Diagnosis Date Noted  Intractable headache 2020  Other dysphagia 2020  New onset seizure ([facility] [address] 2020  Hypokalemia 2020  Malignant neoplasm of lower third of esophagus ([address] 2019  Essential hypertension with goal blood pressure less than 140/90 08/17/2016 Plan: # Seizure-like activity in the setting of recent falls with hitting of his head - ?post-concussive syndrome vs complex migraine vs ?seizures vs ?mets from cancer - Rapid response on 2/10/2020 as detailed above - will repeat MRI with/without contrast, ?is patient's known esophageal spreading to the CNS? (MRI from earlier this month was negative) - will consult oncology for above 
- increase Keppra to 100 mg IV BID and start Vimpat per neurology recs 
- consult to IR for lumbar puncture to assess for cancer spread 
- stopped Reglan, Benadryl, and steroids for headaches 
- EEG from earlier in this hospitalization was unremarkable but patient with suspected seizure-like activity as above 
- avoid narcotics 
- frequent neuro checks 
- continue with pain management as above # Esophageal cancer with interval worsening of mediastinal/neck lymphadenopathy suggestive of spread - currently undergoing radiation therapy, CT imaging showing spread of cancer 
- needs follow-up with Heme/Onc following hospital discharge # Chronic dysphagia, due to esophageal cancer - patient has been on tube feeds long-term but wants to try oral route 
- ordered speech consult for swallow evaluation F/E/N: stop fluids, replete electrolytes as needed, diet per speech evaluation Ppx: SCDs for VTE Code Status: FULL CODE Disposition: transfer to ICU for frequent neuro checks. Discussed at length with patient and wife at bedside. All questions answered. Signed By: Susan Razo DO February 10, 2020

## 2020-02-10 NOTE — PROGRESS NOTES
Spoke to Nurse at [facility], and she states that patient is not cooperative and moving all around. She states he will not hold still for the EEG and is all over the place. EEG was done on Friday 2/7/20 with shaking episodes, and no seizure was seen. EEG was normal. 
 
Hever Viddler EEG Tech.

## 2020-02-10 NOTE — PROGRESS NOTES
Problem: Falls - Risk of 
Goal: *Absence of Falls Description Document Moni Suggs Fall Risk and appropriate interventions in the flowsheet. Outcome: Not Progressing Towards Goal 
Note: Fall Risk Interventions: 
Mobility Interventions: Bed/chair exit alarm Medication Interventions: Bed/chair exit alarm Elimination Interventions: Bed/chair exit alarm History of Falls Interventions: Bed/chair exit alarm Problem: Patient Education: Go to Patient Education Activity Goal: Patient/Family Education Outcome: Not Progressing Towards Goal 
  
Problem: Seizure Disorder (Adult) Goal: *STG: Remains free of seizure activity Outcome: Not Progressing Towards Goal 
Goal: *STG: Maintains lab values within therapeutic range Outcome: Not Progressing Towards Goal 
Goal: *STG/LTG: Complies with medication therapy Outcome: Not Progressing Towards Goal 
Goal: *STG: Remains free of injury during seizure activity Outcome: Not Progressing Towards Goal 
Goal: *STG: Remains safe in hospital 
Outcome: Not Progressing Towards Goal 
Goal: Interventions Outcome: Not Progressing Towards Goal 
  
Problem: Patient Education: Go to Patient Education Activity Goal: Patient/Family Education Outcome: Not Progressing Towards Goal 
  
Problem: Nutrition Deficit Goal: *Optimize nutritional status Outcome: Not Progressing Towards Goal 
  
Problem: Delirium Treatment Goal: *Level of consciousness restored to baseline Outcome: Not Progressing Towards Goal 
Goal: *Level of environmental perceptions restored to baseline Outcome: Not Progressing Towards Goal 
Goal: *Sensory perception restored to baseline Outcome: Not Progressing Towards Goal 
Goal: *Emotional stability restored to baseline Outcome: Not Progressing Towards Goal 
Goal: *Functional assessment restored to baseline Outcome: Not Progressing Towards Goal 
Goal: *Absence of falls Outcome: Not Progressing Towards Goal 
 Goal: *Will remain free of delirium, CAM Score negative Outcome: Not Progressing Towards Goal 
Goal: *Cognitive status will be restored to baseline Outcome: Not Progressing Towards Goal 
Goal: Interventions Outcome: Not Progressing Towards Goal 
  
Problem: Patient Education: Go to Patient Education Activity Goal: Patient/Family Education Outcome: Not Progressing Towards Goal

## 2020-02-10 NOTE — PROGRESS NOTES
Went to pt's room to assess him. Pt did not arouse. Sternal rub used on pt; still did not arouse. Sent message to Dr. Juan Goncalves to obtain order for Narcan & Remazicon. Nurse Supervisor Mark here; decision made to call a Rapid Response.

## 2020-02-10 NOTE — PROGRESS NOTES
Wife called out around 6404 and said her  was having a seizure. Ativan 1 mg IV was given. Never really saw any seizure activity, however, pt is sleeping and snoring and not waking up currently. Vitals taken- all WNL's. I asked what time she felt the seizure was over and she said-it was still happening. Pt. Reclined in recliner snoring.

## 2020-02-10 NOTE — PROGRESS NOTES
Responded to  Rapid response code. Pt is not responsive with good vital sighs. See flow-sheets. Placed pt on 3L NC. Order received to obtain ABG. ABG done and reported to . No new orders at this time.

## 2020-02-10 NOTE — CONSULTS
HEMATOLOGY/ MEDICAL ONCOLOGY CONSULTATION Patient Name: Ridge Barrera    Date of Consult: 2/10/2020 : 1965  Age:55 y.o. POT:[id number] Reason for Consultation: 
Mr. Narendra Dawn is a 60 y.o. male admitted on 2020 with a primary diagnosis of headache and possible seizures. History of Present Illness: 
Mr. Narendra Dawn has a one year history of metastatic esophageal cancer. The details of his therapy to date are listed below. He has however had a somewhat confusing recent history. Last week, he suffered three falls in the bathroom (unwitnessed) from which he apparently recovered. The following day he began to complain of severe headaches. He was seen in ED and given some pain medications for possible concussion. He returned to the ED the following night with inadequate headache control and suffered what appears to have been seizure like activity. He was admitted to the hospital where he continued to complain of severe headache and would have \"spells\" of decrease awareness. Today, he again complained of head pain and then became stuporous. My visit was 12 hours after this episode and he had not regained consciousness. He has had a negative MRI of the brain as well as a negative CTA. He has been seen by neurology and EEG did not suggest epileptiform activity. This does not appear to be temporally related to meds. In terms of his underlying cancer, he experienced dysphagia and underwent EGD 19 revealing malignant appearing circumferential esophageal mass from 36 to 44 cm from incisors. 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 Her2 testing negative. PET 19 showed distal esophageal tumor with associated gastrohepatic adenopathy, no distant metastases seen. A jejunostomy tube was placed at which time peritoneal metastases were noted.  He was treated with FOLFOX chemotherapy and then transitioned to paclitaxel and ramucirumab when disease progressed. He is currently in the midst of a course of radiation therapy and concurrent taxol for progressive symptomatic local disease. Medications:  
Current Facility-Administered Medications Medication Dose Route Frequency Provider Last Rate Last Dose  naloxone [facility]) injection   IntraVENous CODE BLUE Ciesco, Margarito, DO   0.4 mg at 02/10/20 7468  flumazeniL (ROMAZICON) 0.1 mg/mL injection    CODE BLUE Ciesco, Margarito, DO   0.5 mg at 02/10/20 1348  levETIRAcetam (KEPPRA) 1,000 mg in 0.9% sodium chloride 100 mL IVPB  1,000 mg IntraVENous Q12H Ciesco, Margarito, DO      
 lacosamide (VIMPAT) 100 mg in 0.9% sodium chloride 100 mL IVPB  100 mg IntraVENous Q12H Ciesco, Margarito, DO   100 mg at 02/10/20 1201  LORazepam (ATIVAN) injection 1 mg  1 mg IntraVENous Q4H PRN Ciesco, Margarito, DO   1 mg at 02/10/20 0640  
 HYDROmorphone (PF) (DILAUDID) injection 1 mg  1 mg IntraVENous Q4H PRN Millicent Epps MD   1 mg at 02/10/20 1685  allopurinoL (ZYLOPRIM) tablet 100 mg  100 mg Oral DAILY Lester Mo MD   Stopped at 02/10/20 0900  
 albuterol-ipratropium (DUO-NEB) 2.5 MG-0.5 MG/3 ML  3 mL Nebulization Q4H PRN Lester Mo MD      
 cloNIDine HCL (CATAPRES) tablet 0.2 mg  0.2 mg Oral BID PRN Lester Mo MD      
 sodium chloride (NS) flush 5-40 mL  5-40 mL IntraVENous Q8H Lester Mo MD   10 mL at 02/10/20 1400  
 sodium chloride (NS) flush 5-40 mL  5-40 mL IntraVENous PRN Lester Mo MD      
 acetaminophen (TYLENOL) tablet 650 mg  650 mg Oral Q4H PRN Lester Mo MD   650 mg at 02/09/20 1748  
 naloxone [facility]) injection 0.4 mg  0.4 mg IntraVENous PRN Lester Mo MD      
 ondansetron [facility]) injection 4 mg  4 mg IntraVENous Q4H PRN Lester Mo MD   4 mg at 02/07/20 2041  
 magnesium hydroxide (MILK OF MAGNESIA) 400 mg/5 mL oral suspension 30 mL  30 mL Oral DAILY PRN Lester Mo MD      
 nicotine (NICODERM CQ) 21 mg/24 hr patch 1 Patch  1 Patch TransDERmal DAILY PRN Jovi Pardo MD      
 
 
Allergies: 
No Known Allergies Review of Systems: The Review of Systems is documented in full in the internal medical record. All systems are negative other than for those noted above. Past Medical History: 
Past Medical History:  
Diagnosis Date  Appendicitis   
 resulted in appendectomy  Chewing tobacco use  Chronic obstructive pulmonary disease (HCC)   
 nebulizer prn and rescue inhaler only  Chronic pain   
 pt wife denies any pain  Depression   
 pt wife denies, no current meds  Erectile disorder due to medical condition in male patient  Esophageal cancer ([facility] [address]  Former cigarette smoker  Gout   
 treated with Allopurinol. Last flareup   H/O heart artery stent X2- last stent placed   History of MI (myocardial infarction) 2014  
 stents x2  RCA- 2014  LAD - 2016  Hyperlipidemia  Hypertension   
 managed with meds  Hypotestosteronemia  Morbid obesity ([facility] [address] 47.6  KORINA (obstructive sleep apnea) Trilegy and oxygen 2L  Osteoarthritis Past Surgical History: 
Past Surgical History:  
Procedure Laterality Date  COLONOSCOPY N/A 2019 COLONOSCOPY/ 50 performed by Phoenix Orellana MD at [facility] HX APPENDECTOMY  2003  HX CARPAL TUNNEL RELEASE Right 2014  HX CORONARY STENT PLACEMENT   RCA x 1  
 HX CORONARY STENT PLACEMENT   PCI of the lesion with a 4x8mm Xience Alpine RONN  
 HX VASCULAR ACCESS    
 IR INSERT GASTROSTOMY TUBE [facility]  2019 Social History: 
Social History Tobacco Use  Smoking status: Former Smoker Packs/day: 0.15 Years: 35.00 Pack years: 5.25 Last attempt to quit: 2016 Years since quittin.1  Smokeless tobacco: Current User Substance Use Topics  Alcohol use: No  
 Drug use: No  
 
 
Family History: 
Family History Problem Relation Age of Onset  Heart Disease Mother  Hypertension Mother  Cancer Father  Heart Disease Father  Cancer Brother Physical Examination: 
General Appearance:Comatose patient in no acute distress. Vital signs:  
Visit Vitals /81 Pulse 94 Temp 98.7 °F (37.1 °C) Resp 13 Ht 6' 2.5\" (1.892 m) Wt 212 lb (96.2 kg) SpO2 98% BMI 26.86 kg/m² HEENT:Not examined Neck: Supple. There is no thyromegaly. Lymph nodes: There is no cervical, supraclavicular, axillary or inguinal adenopathy. Lungs: The lungs are clear to auscultation and percussion. There is no egophony. There is no chest wall tenderness and no  use of accessory respiratory musculature. Heart: There is no jugular venous distention. The rate is normal and rhythm regular. The S1 and S2 are normal and there are no murmurs or rubs. Abdomen: Soft, non-tender, bowel sounds present and normal, no appreciated hepatosplenomegaly. No palpable masses. J tube in place. Skin: Multiple ecchymoses Musculoskeletal: No bony or muscular tenderness. No joint effusions Extremities: No cyanosis, clubbing or edema. Neurologic:Completely unable to arouse; did not move during my conversation with his wife. Labs: 
 
Recent Results (from the past 24 hour(s)) CBC WITH AUTOMATED DIFF Collection Time: 02/10/20  6:04 AM  
Result Value Ref Range WBC 6.1 4.3 - 11.1 K/uL  
 RBC 3.79 (L) 4.23 - 5.6 M/uL  
 HGB 12.4 (L) 13.6 - 17.2 g/dL HCT 35.7 (L) 41.1 - 50.3 % MCV 94.2 79.6 - 97.8 FL  
 MCH 32.7 26.1 - 32.9 PG  
 MCHC 34.7 31.4 - 35.0 g/dL  
 RDW 15.5 (H) 11.9 - 14.6 % PLATELET 54 (L) 221 - 450 K/uL MPV 11.9 9.4 - 12.3 FL ABSOLUTE NRBC 0.00 0.0 - 0.2 K/uL  
 DF AUTOMATED NEUTROPHILS 84 (H) 43 - 78 % LYMPHOCYTES 3 (L) 13 - 44 % MONOCYTES 10 4.0 - 12.0 % EOSINOPHILS 3 0.5 - 7.8 % BASOPHILS 0 0.0 - 2.0 % IMMATURE GRANULOCYTES 1 0.0 - 5.0 %  
 ABS. NEUTROPHILS 5.1 1.7 - 8.2 K/UL  
 ABS. LYMPHOCYTES 0.2 (L) 0.5 - 4.6 K/UL ABS. MONOCYTES 0.6 0.1 - 1.3 K/UL  
 ABS. EOSINOPHILS 0.2 0.0 - 0.8 K/UL  
 ABS. BASOPHILS 0.0 0.0 - 0.2 K/UL  
 ABS. IMM. GRANS. 0.0 0.0 - 0.5 K/UL METABOLIC PANEL, BASIC Collection Time: 02/10/20  6:04 AM  
Result Value Ref Range Sodium 132 (L) 136 - 145 mmol/L Potassium 3.1 (L) 3.5 - 5.1 mmol/L Chloride 101 98 - 107 mmol/L  
 CO2 26 21 - 32 mmol/L Anion gap 5 (L) 7 - 16 mmol/L Glucose 109 (H) 65 - 100 mg/dL BUN 11 6 - 23 MG/DL Creatinine 0.60 (L) 0.8 - 1.5 MG/DL  
 GFR est AA >60 >60 ml/min/1.73m2 GFR est non-AA >60 >60 ml/min/1.73m2 Calcium 8.6 8.3 - 10.4 MG/DL  
GLUCOSE, POC Collection Time: 02/10/20  7:34 AM  
Result Value Ref Range Glucose (POC) 111 (H) 65 - 100 mg/dL POC G3 Collection Time: 02/10/20  7:49 AM  
Result Value Ref Range Device: NASAL CANNULA pH (POC) 7.487 (H) 7.35 - 7.45    
 pCO2 (POC) 29.7 (L) 35 - 45 MMHG  
 pO2 (POC) 75 75 - 100 MMHG  
 HCO3 (POC) 22.4 22 - 26 MMOL/L  
 sO2 (POC) 96 95 - 98 % Base excess (POC) 0 mmol/L Allens test (POC) YES Site RIGHT RADIAL Specimen type (POC) ARTERIAL Performed by Darinel   
 CO2, POC 23 MMOL/L Flow rate (POC) 3.000 L/min COLLECT TIME 745 EKG, 12 LEAD, INITIAL Collection Time: 02/10/20  8:34 AM  
Result Value Ref Range Ventricular Rate 127 BPM  
 Atrial Rate 127 BPM  
 P-R Interval 130 ms QRS Duration 80 ms  
 Q-T Interval 312 ms QTC Calculation (Bezet) 453 ms Calculated P Axis 75 degrees Calculated R Axis 73 degrees Calculated T Axis 74 degrees Diagnosis Atrial fibrillation with rapid ventricular response Low voltage QRS Nonspecific ST abnormality Abnormal ECG When compared with ECG of 05-FEB-2020 21:16, 
Vent. rate has increased BY  42 BPM 
Confirmed by Lafayette Shell  MD (), BEATRICE MENDOZA ([id number]) on 2/10/2020 3:18:36 PM 
  
PHOSPHORUS Collection Time: 02/10/20  9:28 AM  
Result Value Ref Range  Phosphorus 3.0 2.5 - 4.5 MG/DL  
 MAGNESIUM Collection Time: 02/10/20  9:28 AM  
Result Value Ref Range Magnesium 1.8 1.8 - 2.4 mg/dL Imaging: As above ASSESSMENT: 
Stupor and fluctuating mental status in the context of a progressive malignancy. There is no evidence of metastatic disease to the brain nor EEG evidence of seizures. He has no fevers or indications of encephalitis and no signs of leptomeningeal disease. Neuro has raised the question of paraneoplastic syndrome. I see no relationship to medication use at this time. PLAN: 
Paraneoplastic workup as per neuro. Caution with psychotropic medication Low platelet count may represent XRT and chemo but will rule out DIC. I see no direct correlation with his cancer but obviously, it likely plays a role. Palliative care on board. Colby Meckel MD FACP Oncology and Hematology  [address] [phone] F [phone] 
[email or web address] 
 
 
Elements of this note have been dictated using speech recognition software. As a result, errors of speech recognition may have occurred.

## 2020-02-10 NOTE — PROGRESS NOTES
Received pt from RN (Cindy Carbone.) in stable condition. Pt in bed resting quietly. Resp even & unlabored on room air; no acute signs of distress noted. Bed low & locked; call light in reach; no needs voiced.

## 2020-02-11 PROBLEM — G93.41 ACUTE METABOLIC ENCEPHALOPATHY: Status: ACTIVE | Noted: 2020-01-01

## 2020-02-11 PROBLEM — R56.9 SEIZURE (HCC): Status: ACTIVE | Noted: 2020-01-01

## 2020-02-11 NOTE — PROGRESS NOTES
END OF SHIFT NOTE: 
 
Intake/Output No intake/output data recorded. Voiding: YES Catheter: YES Drain: PEG/Gastrostomy Tube (Active) Site Assessment Clean, dry, & intact 2/11/2020  5:05 PM  
Dressing Status Clean, dry, & intact 2/11/2020  5:05 PM  
G Port Status Clamped 2/11/2020  5:05 PM  
Gastric Residual (mL) 240 ml 2/11/2020  5:05 PM  
Water Flush Volume (mL) 300 mL 2/11/2020  5:23 AM  
Intake (ml) 237 ml 2/11/2020  5:23 AM  
 
 
 
 
 
 
Stool:  0 occurrences. Emesis:  0 occurrences. VITAL SIGNS Patient Vitals for the past 12 hrs: 
 Temp Pulse BP SpO2  
02/11/20 1237 97.7 °F (36.5 °C) 83 154/90 98 % Pain Assessment Pain 1 Pain Scale 1: FLACC (02/11/20 1535) Pain Intensity 1: 0 (02/11/20 1535) Patient Stated Pain Goal: 0 (02/11/20 1535) Pain Reassessment 1: Patient resting w/respiratory rate greater than 10 (02/11/20 1535) Pain Intervention(s) 1: Medication (see MAR) (02/11/20 1507) Ambulating No 
 
Additional Information: Pt transferred from [place]. Pt very lethargic throughout the day. Family at the bedside. Pt aroused around 1800. Pt very angry and fighting to take mitts off. Mitts removed Pt calmed for a while. Pt gets agitated when he awakes. He is in pain, which is causing the anger. After giving pain medication, pt was able to rest. Pt wants to leave the hospital. Wife to stay overnight. Daughter expected to be here tonight. Daughter is a RN at a [place] facility; wife states she will be trying to tell the staff what to do. No complaints noted at this time. Shift report given to Debra Rosenbaum RN oncoming nurse at the bedside.  
 
Clifford Leonard RN

## 2020-02-11 NOTE — PROGRESS NOTES
TRANSFER - OUT REPORT: 
 
Verbal report given to Franc(name) on Teresita Bolanos  being transferred to 52(unit) for routine progression of care Report consisted of patients Situation, Background, Assessment and  
Recommendations(SBAR). Information from the following report(s) SBAR, Kardex, ED Summary, Procedure Summary, Intake/Output, Recent Results, Cardiac Rhythm NSR and Alarm Parameters  was reviewed with the receiving nurse. Lines:  
Venous Access Device Left upper chest port 02/09/20 Upper chest (subclavicular area), left (Active) Central Line Being Utilized Yes 2/11/2020  7:00 AM  
Criteria for Appropriate Use Limited/no vessel suitable for conventional peripheral access 2/11/2020  7:00 AM  
Site Assessment Clean, dry, & intact 2/11/2020  7:00 AM  
Date of Last Dressing Change 02/09/20 2/11/2020  7:00 AM  
Dressing Status Clean, dry, & intact 2/11/2020  7:00 AM  
Dressing Type Transparent;Tape;Disk with Chlorhexadine gluconate (CHG) 2/11/2020  7:00 AM  
Date Accessed (Medial Site) 02/09/20 2/11/2020  7:00 AM  
Positive Blood Return (Medial Site) Yes 2/11/2020  7:00 AM  
  
 
Opportunity for questions and clarification was provided. Patient transported with: 
 Monitor O2 @ 2 liters

## 2020-02-11 NOTE — PROGRESS NOTES
Geodon and Dilaudid given and patient resting quietly. Maxwell catheter placed per Dr. Jhon Nesbitt with an immediate return of 300 ml of deidra urine. While patient resting, this RN was able to give one container(237 ml) of Jevity 1.2 and a 300 ml water flush as patient has not had nutrition since Rapid Response was called yesterday morning. His wife was agreeable to this and states that he normally takes 2 full containers of the Jevity 1.2 but she feels it is best  to work back up to that since it does upset his stomach at times. While patient was calm, we also applied xeroform, gauze, and a stocking net to bilateral arms to cover skin tears and protect skin from further damage while patient is agitated. Patient now resting quietly and wife napping at bedside. Will continue to monitor.
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
## 2020-02-11 NOTE — PROGRESS NOTES
Bedside and Verbal shift change report given to Mendez Ospina RN (oncoming nurse) by Niall Chavez RN  (offgoing nurse). Report included the following information SBAR, Kardex, ED Summary, Procedure Summary, Intake/Output, Recent Results, Cardiac Rhythm NSR and Alarm Parameters .

## 2020-02-11 NOTE — PROGRESS NOTES
02/11/20 1245 Dual Skin Pressure Injury Assessment Dual Skin Pressure Injury Assessment WDL Second Care Provider (Based on [address] [place] [address]) Komal Baca RN Patient has scattered skin tears to BUE, and patient has bruising to both knees from fall per wife.

## 2020-02-11 NOTE — DISCHARGE SUMMARY
Discharge Summary Patient: Arline Hernandez MRN: [id number]  SSN: [id number] YOB: 1965  Age: 60 y.o. Sex: male Admit Date: 2/5/2020 Discharge Date: 2/11/2020 Admission Diagnoses: New onset seizure ([facility] [R56.9] New onset seizure ([facility] [R56.9] Intractable headache [R51] Discharge Diagnoses:  
Problem List as of 2/11/2020 Date Reviewed: 1/31/2020 Codes Class Noted - Resolved Acute metabolic encephalopathy [id number]: G93.41 
ICD-9-CM: 348.31  2/11/2020 - Present Intractable headache ICD-10-CM: R51 ICD-9-CM: 784.0  2/8/2020 - Present Other dysphagia ICD-10-CM: R13.19 ICD-9-CM: 787.29  2/6/2020 - Present * (Principal) New onset seizure ([facility] ICD-10-CM: R56.9 ICD-9-CM: 780.39  2/5/2020 - Present Hypokalemia ICD-10-CM: E87.6 ICD-9-CM: 276.8  2/5/2020 - Present Dehydration ICD-10-CM: E86.0 ICD-9-CM: 276.51  2/2/2020 - Present COPD exacerbation ([facility] ICD-10-CM: J44.1 ICD-9-CM: 491.21  11/27/2019 - Present Acute respiratory failure with hypoxia [facility]) ICD-10-CM: J96.01 
ICD-9-CM: 518.81  11/27/2019 - Present Bacterial pneumonia ICD-10-CM: J15.9 ICD-9-CM: 482.9  11/27/2019 - Present Chemotherapy-induced neutropenia (HCC) ICD-10-CM: D70.1, T45.1X5A 
ICD-9-CM: 288.03, E933.1  11/25/2019 - Present Malignant neoplasm of lower third of esophagus (HCC) ICD-10-CM: C15.5 ICD-9-CM: 150.5  2/19/2019 - Present Panlobular emphysema ([facility] ICD-10-CM: J43.1 ICD-9-CM: 492.8  1/5/2018 - Present Obesity, morbid ([facility] ICD-10-CM: E66.01 
ICD-9-CM: 278.01  12/13/2017 - Present Obstructive sleep apnea syndrome ICD-10-CM: N86.54 
ICD-9-CM: 327.23  4/10/2017 - Present Pure hypercholesterolemia ICD-10-CM: E78.00 ICD-9-CM: 272.0  8/17/2016 - Present Chronic gout without tophus ICD-10-CM: M1A. 9XX0 
ICD-9-CM: 274.02  8/17/2016 - Present Peripheral polyneuropathy ICD-10-CM: G62.9 ICD-9-CM: 356.9  8/17/2016 - Present Essential hypertension with goal blood pressure less than 140/90 ICD-10-CM: I10 
ICD-9-CM: 401.9  8/17/2016 - Present Hypotestosteronemia ICD-10-CM: E34.9 ICD-9-CM: 259.9  12/29/2015 - Present RESOLVED: Hypertension, essential ICD-10-CM: I10 
ICD-9-CM: 401.9  12/29/2015 - 9/5/2017 RESOLVED: Hyperlipidemia ICD-10-CM: E78.5 ICD-9-CM: 272.4  12/29/2015 - 9/5/2017 Discharge Condition: Stable Hospital Course:  
Patient originally was admitted to [facility] on 2/5/2020. He has history of esophageal cancer, on radiation therapy, COPD, on tube feeding.  
  
Admitted due to headache. His headache was severe at times. Patient lost a lot of weight. Patient also had episodes of seizure-like activities. Patient continues to have headache from time to time. Alternating with episodes of unresponsiveness. EEG was done which did not show seizure focus but telemetry neurologist recommended starting him on Keppra.  
  
Neurological imaging did not show lesions in the brain. Although there is question about paraneoplastic syndrome . The plan now is for patient to have lumbar puncture to assess CSF  
  
As of yesterday, patient has episodes of unresponsiveness, did not respond to sternal rub,  rapid response was called. Code as was activated. Patient had CT scan. During CT scan, patient also had CODE BLUE called although upon verification patient did not lose pulse, but just had erratic breathing.  
  
CT head showing nonspecific cerebellar changes. Patient has been monitored overnight. Patient is being transferred to [facility]. Physical Exam: 
  
General:                    The patient is a middle aged male in no acute respiratory  distress. Patient is lethargic. Mostly sleeping. Responded to stimuli by opening eyes, no verbal responses. .  
BKOV:                                   TMUEUY to back of head Eyes:                                   palpebral pallor, no scleral icterus. ENT:                                    External auricular and nasal exam within normal limits.                                             BAOVYK membranes are moist. 
Neck:                                   Supple, non-tender, no JVD. Lungs:                       decreased to auscultation bilaterally without wheezes or crackles.                                             No respiratory distress or accessory muscle use. Heart:                                  Regular rate and rhythm, without murmurs, rubs, or gallops. Abdomen:                  Soft, non-tender, non-distended with normoactive bowel sounds. Genitourinary:           No tenderness over the bladder or bilateral CVAs. Maxwell catheter in place Extremities:               Without clubbing, cyanosis, or edema. Skin:                                    Normal color, texture, and turgor. No rashes, lesions, or jaundice. Pulses:                      Radial and dorsalis pedis pulses present 2+ bilaterally.  
                                            Capillary refill <2s. Neurologic:               lethargic. No obvious neuro motor deficit.  
  
 
 
Consults: Neurology Significant Diagnostic Studies: CT perfusion 2- IMPRESSION: Hypoperfusion within the cerebellar hemispheres. There is diffuse 
global Tmax>4s within the supra and infratentorial brain which may indicate 
chronic oligemia versus artifact. 
  
CTA  
2- IMPRESSION: 
1. Negative CTA exam of the major cervical arterial vasculature for significant 
stenosis or occlusion. 
  
2. Negative CTA exam of the major intracranial arterial vasculature for 
significant stenosis, occlusion, or aneurysms. 
  
3. Similar left cervical and visualized mediastinal adenopathy, suggesting 
progression of the patient's known esophageal cancer. 
  
CT brain 2- IMPRESSION: 
  
 1. No definite noncontrast CT evidence of acute intracranial abnormality within 
the limits of patient motion. 
  
2. If there are persistent or progressive symptoms, a repeat exam should be 
obtained when the patient is better able to lie still. 
  
CT brain 2-8-2020 Impression:  No acute intracranial abnormality.  Other chronic findings as 
Above. 
  
XR chest  
2-5-2020 IMPRESSION:  Negative for acute change. 
  
MRI brain 2- Impression: 1. No evidence of acute infarction. 2. Mild chronic small vessel ischemic change. Recent Results (from the past 24 hour(s)) PROTHROMBIN TIME + INR Collection Time: 02/10/20  6:35 PM  
Result Value Ref Range Prothrombin time 14.3 12.0 - 14.7 sec INR 1.1 PTT Collection Time: 02/10/20  6:35 PM  
Result Value Ref Range aPTT 25.2 24.3 - 35.4 SEC FIBRINOGEN Collection Time: 02/10/20  6:35 PM  
Result Value Ref Range Fibrinogen 168 (L) 190 - 501 mg/dL D DIMER Collection Time: 02/10/20  6:35 PM  
Result Value Ref Range D DIMER 16.15 (HH) <0.56 ug/ml(FEU) HAPTOGLOBIN Collection Time: 02/10/20  6:35 PM  
Result Value Ref Range Haptoglobin 195 30 - 200 mg/dL VITAMIN B12 Collection Time: 02/10/20  6:35 PM  
Result Value Ref Range Vitamin B12 439 193 - 986 pg/mL CBC WITH AUTOMATED DIFF Collection Time: 02/11/20  3:31 AM  
Result Value Ref Range WBC 9.5 4.3 - 11.1 K/uL  
 RBC 3.79 (L) 4.23 - 5.6 M/uL  
 HGB 12.4 (L) 13.6 - 17.2 g/dL HCT 36.3 (L) 41.1 - 50.3 % MCV 95.8 79.6 - 97.8 FL  
 MCH 32.7 26.1 - 32.9 PG  
 MCHC 34.2 31.4 - 35.0 g/dL  
 RDW 15.6 (H) 11.9 - 14.6 % PLATELET 68 (L) 809 - 450 K/uL MPV 11.4 9.4 - 12.3 FL ABSOLUTE NRBC 0.00 0.0 - 0.2 K/uL  
 DF AUTOMATED NEUTROPHILS 81 (H) 43 - 78 % LYMPHOCYTES 4 (L) 13 - 44 % MONOCYTES 12 4.0 - 12.0 % EOSINOPHILS 2 0.5 - 7.8 % BASOPHILS 0 0.0 - 2.0 % IMMATURE GRANULOCYTES 1 0.0 - 5.0 %  
 ABS. NEUTROPHILS 7.7 1.7 - 8.2 K/UL ABS. LYMPHOCYTES 0.3 (L) 0.5 - 4.6 K/UL  
 ABS. MONOCYTES 1.1 0.1 - 1.3 K/UL  
 ABS. EOSINOPHILS 0.2 0.0 - 0.8 K/UL  
 ABS. BASOPHILS 0.0 0.0 - 0.2 K/UL  
 ABS. IMM. GRANS. 0.1 0.0 - 0.5 K/UL METABOLIC PANEL, BASIC Collection Time: 02/11/20  3:31 AM  
Result Value Ref Range Sodium 135 (L) 136 - 145 mmol/L Potassium 3.8 3.5 - 5.1 mmol/L Chloride 104 98 - 107 mmol/L  
 CO2 27 21 - 32 mmol/L Anion gap 4 (L) 7 - 16 mmol/L Glucose 98 65 - 100 mg/dL BUN 10 6 - 23 MG/DL Creatinine 0.69 (L) 0.8 - 1.5 MG/DL  
 GFR est AA >60 >60 ml/min/1.73m2 GFR est non-AA >60 >60 ml/min/1.73m2 Calcium 8.9 8.3 - 10.4 MG/DL Disposition: acute care facility Discharge Medications:  
Current Discharge Medication List  
  
CONTINUE these medications which have NOT CHANGED Details Lactose-Free Food with Fiber (JEVITY 1.5 MELITON) 0.06 gram-1.5 kcal/mL liqd 6 Bottles by PEG Tube route daily for 100 days. Bolus Feeds, goal 6/day to provide 2130 kcal, 91 gm pro, 1080 ml. Needs additional 36 oz/day for hydration. JESSICA > 3 months. Indications: blockage of the esophagus, dysphagia Qty: 180 Bottle, Refills: 4  
  
midodrine (PROAMITINE) 5 mg tablet Take 1 Tab by mouth two (2) times daily (with meals) for 30 days. Indications: a feeling of dizziness upon standing due to a drop in blood pressure Qty: 60 Tab, Refills: 1  
  
sucralfate (CARAFATE) 1 gram tablet Take 1 Tab by mouth four (4) times daily. Qty: 60 Tab, Refills: 0  
  
artificial saliva (MOUTH KOTE) spra Take 1 Spray by mouth as needed for Pain. Qty: 240 mL, Refills: 1  
  
acyclovir (ZOVIRAX) 5 % ointment Apply thin layer of cream to effected areas 4-5- times a day 
Qty: 5 g, Refills: 3  
  
potassium chloride (KAON 20%) 40 mEq/15 mL liqd Daily X 2 days through his feeding tube  Indications: prevention of low potassium in the blood Qty: 120 mL, Refills: 0  
  
cpap machine kit by Does Not Apply route. Oxygen at bedtime as directed for dose pack.  
  
ezetimibe (ZETIA) 10 mg tablet Take 10 mg by mouth daily. allopurinol (ZYLOPRIM) 100 mg tablet Take 1 Tab by mouth daily. Qty: 90 Tab, Refills: 3 Associated Diagnoses: Chronic gout without tophus, unspecified cause, unspecified site  
  
metoprolol tartrate (LOPRESSOR) 50 mg tablet Take 1 Tab by mouth two (2) times a day. Qty: 180 Tab, Refills: 3 cholecalciferol, VITAMIN D3, (VITAMIN D3) 5,000 unit tab tablet Take 5,000 Units by mouth daily. STOP taking these medications  
  
 oxyCODONE IR (ROXICODONE) 10 mg tab immediate release tablet Comments:  
Reason for Stopping:   
   
  
 
 
Activity: Activity as tolerated Diet: feeding via tube Wound Care: Keep wound clean and dry No orders of the defined types were placed in this encounter. I have discussed the plan of care with patient and spouse at bedside. Time spent on discharge is 37 minutes. Signed By: Jadon Jorge MD   
 February 11, 2020

## 2020-02-11 NOTE — PROGRESS NOTES
Progress Note Patient: Anuja Rodriguez MRN: [id number]  SSN: [id number] YOB: 1965  Age: 60 y.o. Sex: male Admit Date: 2/5/2020 LOS: 4 days Subjective:  
 
Patient originally was admitted to [facility] 2/5/2020.  He has history of esophageal cancer, on radiation therapy, COPD, on tube feeding.  
  
Admitted due to headache.  His headache was severe at times.  Patient lost a lot of weight.  Patient also had episodes of seizure-like activities.  Patient continues to have headache from time to time.  Alternating with episodes of unresponsiveness.  EEG was done which did not show seizure focus but telemetry neurologist recommended starting him on Keppra.  
  
Neurological imaging did not show lesions in the brain.  Although there is question about paraneoplastic syndrome .  The plan now is for patient to have lumbar puncture to assess CSF  
  
As of yesterday, patient has episodes of unresponsiveness, did not respond to sternal rub,  rapid response was called.  Code as was activated.  Patient had CT scan.  During CT scan, patient also had CODE BLUE called although upon verification patient did not lose pulse, but just had erratic breathing.  
  
CT head showing nonspecific cerebellar changes.  Patient has been monitored overnight.  
  
Patient is being transferred to [facility]. Objective:  
 
Vitals:  
 02/11/20 0800 02/11/20 0900 02/11/20 1000 02/11/20 1100 BP: 138/87 144/88 146/90 (!) 140/96 Pulse: 83 87 93 (!) 141 Resp: 10 9 11 (!) 52 Temp:      
SpO2: 94% 94% 95% 97% Weight:      
Height:      
  
 
Intake and Output: 
Current Shift: No intake/output data recorded. Last three shifts: 02/09 1901 - 02/11 0700 In: 835 [I.V.:310] [id number] Physical Exam:  
General:                    The patient is a middle aged male in no acute respiratory  distress.  Patient is lethargic.  Mostly sleeping.  Responded to stimuli by opening eyes, no verbal responses.  .  
 Head:                                   bruise to back of head  
Eyes:                                   palpebral pallor, no scleral icterus. ENT:                                    External auricular and nasal exam within normal limits.                                             TTVHEO membranes are moist. 
Neck:                                   Supple, non-tender, no JVD. Lungs:                       decreased to auscultation bilaterally without wheezes or crackles.                                             No respiratory distress or accessory muscle use. Heart:                                  Regular rate and rhythm, without murmurs, rubs, or gallops. Abdomen:                  Soft, non-tender, non-distended with normoactive bowel sounds. Genitourinary:           No tenderness over the bladder or bilateral CVAs.  Maxwell catheter in place Extremities:               Without clubbing, cyanosis, or edema. Skin:                                    Normal color, texture, and turgor. No rashes, lesions, or jaundice. Pulses:                      Radial and dorsalis pedis pulses present 2+ bilaterally.  
                                            Capillary refill <2s. Neurologic:               lethargic.  No obvious neuro motor deficit.  
 
 
Lab/Data Review: 
 
Recent Results (from the past 24 hour(s)) PROTHROMBIN TIME + INR Collection Time: 02/10/20  6:35 PM  
Result Value Ref Range Prothrombin time 14.3 12.0 - 14.7 sec INR 1.1 PTT Collection Time: 02/10/20  6:35 PM  
Result Value Ref Range aPTT 25.2 24.3 - 35.4 SEC FIBRINOGEN Collection Time: 02/10/20  6:35 PM  
Result Value Ref Range Fibrinogen 168 (L) 190 - 501 mg/dL D DIMER Collection Time: 02/10/20  6:35 PM  
Result Value Ref Range D DIMER 16.15 (HH) <0.56 ug/ml(FEU) HAPTOGLOBIN Collection Time: 02/10/20  6:35 PM  
Result Value Ref Range Haptoglobin 195 30 - 200 mg/dL VITAMIN B12  
 Collection Time: 02/10/20  6:35 PM  
Result Value Ref Range Vitamin B12 439 193 - 986 pg/mL CBC WITH AUTOMATED DIFF Collection Time: 02/11/20  3:31 AM  
Result Value Ref Range WBC 9.5 4.3 - 11.1 K/uL  
 RBC 3.79 (L) 4.23 - 5.6 M/uL  
 HGB 12.4 (L) 13.6 - 17.2 g/dL HCT 36.3 (L) 41.1 - 50.3 % MCV 95.8 79.6 - 97.8 FL  
 MCH 32.7 26.1 - 32.9 PG  
 MCHC 34.2 31.4 - 35.0 g/dL  
 RDW 15.6 (H) 11.9 - 14.6 % PLATELET 68 (L) 748 - 450 K/uL MPV 11.4 9.4 - 12.3 FL ABSOLUTE NRBC 0.00 0.0 - 0.2 K/uL  
 DF AUTOMATED NEUTROPHILS 81 (H) 43 - 78 % LYMPHOCYTES 4 (L) 13 - 44 % MONOCYTES 12 4.0 - 12.0 % EOSINOPHILS 2 0.5 - 7.8 % BASOPHILS 0 0.0 - 2.0 % IMMATURE GRANULOCYTES 1 0.0 - 5.0 %  
 ABS. NEUTROPHILS 7.7 1.7 - 8.2 K/UL  
 ABS. LYMPHOCYTES 0.3 (L) 0.5 - 4.6 K/UL  
 ABS. MONOCYTES 1.1 0.1 - 1.3 K/UL  
 ABS. EOSINOPHILS 0.2 0.0 - 0.8 K/UL  
 ABS. BASOPHILS 0.0 0.0 - 0.2 K/UL  
 ABS. IMM. GRANS. 0.1 0.0 - 0.5 K/UL METABOLIC PANEL, BASIC Collection Time: 02/11/20  3:31 AM  
Result Value Ref Range Sodium 135 (L) 136 - 145 mmol/L Potassium 3.8 3.5 - 5.1 mmol/L Chloride 104 98 - 107 mmol/L  
 CO2 27 21 - 32 mmol/L Anion gap 4 (L) 7 - 16 mmol/L Glucose 98 65 - 100 mg/dL BUN 10 6 - 23 MG/DL Creatinine 0.69 (L) 0.8 - 1.5 MG/DL  
 GFR est AA >60 >60 ml/min/1.73m2 GFR est non-AA >60 >60 ml/min/1.73m2 Calcium 8.9 8.3 - 10.4 MG/DL  
 
CT perfusion 2- IMPRESSION: Hypoperfusion within the cerebellar hemispheres. There is diffuse 
global Tmax>4s within the supra and infratentorial brain which may indicate 
chronic oligemia versus artifact. 
  
CTA  
2- IMPRESSION: 
1.  Negative CTA exam of the major cervical arterial vasculature for significant 
stenosis or occlusion. 
  
2. Negative CTA exam of the major intracranial arterial vasculature for 
significant stenosis, occlusion, or aneurysms. 
  
 3. Similar left cervical and visualized mediastinal adenopathy, suggesting 
progression of the patient's known esophageal cancer. 
  
CT brain 2- IMPRESSION: 
  
1. No definite noncontrast CT evidence of acute intracranial abnormality within 
the limits of patient motion. 
  
2. If there are persistent or progressive symptoms, a repeat exam should be 
obtained when the patient is better able to lie still. 
  
CT brain 2-8-2020 Impression:  No acute intracranial abnormality.  Other chronic findings as 
Above. 
  
XR chest  
2-5-2020 IMPRESSION:  Negative for acute change. 
  
MRI brain 2- Impression: 1. No evidence of acute infarction. 2. Mild chronic small vessel ischemic change. Current Facility-Administered Medications:  
  lacosamide (VIMPAT) tablet 100 mg, 100 mg, Oral, Q12H, Jose EnriquecoMargarito, DO 
  levETIRAcetam (KEPPRA) tablet 1,000 mg, 1,000 mg, Oral, BID, Ciesco, Margarito, DO 
  LORazepam (ATIVAN) injection 1 mg, 1 mg, IntraVENous, Q4H PRN, Margarito Boss DO, 1 mg at 02/11/20 1101 
  HYDROmorphone (PF) (DILAUDID) injection 1 mg, 1 mg, IntraVENous, Q4H PRN, Emelia Buckley MD, 1 mg at 02/11/20 1103   allopurinoL (ZYLOPRIM) tablet 100 mg, 100 mg, Oral, DAILY, Roshni De Leon MD, 100 mg at 02/11/20 0400   albuterol-ipratropium (DUO-NEB) 2.5 MG-0.5 MG/3 ML, 3 mL, Nebulization, Q4H PRN, Roshni De Leon MD 
  cloNIDine HCL (CATAPRES) tablet 0.2 mg, 0.2 mg, Oral, BID PRN, Roshni De Leon MD 
  sodium chloride (NS) flush 5-40 mL, 5-40 mL, IntraVENous, Q8H, SidhomLorena MD, 20 mL at 02/11/20 1392   sodium chloride (NS) flush 5-40 mL, 5-40 mL, IntraVENous, PRN, Roshni De Leon MD 
  acetaminophen (TYLENOL) tablet 650 mg, 650 mg, Oral, Q4H PRN, Roshni De Leon MD, 650 mg at 02/09/20 1748 
  naloxone [facility]) injection 0.4 mg, 0.4 mg, IntraVENous, PRN, Roshni De Leon MD 
  ondansetron [facility]) injection 4 mg, 4 mg, IntraVENous, Q4H PRN, Tatum Milner MD, 4 mg at 02/07/20 2041 
  magnesium hydroxide (MILK OF MAGNESIA) 400 mg/5 mL oral suspension 30 mL, 30 mL, Oral, DAILY PRN, Tatum Milner MD 
  nicotine (NICODERM CQ) 21 mg/24 hr patch 1 Patch, 1 Patch, TransDERmal, DAILY PRN, Tatum Milner MD 
 
 
Assessment:  
 
Principal Problem: 
  New onset seizure ([facility] (2/5/2020) Active Problems: 
  Essential hypertension with goal blood pressure less than 140/90 (8/17/2016) Malignant neoplasm of lower third of esophagus ([facility] (2/19/2019) Hypokalemia (2/5/2020) Other dysphagia (2/6/2020) Intractable headache (2/8/2020) Plan:  
 
Acute metabolic encephalopathy Etiology in unclear. ?seizure Need in-house neurology consultation. ? Continuous EEG Transfer to [facility] I have discussed the plan of care with patient and spouse at bedside. Signed By: Geno Shone, MD   
 February 11, 2020

## 2020-02-11 NOTE — PROGRESS NOTES
Care Management Interventions Last Visit to PCP: 07/17/19 Palliative Care Criteria Met (RRAT>21 & CHF Dx)?: No(Risk 17% Dx ? Seizure activity) Transition of Care Consult (CM Consult): Discharge Planning Physical Therapy Consult: No 
Occupational Therapy Consult: No 
Speech Therapy Consult: No 
Current Support Network: Lives with Spouse Confirm Follow Up Transport: Family [employer] Information Provided?: No 
Discharge Location Discharge Placement: Transferred to higher level of care Patient is to transfer to [facility] and Dr. Constance Perez is accepting physician. Nursing is arranging transport and obtaining room.

## 2020-02-11 NOTE — PROGRESS NOTES
Pt transferred to [facility] for extended treatment. Pt is still having seizure activity. Neuro consult. Chart screened by  for discharge planning. Please consult  if any new issues arise. Case management will continue to follow. Care Management Interventions PCP Verified by CM: Yes Mode of Transport at Discharge: Self Transition of Care Consult (CM Consult): Discharge Planning Discharge Durable Medical Equipment: No 
Physical Therapy Consult: Yes Occupational Therapy Consult: Yes Speech Therapy Consult: No 
Current Support Network: Own Home, Family Lives [place], Lives with Spouse Confirm Follow Up Transport: Family Discharge Location Discharge Placement: Unable to determine at this time

## 2020-02-11 NOTE — INTERDISCIPLINARY ROUNDS
Interdisciplinary team rounds were held 2/11/2020 with the following team members:Care Management, Nursing, Nutrition, Pastoral Care, Physical Therapy, Physician, Respiratory Therapy and Clinical Coordinator and the patient and spouse. Plan of care discussed. See clinical pathway and/or care plan for interventions and desired outcomes.

## 2020-02-11 NOTE — CONSULTS
Patient is seen today as a follow-up for a question of seizures and for headache. He was previously seen by tele-neurology by my partner Pia Brodie on February 6 and 7 who said: 
 
 
 
78-year-old man with a history of esophageal cancer who is being treated with chemotherapy and radiation. He has been experiencing severe sudden onset headaches which are holocephalic. From chart review the patient had 3 seizure-like episodes in the emergency department which were associated with urinary incontinence. The patient states that he has a severe headache and then begins shaking in all 4 limbs. He is awake during this time. He does not has impairment of consciousness. He also had an event in the morning in which he was staring off and \"out of it\" for 6 to 7 minutes. With these events of generalized shaking the patient did have urinary incontinence. CBC and CMP are fairly unremarkable. No leukocytosis. Neurological examination: Limited over computer. Speech and language are normal.  Comprehension is normal.  Face is symmetric. On motor examination he moves all 4 limbs against gravity. Assessment and plan 78-year-old man with seizure-like events in the setting of esophageal cancer. Fortunately, brain MRI does not show metastasis or leptomeningeal carcinomatosis. The events that the patient is having are unusual.  It is very rare for patients to convulse in all 4 limbs and have conscious awareness. His episode in the morning is also very prolonged. The average duration of the seizure is less than 2 minutes. A seizure lasting 6 to 7 minutes would be very unusual.  Differential diagnosis would also include convulsive syncope and disassociative events in the setting of severe pain from headache and anxiety in the setting of cancer. In the setting of cancer, seizures can be from a paraneoplastic cause. I will send for antibodies.   We will obtain an EEG to see if the patient has epileptiform discharges. We will also obtain a CTA of the head and neck to investigate thunderclap headache. Continue Keppra. Since initial consultation the patient has been treated with Wojciech Gonzalez He continues to have a variety of different episodic disturbances of neurological function. He had several more of the episodes of bilateral hand shaking/hand ringing with preservation of consciousness and complains of severe headache. On Sunday he had an episode where he slumped and was flaccid and unresponsive for a period of time. Current Facility-Administered Medications:  
  acetaminophen (TYLENOL) tablet 650 mg, 650 mg, Oral, Q4H PRN, Aneta Boyd MD 
  magnesium hydroxide (MILK OF MAGNESIA) 400 mg/5 mL oral suspension 30 mL, 30 mL, Oral, DAILY PRN, Aneta Boyd MD 
  naloxone (NARCAN) injection 0.4 mg, 0.4 mg, IntraVENous, PRN, Aneta Boyd MD 
  nicotine (NICODERM CQ) 21 mg/24 hr patch 1 Patch, 1 Patch, TransDERmal, DAILY PRN, Aneta Boyd MD 
  ondansetron (ZOFRAN) injection 4 mg, 4 mg, IntraVENous, Q4H PRN, Aneta Boyd MD 
  sodium chloride (NS) flush 5-40 mL, 5-40 mL, IntraVENous, Q8H, Aneta Boyd MD, 10 mL at 02/11/20 1508   sodium chloride (NS) flush 5-40 mL, 5-40 mL, IntraVENous, PRN, Aneta Boyd MD 
  albuterol-ipratropium (DUO-NEB) 2.5 MG-0.5 MG/3 ML, 3 mL, Nebulization, Q4H PRN, Aneta Boyd MD 
  [START ON 2/12/2020] allopurinoL (ZYLOPRIM) tablet 100 mg, 100 mg, Oral, DAILY, Aneta Boyd MD 
  cloNIDine HCL (CATAPRES) tablet 0.2 mg, 0.2 mg, Oral, BID PRN, Aneta Boyd MD 
  HYDROmorphone (PF) (DILAUDID) injection 1 mg, 1 mg, IntraVENous, Q4H PRN, Aneta Boyd MD, 1 mg at 02/11/20 1507   lacosamide (VIMPAT) tablet 100 mg, 100 mg, Oral, Q12H, Aneta Boyd MD 
  LORazepam (ATIVAN) injection 1 mg, 1 mg, IntraVENous, Q4H PRN, Kaci Calvin MD 
   levETIRAcetam (KEPPRA) oral solution 1,000 mg, 1,000 mg, Per G Tube, BID, Manda Gonzalez MD 
  0.9% sodium chloride infusion, 75 mL/hr, IntraVENous, CONTINUOUS, Aneta Boyd MD 
 
Past Medical History:  
Diagnosis Date  Appendicitis   
 resulted in appendectomy  Chewing tobacco use  Chronic obstructive pulmonary disease (HCC)   
 nebulizer prn and rescue inhaler only  Chronic pain   
 pt wife denies any pain  Depression   
 pt wife denies, no current meds  Erectile disorder due to medical condition in male patient  Esophageal cancer ([facility] [address]  Former cigarette smoker  Gout   
 treated with Allopurinol. Last flareup 2013  H/O heart artery stent X2- last stent placed 2016  History of MI (myocardial infarction) 2014  
 stents x2  RCA- 2014  LAD - 2016  Hyperlipidemia  Hypertension   
 managed with meds  Hypotestosteronemia  Morbid obesity ([facility] [address] 47.6  KORINA (obstructive sleep apnea) Trilegy and oxygen 2L  Osteoarthritis Past Surgical History:  
Procedure Laterality Date  COLONOSCOPY N/A 2/5/2019 COLONOSCOPY/ 50 performed by Wade [facility] at [facility] HX APPENDECTOMY  2003  HX CARPAL TUNNEL RELEASE Right 2014  HX CORONARY STENT PLACEMENT  2014 RCA x 1  
 HX CORONARY STENT PLACEMENT  2016 PCI of the lesion with a 4x8mm Xience Alpine RONN  
 HX VASCULAR ACCESS    
 IR INSERT GASTROSTOMY TUBE [facility]  11/21/2019 Family History Problem Relation Age of Onset  Heart Disease Mother  Hypertension Mother  Cancer Father  Heart Disease Father  Cancer Brother No Known Allergies ROS patient is heavily sedated and cannot give a review of systems. Family at bedside Visit Vitals /90 Pulse 83 Temp 97.7 °F (36.5 °C) SpO2 98% Patient somnolent but arousable briefly to vigorous stimulation Pupils equal reactive to light Reflexes 1-2+ symmetrical  
 
 Plantar responses flexor bilaterally MRI Results (most recent): 
Results from [facility] Encounter encounter on 02/05/20 MRA BRAIN WO CONT Narrative HISTORY: 60years of age Male with a history of esophageal cancer with recurrent 
seizure like activity. . Concern for dural venous sinus thrombosis. EXAM: MRA BRAIN WO CONT. Magnetic resonance angiography of the brain performed 
in the venous phase was performed without contrast. 3-D multiplanar 
reconstructions were performed at the workstation. COMPARISON: Multiple prior CTA head and CTA had exams with the most recent being 
a CTA head and neck exam on 2/10/2020. MR brain exams with most recent on 
2/10/2020. Same-day CTA head and neck examination demonstrated again no evidence of 
significant stenosis or occlusion in the major cervical or intracranial arterial 
vasculature. There were however again findings of left cervical and upper 
mediastinal adenopathy concerning for progression given the patient's history of 
esophageal cancer. Same day MR brain examination demonstrated no evidence of acute intracranial 
abnormality. FINDINGS:  
Markedly limited examination of the dural venous sinuses due to patient motion 
which created artifact and limited evaluation of the dural venous sinuses. The inferior sagittal sinus is not adequately visualized. The straight sinus 
appears grossly patent. The right transverse sinus, sigmoid sinus, jugular bulb 
are patent. The left transverse sinus is patent but diffusely hypoplastic. The 
left sigmoid sinus and jugular bulb are hypoplastic but appear patent where 
adequately visualized. There is no definite evidence of dural venous thrombosis where adequately 
visualized. Impression IMPRESSION:  
Markedly limited MRV examination demonstrating hypoplasia of the left transverse 
sinus, sigmoid sinus, and jugular bulb but no definite evidence of dural venous 
sinus thrombosis, as above. Kaia Summers Result MRI brain with and without contrast 
  
History: recurrent seizure-like activity in the setting of known esophageal 
cancer. Altered mental status 
  
Imaging sequences: Sagittal short TR/short TE, axial short TR/short TE, long TR/long TE, FLAIR, gradient recall, diffusion weighted images and ADC mapping. Axial and coronal short TR/short TE postcontrast images. Imaging was performed 
on a 1.5 Rakel magnet, utilizing the uneventful administration of 20 mL of 
intravenous Dotarem and order to better evaluate for intracranial pathology. 
  
Comparison: 02/04/2020. Correlation is made to the CT perfusion performed 
earlier on the same day. 
  
Findings: The ventricles and sulci are normal in size and configuration. There 
are no extra-axial fluid collections. Normal flow voids are present within all 
of the major intracranial vessels. No evidence of intraparenchymal hemorrhage 
or mass effect is identified. .  There are no areas of restricted diffusion to 
suggest an acute or subacute infarction.  
  
Patchy and discrete foci of T2 prolongation are present within the 
supratentorial white matter. These are nonspecific findings but would be most 
compatible with mild chronic small vessel ischemic changes. No abnormal areas of 
enhancement are identified following contrast administration. There is under 
pneumatization of bilateral mastoid air cells. There is a trace amount of 
layering fluid within the sphenoid sinuses. 
  
IMPRESSION Impression: 1. No evidence of acute infarction. 2. Mild chronic small vessel ischemic change. Impression: 
 
Recurrent episodic alteration of neurological function. The episodes do not sound like seizures. Severe headache without explanation.   Agree with plans for lumbar puncture under fluoroscopy

## 2020-02-11 NOTE — H&P
History and Physical 
 
Patient: Ana Haynes MRN: [id number]  SSN: [id number] YOB: 1965  Age: 60 y.o. Sex: male Subjective:  
  
Ana Haynes is a 60 y.o. male who is being admitted to [address] [place] [address], after being transferred from [address] [address] [facility]. Patient originally was admitted to [facility] on 2/5/2020. He has history of esophageal cancer, on radiation therapy, COPD, on tube feeding. Admitted due to headache. His headache was severe at times. Patient lost a lot of weight. Patient also had episodes of seizure-like activities. Patient continues to have headache from time to time. Alternating with episodes of unresponsiveness. EEG was done which did not show seizure focus but telemetry neurologist recommended starting him on Keppra. Neurological imaging did not show lesions in the brain. Although there is question about paraneoplastic syndrome . The plan now is for patient to have lumbar puncture to assess CSF  
 
as of yesterday, patient has episodes of unresponsiveness, did not respond to sternal rub,  rapid response was called. Code as was activated. Patient had CT scan. During CT scan, patient also had CODE BLUE called although upon verification patient did not lose pulse, but just had erratic breathing. CT head showing nonspecific cerebellar changes. Patient has been monitored overnight. Due to his complicated neurological symptoms since admission, it is deemed that patient would benefit from in-house neurologist evaluation, and follow-up while in the hospital. So patient is being transferred to [facility]. Past Medical History:  
Diagnosis Date  Appendicitis   
 resulted in appendectomy  Chewing tobacco use  Chronic obstructive pulmonary disease (HCC)   
 nebulizer prn and rescue inhaler only  Chronic pain   
 pt wife denies any pain  Depression   
 pt wife denies, no current meds  Erectile disorder due to medical condition in male patient  Esophageal cancer ([facility] [address]  Former cigarette smoker  Gout   
 treated with Allopurinol. Last flareup   H/O heart artery stent X2- last stent placed   History of MI (myocardial infarction) 2014  
 stents x2  RCA-   LAD - 2016  Hyperlipidemia  Hypertension   
 managed with meds  Hypotestosteronemia  Morbid obesity ([facility] [address] 47.6  KORINA (obstructive sleep apnea) Trilegy and oxygen 2L  Osteoarthritis Past Surgical History:  
Procedure Laterality Date  COLONOSCOPY N/A 2019 COLONOSCOPY/ 50 performed by Ryan Morgan MD at [facility] HX APPENDECTOMY  2003  HX CARPAL TUNNEL RELEASE Right 2014  HX CORONARY STENT PLACEMENT   RCA x 1  
 HX CORONARY STENT PLACEMENT   PCI of the lesion with a 4x8mm Xience Alpine RONN  
 HX VASCULAR ACCESS    
 IR INSERT GASTROSTOMY TUBE [facility]  2019 Family History Problem Relation Age of Onset  Heart Disease Mother  Hypertension Mother  Cancer Father  Heart Disease Father  Cancer Brother Social History Tobacco Use  Smoking status: Former Smoker Packs/day: 0.15 Years: 35.00 Pack years: 5.25 Last attempt to quit: 2016 Years since quittin.1  Smokeless tobacco: Current User Substance Use Topics  Alcohol use: No  
  
Prior to Admission medications Medication Sig Start Date End Date Taking? Authorizing Provider  
midodrine (PROAMITINE) 5 mg tablet Take 1 Tab by mouth two (2) times daily (with meals) for 30 days. Indications: a feeling of dizziness upon standing due to a drop in blood pressure 20  Anna Schneider NP  
sucralfate (CARAFATE) 1 gram tablet Take 1 Tab by mouth four (4) times daily. 20   Sandip Loo NP  
artificial saliva (MOUTH KOTE) spra Take 1 Spray by mouth as needed for Pain.  1/10/20   Luis Flores MD  
 acyclovir (ZOVIRAX) 5 % ointment Apply thin layer of cream to effected areas 4-5- times a day 12/30/19   Brittany Nesbitt NP  
potassium chloride (KAON 20%) 40 mEq/15 mL liqd Daily X 2 days through his feeding tube  Indications: prevention of low potassium in the blood 11/25/19   Dotty Guerra MD  
Lactose-Free Food with Fiber (JEVITY 1.5 MELITON) 0.06 gram-1.5 kcal/mL liqd 6 Bottles by PEG Tube route daily for 100 days. Bolus Feeds, goal 6/day to provide 2130 kcal, 91 gm pro, 1080 ml. Needs additional 36 oz/day for hydration. JESSICA > 3 months. Indications: blockage of the esophagus, dysphagia 11/21/19 2/29/20  Dotty Guerra MD  
cpap machine kit by Does Not Apply route. Provider, Historical  
Oxygen at bedtime as directed for dose pack. Provider, Historical  
ezetimibe (ZETIA) 10 mg tablet Take 10 mg by mouth daily. Provider, Historical  
allopurinol (ZYLOPRIM) 100 mg tablet Take 1 Tab by mouth daily. 1/29/19   Ryan Fatima MD  
metoprolol tartrate (LOPRESSOR) 50 mg tablet Take 1 Tab by mouth two (2) times a day. 7/5/18   Ryan Fatima MD  
cholecalciferol, VITAMIN D3, (VITAMIN D3) 5,000 unit tab tablet Take 5,000 Units by mouth daily. Provider, Historical  
  
 
No Known Allergies Review of Systems: 
10-point review of systems is negative except what is mentioned in the present illness section. Objective: There were no vitals filed for this visit. Physical Exam: 
 
General:                    The patient is a middle aged male in no acute respiratory  distress. Patient is lethargic. Mostly sleeping. Responded to stimuli by opening eyes, no verbal responses. .  
Head:                                   bruise to back of head Eyes:                                   palpebral pallor, no scleral icterus. ENT:                                    External auricular and nasal exam within normal limits.   
                                            Mucous membranes are moist. 
 Neck:                                   Supple, non-tender, no JVD. Lungs:                       decreased to auscultation bilaterally without wheezes or crackles. No respiratory distress or accessory muscle use. Heart:                                  Regular rate and rhythm, without murmurs, rubs, or gallops. Abdomen:                  Soft, non-tender, non-distended with normoactive bowel sounds. Genitourinary:           No tenderness over the bladder or bilateral CVAs. Maxwell catheter in place Extremities:               Without clubbing, cyanosis, or edema. Skin:                                    Normal color, texture, and turgor. No rashes, lesions, or jaundice. Pulses:                      Radial and dorsalis pedis pulses present 2+ bilaterally. Capillary refill <2s. Neurologic:               lethargic. No obvious neuro motor deficit. Lab and data Recent Results (from the past 24 hour(s)) PROTHROMBIN TIME + INR Collection Time: 02/10/20  6:35 PM  
Result Value Ref Range Prothrombin time 14.3 12.0 - 14.7 sec INR 1.1 PTT Collection Time: 02/10/20  6:35 PM  
Result Value Ref Range aPTT 25.2 24.3 - 35.4 SEC FIBRINOGEN Collection Time: 02/10/20  6:35 PM  
Result Value Ref Range Fibrinogen 168 (L) 190 - 501 mg/dL D DIMER Collection Time: 02/10/20  6:35 PM  
Result Value Ref Range D DIMER 16.15 (HH) <0.56 ug/ml(FEU) HAPTOGLOBIN Collection Time: 02/10/20  6:35 PM  
Result Value Ref Range Haptoglobin 195 30 - 200 mg/dL VITAMIN B12 Collection Time: 02/10/20  6:35 PM  
Result Value Ref Range Vitamin B12 439 193 - 986 pg/mL CBC WITH AUTOMATED DIFF Collection Time: 02/11/20  3:31 AM  
Result Value Ref Range WBC 9.5 4.3 - 11.1 K/uL  
 RBC 3.79 (L) 4.23 - 5.6 M/uL  
 HGB 12.4 (L) 13.6 - 17.2 g/dL HCT 36.3 (L) 41.1 - 50.3 %  MCV 95.8 79.6 - 97.8 FL  
 MCH 32.7 26.1 - 32.9 PG  
 MCHC 34.2 31.4 - 35.0 g/dL  
 RDW 15.6 (H) 11.9 - 14.6 % PLATELET 68 (L) 533 - 450 K/uL MPV 11.4 9.4 - 12.3 FL ABSOLUTE NRBC 0.00 0.0 - 0.2 K/uL  
 DF AUTOMATED NEUTROPHILS 81 (H) 43 - 78 % LYMPHOCYTES 4 (L) 13 - 44 % MONOCYTES 12 4.0 - 12.0 % EOSINOPHILS 2 0.5 - 7.8 % BASOPHILS 0 0.0 - 2.0 % IMMATURE GRANULOCYTES 1 0.0 - 5.0 %  
 ABS. NEUTROPHILS 7.7 1.7 - 8.2 K/UL  
 ABS. LYMPHOCYTES 0.3 (L) 0.5 - 4.6 K/UL  
 ABS. MONOCYTES 1.1 0.1 - 1.3 K/UL  
 ABS. EOSINOPHILS 0.2 0.0 - 0.8 K/UL  
 ABS. BASOPHILS 0.0 0.0 - 0.2 K/UL  
 ABS. IMM. GRANS. 0.1 0.0 - 0.5 K/UL METABOLIC PANEL, BASIC Collection Time: 02/11/20  3:31 AM  
Result Value Ref Range Sodium 135 (L) 136 - 145 mmol/L Potassium 3.8 3.5 - 5.1 mmol/L Chloride 104 98 - 107 mmol/L  
 CO2 27 21 - 32 mmol/L Anion gap 4 (L) 7 - 16 mmol/L Glucose 98 65 - 100 mg/dL BUN 10 6 - 23 MG/DL Creatinine 0.69 (L) 0.8 - 1.5 MG/DL  
 GFR est AA >60 >60 ml/min/1.73m2 GFR est non-AA >60 >60 ml/min/1.73m2 Calcium 8.9 8.3 - 10.4 MG/DL  
 
CT perfusion 2- IMPRESSION: Hypoperfusion within the cerebellar hemispheres. There is diffuse 
global Tmax>4s within the supra and infratentorial brain which may indicate 
chronic oligemia versus artifact. 
  
CTA  
2- IMPRESSION: 
1. Negative CTA exam of the major cervical arterial vasculature for significant 
stenosis or occlusion. 
  
2. Negative CTA exam of the major intracranial arterial vasculature for 
significant stenosis, occlusion, or aneurysms. 
  
3. Similar left cervical and visualized mediastinal adenopathy, suggesting 
progression of the patient's known esophageal cancer. 
  
CT brain 2- IMPRESSION: 
  
1. No definite noncontrast CT evidence of acute intracranial abnormality within 
the limits of patient motion. 
  
2. If there are persistent or progressive symptoms, a repeat exam should be obtained when the patient is better able to lie still. CT brain 2-8-2020 Impression:  No acute intracranial abnormality. Other chronic findings as 
Above. XR chest  
2-5-2020 IMPRESSION:  Negative for acute change. MRI brain 2- Impression: 1. No evidence of acute infarction. 2. Mild chronic small vessel ischemic change. No current facility-administered medications for this encounter. No current outpatient medications on file. Facility-Administered Medications Ordered in Other Encounters:  
  lacosamide (VIMPAT) tablet 100 mg, 100 mg, Oral, Q12H, Margarito Boss, DO 
  levETIRAcetam (KEPPRA) tablet 1,000 mg, 1,000 mg, Oral, BID, Margarito Boss, DO 
  LORazepam (ATIVAN) injection 1 mg, 1 mg, IntraVENous, Q4H PRN, Margarito Boss DO, 1 mg at 02/11/20 1101 
  HYDROmorphone (PF) (DILAUDID) injection 1 mg, 1 mg, IntraVENous, Q4H PRN, Lauryn Johnson MD, 1 mg at 02/11/20 1103   allopurinoL (ZYLOPRIM) tablet 100 mg, 100 mg, Oral, DAILY, Alexander Naylor MD, 100 mg at 02/11/20 8518   albuterol-ipratropium (DUO-NEB) 2.5 MG-0.5 MG/3 ML, 3 mL, Nebulization, Q4H PRN, Alexander Naylor MD 
  cloNIDine HCL (CATAPRES) tablet 0.2 mg, 0.2 mg, Oral, BID PRN, Alexander Naylor MD 
  sodium chloride (NS) flush 5-40 mL, 5-40 mL, IntraVENous, Q8H, SidhomAbdi MD, 20 mL at 02/11/20 2127   sodium chloride (NS) flush 5-40 mL, 5-40 mL, IntraVENous, PRN, Alexander Naylor MD 
  acetaminophen (TYLENOL) tablet 650 mg, 650 mg, Oral, Q4H PRN, Alexander Naylor MD, 650 mg at 02/09/20 1748 
  naloxone [facility]) injection 0.4 mg, 0.4 mg, IntraVENous, PRN, Alexander Naylor MD 
  ondansetron [facility]) injection 4 mg, 4 mg, IntraVENous, Q4H PRN, Alexander Naylor MD, 4 mg at 02/07/20 2041 
  magnesium hydroxide (MILK OF MAGNESIA) 400 mg/5 mL oral suspension 30 mL, 30 mL, Oral, DAILY PRN, Alexander Nayolr MD 
  nicotine (NICODERM CQ) 21 mg/24 hr patch 1 Patch, 1 Patch, TransDERmal, DAILY PRN, Alexander Naylor MD 
 
   
 
 
Assessment:  
 
Hospital Problems  Date Reviewed: 1/31/2020 None Acute metabolic encephalopathy Seizure Esophageal cancer Hyperlipidemia Obesity Hypertension Plan:  
 
Esophageal cancer With mediastinal, neck lymphadenopathy, suggestive of spread. Currently undergoing radiation therapy. To follow-up with oncologist 
 
Seizure-like activity? Episodes of unresponsiveness from time to time Erika Oh of etiology is still questionable Ask in-house neurologist to see May need continuous EEG monitor Already on Keppra Will need lumbar puncture Chronic dysphagia due to esophageal cancer Already has tube feeding I have discussed with patient's wife regarding advance directive. Patient's wife would like to have a full-code status. I have discussed the plan of care with patient and spouse at bedside. DVT prophylaxis : SCD Patient has no pain now. Will monitor. Further treatments will depend on initial responses and findings. Signed By: Krystal Polanco MD   
 February 11, 2020

## 2020-02-11 NOTE — PROGRESS NOTES
Problem: Falls - Risk of 
Goal: *Absence of Falls Description Document Samm Mehta Fall Risk and appropriate interventions in the flowsheet. Outcome: Progressing Towards Goal 
Note: Fall Risk Interventions: 
Mobility Interventions: Bed/chair exit alarm, Communicate number of staff needed for ambulation/transfer, OT consult for ADLs, Patient to call before getting OOB, PT Consult for mobility concerns Mentation Interventions: Adequate sleep, hydration, pain control, Bed/chair exit alarm, Door open when patient unattended, Increase mobility, More frequent rounding, Reorient patient, Room close to nurse's station Medication Interventions: Assess postural VS orthostatic hypotension, Bed/chair exit alarm, Evaluate medications/consider consulting pharmacy, Patient to call before getting OOB, Teach patient to arise slowly Elimination Interventions: Bed/chair exit alarm, Call light in reach, Elevated toilet seat, Patient to call for help with toileting needs, Stay With Me (per policy) History of Falls Interventions: Bed/chair exit alarm, Consult care management for discharge planning, Evaluate medications/consider consulting pharmacy, Investigate reason for fall, Room close to nurse's station Problem: Patient Education: Go to Patient Education Activity Goal: Patient/Family Education Outcome: Progressing Towards Goal 
  
Problem: Pressure Injury - Risk of 
Goal: *Prevention of pressure injury Description Document Lee Scale and appropriate interventions in the flowsheet. Outcome: Progressing Towards Goal 
Note: Pressure Injury Interventions: 
Sensory Interventions: Assess changes in LOC, Assess need for specialty bed, Discuss PT/OT consult with provider, Pressure redistribution bed/mattress (bed type), Turn and reposition approx. every two hours (pillows and wedges if needed) Moisture Interventions: Apply protective barrier, creams and emollients, Check for incontinence Q2 hours and as needed, Internal/External urinary devices Activity Interventions: Assess need for specialty bed, Increase time out of bed, Pressure redistribution bed/mattress(bed type), PT/OT evaluation Mobility Interventions: Assess need for specialty bed, HOB 30 degrees or less, Pressure redistribution bed/mattress (bed type), PT/OT evaluation Nutrition Interventions: Document food/fluid/supplement intake, Offer support with meals,snacks and hydration Friction and Shear Interventions: Apply protective barrier, creams and emollients, Foam dressings/transparent film/skin sealants, HOB 30 degrees or less Problem: Patient Education: Go to Patient Education Activity Goal: Patient/Family Education Outcome: Progressing Towards Goal

## 2020-02-11 NOTE — ADT AUTH CERT NOTES
Seizure - Care Day 6 (2/10/2020) by Sherry Ward RN  
 
   
Review Status Review Entered Completed 2/10/2020 11:47  
   
Criteria Review Care Day: 6 Care Date: 2/10/2020 Level of Care: ICU Guideline Day 2 Clinical Status ( ) * Hemodynamic stability 2/10/2020 11:47:41 EST by Roberth Dockery   
  Heart-Tachycardic rate ( ) * Mental status at baseline 2/10/2020 11:47:41 EST by Richie Lucas \"unresponsiveness\", up in bedside chair and does not respond to sternal rub. Rapid Response called. (X) * No evidence of CNS bleeding or infection 2/10/2020 11:47:41 EST by Roberth Dockery   
  consult to IR for lumbar puncture to assess for cancer spread ( ) * No new neurologic deficits 2/10/2020 11:47:41 EST by Richie Lucas \"unresponsiveness\", up in bedside chair and does not respond to sternal rub. Rapid Response called. (X) * Seizures absent or managed ( ) * No evidence of seizure etiology requiring inpatient care ( ) * Discharge plans and education understood Activity ( ) * Ambulatory Routes   
(X) * Oral hydration, medications, and diet 2/10/2020 11:47:41 EST by Roberth Dockery   
  reg diet as tolerated Interventions   
(X) Neurologic checks and seizure monitoring 2/10/2020 11:47:41 EST by Roberth Dockery   
  frequent neuro checks (X) Possible antiepileptic drug levels 2/10/2020 11:47:41 EST by Roberth Dockery   
  Keppra 1g iv q12 (X) Possible cardiac monitoring 2/10/2020 11:47:41 EST by Roberth Dockery   
  ICU monitoring cardiac/resp monitoring Medications ( ) * Antiseizure regimen suitable for next level of care in place, if indicated [B] * Milestone Additional Notes 2/10/20 IP ICU Rapid Response (2/10/2020): paged by nursing staff for \"unresponsiveness\", up in bedside chair and does not respond to sternal rub.  Rapid Response called. Patient tachycardic with normal BPs, given Narcan 0.4 mg x2 without response. Upon further clarification, patient given Dilaudid last night and patient also took some home oral oxycodone that was on the table, wife is unsure how much he took. He also received Ativan twice, once around 1 AM and again around 6:30 AM. Flumazenil 0.5 mg IV given once. ABG showing 7.48/29/75/22. Code S called and patient transported to CT imaging for CT head where Code Memo called. Upon presentation patient did not lose pulse but had \"erratic breathing\" when physically being transferred from stretcher to CT table. Vitals stable. CT head completed showing nonspecific cerebellar changes. STAT teleneurology consultation with recommendations for repeat MRI head with/without contrast, repeat EEG (had one EEG earlier in the hospitalization that was negative), and lumbar puncture. Patient transferred to ICU for further monitoring. Not responsive to sternal rub but protecting airway Heart-Tachycardic rate No focal deficits. CN II thru XII intact b/l.  
  
/100 Pulse 75 Temp 98.9 ° Resp 20 SpO2 98% on 3L NC   
  
RBC: 3.79 (L) HGB: 12.4 (L) HCT: 35.7 (L) RDW: 15.5 (H) PLATELET: 54 (L) NEUTROPHILS: 84 (H) LYMPHOCYTES: 3 (L) Sodium: 132 (L) Potassium: 3.1 (L) Anion gap: 5 (L) Glucose: 109 (H) Creatinine: 0.60 (L)  
  
CT Perf-Hypoperfusion within the cerebellar hemispheres. There is diffuse  
global Tmax>4s within the supra and infratentorial brain which may indicate  
chronic oligemia versus artifact. Meds- allopurinol 100mg po qd, Keppra 1g iv q12, Ativan 1mg iv x2, Tylenol 650mg po prn, Romazicon 0.1mg iv x1, vimpat 100mg iv q12h, Narcan 0.4mg iv x3, Lopressor 5mg iv x1 A/P- # Seizure-like activity in the setting of recent falls with hitting of his head - ?post-concussive syndrome vs complex migraine vs ?seizures - Rapid response on 2/10/2020 as detailed above - will repeat MRI with/without contrast, ?is patient's known esophageal spreading to the CNS? (MRI from earlier this month was negative)  
- increase Keppra to 100 mg IV BID  
- consult to IR for lumbar puncture to assess for cancer spread  
- stopped Reglan, Benadryl, and steroids for headaches  
- EEG from earlier in this hospitalization was unremarkable but patient with suspected seizure-like activity  
- avoid narcotics  
- frequent neuro checks  
- continue with pain management as above  
   
# Esophageal cancer with interval worsening of mediastinal/neck lymphadenopathy suggestive of spread - currently undergoing radiation therapy, CT imaging showing spread of cancer  
- needs follow-up with Heme/Onc following hospital discharge  
   
# Chronic dysphagia, due to esophageal cancer - patient has been on tube feeds long-term but wants to try oral route  
- ordered speech consult for swallow evaluation F/E/N: stop fluids, replete electrolytes as needed, diet per speech evaluation Ppx: SCDs for VTE  
  
  
   
Seizure - Care Day 5 (2/9/2020) by Fartun Hdz RN  
 
   
Review Status Review Entered Completed 2/10/2020 11:44  
   
Criteria Review Care Day: 5 Care Date: 2/9/2020 Level of Care: Telemetry Guideline Day 2 Clinical Status   
(X) * Hemodynamic stability 2/10/2020 11:44:22 EST by Jessica Magdaleno /80 [id number] Temp98.5  ° 
Resp20 PqE689% on RA   
( ) * Mental status at baseline 2/10/2020 11:44:22 EST by Thompson Bravo   
  Pt much more sleepy today compared to yesterday. When he wakes up he immediately starts complaining of headaches   
(X) * No evidence of CNS bleeding or infection 2/10/2020 11:44:22 EST by Jessica Magdaleno patient still having really bad headaches of the same presentation without any changes   
(X) * No new neurologic deficits 2/10/2020 11:44:22 EST by Thompson Bravo   No focal deficits. CN II thru XII intact b/l. (X) * Seizures absent or managed 2/10/2020 11:44:22 EST by Jose Warner Pt has had episodes of \"unresponsiveness\" again, similar in presentation to several days ago. Reconsult w/ teleneurology recommends to restart Keppra(he had EEG several days ago that was not suggestive of seizures). It is unclear if truly having seizu   
( ) * No evidence of seizure etiology requiring inpatient care ( ) * Discharge plans and education understood Activity   
(X) * Ambulatory Routes   
(X) * Oral hydration, medications, and diet 2/10/2020 11:44:22 EST by Alize Haro   
  reg diet Interventions   
(X) Neurologic checks and seizure monitoring (X) Possible cardiac monitoring 2/10/2020 11:44:22 EST by Alize Haro   
  cardiac Cooter Tamia monitoring Medications ( ) * Antiseizure regimen suitable for next level of care in place, if indicated [B] * Milestone Additional Notes  
 2/9/20 IP Medical   
Throughout his hospitalization, patient still having really bad headaches of the same presentation without any changes. Patient has had episodes of \"unresponsiveness\" again, similar in presentation to several days ago. Reconsult with teleneurology with recommendations to restart Keppra (he had EEG several days ago that was not suggestive of seizures). It is unclear if it is his headaches causing these symptoms or if he is truly having seizures. His CT and MRI imaging have been unremarkable.   
   
Interval History (2/9): patient examined at bedside. No acute overnight events and much more sleepy today compared to yesterday. When he wakes up he immediately starts complaining of headaches. No fevers/chills, chest pain, shortness of breath, or abdominal pain. No acute distress, sleepy appearing but awakens to voice Noted bruise to back of head that is nontender to palpation with interval improvement No focal deficits. CN II thru XII intact b/l.  
  
/80 Pulse 112 Temp 98.5 ° Resp 20 SpO2 96% on RA Meds- ivf @100cc/hr, allopurinol 100mg po qd, Keppra 500mg iv q12, Ativan 1mg iv x2, Ativan 2mg iv x2oxy 5mg po x1, Dqyqlufv1mh iv x1, toradol 30mg iv x2, Tylenol 650mg po x1, Medrol 4mg po x2, Benadryl 25mg iv x1, Reglan 10mg iv q6hr, morphine 2mg iv x1 Plans-  
# Seizure-like activity in the setting of recent falls with hitting of his head - ?post-concussive syndrome vs complex migraine vs ?seizures  
- teleneurology reconsulted on 2/8/2020, recommend restarting Keppra 500 mg IV BID, will continue  
- repeat STAT CT head negative for acute etiology and MRI of the head prior to admission does not show metastases   
- stopped Depakote (was going to use for headaches and not for seizures)  
- stop Reglan, Benadryl, and steroids  
- EEG unremarkable but patient with suspected seizure-like activity, repeat teleneurology consult recommends restarting Keppra  
- continue with pain management as above  
   
# Esophageal cancer with interval worsening of mediastinal/neck lymphadenopathy suggestive of spread - currently undergoing radiation therapy, CT imaging showing spread of cancer  
- needs follow-up with Heme/Onc following hospital discharge  
   
# Chronic dysphagia, due to esophageal cancer - patient has been on tube feeds long-term but wants to try oral route  
- ordered speech consult for swallow evaluation  
   
F/E/N: stop fluids, replete electrolytes as needed, diet per speech evaluation Ppx: SCDs for VTE  
  
   
PA IP Recommendation Letter by Jeyson Beth RN  
 
   
Review Status Review Entered In Primary 2/8/2020 17:30  
   
Criteria Review   
     
Letter of Status Determination:  
Recommend hospitalization status upgraded from  
OBSERVATION  to INPATIENT Status 
   
Pt Name:  Jarod Evans MR#  
  [id number]   
 UWD#                                                                          [id number] Room and Hospital  [email or web address] [facility] Hospitalization date  2/5/2020  7:34 PM  
Current Attending Yocasta Paz MD  
Principal diagnosis  New onset seizure [facility]) [R56.9] New onset seizure ([facility] [address] [R56.9] 
   
Clinicals  Patient is 19  old male with history of esophegeal cancer, currently on radioRx, COPD and on tubal feeding for 1 year, presents with headaches for last 2-3 days. He had a fall this week and in ED, his MRI brain is negative. He used to be treated for HTN until 3 months ago then his meds were stopped due to continuous hypotension. His BP was initially elevated on arrival. He lost over 200 lb in last 12 months. In ED, the pt had 3 epsisoeds of generalized seizure like activity witnessed by staff including ED physician, w/ urinary incontinence and followed by period of confusion. No known hx of seizures and no alcohol use. 
  
Interval History (2/8): patient examined at bedside. No acute events overnight, still having really bad headaches of the same presentation without any changes. Later on in the morning, patient up in chair and was unresponsive again, similar in presentation to several days ago. He had started Depakote a few minutes before (1500 mg IV was started per neurology recommendations for his continued headaches). He had received Reglan/Benadryl combination about 30 minutes prior to the event. Depakote stopped and Ativan 2 mg IV was given after 2 minutes. Code S called and STAT CT negative for acute finding. STAT teleneurology consulted with recommendations to restart Keppra.  
  
   
     
STATUS DETERMINATION    
   
Additional comments     
Payor: [facility] / Plan: [address] / Product Type: PPO Jorge Hintonfonealstraeti 5 Day 4 (2/8/2020) by Dillon Escalante  
 
   
Review Status Review Entered Completed 2/8/2020 15:43  
   
 Criteria Review Care Day: 4 Care Date: 2/8/2020 Level of Care: Telemetry Guideline Day 2 Clinical Status ( ) * Hemodynamic stability 2/8/2020 15:43:15 EST by Amrita Valenzuela   
  severe montes de oca same as presentatiion w/o changes, late am up in chair unresponsive again  started depakote a few min before iv 1500mg recommended by neuro for HA , code s called stat ct head stat tele neuro consult  recommends re start keppra ( ) * Mental status at baseline   
(X) * No evidence of CNS bleeding or infection ( ) * No new neurologic deficits 2/8/2020 15:43:15 EST by Amrita Valenzuela   
  unresponsive in chair , ct stat restarting iv keppra per neuro   
(X) * Seizures absent or managed ( ) * No evidence of seizure etiology requiring inpatient care 2/8/2020 15:43:15 EST by Amrita Valenzuela   
  unresponsive in chair the am code S called ( ) * Discharge plans and education understood Activity   
(X) * Ambulatory 2/8/2020 15:43:15 EST by Kendra Richardson scds 
 up ad leah Routes   
(X) * Oral hydration, medications, and diet 2/8/2020 15:43:15 EST by Higinio Tapia TF  
ST consult to try po pt will nt try due to severe HA  
ST to foll up w/ bed side swallow eval   
Interventions   
(X) Neurologic checks and seizure monitoring ( ) Possible antiepileptic drug levels 2/8/2020 15:43:15 EST by Amrita Valenzuela   
  IV keppra 500mg 1 12 start today  
iv ativan 2mg x 1 today 10 am  
solumedrol 4mg after lunch 4mg after dinner , 8mg at bedtime po (X) Possible cardiac monitoring Medications ( ) * Antiseizure regimen suitable for next level of care in place, if indicated [B]   
2/8/2020 15:43:15 EST by Amrita Valenzuela   
  IV keppra 500mg 1 12 start today  
iv ativan 2mg x 1 today today 10 am  
solumedrol 4mg after lunch 4mg after dinner , 8mg at bedtime po   
* Milestone Additional Notes 2/8/2020 additional information T 97.7 p 89 bp 132/78 sats 98% ra pain 10/10 HA Iv moprhine 2mg q 4hr prn x 3 as of 12 noon today Iv regalan q 6hr   
2/8 11 am ct head neg for acute finding 2/8 1047 am glucose 157 Neuro re consult   
   
Headaches - Clinical Indications for Admission to Inpatient Care by Frankie Celaya  
 
   
Review Status Review Entered Completed 2/8/2020 15:23  
   
Criteria Review Clinical Indications for Admission to Inpatient Care Most Recent : Frankie Celaya Most Recent Date: 2/8/2020 15:23:32 EST   
(X) Admission is indicated for  1 or more  of the following  (1) (2) (3) (4) (5):   
   (X) Other condition, treatment, or monitoring requiring inpatient admission   
   2/8/2020 15:23:32 EST by Frankie Celaya   
     severe headache requiring IV morphine  
   
Headaches - Care Day 1 (2/7/2020) by Frankie Celaya  
 
   
Review Status Review Entered Completed 2/8/2020 15:22  
   
Criteria Review Care Day: 1 Care Date: 2/7/2020 Level of Care: Telemetry Guideline Day 1 Level Of Care (X) Floor 2/8/2020 15:22:40 EST by Frankie Celaya   
  2/7 medical pn neuro consult today low suspicon for seizure  , eeg unremarkable ,stop ,keppra  ct head unremarkable, cont pain management, MRi form several days ago neg for mets  ch dysphagia , long term PEG, wants to try po ST consult ( ) Discharge planning 2/8/2020 15:22:40 EST by Frankie Celaya   
  2/7 cm dc plan home Clinical Status ( ) * Clinical Indications met [B]   
2/8/2020 15:22:40 EST by Frankie Celaya   
  2/7 pt w/ horrible HA on scale 1-10 pain 15 per pt no associated blury vision numbness  tingiling or weakness, no cp sob noted , ? post concussive syndrome or complex migrane EEG today 
neuro consult This EEG is normal in the awake and sleep states. (X) Intractable symptoms not responsive to outpatient treatment 2/8/2020 15:22:40 EST by Frankie Celaya   
  MCKEON rated as 15 per MD pn today on scale 1-10 Activity (X) As tolerated 2/8/2020 15:22:40 EST by Nelida mares tf   
Routes ( ) IV fluids, medications 2/8/2020 15:22:40 EST by Lion Chen   
  iv morphine  2mg q 4h pn x 5 doses  
 2/6 start iv reglan 10mg q 6hr  
iv zofran 4mg q 4h prn  
iv ns 100hr  
iv benadryl 25mg x 2 on 2/7 2/7 solumedrol 8mg po qhs  start today 2/7 solumedrol 4mg po 12 n and 5 pm  
2/7 keppra 1000mg x 1 8am then dced ( ) Diet as tolerated 2/8/2020 15:22:40 EST by Loin Chen   
  peg feeding St consult pt want to try po Medications (X) Analgesia as indicated 2/8/2020 15:22:40 EST by Lion Chen   
  iv morphine 2mg q 4hr prn  2/7 x 5 * Milestone

## 2020-02-11 NOTE — PROGRESS NOTES
Patient extremely agitated and restless. He is repeatedly yelling \"help me\" loudly and trying to climb out of bed. Very delirious. He is beginning to get combative with his wife and this RN. PRN Ativan administered. Will monitor.

## 2020-02-11 NOTE — PROGRESS NOTES
Received call from Dr Zofia Rosales requesting transfer of patient to [facility] be initiated. MD stated accepting MD would be Dr Bertha Moulton. Call center called and notified, bed requested.

## 2020-02-11 NOTE — PROGRESS NOTES
Patient had Ativan one hour ago and is still yelling, agitated, and combative. Spoke with Dr. Brianda Mcneil about patient. Received new order to go ahead and give his PRN Dilaudid for pain  as well as IM Geodon to calm patient. Will administer and continue to monitor.

## 2020-02-12 NOTE — PROGRESS NOTES
END OF SHIFT NOTE: 
 
Intake/Output 02/11 1901 - 02/12 0700 In: 947 [I.V.:947] Out: 500 [Urine:500] Voiding: YES Catheter: YES Drain: PEG/Gastrostomy Tube (Active) Site Assessment Clean, dry, & intact 2/11/2020 10:45 PM  
Dressing Status Clean, dry, & intact 2/11/2020 10:45 PM  
G Port Status Clamped 2/11/2020 10:45 PM  
Gastric Residual (mL) 240 ml 2/11/2020  5:05 PM  
Water Flush Volume (mL) 300 mL 2/11/2020  5:23 AM  
Intake (ml) 237 ml 2/11/2020  5:23 AM  
 
 
 
 
 
 
Stool:  0 occurrences. Emesis:  0 occurrences. VITAL SIGNS Patient Vitals for the past 12 hrs: 
 Temp Pulse Resp BP SpO2  
02/12/20 0400 97.9 °F (36.6 °C) 92 18 (!) 141/96 97 % 02/11/20 1922 98.3 °F (36.8 °C) 93 19 (!) 152/93 98 % Pain Assessment Pain 1 Pain Scale 1: Visual (02/12/20 0645) Pain Intensity 1: 10 (02/12/20 0645) Patient Stated Pain Goal: 0 (02/11/20 1851) Pain Reassessment 1: Patient resting w/respiratory rate greater than 10 (02/11/20 2339) Pain Location 1: Head (02/11/20 1851) Pain Intervention(s) 1: Medication (see MAR) (02/12/20 0645) Ambulating No 
 
Additional Information: Patient received dilaudid 3x and ativan 1x. Patient continues to be confused and agitated upon waking. Vimpat changed to IV q12h. Patient to have LP when appropriate and when patient can lay on stomach for an extended amount of time. PEG tube feeding still on hold. Was able to take sips of liquids throughout night. Shift report given to oncoming nurse at the bedside. Leti Leaf

## 2020-02-12 NOTE — PROGRESS NOTES
END OF SHIFT NOTE: 
 
Intake/Output 02/12 0701 - 02/12 1900 In: 316 [I.V.:286] Out: 200 [Urine:200] Voiding: YES Catheter: NO 
Drain: PEG/Gastrostomy Tube (Active) Site Assessment Clean, dry, & intact 2/12/2020  3:08 PM  
Dressing Status Clean, dry, & intact 2/12/2020  3:08 PM  
G Port Status Clamped 2/12/2020  3:08 PM  
Gastric Residual (mL) 240 ml 2/11/2020  5:05 PM  
Tube Feeding/Formula Options Jevity 1.2 2/12/2020  5:00 PM  
Tube Feeding/Verify Rate (mL/hr) 35 2/12/2020  5:00 PM  
Water Flush Volume (mL) 30 mL 2/12/2020  5:00 PM  
Intake (ml) 237 ml 2/11/2020  5:23 AM  
   
Condom Catheter 02/12/20 (Active) Stool:  0 occurrences. Emesis:  0 occurrences. VITAL SIGNS Patient Vitals for the past 12 hrs: 
 Temp Pulse Resp BP SpO2  
02/12/20 1600 97.3 °F (36.3 °C) 85 14 (!) 144/93 96 % 02/12/20 1140 97.1 °F (36.2 °C) 87 21 (!) 145/99 93 % 02/12/20 0808 96.1 °F (35.6 °C) 73 20 146/77 95 % Pain Assessment Pain 1 Pain Scale 1: Numeric (0 - 10) (02/12/20 1508) Pain Intensity 1: 10 (02/12/20 1508) Patient Stated Pain Goal: 0 (02/12/20 1508) Pain Reassessment 1: Patient resting w/respiratory rate greater than 10 (02/12/20 1110) Pain Location 1: Head (02/12/20 1508) Pain Intervention(s) 1: Medication (see MAR) (02/12/20 1508) Ambulating No 
 
Additional Information: Pt agitated today, but more alert. Family at the bedside. Maxwell removed; condom cath in place. Continuous tube feeds started. Milk of mag given. No complaints noted at this time. Shift report given to ARTEM Short oncoming nurse at the bedside.  
 
Jag Nguyen RN

## 2020-02-12 NOTE — PROGRESS NOTES
Problem: Falls - Risk of 
Goal: *Absence of Falls Description Document Kong Otero Fall Risk and appropriate interventions in the flowsheet. 2/12/2020 0707 by Dilia Ruiz RN Outcome: Progressing Towards Goal 
Note: Fall Risk Interventions: 
Mobility Interventions: Bed/chair exit alarm Mentation Interventions: Adequate sleep, hydration, pain control Medication Interventions: Assess postural VS orthostatic hypotension Elimination Interventions: Bed/chair exit alarm History of Falls Interventions: Bed/chair exit alarm, Consult care management for discharge planning, Evaluate medications/consider consulting pharmacy, Investigate reason for fall, Room close to nurse's station 2/11/2020 1728 by Dilia Ruiz RN Outcome: Progressing Towards Goal 
Note: Fall Risk Interventions: 
Mobility Interventions: Bed/chair exit alarm, Communicate number of staff needed for ambulation/transfer, OT consult for ADLs, Patient to call before getting OOB, PT Consult for mobility concerns Mentation Interventions: Adequate sleep, hydration, pain control, Bed/chair exit alarm, Door open when patient unattended, Increase mobility, More frequent rounding, Reorient patient, Room close to nurse's station Medication Interventions: Assess postural VS orthostatic hypotension, Bed/chair exit alarm, Evaluate medications/consider consulting pharmacy, Patient to call before getting OOB, Teach patient to arise slowly Elimination Interventions: Bed/chair exit alarm, Call light in reach, Elevated toilet seat, Patient to call for help with toileting needs, Stay With Me (per policy) History of Falls Interventions: Bed/chair exit alarm, Consult care management for discharge planning, Evaluate medications/consider consulting pharmacy, Investigate reason for fall, Room close to nurse's station Problem: Patient Education: Go to Patient Education Activity Goal: Patient/Family Education 2/12/2020 0707 by Mandy Ordaz RN Outcome: Progressing Towards Goal 
2/11/2020 1728 by Mandy Ordaz RN Outcome: Progressing Towards Goal 
  
Problem: Pressure Injury - Risk of 
Goal: *Prevention of pressure injury Description Document Lee Scale and appropriate interventions in the flowsheet. 2/12/2020 0707 by Mandy Ordaz RN Outcome: Progressing Towards Goal 
Note: Pressure Injury Interventions: 
Sensory Interventions: Assess changes in LOC Moisture Interventions: Apply protective barrier, creams and emollients Activity Interventions: Assess need for specialty bed Mobility Interventions: Assess need for specialty bed Nutrition Interventions: Document food/fluid/supplement intake, Discuss nutritional consult with provider Friction and Shear Interventions: Apply protective barrier, creams and emollients, Foam dressings/transparent film/skin sealants, HOB 30 degrees or less 2/11/2020 1728 by Mandy Ordaz RN Outcome: Progressing Towards Goal 
Note: Pressure Injury Interventions: 
Sensory Interventions: Assess changes in LOC, Assess need for specialty bed, Discuss PT/OT consult with provider, Pressure redistribution bed/mattress (bed type), Turn and reposition approx. every two hours (pillows and wedges if needed) Moisture Interventions: Apply protective barrier, creams and emollients, Check for incontinence Q2 hours and as needed, Internal/External urinary devices Activity Interventions: Assess need for specialty bed, Increase time out of bed, Pressure redistribution bed/mattress(bed type), PT/OT evaluation Mobility Interventions: Assess need for specialty bed, HOB 30 degrees or less, Pressure redistribution bed/mattress (bed type), PT/OT evaluation Nutrition Interventions: Document food/fluid/supplement intake, Offer support with meals,snacks and hydration Friction and Shear Interventions: Apply protective barrier, creams and emollients, Foam dressings/transparent film/skin sealants, HOB 30 degrees or less Problem: Patient Education: Go to Patient Education Activity Goal: Patient/Family Education 2/12/2020 0707 by Ra Dean RN Outcome: Progressing Towards Goal 
2/11/2020 1728 by Ra Dean RN Outcome: Progressing Towards Goal 
  
Problem: Pain - Acute Goal: *Control of acute pain Outcome: Progressing Towards Goal 
  
Problem: Anxiety Goal: *Alleviation of anxiety Outcome: Progressing Towards Goal 
Goal: *Alleviation of anxiety (Palliative Care) Outcome: Progressing Towards Goal

## 2020-02-12 NOTE — PROGRESS NOTES
Nutrition Assessment for:  
Consult for tube feeding management (Hospitalist/Jean Marie) See previous nutrition note: Will start TF of Jevity 1.5 at 35 ml/hr with 30 ml water every hour. Will advance slowly (10 ml every 8 hrs) due to no reported/recorded BM in at least a week. Will order Metabolic panel for am. 
 
[facility], [address], [address]

## 2020-02-12 NOTE — PROGRESS NOTES
Nutrition: 
Best Practice Alert for EN/PN PTA Assessment: 
Food/Nutrition Patient History: Patient transferred from [facility] for closer monitoring of new onset confusion, questionable seizures, headache, unresponsiveness at times. Plan for LP. Patient PMH also significant for esophageal cancer currently on chemo/XRT, COPD, HTN, hyperlipidemia, morbid obesity. He is followed by outpatient oncology RD, per her notes bolus feedings of Jevity 1.5 and CIB w/ whole milk - goal 5-6 per day, but pt maintaining weight w/ current intake (3 Jevity 1.5 and 1 CIB). He initially had Jtube placed at diagnosis/start of treatment, but never used. It was replaced x1 and then fell out several months ago. Gtube was placed in November of last year as PO intake declined secondary to dysphagia. Patient was seen with wife at bedside. He is confused and provides no information. Wife states that patient has been using tube feeds to supplement PO intake. She states that PO intake varies depending on volume and consistency tolerance which also varies daily. She states some days he can only tolerate full liquids (pudding, oatmeal, cottage cheese) but he was able to tolerate hamburger steak (cut very small) and mashed potatoes. She states that he always take 2 Jevity 1.5 boluses in the am, but the night depends on how he has eaten through the day. She confirms that he will typically do 1 Jevity 1.5 and then either Muscle Milk or CIB in whole milk. She states that she uses the plunger to administer feedings because the patient is impatient to longer infusion times. She states that he had a feeding yesterday, but has not had any today. She is asking if she needs to check residuals as nursing has been doing this inpatient. When questioned she states that patient has not had a BM in at least a week. RN reports that when checking residuals yesterday, she pulled back ~240 ml of bile. She reports only ~25 ml residuals today. Active bowel sounds documented. Lab Results Component Value Date/Time Sodium 135 (L) 02/11/2020 03:31 AM  
 Potassium 3.8 02/11/2020 03:31 AM  
 Chloride 104 02/11/2020 03:31 AM  
 CO2 27 02/11/2020 03:31 AM  
 Anion gap 4 (L) 02/11/2020 03:31 AM  
 Glucose 98 02/11/2020 03:31 AM  
 BUN 10 02/11/2020 03:31 AM  
 Creatinine 0.69 (L) 02/11/2020 03:31 AM  
 Calcium 8.9 02/11/2020 03:31 AM  
 Albumin 3.1 (L) 02/05/2020 08:15 PM  
 Phosphorus 3.0 02/10/2020 09:28 AM  
 
Diet: DIET TUBE FEEDING Jevity  (ordered as 1 carton Q8) Pertinent Medications: NS @ 75 ml/hr, Milk of Mag daily - none given since admission,  
keppra Enteral nutrition access: Gtube Anthropometrics:Height: 6' 2\" (188 cm), Last recorded weight know 95.9 kg (211#) per outpatient RD notes, Body mass index is 27.1 kg/m². BMI class of overweight. Weight and BMI 2/5/2020 2/4/2020 1/30/2020 1/23/2020 1/20/2020 Weight 96.163 kg 96.072 kg 95.845 kg 96.525 kg 96.389 kg BMI (calculated) 26.9 kg/m2 27.2 kg/m2 Weight and BMI 1/13/2020 1/10/2020 1/6/2020 12/30/2019 12/18/2019 Weight 96.389 kg 96.934 kg 95.391 kg 96.299 kg 101.152 kg BMI (calculated) Weight and BMI 3/18/2019 3/14/2019 3/8/2019 3/7/2019 Weight 166.697 kg 166.017 kg 170.462 kg 170.825 kg BMI (calculated) 47.8 kg/m2 47.6 kg/m2 Weight and BMI 2/19/2019 2/13/2019 2/12/2019 Weight 175.225 kg 176.903 kg 176.903 kg BMI (calculated)  49.4 kg/m2 49.4 kg/m2 Weight history per EMR remarkable for 178# (46%) weight loss over last 1 year which is clinically significant. However, appears patient has been relatively stable for last 1 month. Macronutrient Needs: 95.9 kg (last recorded office weight (1/31/2020 per outpt RD) Estimated energy requirements:  6024-9654 kcal /day (20-25 kcal/kg) Estimated protein requirements:   grams protein/day (20% kcal grams/kg) CHO limit per day: 329 grams CHO/day (55% calories) Estimated fluid/day: 1.9-2.4 liters/day (~1ml/kcal/day) Intake/Comparative Standards: Patient has not received any tube feeding today. There is no diet ordered Nutrition Diagnosis: 
Inadequate oral intake related to decreased ability to consume sufficient energy as evidenced by patient with esophageal cancer on TF for primary needs, acute change in mental status with no diet order. Intervention: 
Meals and snacks: Recommend full liquid diet order once mentation improves. May benefit from SLP consult prior to initiating oral diet. Education: Explained to wife that gastric residuals do not need to be checked when patient is alert and oriented. Patient should be able to verbalize fullness and bloating. Explained that bolus feedings should be given via syringe gravity and not use the plunger to allow for longer infusion time. Explained need to use plunger for water flushes to help keep patency of tube. Enteral Nutrition: If RD to manage enteral nutrition, please consult RD for tube feeding management. Would recommend initiating Jevity 1.2 via Gtube at 35 ml/hour, progress by 10 ml/hour every 4 hours to goal rate of 75ml/hour (as tolerated and pending BM). Water flush 30/hour. At goal will provide 2160 kcal (100% estimated calorie needs), 100 grams protein (100% estimated protein needs), 304 grams CHO/day (does not exceed maximum CHO/day) and 2173 ml free fluid (~1 ml/kcal). Can consider change to bolus feeds if/when advanced to goal and tolerating. Would recommend Ensure Plus, as Jevity 1.5 is not available on formulary, to closer mimic home regimen. Nutrition Supplement Therapy: If diet is able to be advanced, would likely benefit from Ensure Enlive based on diet history. Vitamin and Mineral Supplement Therapy: 
Nutrition support orders for electrolyte management replacement are activated and placed on the MAR.   
Coordination of Nutrition Care: Discussed with Sandhya Cobb RN, no BM in at least a week per wife - Milk of Mag on MAR. [employer] message sent to Dr. Con Asher. Discharge Nutrition Plan: Too soon to determine. [address] [facility] [address] [place] [address], [address], [address]

## 2020-02-12 NOTE — PROGRESS NOTES
Progress Note Patient: Arline Hernandez MRN: [id number]  SSN: [id number] YOB: 1965  Age: 60 y.o. Sex: male Admit Date: 2/11/2020 LOS: 1 day Subjective: F/U seizure like activity 61 yo hx of HTN, COPD, and esophageal cancer getting chemo and radiation with tube feedings who presented to the ED for cc headache and seizure like activity. Originally admitted to [facility] and transferred to [facility] on 2/11. EEG originally done that did not show actual seizures. Tele neuro recommended Keppra. There is concern for paraneoplastic syndrome with plan for LP today. Patient will have episodes of unresponsiveness. Neuroimaging benign. Nursing staff state constantly having headaches and dilaudid will help with pain. Per wife, headaches started after head trauma two weeks ago. No issues with headaches prior to two weeks ago. No chest pain or SOB. Persistent headache diffusely. Current Facility-Administered Medications Medication Dose Route Frequency  lacosamide (VIMPAT) tablet 100 mg  100 mg Per G Tube Q12H  
 HYDROmorphone (PF) (DILAUDID) injection 1 mg  1 mg IntraVENous Q3H PRN  
 acetaminophen (TYLENOL) tablet 650 mg  650 mg Oral Q4H PRN  
 magnesium hydroxide (MILK OF MAGNESIA) 400 mg/5 mL oral suspension 30 mL  30 mL Oral DAILY PRN  
 naloxone (NARCAN) injection 0.4 mg  0.4 mg IntraVENous PRN  
 nicotine (NICODERM CQ) 21 mg/24 hr patch 1 Patch  1 Patch TransDERmal DAILY PRN  
 ondansetron (ZOFRAN) injection 4 mg  4 mg IntraVENous Q4H PRN  
 sodium chloride (NS) flush 5-40 mL  5-40 mL IntraVENous Q8H  
 sodium chloride (NS) flush 5-40 mL  5-40 mL IntraVENous PRN  
 albuterol-ipratropium (DUO-NEB) 2.5 MG-0.5 MG/3 ML  3 mL Nebulization Q4H PRN  
 allopurinoL (ZYLOPRIM) tablet 100 mg  100 mg Oral DAILY  cloNIDine HCL (CATAPRES) tablet 0.2 mg  0.2 mg Oral BID PRN  
 LORazepam (ATIVAN) injection 1 mg  1 mg IntraVENous Q4H PRN  
  levETIRAcetam (KEPPRA) oral solution 1,000 mg  1,000 mg Per G Tube BID  
 0.9% sodium chloride infusion  75 mL/hr IntraVENous CONTINUOUS Objective:  
 
Vitals:  
 02/11/20 1922 02/12/20 0400 02/12/20 0808 02/12/20 1140 BP: (!) 152/93 (!) 141/96 146/77 (!) 145/99 Pulse: 93 92 73 87 Resp: 19 18 20 21 Temp: 98.3 °F (36.8 °C) 97.9 °F (36.6 °C) 96.1 °F (35.6 °C) 97.1 °F (36.2 °C) SpO2: 98% 97% 95% 93% Height:      
  
  
Intake and Output: 
Current Shift: 02/12 0701 - 02/12 1900 In: 286 [I.V.:286] Out: 200 [Urine:200] Last three shifts: 02/10 1901 - 02/12 0700 In: 947 [I.V.:947] Out: 500 [Urine:500] Physical Exam:  
General:  Alert, cooperative, limited movement in bed and keeping eyes closed. Eyes:  Pin point pupils Ears:  Normal TMs and external ear canals both ears. Nose: Nares normal. Septum midline. Mouth/Throat: Dry oral mucosa. Neck:  no JVD. Back:   deferred Lungs:   Clear to auscultation bilaterally. Heart:  Regular rate and rhythm, S1, S2 normal  
Abdomen:   Soft, non-tender. Bowel sounds normal. No masses,  No organomegaly. Peg tube in place. Morbidly obese. Extremities: Extremities normal, atraumatic, no cyanosis or edema. Pulses: 2+ and symmetric all extremities. Skin: Skin color, texture, turgor normal. No rashes or lesions Lymph nodes: Cervical, supraclavicular, and axillary nodes normal.  
Neurologic: Able to answer questions appropriately but short to answer. Obvious discomfort. Lab/Data Review: No results found for this or any previous visit (from the past 24 hour(s)). Assessment/ Plan:  
 
Principal Problem: 
  Acute metabolic encephalopathy (9/82/1438) Active Problems: Hypotestosteronemia (12/29/2015) Essential hypertension with goal blood pressure less than 140/90 (8/17/2016) Obesity, morbid ([address] (12/13/2017) Malignant neoplasm of lower third of esophagus ([facility] (2/19/2019) New onset seizure ([facility] [address] (2/5/2020) Seizure ([facility] [address] (2/11/2020) Acute metabolic encephalopathy with seizure like activity - EEG in the past has been negative. On Vimpat and Keppra in case of seizures. Neurology following. Possible LP tomorrow if platelets improved. Possible convulsive syncope or dissociative process? [address] neurology. Esophageal cancer - Restart PEG tube feedings. Consult nutrition. Does not take any medications at home. DVT prophylaxis - SCDs. Monitor platlets Signed By: Henri Barrera DO February 12, 2020

## 2020-02-13 NOTE — PROGRESS NOTES
Progress Note Patient: Paolo Carreno MRN: [id number]  SSN: [id number] YOB: 1965  Age: 60 y.o. Sex: male Admit Date: 2/11/2020 LOS: 2 days Subjective: F/U seizure like activity, constipation 59 yo hx of HTN, COPD, and esophageal cancer getting chemo last treatment 4 weeks ago and radiation last treatment one week ago with tube feedings who presented to the ED for cc headache and seizure like activity. Originally admitted to [facility] and transferred to [facility] on 2/11. EEG originally done that did not show actual seizures. Tele neuro recommended Keppra. 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 Patient will have episodes of unresponsiveness. Neuroimaging benign. Still with severe headache. Wife concerned of constipation. KUB pending. Nursing staff state constantly having headaches and dilaudid will help with pain. Per wife, headaches started after head trauma two weeks ago. No issues with headaches prior to two weeks ago. No chest pain or SOB. Persistent headache diffusely. Current Facility-Administered Medications Medication Dose Route Frequency  potassium chloride 20 mEq in 100 ml IVPB  20 mEq IntraVENous Q2H  
 0.9% sodium chloride infusion 250 mL  250 mL IntraVENous PRN  
 simethicone (MYLICON) tablet 80 mg  80 mg Per G Tube QID PRN  
 cefTRIAXone (ROCEPHIN) 2 g in 0.9% sodium chloride (MBP/ADV) 50 mL  2 g IntraVENous Q12H  
 ampicillin (OMNIPEN) 2 g in 0.9% sodium chloride (MBP/ADV) 100 mL  2 g IntraVENous Q4H  
 dexamethasone (DECADRON) injection 10 mg  10 mg IntraVENous Q6H  
 acyclovir (ZOVIRAX) 800 mg in 0.9% sodium chloride 250 mL IVPB  800 mg IntraVENous Q8H  
 vancomycin (VANCOCIN) 2500 mg in  mL infusion  2,500 mg IntraVENous ONCE  
 lacosamide (VIMPAT) tablet 100 mg  100 mg Per G Tube Q12H  
 HYDROmorphone (PF) (DILAUDID) injection 1 mg  1 mg IntraVENous Q3H PRN  
  diphenhydrAMINE (BENADRYL) injection 25 mg  25 mg IntraVENous Q4H PRN  
 acetaminophen (TYLENOL) tablet 650 mg  650 mg Oral Q4H PRN  
 magnesium hydroxide (MILK OF MAGNESIA) 400 mg/5 mL oral suspension 30 mL  30 mL Oral DAILY PRN  
 naloxone (NARCAN) injection 0.4 mg  0.4 mg IntraVENous PRN  
 nicotine (NICODERM CQ) 21 mg/24 hr patch 1 Patch  1 Patch TransDERmal DAILY PRN  
 ondansetron (ZOFRAN) injection 4 mg  4 mg IntraVENous Q4H PRN  
 sodium chloride (NS) flush 5-40 mL  5-40 mL IntraVENous Q8H  
 sodium chloride (NS) flush 5-40 mL  5-40 mL IntraVENous PRN  
 albuterol-ipratropium (DUO-NEB) 2.5 MG-0.5 MG/3 ML  3 mL Nebulization Q4H PRN  
 allopurinoL (ZYLOPRIM) tablet 100 mg  100 mg Oral DAILY  cloNIDine HCL (CATAPRES) tablet 0.2 mg  0.2 mg Oral BID PRN  
 LORazepam (ATIVAN) injection 1 mg  1 mg IntraVENous Q4H PRN  
 levETIRAcetam (KEPPRA) oral solution 1,000 mg  1,000 mg Per G Tube BID  
 0.9% sodium chloride infusion  50 mL/hr IntraVENous CONTINUOUS Objective:  
 
Vitals:  
 02/13/20 0301 02/13/20 0752 02/13/20 1158 02/13/20 1625 BP: 149/86 (!) 159/113 143/87 (!) 145/101 Pulse: 98 (!) 122 (!) 113 (!) 102 Resp: 16 17 17 17 Temp: 98.4 °F (36.9 °C) 98 °F (36.7 °C) 98.6 °F (37 °C) 98.5 °F (36.9 °C) SpO2: 98% 92% 93% 91% Height:      
  
  
Intake and Output: 
Current Shift: No intake/output data recorded. Last three shifts: 02/11 1901 - 02/13 0700 In: 7015 [I.V.:1722] Out: 1075 [JCHEX:2184] Physical Exam:  
General:  Sleeping, cooperative when awake, limited movement in bed Eyes:  Sclera edema. Ears:  Normal TMs and external ear canals both ears. Nose: Nares normal. Septum midline. Mouth/Throat: Dry oral mucosa. Neck:  no JVD. Back:   deferred Lungs:   Clear to auscultation bilaterally. Heart:  Regular rate and rhythm, S1, S2 normal  
Abdomen:   Soft, non-tender. Bowel sounds normal. No masses,  No organomegaly. Peg tube in place. Morbidly obese. Extremities: Ecchymosis to UE bilaterally Pulses: 2+ and symmetric all extremities. Skin: As above Lymph nodes: Cervical, supraclavicular, and axillary nodes normal.  
Neurologic: Able to answer questions appropriately but short to answer. Sleepy Lab/Data Review: 
 
Recent Results (from the past 24 hour(s)) METABOLIC PANEL, BASIC Collection Time: 02/13/20  3:01 AM  
Result Value Ref Range Sodium 138 136 - 145 mmol/L Potassium 3.4 (L) 3.5 - 5.1 mmol/L Chloride 102 98 - 107 mmol/L  
 CO2 28 21 - 32 mmol/L Anion gap 8 7 - 16 mmol/L Glucose 91 65 - 100 mg/dL BUN 8 6 - 23 MG/DL Creatinine 0.49 (L) 0.8 - 1.5 MG/DL  
 GFR est AA >60 >60 ml/min/1.73m2 GFR est non-AA >60 >60 ml/min/1.73m2 Calcium 8.6 8.3 - 10.4 MG/DL  
CBC W/O DIFF Collection Time: 02/13/20  3:01 AM  
Result Value Ref Range WBC 6.8 4.3 - 11.1 K/uL  
 RBC 3.73 (L) 4.23 - 5.6 M/uL  
 HGB 12.0 (L) 13.6 - 17.2 g/dL HCT 35.6 (L) 41.1 - 50.3 % MCV 95.4 79.6 - 97.8 FL  
 MCH 32.2 26.1 - 32.9 PG  
 MCHC 33.7 31.4 - 35.0 g/dL  
 RDW 14.8 (H) 11.9 - 14.6 % PLATELET 45 (L) 583 - 450 K/uL MPV 10.9 9.4 - 12.3 FL ABSOLUTE NRBC 0.00 0.0 - 0.2 K/uL Assessment/ Plan:  
 
Principal Problem: 
  Acute metabolic encephalopathy (6/28/2624) Active Problems: Hypotestosteronemia (12/29/2015) Essential hypertension with goal blood pressure less than 140/90 (8/17/2016) Obesity, morbid ([facility] [address] (12/13/2017) Malignant neoplasm of lower third of esophagus ([facility] (2/19/2019) New onset seizure ([facility] (2/5/2020) Hypokalemia (2/5/2020) Seizure ([address] (2/11/2020) Acute metabolic encephalopathy with seizure like activity - EEG in the past has been negative. On Vimpat and Keppra in case of seizures. Neurology following. Possible LP tomorrow if platelets improved.  Since there is concern for possible meningitis, will order acyclovir, ceftriaxone, ampicillin, and vancomycin. Weight is not current so called pharmacy to place acyclovir order since weight based. Possible convulsive syncope or dissociative process? [address] neurology. Also order decadron 10mg q 6 hr. Monitor mentation. Esophageal cancer - High residuals so tube feedings held. Possibly from constipation? Nutrition following. Constipation - Official KUB report pending but can see gas and stool in colon. Order simethicone and enema. Thrombocytopenia - Likely from radiation. Transfuse 1 unit Platelets. Does not take any medications at home. DVT prophylaxis - SCDs. Monitor platlets Signed By: Dolores Valdez,  February 13, 2020

## 2020-02-13 NOTE — PROGRESS NOTES
Pt's lopez was removed today. Pt has not voided since removal of lopez at 1645. Bladder scanned pt. Pt has 573ml of urine in bladder. Paged Dr. Gillian Tavera. He said \"573ml is not urinary retention. Continue with q6 bladder scans\". [id number]: It has now been almost 12 hrs since pt last had any urinary output. Bladder scanned pt again. 601 ml currently in pt's bladder. Paged Dr. Gillian Tavera. His response was \"that's fine. \"

## 2020-02-13 NOTE — PROGRESS NOTES
Pt with >550 gastric residual. Residual bile like in appearance. Pt placed on right side and tube feeding stopped.

## 2020-02-13 NOTE — PROGRESS NOTES
Nutrition Assessment for:  
Consult for tube feeding management (Hospitalist/Jean Marie) Assessment: 
Food/Nutrition Patient History: Patient transferred from [facility] for closer monitoring of new onset confusion, questionable seizures, headache, unresponsiveness at times. Plan for LP. Patient PMH also significant for esophageal cancer currently on chemo/XRT, COPD, HTN, hyperlipidemia, morbid obesity. He is followed by outpatient oncology RD, per her notes bolus feedings of Jevity 1.5 and CIB w/ whole milk - goal 5-6 per day, but pt maintaining weight w/ current intake (3 Jevity 1.5 and 1 CIB). He initially had Jtube placed at diagnosis/start of treatment, but never used. It was replaced x1 and then fell out several months ago. Gtube was placed in November of last year as PO intake declined secondary to dysphagia. Patient seen in follow-up today for TF tolerance. Per nursing documentation, TF were held since last night due to gastric residuals >550 ml. Patient is still confused today. Wife at bedside. No known BM yet. Abdominal: Active bowel sounds documented yesterday. Lab Results Component Value Date/Time Sodium 138 02/13/2020 03:01 AM  
 Potassium 3.4 (L) 02/13/2020 03:01 AM  
 Chloride 102 02/13/2020 03:01 AM  
 CO2 28 02/13/2020 03:01 AM  
 Anion gap 8 02/13/2020 03:01 AM  
 Glucose 91 02/13/2020 03:01 AM  
 BUN 8 02/13/2020 03:01 AM  
 Creatinine 0.49 (L) 02/13/2020 03:01 AM  
 Calcium 8.6 02/13/2020 03:01 AM  
 Albumin 3.1 (L) 02/05/2020 08:15 PM  
 Phosphorus 3.0 02/10/2020 09:28 AM  
 
Diet: DIET TUBE FEEDING Jevity DIET TUBE FEEDING Open order for details. Keep HOB > 30 degrees. Check residuals every 4 hours. Hold TF for > 500 ml x 1 or 250 ml x 2 consecutive checks. Also place patient on right side if residual is > 250 ml. Pertinent Medications: NS @ 75 ml/hr, Milk of Mag daily - 30 ml administered yesterday, keppra Enteral nutrition access: Gtube Anthropometrics:Height: 6' 2\" (188 cm), Last recorded weight know 95.9 kg (211#) per outpatient RD notes, Body mass index is 27.1 kg/m². BMI class of overweight. Macronutrient Needs: 95.9 kg (last recorded office weight (1/31/2020 per outpt RD) Estimated energy requirements:  2996-6834 kcal /day (20-25 kcal/kg) Estimated protein requirements:   grams protein/day (20% kcal grams/kg) CHO limit per day: 329 grams CHO/day (55% calories) Estimated fluid/day: 1.9-2.4 liters/day (~1ml/kcal/day) Intake/Comparative Standards: Tube feeds currently on hold. This does not meet EEN or EPN. Intervention: 
Meals and snacks: Recommend full liquid diet order once mentation improves. May benefit from SLP consult prior to initiating oral diet. Enteral Nutrition: Hold TF for now until positive bowel movement. Once +BM would recommend re-initiating Jevity 1.2 via Gtube at 35 ml/hour, progress by 10 ml/hour every 4 hours to goal rate of 75ml/hour (as tolerated and pending BM). Water flush 30/hour. At goal will provide 2160 kcal (100% estimated calorie needs), 100 grams protein (100% estimated protein needs), 304 grams CHO/day (does not exceed maximum CHO/day) and 2173 ml free fluid (~1 ml/kcal). Can consider change to bolus feeds if/when advanced to goal and tolerating. Would recommend Ensure Plus, as Jevity 1.5 is not available on formulary, to closer mimic home regimen. Nutrition Supplement Therapy: If diet is able to be advanced, would likely benefit from Ensure Enlive based on diet history. Vitamin and Mineral Supplement Therapy: 
Nutrition support orders for electrolyte management replacement are activated and placed on the MAR. Will replace K per nutrition support protocols. Coordination of Nutrition Care: Discussed with Laura Parks RN - recommended checking for impaction. Mabeline Sink [employer] message sent to Dr. Kwaku Asencio - spoke with  she ordered KUB, additional interventions pending results. Discharge Nutrition Plan: Too soon to determine. [address] [facility] [address] [place] [address], [address], [address] [facility]

## 2020-02-14 PROBLEM — I46.9 CARDIAC ARREST (HCC): Status: ACTIVE | Noted: 2020-01-01

## 2020-02-14 PROBLEM — J96.01 ACUTE RESPIRATORY FAILURE WITH HYPOXIA (HCC): Status: RESOLVED | Noted: 2019-01-01 | Resolved: 2020-01-01

## 2020-02-14 PROBLEM — J96.01 ACUTE RESPIRATORY FAILURE WITH HYPOXIA (HCC): Status: ACTIVE | Noted: 2020-01-01

## 2020-02-14 PROBLEM — I95.9 HYPOTENSION: Status: ACTIVE | Noted: 2020-01-01

## 2020-02-14 NOTE — CONSULTS
CONSULT NOTE [address] 2/14/2020 Date of Admission:  2/11/2020 The patient's chart is reviewed and the patient is discussed with the staff. Subjective:  
 
Patient is a 60 y.o.  male seen and evaluated at the request of Dr. Friddie Lanes. 61 yo hx of HTN, COPD, and esophageal cancer getting chemo last treatment 4 weeks ago and radiation last treatment one week ago with tube feedings who presented to the ED for cc headache and seizure like activity. Originally admitted to [facility] and transferred to [facility] on 2/11. EEG originally done that did not show actual seizures. Tele neuro recommended Keppra. 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 Patient will have episodes of unresponsiveness. Neuroimaging benign. Still with severe headache.  
  
Wife concerned of constipation. KUB pending.  
  
Nursing staff state constantly having headaches and dilaudid will help with pain. Per wife, headaches started after head trauma two weeks ago. No issues with headaches prior to two weeks ago. No chest pain or SOB. Persistent headache diffusely. Responded to code blue, patient found to be unresponsive and asystolic. He was intubated , and epinephrine given with ROSC. Pupils fixed and dilated post code, patient unresponsive. Transferred to ICU for further care. Review of Systems Review of systems not obtained due to patient factors. Patient Active Problem List  
Diagnosis Code  Hypotestosteronemia E34.9  Pure hypercholesterolemia E78.00  Chronic gout without tophus M1A. 9XX0  Peripheral polyneuropathy G62.9  
 Essential hypertension with goal blood pressure less than 140/90 I10  Obstructive sleep apnea syndrome G47.33  
 Obesity, morbid (HCC) E66.01  
 Panlobular emphysema ([facility] J43.1  Malignant neoplasm of lower third of esophagus (HCC) C15.5  Chemotherapy-induced neutropenia (HCC) D70.1, T45.1X5A  
  COPD exacerbation ([facility] J44.1  Acute respiratory failure with hypoxia ([facility]) J96.01  
 Bacterial pneumonia J15.9  Dehydration E86.0  New onset seizure ([facility] R56.9  Hypokalemia E87.6  Other dysphagia R13.19  
 Intractable headache R51  Acute metabolic encephalopathy C30.25  
 Seizure ([facility] R56.9 Prior to Admission Medications Prescriptions Last Dose Informant Patient Reported? Taking? Lactose-Free Food with Fiber (JEVITY 1.5 MELITON) 0.06 gram-1.5 kcal/mL liqd   No No  
Si Bottles by PEG Tube route daily for 100 days. Bolus Feeds, goal 6/day to provide 2130 kcal, 91 gm pro, 1080 ml. Needs additional 36 oz/day for hydration. JESSICA > 3 months. Indications: blockage of the esophagus, dysphagia Oxygen   Yes No  
Sig: at bedtime as directed for dose pack. acyclovir (ZOVIRAX) 5 % ointment   No No  
Sig: Apply thin layer of cream to effected areas 4-5- times a day  
allopurinol (ZYLOPRIM) 100 mg tablet   No No  
Sig: Take 1 Tab by mouth daily. artificial saliva (MOUTH KOTE) spra   No No  
Sig: Take 1 Spray by mouth as needed for Pain. cholecalciferol, VITAMIN D3, (VITAMIN D3) 5,000 unit tab tablet   Yes No  
Sig: Take 5,000 Units by mouth daily. cpap machine kit   Yes No  
Sig: by Does Not Apply route.  
ezetimibe (ZETIA) 10 mg tablet   Yes No  
Sig: Take 10 mg by mouth daily. metoprolol tartrate (LOPRESSOR) 50 mg tablet   No No  
Sig: Take 1 Tab by mouth two (2) times a day. midodrine (PROAMITINE) 5 mg tablet   No No  
Sig: Take 1 Tab by mouth two (2) times daily (with meals) for 30 days. Indications: a feeling of dizziness upon standing due to a drop in blood pressure  
potassium chloride (KAON 20%) 40 mEq/15 mL liqd   No No  
Sig: Daily X 2 days through his feeding tube  Indications: prevention of low potassium in the blood  
sucralfate (CARAFATE) 1 gram tablet   No No  
Sig: Take 1 Tab by mouth four (4) times daily. Facility-Administered Medications: None Past Medical History:  
Diagnosis Date  Appendicitis   
 resulted in appendectomy  Chewing tobacco use  Chronic obstructive pulmonary disease (HCC)   
 nebulizer prn and rescue inhaler only  Chronic pain   
 pt wife denies any pain  Depression   
 pt wife denies, no current meds  Erectile disorder due to medical condition in male patient  Esophageal cancer ([facility]  Former cigarette smoker  Gout   
 treated with Allopurinol. Last flareup   H/O heart artery stent X2- last stent placed   History of MI (myocardial infarction) 2014  
 stents x2  RCA- 2014  LAD - 2016  Hyperlipidemia  Hypertension   
 managed with meds  Hypotestosteronemia  Morbid obesity ([facility] 47.6  KORINA (obstructive sleep apnea) Trilegy and oxygen 2L  Osteoarthritis Past Surgical History:  
Procedure Laterality Date  COLONOSCOPY N/A 2019 COLONOSCOPY/ 50 performed by Michael Benson MD at [facility] HX APPENDECTOMY  2003  HX CARPAL TUNNEL RELEASE Right 2014  HX CORONARY STENT PLACEMENT   RCA x 1  
 HX CORONARY STENT PLACEMENT   PCI of the lesion with a 4x8mm Xience Alpine RONN  
 HX VASCULAR ACCESS    
 IR INSERT GASTROSTOMY TUBE [facility]  2019 Social History Socioeconomic History  Marital status:  Spouse name: Not on file  Number of children: Not on file  Years of education: Not on file  Highest education level: Not on file Occupational History  Not on file Social Needs  Financial resource strain: Not on file  Food insecurity:  
  Worry: Not on file Inability: Not on file  Transportation needs:  
  Medical: Not on file Non-medical: Not on file Tobacco Use  Smoking status: Former Smoker Packs/day: 0.15 Years: 35.00 Pack years: 5.25 Last attempt to quit: 2016 Years since quittin.1  Smokeless tobacco: Current User Substance and Sexual Activity  Alcohol use: No  
 Drug use: No  
 Sexual activity: Not on file Lifestyle  Physical activity:  
  Days per week: Not on file Minutes per session: Not on file  Stress: Not on file Relationships  Social connections:  
  Talks on phone: Not on file Gets together: Not on file Attends Oriental orthodox service: Not on file Active member of club or organization: Not on file Attends meetings of clubs or organizations: Not on file Relationship status: Not on file  Intimate partner violence:  
  Fear of current or ex partner: Not on file Emotionally abused: Not on file Physically abused: Not on file Forced sexual activity: Not on file Other Topics Concern [address] [employer] Service Not Asked  Blood Transfusions Not Asked  Caffeine Concern Not Asked  Occupational Exposure Not Asked Judene Pill Hazards Not Asked  Sleep Concern Not Asked  Stress Concern Not Asked  Weight Concern Not Asked  Special Diet Not Asked  Back Care Not Asked  Exercise Not Asked  Bike Helmet Not Asked  Seat Belt Not Asked  Self-Exams Not Asked Social History Narrative  Not on file Family History Problem Relation Age of Onset  Heart Disease Mother  Hypertension Mother  Cancer Father  Heart Disease Father  Cancer Brother No Known Allergies Current Facility-Administered Medications Medication Dose Route Frequency  0.9% sodium chloride infusion 250 mL  250 mL IntraVENous PRN  
 simethicone (MYLICON) tablet 80 mg  80 mg Per G Tube QID PRN  
 cefTRIAXone (ROCEPHIN) 2 g in 0.9% sodium chloride (MBP/ADV) 50 mL  2 g IntraVENous Q12H  
 ampicillin (OMNIPEN) 2 g in 0.9% sodium chloride (MBP/ADV) 100 mL  2 g IntraVENous Q4H  
 dexamethasone (DECADRON) injection 10 mg  10 mg IntraVENous Q6H  
 acyclovir (ZOVIRAX) 800 mg in 0.9% sodium chloride 250 mL IVPB  800 mg IntraVENous Q8H  
  vancomycin (VANCOCIN) 2000 mg in  ml infusion  2,000 mg IntraVENous Q12H  
 lacosamide (VIMPAT) tablet 100 mg  100 mg Per G Tube Q12H  
 HYDROmorphone (PF) (DILAUDID) injection 1 mg  1 mg IntraVENous Q3H PRN  
 diphenhydrAMINE (BENADRYL) injection 25 mg  25 mg IntraVENous Q4H PRN  
 acetaminophen (TYLENOL) tablet 650 mg  650 mg Oral Q4H PRN  
 magnesium hydroxide (MILK OF MAGNESIA) 400 mg/5 mL oral suspension 30 mL  30 mL Oral DAILY PRN  
 naloxone (NARCAN) injection 0.4 mg  0.4 mg IntraVENous PRN  
 nicotine (NICODERM CQ) 21 mg/24 hr patch 1 Patch  1 Patch TransDERmal DAILY PRN  
 ondansetron (ZOFRAN) injection 4 mg  4 mg IntraVENous Q4H PRN  
 sodium chloride (NS) flush 5-40 mL  5-40 mL IntraVENous Q8H  
 sodium chloride (NS) flush 5-40 mL  5-40 mL IntraVENous PRN  
 albuterol-ipratropium (DUO-NEB) 2.5 MG-0.5 MG/3 ML  3 mL Nebulization Q4H PRN  
 allopurinoL (ZYLOPRIM) tablet 100 mg  100 mg Oral DAILY  cloNIDine HCL (CATAPRES) tablet 0.2 mg  0.2 mg Oral BID PRN  
 LORazepam (ATIVAN) injection 1 mg  1 mg IntraVENous Q4H PRN  
 levETIRAcetam (KEPPRA) oral solution 1,000 mg  1,000 mg Per G Tube BID  
 0.9% sodium chloride infusion  75 mL/hr IntraVENous CONTINUOUS Objective:  
 
Vitals:  
 02/13/20 1916 02/13/20 2305 02/14/20 0143 02/14/20 2374 BP: (!) 158/112 (!) 144/101 (!) 159/94 (!) 145/112 Pulse: 75 86 (!) 119 (!) 105 Resp: 18 18 18 18 Temp: 97.9 °F (36.6 °C) 97.9 °F (36.6 °C) 98.3 °F (36.8 °C) 97.6 °F (36.4 °C) SpO2: 94% 94% 95% 96% Weight:      
Height: PHYSICAL EXAM  
 
Gen:  the patient is intubated and unresponsiveHEENT:  Sclera clear, pupils equal, oral mucosa moist 
Lungs: CTA Heart:  RRR without M,G,R 
Abd: soft and non-tender; with positive bowel sounds. Ext: warm without cyanosis. There is 1+ lower leg edema. Skin:  no jaundice or rashes, no wounds Neuro: coma Chest X-ray:   
Pending Recent Labs  
  02/14/20 
0406 02/13/20 
0301 WBC 6.6 6.8 HGB 14.8 12.0*  
HCT 43.1 35.6* PLT 66* 45* Recent Labs  
  02/14/20 
0406 02/13/20 
0301 * 138  
K 4.0 3.4*  
 102 * 91  
CO2 23 28 BUN 9 8  
CREA 0.57* 0.49* CA 9.1 8.6 No results for input(s): PH, PCO2, PO2, HCO3 in the last 72 hours. Assessment:  (Medical Decision Making) Patient Active Problem List  
Diagnosis Code  Hypotestosteronemia E34.9  Pure hypercholesterolemia E78.00  Chronic gout without tophus M1A. 9XX0  Peripheral polyneuropathy G62.9  
 Essential hypertension with goal blood pressure less than 140/90 I10  Obstructive sleep apnea syndrome G47.33  
 Obesity, morbid (HCC) E66.01  
 Panlobular emphysema ([facility] J43.1  Malignant neoplasm of lower third of esophagus (HCC) C15.5  Chemotherapy-induced neutropenia ([facility]) D70.1, T45.1X5A  
 COPD exacerbation ([facility]) J44.1  Acute respiratory failure with hypoxia ([facility]) J96.01  
 Bacterial pneumonia J15.9  Dehydration E86.0  New onset seizure ([facility] R56.9  Hypokalemia E87.6  Other dysphagia R13.19  
 Intractable headache R51  Acute metabolic encephalopathy E17.44  
 Seizure ([facility] R56.9 Plan:  (Medical Decision Making) Hospital Problems  Date Reviewed: 1/31/2020 Codes Class Noted POA Cardiac arrest [facility]) ICD-10-CM: I46.9 ICD-9-CM: 427.5  2/14/2020 Unknown Etiology unclear, duration of lack of adequate cerebral perfusion unknown, patient comatose post code with fixed pupils. Will implement therapeutic hypothermia PUD DVT proph Discussed guarded prognosis with wife Patient already on broad ABX coverage Maintain MAP of 65mmHg + 
Daily CXR and ABG * (Principal) Acute metabolic encephalopathy [id number]: G93.41 
ICD-9-CM: 348.31  2/11/2020 Unknown Seizure ([facility] ICD-10-CM: R56.9 ICD-9-CM: 780.39  2/11/2020 Unknown New onset seizure ([address] ICD-10-CM: R56.9 ICD-9-CM: 780.39  2/5/2020 Yes On keppra- no seizure activity reported Hypokalemia ICD-10-CM: E87.6 ICD-9-CM: 276.8  2/5/2020 Yes Malignant neoplasm of lower third of esophagus (HCC) ICD-10-CM: C15.5 ICD-9-CM: 150.5  2/19/2019 Yes Obesity, morbid ([address] ICD-10-CM: E66.01 
ICD-9-CM: 278.01  12/13/2017 Yes Essential hypertension with goal blood pressure less than 140/90 ICD-10-CM: I10 
ICD-9-CM: 401.9  8/17/2016 Yes Hypotestosteronemia ICD-10-CM: E34.9 ICD-9-CM: 259.9  12/29/2015 Yes Thank you very much for this referral.  We appreciate the opportunity to participate in this patient's care. Will follow along with above stated plan.  
 
Judy Avitia MD

## 2020-02-14 NOTE — PROGRESS NOTES
END OF SHIFT NOTE: 
 
Intake/Output No intake/output data recorded. Voiding: YES Catheter: YES placed during shift Drain: PEG/Gastrostomy Tube (Active) Site Assessment Clean, dry, & intact 2/13/2020  8:00 AM  
Dressing Status Clean, dry, & intact 2/13/2020  8:00 AM  
G Port Status Clamped 2/13/2020  8:00 AM  
Action Taken Other (comment) 2/13/2020  8:00 AM  
Drainage Description Green 2/12/2020 10:32 PM  
Gastric Residual (mL) 550 ml 2/12/2020 10:32 PM  
Tube Feeding/Formula Options Jevity 1.2 2/12/2020  7:09 PM  
Tube Feeding/Verify Rate (mL/hr) 35 2/12/2020  7:09 PM  
Water Flush Volume (mL) 30 mL 2/12/2020  7:09 PM  
Intake (ml) 237 ml 2/11/2020  5:23 AM  
 
 
 
 
 
 
Stool:  0 occurrences. Emesis:  0 occurrences. VITAL SIGNS Patient Vitals for the past 12 hrs: 
 Temp Pulse Resp BP SpO2  
02/13/20 1625 98.5 °F (36.9 °C) (!) 102 17 (!) 145/101 91 % 02/13/20 1158 98.6 °F (37 °C) (!) 113 17 143/87 93 % Pain Assessment Pain 1 Pain Scale 1: NIPS (02/13/20 1940) Pain Intensity 1: 3 (02/13/20 1940) Patient Stated Pain Goal: 0 (02/12/20 1508) Pain Reassessment 1: Patient resting w/respiratory rate greater than 10 (02/13/20 1500) Pain Location 1: Generalized (02/13/20 1404) Pain Description 1: Aching (02/13/20 1404) Pain Intervention(s) 1: Medication (see MAR) (02/13/20 1940) Ambulating No 
 
Additional Information: Patient has been very restless during the day. Family by bedside. KUB done today. Maxwell placed because of retention Redressed arm very fragile skin Shift report given to oncoming nurse at the bedside.  
 
Padmaja Ruiz, RN

## 2020-02-14 NOTE — PROCEDURES
Using sterile seldinger technique the R femoral artery was cannulated in usual fashion. A good arterial line wave form was seen with good back flash. Line was sutured with provided sutures and transparent dressing applied. Complications: mild bleeding at insertion site-surgicele applied EBL: 10 ml 
 
Nathalie Sanchez MD.

## 2020-02-14 NOTE — PROGRESS NOTES
Pharmacokinetic Consult to Pharmacist 
 
Rachel Alejandrina is a 60 y.o. male being treated for CNS infection with vancomycin, acyclovir, rocephin and ampicillin. Height: 6' 2\" (188 cm)  Weight: 102.5 kg (226 lb) Lab Results Component Value Date/Time BUN 8 02/13/2020 03:01 AM  
 Creatinine 0.49 (L) 02/13/2020 03:01 AM  
 WBC 6.8 02/13/2020 03:01 AM  
 Procalcitonin 0.52 11/27/2019 06:39 PM  
 Lactic Acid (POC) 1.77 11/27/2019 06:47 PM  
  
Estimated Creatinine Clearance: 148.7 mL/min (A) (by C-G formula based on SCr of 0.49 mg/dL (L)). CULTURES: 
- 
 
Day 1 of vancomycin. Goal trough is 15 -20. Vancomycin dose initiated at 2.5g load, then 2g q 12h. Will continue to follow patient. Thank you, 
Malika Banks, PharmD Clinical Pharmacist 
[phone]

## 2020-02-14 NOTE — PROGRESS NOTES
Patient BIS still 70-80s despite versed, fentanyl, dilaudid and ativan pushes per orders. Patient started on propofol and BIS still in 70-80s. Patient now at temp 32.8; per Dr. Tennille Carpio okay to hold paralytics.

## 2020-02-14 NOTE — PROGRESS NOTES
Ventilator check complete; patient has a #8. 0 ET tube secured at the 24 at the lip. Breath sounds are coarse. Trachea is midline, Negative for subcutaneous air, and chest excursion is symmetric. Patient is also Positive for cyanosis and is Negative for pitting edema. All alarms are set and audible. Resuscitation bag is at the head of the bed. Ventilator Settings Mode FIO2 Rate Tidal Volume Pressure PEEP I:E Ratio Pressure control(mode changed for Synchrony)  100 %(increased until,  Pt stable)    500 ml     8 cm H20 Peak airway pressure: 20 cm H2O Minute ventilation: 14.6 l/min ABG: No results for input(s): PH, PCO2, PO2, HCO3 in the last 72 hours.  
 
 
Margaret Bender, RT

## 2020-02-14 NOTE — PROGRESS NOTES
Patient unresponsive with no cough, no movement to any extremities. Pupils minimally reactive. Sinus tach on monitor, S1/S2 auscultated. Bowel sounds hypoactive, abdomen semi-soft and obese. Patient placed on artic sun, BIS attached and  TOF completed. Lines: R CVC, L chest port, R femoral art line Drips: fentanyl and versed to be started, NS, levophed Drains: Maxwell, G tube

## 2020-02-14 NOTE — PROGRESS NOTES
Neurology Daily Progress Note I have seen and examined the patient along with Cony Min NP. I agree with the documentation as recorded. In addition I would add: 
 
I have seen the patient independently during which time I reviewed the history and performed a physical examination, reviewed the NP documentation and discussed with the NP . The interval history obtained is notable for: Now SP cardiac arrest on minimal therapeutic hypothermia protocol and multiple sedatives. The physical examination performed is notable for: Pupils midposition and nonreactive. Oculocephalics absent. Corneals absent. Gag absent. No motor response to pain. EEG essentially flat. No change in EEG with discontinuation of propofol The medical decision making including assessment and recommendations is notable for: New onset headache possibly posttraumatic, esophageal cancer, status post cardiac arrest with hypoxic ischemic encephalopathy. Presence of sedative medications makes neurologic exam less reliable. We will continue to follow with serial re-assessments. Repeat EEG off sedation is recommended after rewarming is completed Carol Romero MD 
 
 
Assessment:  
 
Recurrent episodic alteration of neurological function. The episodes do not sound like seizures. Patient found unresponsive and asystole. Code blue at 3205 started compressions, intubated, and 1 dose of epinephrine administered. Will obtain EEG to assess neurologic function. Plan: Will obtain EEG for neurological function s/p cardiac arrest.  
 
Continue supportive measures. Subjective: Interval history: 
 
Intubated, sedated, and on pressors. Patient found unresponsive and asystole. Code blue at 3944 started compressions, intubated, and 1 dose of epinephrine administered. Transferred to ICU. History: 
 
Johnny Rosario is a 60 y.o. male who is being seen for AMS. Review of systems negative with exception of pertinent positives and negatives noted above. Objective:  
 
Vitals:  
 02/14/20 0809 02/14/20 0849 02/14/20 0907 02/14/20 1009 BP:      
Pulse: (!) 107 98 Resp: 25 21 Temp:   (!) 93 °F (33.9 °C) (!) 90.5 °F (32.5 °C) SpO2: 100% 100% Weight:      
Height:      
  
 
 
Current Facility-Administered Medications:  
  0.9% sodium chloride infusion, 100 mL/hr, IntraVENous, CONTINUOUS, Anibal Langley MD, Last Rate: 100 mL/hr at 02/14/20 0821, 100 mL/hr at 02/14/20 6664   acetaminophen (TYLENOL) solution 650 mg, 650 mg, Per NG tube, Q4H PRN, Anibal Langley MD 
  atracurium (TRACRIUM) injection 51.3 mg, 0.5 mg/kg, IntraVENous, ONCE, Anibal Langley MD 
  atracurium (TRACRIUM) 1,000 mg in 0.9% sodium chloride 250 mL infusion, 0-15 mcg/kg/min, IntraVENous, TITRATE, Anibal Langley MD 
  white petrolatum-mineral oil (AKWA TEARS) 83-15 % ophthalmic ointment, , Both Eyes, Q4H, Anibal Langley MD 
  vasopressin (VASOSTRICT) 20 Units in 0.9% sodium chloride 100 mL infusion, 0-0.04 Units/min, IntraVENous, TITRATE, Anibal Langley MD, Stopped at 02/14/20 0800 
  NOREPINephrine (LEVOPHED) 4 mg in 5% dextrose 250 mL infusion, 0.5-30 mcg/min, IntraVENous, TITRATE, Anibal Langley MD, Last Rate: 22.5 mL/hr at 02/14/20 0919, 6 mcg/min at 02/14/20 0919 
  fentaNYL citrate (PF) 2,500 mcg in 0.9% sodium chloride 100 mL infusion, 0-200 mcg/hr, IntraVENous, TITRATE, Anibal Langley MD, Last Rate: 4 mL/hr at 02/14/20 0913, 100 mcg/hr at 02/14/20 0913 
  midazolam (VERSED) 100 mg in 0.9% sodium chloride 100 mL infusion, 0-10 mg/hr, IntraVENous, TITRATE, Anibal Langley MD, Last Rate: 4 mL/hr at 02/14/20 0913, 4 mg/hr at 02/14/20 6155   propofol (DIPRIVAN) 10 mg/mL infusion, 0-50 mcg/kg/min, IntraVENous, TITRATE, Katie Pereira MD, Last Rate: 15.4 mL/hr at 02/14/20 0956, 25 mcg/kg/min at 02/14/20 0296   [START ON 2/15/2020] allopurinoL (ZYLOPRIM) tablet 100 mg, 100 mg, Per Lisa Warren, DAILY, Neil Madrid MD 
  [START ON 2/15/2020] [address] [facility], , Other, ONCE, [address], [place], [place] 
  0.9% sodium chloride infusion 250 mL, 250 mL, IntraVENous, PRN, Lewis Copeland, DO, Last Rate: 15 mL/hr at 02/14/20 0137, 250 mL at 02/14/20 0137   simethicone (MYLICON) tablet 80 mg, 80 mg, Per G Tube, QID PRN, Lewis Copeland, DO 
  cefTRIAXone (ROCEPHIN) 2 g in 0.9% sodium chloride (MBP/ADV) 50 mL, 2 g, IntraVENous, Q12H, Lewis Copeland, DO, Last Rate: 100 mL/hr at 02/14/20 0836, 2 g at 02/14/20 0836 
  ampicillin (OMNIPEN) 2 g in 0.9% sodium chloride (MBP/ADV) 100 mL, 2 g, IntraVENous, Q4H, Derrick Aj, DO, Last Rate: 200 mL/hr at 02/14/20 0947, 2 g at 02/14/20 0947 
  dexamethasone (DECADRON) injection 10 mg, 10 mg, IntraVENous, Q6H, Vonda Aj, DO, 10 mg at 02/14/20 0836 
  acyclovir (ZOVIRAX) 800 mg in 0.9% sodium chloride 250 mL IVPB, 800 mg, IntraVENous, Q8H, Vonda Aj DO, 800 mg at 02/14/20 0336 
  vancomycin (VANCOCIN) 2000 mg in  ml infusion, 2,000 mg, IntraVENous, Q12H, Lewis Copeland, DO, Last Rate: 250 mL/hr at 02/14/20 0948, 2,000 mg at 02/14/20 7814   lacosamide (VIMPAT) tablet 100 mg, 100 mg, Per G Tube, Q12H, McBurney, Tresia Saupe, MD, 100 mg at 02/14/20 0837 
  HYDROmorphone (PF) (DILAUDID) injection 1 mg, 1 mg, IntraVENous, Q3H PRN, Lewis Copeland, DO, 1 mg at 02/14/20 0298   diphenhydrAMINE (BENADRYL) injection 25 mg, 25 mg, IntraVENous, Q4H PRN, Lewis Copeland, DO, 25 mg at 02/12/20 2100 
  magnesium hydroxide (MILK OF MAGNESIA) 400 mg/5 mL oral suspension 30 mL, 30 mL, Oral, DAILY PRN, Aneta Boyd MD, 30 mL at 02/12/20 1515 
  naloxone (NARCAN) injection 0.4 mg, 0.4 mg, IntraVENous, PRN, Aneta Boyd MD 
  nicotine (NICODERM CQ) 21 mg/24 hr patch 1 Patch, 1 Patch, TransDERmal, DAILY PRN, Sarah Gage MD 
   ondansetron (ZOFRAN) injection 4 mg, 4 mg, IntraVENous, Q4H PRN, Aneta Boyd MD 
  sodium chloride (NS) flush 5-40 mL, 5-40 mL, IntraVENous, Q8H, Aneta Boyd MD, 10 mL at 02/14/20 2960   sodium chloride (NS) flush 5-40 mL, 5-40 mL, IntraVENous, PRN, Aneta Boyd MD 
  albuterol-ipratropium (DUO-NEB) 2.5 MG-0.5 MG/3 ML, 3 mL, Nebulization, Q4H PRN, Aneta Boyd MD 
  cloNIDine HCL (CATAPRES) tablet 0.2 mg, 0.2 mg, Oral, BID PRN, Aneta Boyd MD 
  LORazepam (ATIVAN) injection 1 mg, 1 mg, IntraVENous, Q4H PRN, Aneta Boyd MD, 1 mg at 02/14/20 8094   levETIRAcetam (KEPPRA) oral solution 1,000 mg, 1,000 mg, Per G Tube, BID, Umang Rodriguez MD, 1,000 mg at 02/14/20 0836 
  0.9% sodium chloride infusion, 75 mL/hr, IntraVENous, CONTINUOUS, Shahla Peralta DO, Last Rate: 75 mL/hr at 02/13/20 2214, 75 mL/hr at 02/13/20 2214 Recent Results (from the past 12 hour(s)) SED RATE, AUTOMATED Collection Time: 02/14/20  4:06 AM  
Result Value Ref Range Sed rate, automated 10 0 - 20 mm/hr C REACTIVE PROTEIN, QT Collection Time: 02/14/20  4:06 AM  
Result Value Ref Range C-Reactive protein 8.1 (H) 0.0 - 0.9 mg/dL CBC W/O DIFF Collection Time: 02/14/20  4:06 AM  
Result Value Ref Range WBC 6.6 4.3 - 11.1 K/uL  
 RBC 4.56 4.23 - 5.6 M/uL  
 HGB 14.8 13.6 - 17.2 g/dL HCT 43.1 41.1 - 50.3 % MCV 94.5 79.6 - 97.8 FL  
 MCH 32.5 26.1 - 32.9 PG  
 MCHC 34.3 31.4 - 35.0 g/dL  
 RDW 14.6 11.9 - 14.6 % PLATELET 66 (L) 998 - 450 K/uL MPV 10.6 9.4 - 12.3 FL ABSOLUTE NRBC 0.00 0.0 - 0.2 K/uL METABOLIC PANEL, BASIC Collection Time: 02/14/20  4:06 AM  
Result Value Ref Range Sodium 135 (L) 136 - 145 mmol/L Potassium 4.0 3.5 - 5.1 mmol/L Chloride 101 98 - 107 mmol/L  
 CO2 23 21 - 32 mmol/L Anion gap 11 7 - 16 mmol/L Glucose 135 (H) 65 - 100 mg/dL BUN 9 6 - 23 MG/DL  Creatinine 0.57 (L) 0.8 - 1.5 MG/DL  
 GFR est AA >60 >60 ml/min/1.73m2 GFR est non-AA >60 >60 ml/min/1.73m2 Calcium 9.1 8.3 - 10.4 MG/DL  
GLUCOSE, POC Collection Time: 02/14/20  5:38 AM  
Result Value Ref Range Glucose (POC) 169 (H) 65 - 100 mg/dL POC G3 Collection Time: 02/14/20  7:16 AM  
Result Value Ref Range Device: VENT    
 FIO2 (POC) 100 % pH (POC) 7.344 (L) 7.35 - 7.45    
 pCO2 (POC) 45.8 (H) 35 - 45 MMHG  
 pO2 (POC) 267 (H) 75 - 100 MMHG  
 HCO3 (POC) 24.9 22 - 26 MMOL/L  
 sO2 (POC) 100 (H) 95 - 98 % Base deficit (POC) 1 mmol/L Mode ASSIST CONTROL Tidal volume 500 ml Set Rate 16 bpm  
 PEEP/CPAP (POC) 8 cmH2O Allens test (POC) NOT APPLICABLE Inspiratory Time 0.9 sec Site DRAWN FROM ARTERIAL LINE Specimen type (POC) ARTERIAL Performed by Melchor   
 CO2, POC 26 MMOL/L Respiratory comment: NurseNotified Exhaled minute volume 12.40 L/min COLLECT TIME 710 POC G3 Collection Time: 02/14/20  9:48 AM  
Result Value Ref Range Device: VENT    
 FIO2 (POC) 60 % pH (POC) 7.442 7.35 - 7.45    
 pCO2 (POC) 34.8 (L) 35 - 45 MMHG  
 pO2 (POC) 142 (H) 75 - 100 MMHG  
 HCO3 (POC) 23.8 22 - 26 MMOL/L  
 sO2 (POC) 99 (H) 95 - 98 % Base excess (POC) 0 mmol/L Mode OTHER Allens test (POC) NO Site DRAWN FROM ARTERIAL LINE Specimen type (POC) ARTERIAL Performed by Cee   
 CO2, POC 25 MMOL/L Critical value read back 00:01 COLLECT TIME 945 CBC WITH AUTOMATED DIFF Collection Time: 02/14/20 10:04 AM  
Result Value Ref Range WBC 8.7 4.3 - 11.1 K/uL  
 RBC 4.10 (L) 4.23 - 5.6 M/uL  
 HGB 13.3 (L) 13.6 - 17.2 g/dL HCT 38.4 (L) 41.1 - 50.3 % MCV 93.7 79.6 - 97.8 FL  
 MCH 32.4 26.1 - 32.9 PG  
 MCHC 34.6 31.4 - 35.0 g/dL  
 RDW 14.8 (H) 11.9 - 14.6 % PLATELET 71 (L) 125 - 450 K/uL MPV 11.0 9.4 - 12.3 FL ABSOLUTE NRBC 0.00 0.0 - 0.2 K/uL  
 DF AUTOMATED NEUTROPHILS 90 (H) 43 - 78 % LYMPHOCYTES 2 (L) 13 - 44 % MONOCYTES 7 4.0 - 12.0 % EOSINOPHILS 0 (L) 0.5 - 7.8 % BASOPHILS 0 0.0 - 2.0 % IMMATURE GRANULOCYTES 1 0.0 - 5.0 %  
 ABS. NEUTROPHILS 7.8 1.7 - 8.2 K/UL  
 ABS. LYMPHOCYTES 0.2 (L) 0.5 - 4.6 K/UL  
 ABS. MONOCYTES 0.6 0.1 - 1.3 K/UL  
 ABS. EOSINOPHILS 0.0 0.0 - 0.8 K/UL  
 ABS. BASOPHILS 0.0 0.0 - 0.2 K/UL  
 ABS. IMM. GRANS. 0.1 0.0 - 0.5 K/UL Physical Exam: 
General - chronically ill, well nourished, in no apparent distress. HEENT - Normocephalic, atraumatic. Conjunctiva are clear. Neck - Supple without masses Cardiovascular - Regular rate and rhythm. Normal S1, S2 without murmurs, rubs, or gallops. Lungs - Clear to auscultation. Abdomen - Soft, nontender with normal bowel sounds. Extremities - Peripheral pulses intact. No edema and no rashes. Neurological examination Level of consciousness- intubated and sedated Brain stem reflexes 
-Pupillary reflex to bright light: 4 mm dilated bilaterally 
 -Ciliospinal reflexes: absent 
-Oculovestibular and/or oculocephalic reflex response: absent 
-Corneal reflex: present 
-Facial movement to painful stimulus at supraorbital nerve, temporomandibular joint:absent 
-Cough/gag reflex: na 
 
Motor examination 
-Muscle tone: decreased tone 
-Motor response to pain: does not withdraw from pain 
-Muscle stretch reflexes: 1+ BUE, absent bilateral patellar and ankle jerks 
-Response to plantar stimulation: mute Signed By: Jess Sparks NP February 14, 2020

## 2020-02-14 NOTE — PROGRESS NOTES
Chart reviewed after tx to ICU post code blue. Currently intubated/vent. Arctic sun per staff, levo gtt, sedation and paralytics. Pt screen completed by [facility]. CM will continue to follow for any assist and d/c POC when stable.

## 2020-02-14 NOTE — PROGRESS NOTES
Nutrition Assessment for:  
Consult for tube feeding management (Hospitalist/Jean Marie) Assessment: 
Food/Nutrition Patient History: Patient transferred to ICU. Intubated. Patient PMH also significant for esophageal cancer currently on chemo/XRT, COPD, HTN, hyperlipidemia, morbid obesity. He is followed by outpatient oncology RD, per her notes bolus feedings of Jevity 1.5 and CIB w/ whole milk - goal 5-6 per day, but pt maintaining weight w/ current intake (3 Jevity 1.5 and 1 CIB). He initially had Jtube placed at diagnosis/start of treatment, but never used. It was replaced x1 and then fell out several months ago. Gtube was placed in November of last year as PO intake declined secondary to dysphagia. Patient seen in follow-up today for TF tolerance. Per nursing documentation, TF were held 2/12 due to gastric residuals >550 ml and no BM output and still on hold. No additional MOM given since 2/12. Abdominal Status: Semi-soft, Obese abdomen with Hypoactive  bowel sounds. Last [facility] 2/11/20 Lab Results Component Value Date/Time Sodium 135 (L) 02/14/2020 04:06 AM  
 Potassium 4.0 02/14/2020 04:06 AM  
 Chloride 101 02/14/2020 04:06 AM  
 CO2 23 02/14/2020 04:06 AM  
 Anion gap 11 02/14/2020 04:06 AM  
 Glucose 135 (H) 02/14/2020 04:06 AM  
 BUN 9 02/14/2020 04:06 AM  
 Creatinine 0.57 (L) 02/14/2020 04:06 AM  
 Calcium 9.1 02/14/2020 04:06 AM  
 Albumin 3.1 (L) 02/05/2020 08:15 PM  
 Phosphorus 3.0 02/10/2020 09:28 AM  
MAP- 86 Diet: DIET TUBE FEEDING Jevity DIET TUBE FEEDING Open order for details. Keep HOB > 30 degrees. Check residuals every 4 hours. Hold TF for > 500 ml x 1 or 250 ml x 2 consecutive checks. Also place patient on right side if residual is > 250 ml. DIET NPO Pertinent Medications: NS @ 100 ml/hr, fentanyl, versed, levo, dilaudid, keppra, Propofol (15-18 ml/hr), vanc Enteral nutrition access: Gtube Anthropometrics:Height: 6' 2\" (188 cm), Wt 102.5 kg (226#) per outpatient RD notes, Body mass index is 27.1 kg/m². BMI class of overweight. Macronutrient Needs: 95.9 kg (last recorded office weight (1/31/2020 per outpt RD) Estimated energy requirements:  3147-6419 kcal /day (20-25 kcal/kg) Estimated protein requirements:   grams protein/day (20% kcal grams/kg) CHO limit per day: 329 grams CHO/day (55% calories) Estimated fluid/day: 1.9-2.4 liters/day (~1ml/kcal/day) Intake/Comparative Standards: Tube feeds currently on hold. This does not meet kcal or protein needs. Intervention: 
Meals and snacks: Recommend full liquid diet order once mentation improves. May benefit from SLP consult prior to initiating oral diet. Enteral Nutrition: Hold TF for now until positive bowel movement. Once +BM would recommend re-initiating Jevity 1.2 via Gtube at 35 ml/hour, progress by 10 ml/hour every 4 hours to goal rate of 75ml/hour (as tolerated and pending BM). Water flush 30/hour. At goal will provide 2160 kcal (100% estimated calorie needs), 100 grams protein (100% estimated protein needs), 304 grams CHO/day (does not exceed maximum CHO/day) and 2173 ml free fluid (~1 ml/kcal). Can consider change to bolus feeds if/when advanced to goal and tolerating. Would recommend Ensure Plus, as Jevity 1.5 is not available on formulary, to closer mimic home regimen. Nutrition Supplement Therapy: If diet is able to be advanced, would likely benefit from Ensure Enlive based on diet history. Vitamin and Mineral Supplement Therapy: 
Nutrition support orders for electrolyte management replacement are activated and placed on the MAR. Will replace K per nutrition support protocols. Coordination of Nutrition Care: Rhianna Rojas Discharge Nutrition Plan: Too soon to determine. Fred Gardiner, [address] [place] [address]

## 2020-02-14 NOTE — PROCEDURES
[facility] Neurology Routine Electroencephalogram Report DATE: February 14, 2020 time Start: 2681 time End: 0358 EEG Number:  Indication: Status post cardiac arrest 
 
Medications:  
Current Facility-Administered Medications Medication Dose Route Frequency Provider Last Rate Last Dose  
 0.9% sodium chloride infusion  100 mL/hr IntraVENous CONTINUOUS Anibal Langley  mL/hr at 02/14/20 0821 100 mL/hr at 02/14/20 8341  acetaminophen (TYLENOL) solution 650 mg  650 mg Per NG tube Q4H PRN Anibal Langley MD      
 atracurium (TRACRIUM) injection 51.3 mg  0.5 mg/kg IntraVENous ONCE Kel Langley MD   Stopped at 02/14/20 0800  atracurium (TRACRIUM) 1,000 mg in 0.9% sodium chloride 250 mL infusion  0-15 mcg/kg/min IntraVENous TITRATE Anibal Langley MD   Stopped at 02/14/20 0800  white petrolatum-mineral oil (AKWA TEARS) 83-15 % ophthalmic ointment   Both Eyes Q4H Anibal aLngley MD      
 vasopressin (VASOSTRICT) 20 Units in 0.9% sodium chloride 100 mL infusion  0-0.04 Units/min IntraVENous TITRATE Anibal Langley MD   Stopped at 02/14/20 0800  
 NOREPINephrine (LEVOPHED) 4 mg in 5% dextrose 250 mL infusion  0.5-30 mcg/min IntraVENous TITRATE Anibal Langley MD 37.5 mL/hr at 02/14/20 1335 10 mcg/min at 02/14/20 1335  
 fentaNYL citrate (PF) 2,500 mcg in 0.9% sodium chloride 100 mL infusion  0-200 mcg/hr IntraVENous TITRATE Anibal Langley MD 4 mL/hr at 02/14/20 0913 100 mcg/hr at 02/14/20 0913  
 midazolam (VERSED) 100 mg in 0.9% sodium chloride 100 mL infusion  0-10 mg/hr IntraVENous TITRATE Anibal Langley MD 4 mL/hr at 02/14/20 0913 4 mg/hr at 02/14/20 0913  propofol (DIPRIVAN) 10 mg/mL infusion  0-50 mcg/kg/min IntraVENous TITRATE Bo Verduzco MD   Stopped at 02/14/20 1407  [START ON 2/15/2020] allopurinoL (ZYLOPRIM) tablet 100 mg  100 mg Per G Tube DAILY Bo Verduzco MD      
  [START ON 2/15/2020] VANCOMYCIN TROUGH REMINDER   Other Mukesher Sourav,       
 NUTRITIONAL SUPPORT ELECTROLYTE PRN ORDERS   Does Not Apply PRN Lewis Copeland, DO      
 0.9% sodium chloride infusion 250 mL  250 mL IntraVENous PRN Lewis Copeland, DO 15 mL/hr at 02/14/20 0137 250 mL at 02/14/20 8245  simethicone (MYLICON) tablet 80 mg  80 mg Per G Tube QID PRN Lewis Copeland, DO      
 cefTRIAXone (ROCEPHIN) 2 g in 0.9% sodium chloride (MBP/ADV) 50 mL  2 g IntraVENous Q12H Lewis Copeland,  mL/hr at 02/14/20 0836 2 g at 02/14/20 0836  
 ampicillin (OMNIPEN) 2 g in 0.9% sodium chloride (MBP/ADV) 100 mL  2 g IntraVENous Q4H Lewis Copeland,  mL/hr at 02/14/20 1330 2 g at 02/14/20 1330  
 dexamethasone (DECADRON) injection 10 mg  10 mg IntraVENous Q6H Lewis Copeland, DO   10 mg at 02/14/20 1225  acyclovir (ZOVIRAX) 800 mg in 0.9% sodium chloride 250 mL IVPB  800 mg IntraVENous Q8H Sandra Aj DO   800 mg at 02/14/20 1225  
 vancomycin (VANCOCIN) 2000 mg in  ml infusion  2,000 mg IntraVENous Q12H Lewis Copeland,  mL/hr at 02/14/20 0948 2,000 mg at 02/14/20 8863  lacosamide (VIMPAT) tablet 100 mg  100 mg Per G Tube Q12H McBurney, Tresia Saupe, MD   100 mg at 02/14/20 0837  
 HYDROmorphone (PF) (DILAUDID) injection 1 mg  1 mg IntraVENous Q3H PRN Lewis Copeland, DO   1 mg at 02/14/20 2058  diphenhydrAMINE (BENADRYL) injection 25 mg  25 mg IntraVENous Q4H PRN Lewis Copeland, DO   25 mg at 02/12/20 2100  
 magnesium hydroxide (MILK OF MAGNESIA) 400 mg/5 mL oral suspension 30 mL  30 mL Oral DAILY PRN Aneta Boyd MD   30 mL at 02/12/20 1515  
 naloxone (NARCAN) injection 0.4 mg  0.4 mg IntraVENous PRN Germain Morales MD      
 nicotine (NICODERM CQ) 21 mg/24 hr patch 1 Patch  1 Patch TransDERmal DAILY PRN Aneta Boyd MD      
 ondansetron (ZOFRAN) injection 4 mg  4 mg IntraVENous Q4H PRN Germain Morales MD      
  sodium chloride (NS) flush 5-40 mL  5-40 mL IntraVENous Q8H Aneta Boyd MD   10 mL at 02/14/20 1305  sodium chloride (NS) flush 5-40 mL  5-40 mL IntraVENous PRN Aneta Boyd MD      
 albuterol-ipratropium (DUO-NEB) 2.5 MG-0.5 MG/3 ML  3 mL Nebulization Q4H PRN Aneta Boyd MD      
 cloNIDine HCL (CATAPRES) tablet 0.2 mg  0.2 mg Oral BID PRN Aneta Boyd MD      
 LORazepam (ATIVAN) injection 1 mg  1 mg IntraVENous Q4H PRN Eron Fishman MD   1 mg at 02/14/20 5980  levETIRAcetam (KEPPRA) oral solution 1,000 mg  1,000 mg Per G Tube BID Obie Farmer MD   1,000 mg at 02/14/20 3204  
 0.9% sodium chloride infusion  75 mL/hr IntraVENous CONTINUOUS Radha Abed, DO 75 mL/hr at 02/13/20 2214 75 mL/hr at 02/13/20 2214 Technique: The EEG was recorded on a 32 channel [employer] digital EEG machine. A full electrode headset was applied in accordance with the International 10-20 System of Electrode Placement. All impedances are less than 5000 Ohms. Time locked digital video of the patient was recorded and reviewed as needed for clinical correlation State of Consciousness: Unresponsive, sedated Description: The EEG demonstrates continuous suppression of all EEG amplitudes. No EEG is recognizable at more than 10 µV in amplitude. EMG activity is noted from the right temporal region. Reactivity is poor. Propofol is discontinued at 1407 but Versed and fentanyl continued. No appreciable change in the EEG occurred with this pharmacological manipulation Activation Procedures: Hyperventilation: Not performed Photic Stimulation: Not performed Interpretation: This is a severely abnormal EEG due to the presence of diffuse suppression of all EEG activity. This indicates severe diffuse cerebral dysfunction. Confounding variables of ongoing sedation with multiple agents may confound the appearance of the EEG.   Recommend consideration of a repeat EEG in 24 to 48 hours off sedation.

## 2020-02-14 NOTE — PROGRESS NOTES
Went to patients room to hang another ABX, found patient barely breathing unable to arrouse patient, spouse at bedside . Called code blue at [phone] started compressions and crash cart brought the room. Response team arrived at  see Code blue chart noted.

## 2020-02-14 NOTE — PROGRESS NOTES
Patient was intubated with a number 8.0 ET Tube. Tube placement verified by auscultation and ETCO2 monitor. ET Tube is secured at the 24 cm obinna at the lip and on the right side. Patient was intubated by RT Tyler on the 2 attempt. Breath sounds are clear. Patient is Negative for subcutaneous air and chest excursion is symmetric. Trachea is midline. Patient is also Negative for cyanosis and is Negative for pitting edema. Patient placed on ventilator on documented settings. All alarms are set and audible. Resuscitation bag is at the head of the bed.

## 2020-02-14 NOTE — PROCEDURES
Procedure:  
 
Roxie Nicole U. 8. Indication:  
 
 Sepsis Summary: 
 
Informed consent was obtained from the patient/ patient's family. Using the [address] with a 5-10 MHz vascular probe transducer and sterile seldinger technique, the RIJV vein was identified and under direct real time visualization was cannulated. The CVC kit guide wire was then inserted and its position within the subclavian vein verified in short and long axis views on vascular probe US. A quadruple lumen catheter was then placed in the right IJ  vein, after dilation of entry port  without difficulty. There was no significant bleeding during the procedure and good flush and drawback was noted. The CVC was then affixed with supplied 2.0 silk sutures via the proximal and distal limiters, with the insertion point affixed at 18 cm. A biostatic disc was applied to the entry port followed by a transparent dressing. A chest x-ray / is pending. There were no immediate complications. EBL: 2 ml Tammy Morris MD.

## 2020-02-14 NOTE — PROGRESS NOTES
responded to Code Blue initiated for patient.  provided emotional support and a spiritual presence to his wife, Hadley Davis. Patient was stabilized and taken to the ICU. Rosaura Liu

## 2020-02-14 NOTE — PROGRESS NOTES
Critical Care Daily Progress Note: 2/14/2020 Admission Date: 2/11/2020 The patient's chart is reviewed and the patient is discussed with the staff. 
 
 
60 y.o.  male seen and evaluated at the request of Dr. Clementina Oh. 59 yo hx of HTN, COPD, and esophageal cancer getting chemo last treatment 4 weeks ago and radiation last treatment one week ago with tube feedings who presented to the ED for cc headache and seizure like activity. Originally admitted to [facility] and transferred to [facility] on 2/11. EEG originally done that did not show actual seizures. Tele neuro recommended Keppra. 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 Patient will have episodes of unresponsiveness. Neuroimaging benign. Still with severe headache.  
Subjective:  
Pt found unresponsive with agonal respirations. Code blue called at 5:27 am  He was intubated by RT and  Compressions were done. 1 epi  Given then rosc. He is now in icu on vent support and 16 mics of levophed- unresponsive but on versed and fentanyl Current Facility-Administered Medications Medication Dose Route Frequency  0.9% sodium chloride infusion  100 mL/hr IntraVENous CONTINUOUS  
 acetaminophen (TYLENOL) solution 650 mg  650 mg Per NG tube Q4H PRN  
 atracurium (TRACRIUM) injection 51.3 mg  0.5 mg/kg IntraVENous ONCE  
 atracurium (TRACRIUM) 1,000 mg in 0.9% sodium chloride 250 mL infusion  0-15 mcg/kg/min IntraVENous TITRATE  white petrolatum-mineral oil (AKWA TEARS) 83-15 % ophthalmic ointment   Both Eyes Q4H  
 vasopressin (VASOSTRICT) 20 Units in 0.9% sodium chloride 100 mL infusion  0-0.04 Units/min IntraVENous TITRATE  
 NOREPINephrine (LEVOPHED) 4 mg in 5% dextrose 250 mL infusion  0.5-30 mcg/min IntraVENous TITRATE  fentaNYL citrate (PF) 2,500 mcg in 0.9% sodium chloride 100 mL infusion  0-200 mcg/hr IntraVENous TITRATE  midazolam (VERSED) 100 mg in 0.9% sodium chloride 100 mL infusion  0-10 mg/hr IntraVENous TITRATE  
 0.9% sodium chloride infusion 250 mL  250 mL IntraVENous PRN  
 simethicone (MYLICON) tablet 80 mg  80 mg Per G Tube QID PRN  
 cefTRIAXone (ROCEPHIN) 2 g in 0.9% sodium chloride (MBP/ADV) 50 mL  2 g IntraVENous Q12H  
 ampicillin (OMNIPEN) 2 g in 0.9% sodium chloride (MBP/ADV) 100 mL  2 g IntraVENous Q4H  
 dexamethasone (DECADRON) injection 10 mg  10 mg IntraVENous Q6H  
 acyclovir (ZOVIRAX) 800 mg in 0.9% sodium chloride 250 mL IVPB  800 mg IntraVENous Q8H  
 vancomycin (VANCOCIN) 2000 mg in  ml infusion  2,000 mg IntraVENous Q12H  
 lacosamide (VIMPAT) tablet 100 mg  100 mg Per G Tube Q12H  
 HYDROmorphone (PF) (DILAUDID) injection 1 mg  1 mg IntraVENous Q3H PRN  
 diphenhydrAMINE (BENADRYL) injection 25 mg  25 mg IntraVENous Q4H PRN  
 magnesium hydroxide (MILK OF MAGNESIA) 400 mg/5 mL oral suspension 30 mL  30 mL Oral DAILY PRN  
 naloxone (NARCAN) injection 0.4 mg  0.4 mg IntraVENous PRN  
 nicotine (NICODERM CQ) 21 mg/24 hr patch 1 Patch  1 Patch TransDERmal DAILY PRN  
 ondansetron (ZOFRAN) injection 4 mg  4 mg IntraVENous Q4H PRN  
 sodium chloride (NS) flush 5-40 mL  5-40 mL IntraVENous Q8H  
 sodium chloride (NS) flush 5-40 mL  5-40 mL IntraVENous PRN  
 albuterol-ipratropium (DUO-NEB) 2.5 MG-0.5 MG/3 ML  3 mL Nebulization Q4H PRN  
 allopurinoL (ZYLOPRIM) tablet 100 mg  100 mg Oral DAILY  cloNIDine HCL (CATAPRES) tablet 0.2 mg  0.2 mg Oral BID PRN  
 LORazepam (ATIVAN) injection 1 mg  1 mg IntraVENous Q4H PRN  
 levETIRAcetam (KEPPRA) oral solution 1,000 mg  1,000 mg Per G Tube BID  
 0.9% sodium chloride infusion  75 mL/hr IntraVENous CONTINUOUS Review of Systems Unobtainable due to patient status. Objective:  
 
Vitals:  
 02/14/20 9423 02/14/20 6454 02/14/20 0877 02/14/20 0809 BP:      
Pulse: (!) 117 (!) 113  (!) 107 Resp: 16 13 25 Temp:   (!) 94.8 °F (34.9 °C) SpO2: 98% 99%  100% Weight:      
Height:      
 
 
 
Intake/Output Summary (Last 24 hours) at 2/14/2020 8679 Last data filed at 2/14/2020 0403 Gross per 24 hour Intake 2514 ml Output 425 ml Net 2089 ml Physical Exam:         
Constitutional:  intubated and mechanically ventilated. EENMT:  Sclera clear, pupils equal, oral mucosa moist 
Respiratory:  rhonchi 
Cardiovascular:  RRR with no M,G,R; 
Gastrointestinal:  soft with no tenderness; positive bowel sounds present Musculoskeletal:  warm with no cyanosis, no lower extremity edema Skin:  no jaundice or ecchymosis Neurologic: no gross neuro deficits Psychiatric:  unresponsive LINES:   
ETT 
 
DRIPS:   
Fentanyl, levophed, versed CXR:   
 
 
Ventilator Settings Mode FIO2 Rate Tidal Volume Pressure PEEP Pressure control(mode changed for Synchrony)  100 %(increased until,  Pt stable)    500 ml     8 cm H20 Peak airway pressure: 20 cm H2O Minute ventilation: 14.6 l/min ABG:  
Recent Labs  
  02/14/20 
0716 PHI 7.344* PCO2I 45.8*  
PO2I 267* HCO3I 24.9 LAB Recent Labs  
  02/14/20 
8359 GLUCPOC 169* Recent Labs  
  02/14/20 
0406 02/13/20 
0301 WBC 6.6 6.8 HGB 14.8 12.0*  
HCT 43.1 35.6* PLT 66* 45* Recent Labs  
  02/14/20 
0406 02/13/20 
0301 * 138  
K 4.0 3.4*  
 102 CO2 23 28 * 91  
BUN 9 8  
CREA 0.57* 0.49* CA 9.1 8.6 No results for input(s): LCAD, LAC in the last 72 hours. Patient Active Problem List  
Diagnosis Code  Hypotestosteronemia E34.9  Pure hypercholesterolemia E78.00  Chronic gout without tophus M1A. 9XX0  Peripheral polyneuropathy G62.9  
 Essential hypertension with goal blood pressure less than 140/90 I10  Obstructive sleep apnea syndrome G47.33  
 Obesity, morbid (HCC) E66.01  
 Panlobular emphysema ([address] J43.1  Malignant neoplasm of lower third of esophagus (HCC) C15.5  Chemotherapy-induced neutropenia (HCC) D70.1, T45.1X5A  
 COPD exacerbation (HCC) J44.1  Bacterial pneumonia J15.9  Dehydration E86.0  New onset seizure ([facility] R56.9  Hypokalemia E87.6  Other dysphagia R13.19  
 Intractable headache R51  Acute metabolic encephalopathy W90.10  
 Seizure ([facility] R56.9  Cardiac arrest ([facility] I46.9  Acute respiratory failure with hypoxia ([facility]) J96.01  
 Hypotension I95.9 Assessment:  (Medical Decision Making) Hospital Problems  Date Reviewed: 1/31/2020 Codes Class Noted POA Cardiac arrest [facility]) ICD-10-CM: I46.9 ICD-9-CM: 427.5  2/14/2020 Unknown Acute respiratory failure with hypoxia [facility]) ICD-10-CM: J96.01 
ICD-9-CM: 518.81  2/14/2020 Unknown Critically ill Hypotension ICD-10-CM: I95.9 ICD-9-CM: 458.9  2/14/2020 Unknown * (Principal) Acute metabolic encephalopathy TFK-74-: G93.41 
ICD-9-CM: 348.31  2/11/2020 Unknown Seizure ([facility] ICD-10-CM: R56.9 ICD-9-CM: 780.39  2/11/2020 Unknown New onset seizure ([facility] ICD-10-CM: R56.9 ICD-9-CM: 780.39  2/5/2020 Yes Hypokalemia ICD-10-CM: E87.6 ICD-9-CM: 276.8  2/5/2020 Yes Malignant neoplasm of lower third of esophagus (HCC) ICD-10-CM: C15.5 ICD-9-CM: 150.5  2/19/2019 Yes Obesity, morbid ([facility] ICD-10-CM: E66.01 
ICD-9-CM: 278.01  12/13/2017 Yes Essential hypertension with goal blood pressure less than 140/90 ICD-10-CM: I10 
ICD-9-CM: 401.9  8/17/2016 Yes Hypotestosteronemia ICD-10-CM: E34.9 ICD-9-CM: 259.9  12/29/2015 Yes Plan:  (Medical Decision Making) 1    arctic sun - targetted temp manangement-post arrest 
2    levophed drip 3    Sedation and adding paralytics when bis is [id number] 
4    Iv antibx- continue -- 
Critical care time 39 minutes More than 50% of the time documented was spent in face-to-face contact with the patient and in the care of the patient on the floor/unit where the patient is located.  
 
Koko Tai MD

## 2020-02-14 NOTE — PROGRESS NOTES
Patient found unresponsive and barely breathing at 0527. Compressions started Code blue called at 0530 crash cart brought to room and monitor was placed on patient.

## 2020-02-15 PROBLEM — R91.8 PULMONARY INFILTRATE: Status: ACTIVE | Noted: 2020-01-01

## 2020-02-15 NOTE — PROGRESS NOTES
Pt BIS now ranging in the 20's. Per Dr. Lopez Floor, do not start paralytic unless pt begins shivering. Will continue to monitor for shivering and wean sedation as indicated.

## 2020-02-15 NOTE — PROGRESS NOTES
Pharmacokinetic Consult to Pharmacist 
 
Anuja Rodriguez is a 60 y.o. male being treated for CNS infection with vancomycin, acyclovir, rocephin and ampicillin. Height: 6' 2\" (188 cm)  Weight: 102.5 kg (226 lb) Lab Results Component Value Date/Time BUN 12 02/15/2020 03:24 AM  
 Creatinine 0.51 (L) 02/15/2020 03:24 AM  
 WBC 6.5 02/15/2020 03:25 AM  
 Procalcitonin 0.52 11/27/2019 06:39 PM  
 Lactic Acid (POC) 1.77 11/27/2019 06:47 PM  
  
Estimated Creatinine Clearance: 148.7 mL/min (A) (by C-G formula based on SCr of 0.51 mg/dL (L)). CULTURES: 
- 
 
Lab Results Component Value Date/Time Vancomycin,trough 25.0 (HH) 02/15/2020 09:35 AM  
 
 
Day 3 of vancomycin. Goal trough is 15 -20. Trough supratherapeutic. Will adjust dose to 1500 mg q12h. Pharmacy will continue to follow patient and check levels when clinically indicated. Thank you, Paloma Dupont, Pharm. D. 
PGY1 Pharmacy Resident [phone]

## 2020-02-15 NOTE — PROGRESS NOTES
I have seen and examined the patient along with Donell Noble NP. I agree with the documentation as recorded. In addition I would add: 
 
I have seen the patient independently during which time I reviewed the history and performed a physical examination, reviewed the NP documentation and discussed with the NP . The interval history obtained is notable for: Remains on minimal therapeutic hypothermia. Currently sedated with Versed and fentanyl The physical examination performed is notable for: Pupils midposition poorly reactive. No motor response to pain. Oculocephalics absent The medical decision making including assessment and recommendations is notable for: Hypoxic ischemic encephalopathy following cardiac arrest.  Prognosis guarded. Continue supportive measures with serial examinations. Ignacio Krueger MD 
 
 
Assessment:  
 
Recurrent episodic alteration of neurological function. Episodes do not sound like seizures. Status post cardiac arrest with hypoxic ischemic encephalopathy. EEG presence of diffuse suppression of all EEG activity after discontinuation of propofol. Recommend repeating EEG after completion of arctic/sun protocol. New onset headache possibly posttraumatic Plan:  
 
Recommend repeating EEG after completion of arctic/sun protocol. Continue supportive measures. Subjective: Interval history: 
 
Intubated, sedated, and on pressors. S/p cardiac arrest yesterday. Rewarming phase of arctic/sun protocol. EEG presence of diffuse suppression of all EEG activity after discontinuation of propofol. Recommend repeating EEG after completion of arctic/sun protocol. History: 
 
Briana Aggarwal is a 60 y.o. male who is being seen for AMS. Review of systems negative with exception of pertinent positives and negatives noted above. Objective:  
 
Vitals:  
 02/15/20 0915 02/15/20 0929 02/15/20 0930 02/15/20 0945 BP:      
Pulse: 68  68 68  
 Resp: 10  16 18 Temp:  (!) 91.4 °F (33 °C) SpO2: 95%  94% 93% Weight:      
Height:      
  
 
 
Current Facility-Administered Medications:  
  fentaNYL in normal saline (pf) 25 mcg/mL infusion, 0-200 mcg/hr, IntraVENous, TITRATE, Anibal Langley MD, Last Rate: 4 mL/hr at 02/15/20 0646, 100 mcg/hr at 02/15/20 0646 
  0.9% sodium chloride infusion, 100 mL/hr, IntraVENous, CONTINUOUS, Anibal Langley MD, Last Rate: 100 mL/hr at 02/15/20 0343, 100 mL/hr at 02/15/20 0343   acetaminophen (TYLENOL) solution 650 mg, 650 mg, Per NG tube, Q4H PRN, Anibal Langley MD 
  atracurium (TRACRIUM) 1,000 mg in 0.9% sodium chloride 250 mL infusion, 0-15 mcg/kg/min, IntraVENous, TITRATE, Anibal Langley MD, Stopped at 02/14/20 0800   white petrolatum-mineral oil (AKWA TEARS) 83-15 % ophthalmic ointment, , Both Eyes, Q4H, Anibal Langley MD 
  vasopressin (VASOSTRICT) 20 Units in 0.9% sodium chloride 100 mL infusion, 0-0.04 Units/min, IntraVENous, TITRATE, Anibal Langley MD, Stopped at 02/14/20 0800 
  NOREPINephrine (LEVOPHED) 4 mg in 5% dextrose 250 mL infusion, 0.5-30 mcg/min, IntraVENous, TITRATE, Anibal Langley MD, Last Rate: 15 mL/hr at 02/15/20 0931, 4 mcg/min at 02/15/20 0931 
  midazolam (VERSED) 100 mg in 0.9% sodium chloride 100 mL infusion, 0-10 mg/hr, IntraVENous, TITRATE, Anibal Langley MD, Last Rate: 4 mL/hr at 02/15/20 0646, 4 mg/hr at 02/15/20 6782   propofol (DIPRIVAN) 10 mg/mL infusion, 0-50 mcg/kg/min, IntraVENous, TITRATE, Juanito Connolly MD, Stopped at 02/14/20 1407   allopurinoL (ZYLOPRIM) tablet 100 mg, 100 mg, Per Catarina Lion, DAILY, Juanito Connolly MD, 100 mg at 02/15/20 0853 
  NUTRITIONAL SUPPORT ELECTROLYTE PRN ORDERS, , Does Not Apply, PRN, Artie Bull DO 
  0.9% sodium chloride infusion 250 mL, 250 mL, IntraVENous, PRN, Artie Bull DO, Last Rate: 15 mL/hr at 02/14/20 0137, 250 mL at 02/14/20 0137   simethicone (MYLICON) tablet 80 mg, 80 mg, Per G Tube, QID PRN, Batavia Saucer, DO 
  cefTRIAXone (ROCEPHIN) 2 g in 0.9% sodium chloride (MBP/ADV) 50 mL, 2 g, IntraVENous, Q12H, Bonilla Saucer, DO, Last Rate: 100 mL/hr at 02/15/20 0706, 2 g at 02/15/20 0225   ampicillin (OMNIPEN) 2 g in 0.9% sodium chloride (MBP/ADV) 100 mL, 2 g, IntraVENous, Q4H, Vonda Aj, DO, Last Rate: 200 mL/hr at 02/15/20 0928, 2 g at 02/15/20 4520   dexamethasone (DECADRON) injection 10 mg, 10 mg, IntraVENous, Q6H, Vonda Aj, DO, 10 mg at 02/15/20 9050   acyclovir (ZOVIRAX) 800 mg in 0.9% sodium chloride 250 mL IVPB, 800 mg, IntraVENous, Q8H, Vonda Aj, DO, 800 mg at 02/15/20 0343 
  vancomycin (VANCOCIN) 2000 mg in  ml infusion, 2,000 mg, IntraVENous, Q12H, Batavia Saucer, DO, Last Rate: 250 mL/hr at 02/15/20 0928, 2,000 mg at 02/15/20 3513   lacosamide (VIMPAT) tablet 100 mg, 100 mg, Per G Tube, Q12H, McBurney, Kaleta Burner, MD, 100 mg at 02/15/20 0853 
  HYDROmorphone (PF) (DILAUDID) injection 1 mg, 1 mg, IntraVENous, Q3H PRN, Bonilla Saucer, DO, 1 mg at 02/14/20 2844   diphenhydrAMINE (BENADRYL) injection 25 mg, 25 mg, IntraVENous, Q4H PRN, Batavia Saucer, DO, 25 mg at 02/12/20 2100 
  magnesium hydroxide (MILK OF MAGNESIA) 400 mg/5 mL oral suspension 30 mL, 30 mL, Oral, DAILY PRN, Aneta Boyd MD, 30 mL at 02/12/20 1515 
  naloxone (NARCAN) injection 0.4 mg, 0.4 mg, IntraVENous, PRN, Aneta Boyd MD 
  nicotine (NICODERM CQ) 21 mg/24 hr patch 1 Patch, 1 Patch, TransDERmal, DAILY PRN, Aneta Boyd MD 
  ondansetron (ZOFRAN) injection 4 mg, 4 mg, IntraVENous, Q4H PRN, Aneta Boyd MD 
  sodium chloride (NS) flush 5-40 mL, 5-40 mL, IntraVENous, Q8H, Aneta Boyd MD, 10 mL at 02/15/20 0521 
  sodium chloride (NS) flush 5-40 mL, 5-40 mL, IntraVENous, PRN, Carlton Morgan MD 
   albuterol-ipratropium (DUO-NEB) 2.5 MG-0.5 MG/3 ML, 3 mL, Nebulization, Q4H PRN, Aneta Boyd MD 
  cloNIDine HCL (CATAPRES) tablet 0.2 mg, 0.2 mg, Oral, BID PRN, Aneta Boyd MD 
  LORazepam (ATIVAN) injection 1 mg, 1 mg, IntraVENous, Q4H PRN, Aneta Boyd MD, 1 mg at 02/14/20 8459   levETIRAcetam (KEPPRA) oral solution 1,000 mg, 1,000 mg, Per G Tube, BID, Farhana Ibanez MD, 1,000 mg at 02/15/20 0853 
  0.9% sodium chloride infusion, 75 mL/hr, IntraVENous, CONTINUOUS, Paul Jang DO, Last Rate: 75 mL/hr at 02/13/20 2214, 75 mL/hr at 02/13/20 2214 Recent Results (from the past 12 hour(s)) POC G3 Collection Time: 02/15/20  3:22 AM  
Result Value Ref Range Device: VENT    
 FIO2 (POC) 50 % pH (POC) 7.369 7.35 - 7.45    
 pCO2 (POC) 40.7 35 - 45 MMHG  
 pO2 (POC) 62 (L) 75 - 100 MMHG  
 HCO3 (POC) 23.5 22 - 26 MMOL/L  
 sO2 (POC) 91 (L) 95 - 98 % Base deficit (POC) 2 mmol/L Mode ASSIST CONTROL Set Rate 18 bpm  
 PEEP/CPAP (POC) 8 cmH2O Allens test (POC) NOT APPLICABLE Inspiratory Time 0.85 sec Site DRAWN FROM ARTERIAL LINE Specimen type (POC) ARTERIAL Performed by [employer]RT   
 CO2, POC 25 MMOL/L Pressure control 10 Respiratory comment: NurseNotified Exhaled minute volume 7.80 L/min COLLECT TIME 320 METABOLIC PANEL, COMPREHENSIVE Collection Time: 02/15/20  3:24 AM  
Result Value Ref Range Sodium 146 (H) 136 - 145 mmol/L Potassium 3.6 3.5 - 5.1 mmol/L Chloride 114 (H) 98 - 107 mmol/L  
 CO2 26 21 - 32 mmol/L Anion gap 6 (L) 7 - 16 mmol/L Glucose 139 (H) 65 - 100 mg/dL BUN 12 6 - 23 MG/DL Creatinine 0.51 (L) 0.8 - 1.5 MG/DL  
 GFR est AA >60 >60 ml/min/1.73m2 GFR est non-AA >60 >60 ml/min/1.73m2 Calcium 8.6 8.3 - 10.4 MG/DL Bilirubin, total 0.5 0.2 - 1.1 MG/DL  
 ALT (SGPT) 22 12 - 65 U/L  
 AST (SGOT) 32 15 - 37 U/L Alk.  phosphatase 101 50 - 136 U/L  
 Protein, total 5.4 (L) 6.3 - 8.2 g/dL Albumin 2.1 (L) 3.5 - 5.0 g/dL Globulin 3.3 2.3 - 3.5 g/dL A-G Ratio 0.6 (L) 1.2 - 3.5 MAGNESIUM Collection Time: 02/15/20  3:24 AM  
Result Value Ref Range Magnesium 2.0 1.8 - 2.4 mg/dL CBC W/O DIFF Collection Time: 02/15/20  3:25 AM  
Result Value Ref Range WBC 6.5 4.3 - 11.1 K/uL  
 RBC 3.60 (L) 4.23 - 5.6 M/uL  
 HGB 11.7 (L) 13.6 - 17.2 g/dL HCT 34.5 (L) 41.1 - 50.3 % MCV 95.8 79.6 - 97.8 FL  
 MCH 32.5 26.1 - 32.9 PG  
 MCHC 33.9 31.4 - 35.0 g/dL  
 RDW 14.9 (H) 11.9 - 14.6 % PLATELET 64 (L) 288 - 450 K/uL MPV 11.6 9.4 - 12.3 FL ABSOLUTE NRBC 0.00 0.0 - 0.2 K/uL Sharron Para Collection Time: 02/15/20  9:35 AM  
Result Value Ref Range Vancomycin,trough 25.0 (HH) 5 - 20 ug/mL Physical Exam: 
General - chronically ill, well nourished, in no apparent distress. HEENT - Normocephalic, atraumatic. Conjunctiva are clear. Neck - Supple without masses Cardiovascular - Regular rate and rhythm. Normal S1, S2 without murmurs, rubs, or gallops. Lungs - Clear to auscultation. Abdomen - Soft, nontender with normal bowel sounds. Extremities - Peripheral pulses intact. No edema and no rashes. Neurological examination Level of consciousness- intubated and sedated Brain stem reflexes 
-Pupillary reflex to bright light: 4 mm dilated bilaterally, nonresponsive 
 -Ciliospinal reflexes: absent 
-Oculovestibular and/or oculocephalic reflex response: absent 
-Corneal reflex: present 
-Facial movement to painful stimulus at supraorbital nerve, temporomandibular joint:absent 
-Cough/gag reflex: na 
 
Motor examination 
-Muscle tone: decreased tone 
-Motor response to pain: does not withdraw from pain 
-Muscle stretch reflexes: areflexic in all extremities 
-Response to plantar stimulation: mute Signed By: Niecy Ayala NP February 15, 2020

## 2020-02-15 NOTE — PROGRESS NOTES
Ventilator check complete; patient has a #8. 0 ET tube secured at the 24 at the teeth. Patient is sedated. Patient is not able to follow commands. Breath sounds are coarse and diminished. Trachea is midline, Negative for subcutaneous air, and chest excursion is symmetric. Patient is also Negative for cyanosis and is Negative for pitting edema. All alarms are set and audible. Resuscitation bag is at the head of the bed. Ventilator Settings Mode FIO2 Rate Tidal Volume Pressure PEEP I:E Ratio Pressure control  65 %(increased per ABG)   18 671 ml    10 8 cm H20 Peak airway pressure: 18 cm H2O Minute ventilation: 7.8 l/min AB.37/ 41/ 62/ 24/ 91% - FiO2 was increased from 50% to 65%. Celia Nimisha

## 2020-02-15 NOTE — PROGRESS NOTES
Bedside, Verbal and Written shift change report given to Arleth Arias RN (oncoming nurse) by ARTEM Bella (offgoing nurse). Report included the following information SBAR, Kardex, ED Summary, Procedure Summary, Recent Results, Med Rec Status, Cardiac Rhythm sr/st and Alarm Parameters . Versed and fentanyl gtts signed off.

## 2020-02-15 NOTE — PROGRESS NOTES
Critical Care Daily Progress Note: 2/15/2020 Admission Date: 2/11/2020 The patient's chart is reviewed and the patient is discussed with the staff. 
 
 
55 y.o.  male seen and evaluated at the request of Dr. Kirk Lobato. 61 yo hx of HTN, COPD, and esophageal cancer getting chemo last treatment 4 weeks ago and radiation last treatment one week ago with tube feedings who presented to the ED for cc headache and seizure like activity. Originally admitted to [facility] and transferred to [facility] on 2/11. EEG originally done that did not show actual seizures. Tele neuro recommended Keppra. 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 Patient will have episodes of unresponsiveness. Neuroimaging benign. Still with severe headache Subjective:  
Cardiac arrest yesterday- targeted temperature treatment initiated with artic sun,currently rewarming Current Facility-Administered Medications Medication Dose Route Frequency  fentaNYL in normal saline (pf) 25 mcg/mL infusion  0-200 mcg/hr IntraVENous TITRATE  
 0.9% sodium chloride infusion  100 mL/hr IntraVENous CONTINUOUS  
 acetaminophen (TYLENOL) solution 650 mg  650 mg Per NG tube Q4H PRN  
 atracurium (TRACRIUM) 1,000 mg in 0.9% sodium chloride 250 mL infusion  0-15 mcg/kg/min IntraVENous TITRATE  white petrolatum-mineral oil (AKWA TEARS) 83-15 % ophthalmic ointment   Both Eyes Q4H  
 vasopressin (VASOSTRICT) 20 Units in 0.9% sodium chloride 100 mL infusion  0-0.04 Units/min IntraVENous TITRATE  
 NOREPINephrine (LEVOPHED) 4 mg in 5% dextrose 250 mL infusion  0.5-30 mcg/min IntraVENous TITRATE  midazolam (VERSED) 100 mg in 0.9% sodium chloride 100 mL infusion  0-10 mg/hr IntraVENous TITRATE  propofol (DIPRIVAN) 10 mg/mL infusion  0-50 mcg/kg/min IntraVENous TITRATE  allopurinoL (ZYLOPRIM) tablet 100 mg  100 mg Per G Tube DAILY  VANCOMYCIN TROUGH REMINDER   Other ONCE  
  NUTRITIONAL SUPPORT ELECTROLYTE PRN ORDERS   Does Not Apply PRN  
 0.9% sodium chloride infusion 250 mL  250 mL IntraVENous PRN  
 simethicone (MYLICON) tablet 80 mg  80 mg Per G Tube QID PRN  
 cefTRIAXone (ROCEPHIN) 2 g in 0.9% sodium chloride (MBP/ADV) 50 mL  2 g IntraVENous Q12H  
 ampicillin (OMNIPEN) 2 g in 0.9% sodium chloride (MBP/ADV) 100 mL  2 g IntraVENous Q4H  
 dexamethasone (DECADRON) injection 10 mg  10 mg IntraVENous Q6H  
 acyclovir (ZOVIRAX) 800 mg in 0.9% sodium chloride 250 mL IVPB  800 mg IntraVENous Q8H  
 vancomycin (VANCOCIN) 2000 mg in  ml infusion  2,000 mg IntraVENous Q12H  
 lacosamide (VIMPAT) tablet 100 mg  100 mg Per G Tube Q12H  
 HYDROmorphone (PF) (DILAUDID) injection 1 mg  1 mg IntraVENous Q3H PRN  
 diphenhydrAMINE (BENADRYL) injection 25 mg  25 mg IntraVENous Q4H PRN  
 magnesium hydroxide (MILK OF MAGNESIA) 400 mg/5 mL oral suspension 30 mL  30 mL Oral DAILY PRN  
 naloxone (NARCAN) injection 0.4 mg  0.4 mg IntraVENous PRN  
 nicotine (NICODERM CQ) 21 mg/24 hr patch 1 Patch  1 Patch TransDERmal DAILY PRN  
 ondansetron (ZOFRAN) injection 4 mg  4 mg IntraVENous Q4H PRN  
 sodium chloride (NS) flush 5-40 mL  5-40 mL IntraVENous Q8H  
 sodium chloride (NS) flush 5-40 mL  5-40 mL IntraVENous PRN  
 albuterol-ipratropium (DUO-NEB) 2.5 MG-0.5 MG/3 ML  3 mL Nebulization Q4H PRN  
 cloNIDine HCL (CATAPRES) tablet 0.2 mg  0.2 mg Oral BID PRN  
 LORazepam (ATIVAN) injection 1 mg  1 mg IntraVENous Q4H PRN  
 levETIRAcetam (KEPPRA) oral solution 1,000 mg  1,000 mg Per G Tube BID  
 0.9% sodium chloride infusion  75 mL/hr IntraVENous CONTINUOUS Review of Systems Unobtainable due to patient status. Objective:  
 
Vitals:  
 02/15/20 0800 02/15/20 0801 02/15/20 0845 02/15/20 3863 BP:  101/69 Pulse: 69 69 69 Resp: 8 18 19 Temp:    (!) 91.4 °F (33 °C) SpO2: 95% 95% 94% Weight:      
Height: Intake/Output Summary (Last 24 hours) at 2/15/2020 0915 Last data filed at 2/15/2020 0500 Gross per 24 hour Intake 6298.5 ml Output 3450 ml Net 2848. 5 ml Physical Exam:         
Constitutional:  intubated and mechanically ventilated. EENMT:  Sclera clear, pupils equal, oral mucosa moist 
Respiratory: clear Cardiovascular:  RRR with no M,G,R; 
Gastrointestinal:  soft with no tenderness; positive bowel sounds present Musculoskeletal:  warm with no cyanosis, no lower extremity edema Skin:  no jaundice or ecchymosis Neurologic: no gross neuro deficits Psychiatric:  Unresponsive, pupils dilated, no gag LINES:   
ETT, central 
 
DRIPS:   
Levophed- 4, fentanyl, versed CXR:   
 
 
Ventilator Settings Mode FIO2 Rate Tidal Volume Pressure PEEP Pressure control  65 %    671 ml     8 cm H20 Peak airway pressure: 18 cm H2O Minute ventilation: 7.1 l/min ABG:  
Recent Labs  
  02/15/20 
0322 02/14/20 
0948 02/14/20 
1429 PHI 7.369 7.442 7.344* PCO2I 40.7 34.8* 45.8*  
PO2I 62* 142* 267* HCO3I 23.5 23.8 24.9 LAB Recent Labs  
  02/14/20 
2200 GLUCPOC 169* Recent Labs  
  02/15/20 
0325 02/14/20 
1652 02/14/20 
1004 02/14/20 
0406 WBC 6.5  --  8.7 6.6 HGB 11.7*  --  13.3* 14.8 HCT 34.5*  --  38.4* 43.1 PLT 64*  --  71* 66* INR  --  1.2 1.1  --   
 
Recent Labs  
  02/15/20 
0324 02/14/20 
1004 02/14/20 
0406 02/13/20 
0301 *  --  135* 138  
K 3.6  --  4.0 3.4*  
*  --  101 102 CO2 26  --  23 28 *  --  135* 91 BUN 12  --  9 8 CREA 0.51*  --  0.57* 0.49* MG 2.0 1.9  --   --   
CA 8.6  --  9.1 8.6 ALB 2.1*  --   --   --   
SGOT 32  --   --   -- No results for input(s): LCAD, LAC in the last 72 hours. Patient Active Problem List  
Diagnosis Code  Hypotestosteronemia E34.9  Pure hypercholesterolemia E78.00  Chronic gout without tophus M1A. 9XX0  Peripheral polyneuropathy G62.9  Essential hypertension with goal blood pressure less than 140/90 I10  Obstructive sleep apnea syndrome G47.33  
 Obesity, morbid (HCC) E66.01  
 Panlobular emphysema ([facility] J43.1  Malignant neoplasm of lower third of esophagus (HCC) C15.5  Chemotherapy-induced neutropenia (HCC) D70.1, T45.1X5A  
 COPD exacerbation (HCC) J44.1  Bacterial pneumonia J15.9  Dehydration E86.0  New onset seizure ([facility] R56.9  Hypokalemia E87.6  Other dysphagia R13.19  
 Intractable headache R51  Acute metabolic encephalopathy G33.43  
 Seizure ([facility] R56.9  Cardiac arrest ([facility] I46.9  Acute respiratory failure with hypoxia ([facility]) J96.01  
 Hypotension I95.9  Pulmonary infiltrate R91.8 Assessment:  (Medical Decision Making) Hospital Problems  Date Reviewed: 1/31/2020 Codes Class Noted POA Pulmonary infiltrate ICD-10-CM: R91.8 ICD-9-CM: 793.19  2/15/2020 Unknown RLL Cardiac arrest [facility]) ICD-10-CM: I46.9 ICD-9-CM: 427.5  2/14/2020 Unknown Acute respiratory failure with hypoxia [facility]) ICD-10-CM: J96.01 
ICD-9-CM: 518.81  2/14/2020 Unknown On vent support- critically ill Hypotension ICD-10-CM: I95.9 ICD-9-CM: 458.9  2/14/2020 Unknown Still  On levophed-critically ill * (Principal) Acute metabolic encephalopathy ROBLES-43-JY: G93.41 
ICD-9-CM: 348.31  2/11/2020 Unknown Seizure ([facility] ICD-10-CM: R56.9 ICD-9-CM: 780.39  2/11/2020 Unknown New onset seizure ([facility] ICD-10-CM: R56.9 ICD-9-CM: 780.39  2/5/2020 Yes Hypokalemia ICD-10-CM: E87.6 ICD-9-CM: 276.8  2/5/2020 Yes Malignant neoplasm of lower third of esophagus (HCC) ICD-10-CM: C15.5 ICD-9-CM: 150.5  2/19/2019 Yes Obesity, morbid ([facility] ICD-10-CM: E66.01 
ICD-9-CM: 278.01  12/13/2017 Yes Essential hypertension with goal blood pressure less than 140/90 ICD-10-CM: I10 
ICD-9-CM: 401.9  8/17/2016 Yes Hypotestosteronemia ICD-10-CM: E34.9 ICD-9-CM: 259.9  12/29/2015 Yes  
 encephalopathy Plan:  (Medical Decision Making) 1   Rewarming, then will decrease sedation 2   Levophed 3   Vent support 4   Zovirax, ampicillin, vanc,rocephine -- 
Critical care time 43 minutes More than 50% of the time documented was spent in face-to-face contact with the patient and in the care of the patient on the floor/unit where the patient is located.  
 
Elian Hall MD

## 2020-02-15 NOTE — PROGRESS NOTES
Ventilator check complete; patient has a #8. 0 ET tube secured at the 24 at the teeth. Patient is sedated. Patient is not able to follow commands. Breath sounds are diminished. Trachea is midline, Negative for subcutaneous air, and chest excursion is symmetric. Patient is also Negative for cyanosis and is Negative for pitting edema. All alarms are set and audible. Resuscitation bag is at the head of the bed. Ventilator Settings Mode FIO2 Rate Tidal Volume Pressure PEEP I:E Ratio Pressure control  65 %    671 ml     8 cm H20 Peak airway pressure: 18 cm H2O Minute ventilation: 7.8 l/min ABG: No results for input(s): PH, PCO2, PO2, HCO3 in the last 72 hours.  
 
 
Mekhi Manzano, RT

## 2020-02-15 NOTE — PROGRESS NOTES
Patient unresponsive in deep sedation on arctic sun, no cough or pupil reaction. Breath sounds clear, symmetrical chest expansion on vent FiO2 @ 65%. NSR on monitor, S1/S2 auscultated. Bowel sounds hypoactive, abdomen obese and semi-soft. Skin with scattered abrasions from fall PTA. Lines: L chest port, R CVC, R fem art line Drips: levo, fentanyl, versed Drains: G tube, lopez Patient is to start rewarming at 0807. Will continue to monitor.

## 2020-02-16 PROBLEM — G93.1 ANOXIC ENCEPHALOPATHY (HCC): Status: ACTIVE | Noted: 2020-01-01

## 2020-02-16 NOTE — PROGRESS NOTES
I have seen and examined the patient along with Kat Bowden NP. I agree with the documentation as recorded. In addition I would add: 
 
I have seen the patient independently during which time I reviewed the history and performed a physical examination, reviewed the NP documentation and discussed with the NP . The interval history obtained is notable for: No change overnight. Patient remains unresponsive. Now off sedation since 4:00 this morning. Has rewarmed to 37 °C The physical examination performed is notable for: Pupils 6 mm nonreactive. Oculocephalics negative. No gag. No motor response to pain The medical decision making including assessment and recommendations is notable for: Hypoxic ischemic encephalopathy following cardiac arrest, severe, prognosis poor. Recommend further evaluation including repeat EEG and possible brain death exam in 24 hours Lina Killian MD 
 
 
Assessment:  
 
Recurrent episodic alteration of neurological function. Episodes do not sound like seizures. Status post cardiac arrest with hypoxic ischemic encephalopathy. EEG presence of diffuse suppression of all EEG activity after discontinuation of propofol. No sedation, completed arctic/sun protocol at 1 AM. No clinical change. Discussed plan of care with family. Repeat EEG in AM for prognostication. New onset headache possibly posttraumatic Plan:  
 
Repeat EEG in AM for prognostication. Continue supportive measures. Subjective: Interval history: No clinical change. Remains intubated, on pressors. Rewarming phase of arctic/sun protocol completed. Repeat EEG in AM. Family updated on plan of care. History: 
 
Jodee Gomez is a 60 y.o. male who is being seen for AMS. Review of systems negative with exception of pertinent positives and negatives noted above. Objective:  
 
Vitals:  
 02/16/20 0930 02/16/20 0945 02/16/20 1000 02/16/20 1015 BP:      
 Pulse: 94 94 93 93 Resp: 20 20 20 20 Temp:   98.4 °F (36.9 °C) 98.2 °F (36.8 °C) SpO2: 93% 94% 93% 94% Weight:      
Height:      
  
 
 
Current Facility-Administered Medications:  
  [START ON 2/17/2020] vancomycin (VANCOCIN) 1500 mg in  ml infusion, 1,500 mg, IntraVENous, Q12H, Zachary Carranza MD 
  fentaNYL in normal saline (pf) 25 mcg/mL infusion, 0-200 mcg/hr, IntraVENous, TITRATE, Anibal Langley MD, Stopped at 02/16/20 0406 
  0.9% sodium chloride infusion, 100 mL/hr, IntraVENous, CONTINUOUS, Anibal Langley MD, Last Rate: 100 mL/hr at 02/16/20 0334, 100 mL/hr at 02/16/20 0334   acetaminophen (TYLENOL) solution 650 mg, 650 mg, Per NG tube, Q4H PRN, Anibal Langley MD 
  atracurium (TRACRIUM) 1,000 mg in 0.9% sodium chloride 250 mL infusion, 0-15 mcg/kg/min, IntraVENous, TITRATE, Anibal Langley MD, Stopped at 02/14/20 0800   white petrolatum-mineral oil (AKWA TEARS) 83-15 % ophthalmic ointment, , Both Eyes, Q4H, Anibal Langley MD 
  vasopressin (VASOSTRICT) 20 Units in 0.9% sodium chloride 100 mL infusion, 0-0.04 Units/min, IntraVENous, TITRATE, Anibal Langley MD, Stopped at 02/14/20 0800 
  NOREPINephrine (LEVOPHED) 4 mg in 5% dextrose 250 mL infusion, 0.5-30 mcg/min, IntraVENous, TITRATE, Anibal Langley MD, Last Rate: 22.5 mL/hr at 02/15/20 2247, 6 mcg/min at 02/15/20 2247 
  midazolam (VERSED) 100 mg in 0.9% sodium chloride 100 mL infusion, 0-10 mg/hr, IntraVENous, TITRATE, Anibal Langley MD, Stopped at 02/16/20 0406   propofol (DIPRIVAN) 10 mg/mL infusion, 0-50 mcg/kg/min, IntraVENous, TITRATE, Zachary Carranza MD, Stopped at 02/14/20 1407   allopurinoL (ZYLOPRIM) tablet 100 mg, 100 mg, Per Elsa Mika, DAILY, Zachary Carranza MD, 100 mg at 02/16/20 0916 
  NUTRITIONAL SUPPORT ELECTROLYTE PRN ORDERS, , Does Not Apply, PRN, Paul Jang, DO 
  0.9% sodium chloride infusion 250 mL, 250 mL, IntraVENous, PRN, Josefa River DO, Last Rate: 15 mL/hr at 02/14/20 0137, 250 mL at 02/14/20 8858   simethicone (MYLICON) tablet 80 mg, 80 mg, Per G Tube, QID PRN, Josefa River DO 
  cefTRIAXone (ROCEPHIN) 2 g in 0.9% sodium chloride (MBP/ADV) 50 mL, 2 g, IntraVENous, Q12H, Josefa River DO, Last Rate: 100 mL/hr at 02/16/20 0659, 2 g at 02/16/20 5861   ampicillin (OMNIPEN) 2 g in 0.9% sodium chloride (MBP/ADV) 100 mL, 2 g, IntraVENous, Q4H, Vonda Aj DO, Last Rate: 200 mL/hr at 02/16/20 0917, 2 g at 02/16/20 0917 
  dexamethasone (DECADRON) injection 10 mg, 10 mg, IntraVENous, Q6H, Josefa River DO, 10 mg at 02/16/20 5811   acyclovir (ZOVIRAX) 800 mg in 0.9% sodium chloride 250 mL IVPB, 800 mg, IntraVENous, Q8H, Vonda Aj DO, 800 mg at 02/16/20 2545   lacosamide (VIMPAT) tablet 100 mg, 100 mg, Per G Tube, Q12H, McBurney, Josephus Murders, MD, 100 mg at 02/16/20 0916 
  HYDROmorphone (PF) (DILAUDID) injection 1 mg, 1 mg, IntraVENous, Q3H PRN, Josefa River DO, 1 mg at 02/14/20 6190   diphenhydrAMINE (BENADRYL) injection 25 mg, 25 mg, IntraVENous, Q4H PRN, Josefa River DO, 25 mg at 02/12/20 2100 
  magnesium hydroxide (MILK OF MAGNESIA) 400 mg/5 mL oral suspension 30 mL, 30 mL, Oral, DAILY PRN, Aneta Boyd MD, 30 mL at 02/12/20 1515 
  naloxone (NARCAN) injection 0.4 mg, 0.4 mg, IntraVENous, PRN, Aneta Boyd MD 
  nicotine (NICODERM CQ) 21 mg/24 hr patch 1 Patch, 1 Patch, TransDERmal, DAILY PRN, Aneta Boyd MD 
  ondansetron (ZOFRAN) injection 4 mg, 4 mg, IntraVENous, Q4H PRN, Aneta Boyd MD 
  sodium chloride (NS) flush 5-40 mL, 5-40 mL, IntraVENous, Q8H, Aneta Boyd MD, 10 mL at 02/16/20 0532   sodium chloride (NS) flush 5-40 mL, 5-40 mL, IntraVENous, PRN, Aneta Boyd MD 
  albuterol-ipratropium (DUO-NEB) 2.5 MG-0.5 MG/3 ML, 3 mL, Nebulization, Q4H PRN, Inessa Mcmanus MD 
   cloNIDine HCL (CATAPRES) tablet 0.2 mg, 0.2 mg, Oral, BID PRN, Aneta Boyd MD 
  LORazepam (ATIVAN) injection 1 mg, 1 mg, IntraVENous, Q4H PRN, Aneta Boyd MD, 1 mg at 02/14/20 6332   levETIRAcetam (KEPPRA) oral solution 1,000 mg, 1,000 mg, Per G Tube, BID, Harley Fuchs MD, 1,000 mg at 02/16/20 0916 
  0.9% sodium chloride infusion, 75 mL/hr, IntraVENous, CONTINUOUS, Ciro Whatley DO, Last Rate: 75 mL/hr at 02/13/20 2214, 75 mL/hr at 02/13/20 2214 Recent Results (from the past 12 hour(s)) CBC W/O DIFF Collection Time: 02/16/20  3:34 AM  
Result Value Ref Range WBC 13.3 (H) 4.3 - 11.1 K/uL  
 RBC 3.61 (L) 4.23 - 5.6 M/uL  
 HGB 11.6 (L) 13.6 - 17.2 g/dL HCT 36.5 (L) 41.1 - 50.3 % .1 (H) 79.6 - 97.8 FL  
 MCH 32.1 26.1 - 32.9 PG  
 MCHC 31.8 31.4 - 35.0 g/dL  
 RDW 16.1 (H) 11.9 - 14.6 % PLATELET 81 (L) 069 - 450 K/uL MPV 10.4 9.4 - 12.3 FL ABSOLUTE NRBC 0.00 0.0 - 0.2 K/uL METABOLIC PANEL, BASIC Collection Time: 02/16/20  3:34 AM  
Result Value Ref Range Sodium 151 (H) 136 - 145 mmol/L Potassium 4.1 3.5 - 5.1 mmol/L Chloride 119 (H) 98 - 107 mmol/L  
 CO2 28 21 - 32 mmol/L Anion gap 4 (L) 7 - 16 mmol/L Glucose 135 (H) 65 - 100 mg/dL BUN 12 6 - 23 MG/DL Creatinine 0.77 (L) 0.8 - 1.5 MG/DL  
 GFR est AA >60 >60 ml/min/1.73m2 GFR est non-AA >60 >60 ml/min/1.73m2 Calcium 8.7 8.3 - 10.4 MG/DL MAGNESIUM Collection Time: 02/16/20  3:34 AM  
Result Value Ref Range Magnesium 2.0 1.8 - 2.4 mg/dL POC G3 Collection Time: 02/16/20  4:08 AM  
Result Value Ref Range Device: VENT    
 FIO2 (POC) 65 % pH (POC) 7.274 (L) 7.35 - 7.45    
 pCO2 (POC) 55.3 (H) 35 - 45 MMHG  
 pO2 (POC) 91 75 - 100 MMHG  
 HCO3 (POC) 25.6 22 - 26 MMOL/L  
 sO2 (POC) 96 95 - 98 % Base deficit (POC) 2 mmol/L Mode ASSIST CONTROL Set Rate 18 bpm  
 PEEP/CPAP (POC) 8 cmH2O  Allens test (POC) NOT APPLICABLE    
 Site DRAWN FROM ARTERIAL LINE Specimen type (POC) ARTERIAL Performed by Lesly   
 CO2, POC 27 MMOL/L Pressure control 10 Respiratory comment: NurseNotified Exhaled minute volume 7.40 L/min COLLECT TIME 405 Physical Exam: 
General - chronically ill, well nourished, in no apparent distress. HEENT - Normocephalic, atraumatic. Conjunctiva are clear. Neck - Supple without masses Cardiovascular - Regular rate and rhythm. Normal S1, S2 without murmurs, rubs, or gallops. Lungs - Clear to auscultation. Abdomen - Soft, nontender with normal bowel sounds. Extremities - Peripheral pulses intact. No edema and no rashes. Neurological examination Level of consciousness- intubated and sedated Brain stem reflexes 
-Pupillary reflex to bright light: 4 mm dilated bilaterally, nonresponsive 
 -Ciliospinal reflexes: absent 
-Oculovestibular and/or oculocephalic reflex response: absent 
-Corneal reflex: present 
-Facial movement to painful stimulus at supraorbital nerve, temporomandibular joint:absent 
-Cough/gag reflex: na 
 
Motor examination 
-Muscle tone: decreased tone 
-Motor response to pain: does not withdraw from pain 
-Muscle stretch reflexes: areflexic in all extremities 
-Response to plantar stimulation: mute Signed By: Joyce Herron NP February 16, 2020

## 2020-02-16 NOTE — PROGRESS NOTES
Ventilator check complete; patient has a #8. 0 ET tube secured at the 24 at the teeth. Patient is sedated. Patient is not able to follow commands. Breath sounds are diminished. Trachea is midline, Negative for subcutaneous air, and chest excursion is symmetric. Patient is also Negative for cyanosis and is Positive for pitting edema. All alarms are set and audible. Resuscitation bag is at the head of the bed. Ventilator Settings Mode FIO2 Rate Tidal Volume Pressure PEEP I:E Ratio Pressure control  65 % 20 416 ml  10 8 cm H20  1:2.5 Peak airway pressure: 18 cm H2O Minute ventilation: 7.3 l/min ABG: Results for Dinora Gomez (MRN [id number]) as of 2/16/2020 08:33 
 2/16/2020 04:08  
pH (POC) 7.274 (L)  
pCO2 (POC) 55.3 (H)  
pO2 (POC) 91 HCO3 (POC) 25.6  
sO2 (POC) 96 Base deficit (POC) 2 FIO2 (POC) 65 Specimen type (POC) ARTERIAL Set Rate 18 Charito Campos

## 2020-02-16 NOTE — PROGRESS NOTES
Bedside, Verbal and Written shift change report given to Braeden Mackey RN 
 (oncoming nurse) by Silva Resendiz RN (offgoing nurse). Report included the following information SBAR, Kardex, Intake/Output, Med Rec Status, Cardiac Rhythm sr and Alarm Parameters .

## 2020-02-16 NOTE — PROGRESS NOTES
Critical Care Daily Progress Note: 2/16/2020 Admission Date: 2/11/2020 The patient's chart is reviewed and the patient is discussed with the staff. 
 
 
55 y.o.  male seen and evaluated at the request of Dr. Carmina Keith. 59 yo hx of HTN, COPD, and esophageal cancer getting chemo last treatment 4 weeks ago and radiation last treatment one week ago with tube feedings who presented to the ED for cc headache and seizure like activity. Originally admitted to [facility] and transferred to [facility] on 2/11. EEG originally done that did not show actual seizures. Tele neuro recommended Keppra. 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 Patient will have episodes of unresponsiveness. Neuroimaging benign. Still with severe headache Subjective:  
Now off sedation and has been rewarmed to 37 degrees Remains on levophed-critically ill Current Facility-Administered Medications Medication Dose Route Frequency  fentaNYL in normal saline (pf) 25 mcg/mL infusion  0-200 mcg/hr IntraVENous TITRATE  
 0.9% sodium chloride infusion  100 mL/hr IntraVENous CONTINUOUS  
 acetaminophen (TYLENOL) solution 650 mg  650 mg Per NG tube Q4H PRN  
 atracurium (TRACRIUM) 1,000 mg in 0.9% sodium chloride 250 mL infusion  0-15 mcg/kg/min IntraVENous TITRATE  white petrolatum-mineral oil (AKWA TEARS) 83-15 % ophthalmic ointment   Both Eyes Q4H  
 vasopressin (VASOSTRICT) 20 Units in 0.9% sodium chloride 100 mL infusion  0-0.04 Units/min IntraVENous TITRATE  
 NOREPINephrine (LEVOPHED) 4 mg in 5% dextrose 250 mL infusion  0.5-30 mcg/min IntraVENous TITRATE  midazolam (VERSED) 100 mg in 0.9% sodium chloride 100 mL infusion  0-10 mg/hr IntraVENous TITRATE  propofol (DIPRIVAN) 10 mg/mL infusion  0-50 mcg/kg/min IntraVENous TITRATE  allopurinoL (ZYLOPRIM) tablet 100 mg  100 mg Per G Tube DAILY  NUTRITIONAL SUPPORT ELECTROLYTE PRN ORDERS   Does Not Apply PRN  
 0.9% sodium chloride infusion 250 mL  250 mL IntraVENous PRN  
 simethicone (MYLICON) tablet 80 mg  80 mg Per G Tube QID PRN  
 cefTRIAXone (ROCEPHIN) 2 g in 0.9% sodium chloride (MBP/ADV) 50 mL  2 g IntraVENous Q12H  
 ampicillin (OMNIPEN) 2 g in 0.9% sodium chloride (MBP/ADV) 100 mL  2 g IntraVENous Q4H  
 dexamethasone (DECADRON) injection 10 mg  10 mg IntraVENous Q6H  
 acyclovir (ZOVIRAX) 800 mg in 0.9% sodium chloride 250 mL IVPB  800 mg IntraVENous Q8H  
 vancomycin (VANCOCIN) 2000 mg in  ml infusion  2,000 mg IntraVENous Q12H  
 lacosamide (VIMPAT) tablet 100 mg  100 mg Per G Tube Q12H  
 HYDROmorphone (PF) (DILAUDID) injection 1 mg  1 mg IntraVENous Q3H PRN  
 diphenhydrAMINE (BENADRYL) injection 25 mg  25 mg IntraVENous Q4H PRN  
 magnesium hydroxide (MILK OF MAGNESIA) 400 mg/5 mL oral suspension 30 mL  30 mL Oral DAILY PRN  
 naloxone (NARCAN) injection 0.4 mg  0.4 mg IntraVENous PRN  
 nicotine (NICODERM CQ) 21 mg/24 hr patch 1 Patch  1 Patch TransDERmal DAILY PRN  
 ondansetron (ZOFRAN) injection 4 mg  4 mg IntraVENous Q4H PRN  
 sodium chloride (NS) flush 5-40 mL  5-40 mL IntraVENous Q8H  
 sodium chloride (NS) flush 5-40 mL  5-40 mL IntraVENous PRN  
 albuterol-ipratropium (DUO-NEB) 2.5 MG-0.5 MG/3 ML  3 mL Nebulization Q4H PRN  
 cloNIDine HCL (CATAPRES) tablet 0.2 mg  0.2 mg Oral BID PRN  
 LORazepam (ATIVAN) injection 1 mg  1 mg IntraVENous Q4H PRN  
 levETIRAcetam (KEPPRA) oral solution 1,000 mg  1,000 mg Per G Tube BID  
 0.9% sodium chloride infusion  75 mL/hr IntraVENous CONTINUOUS Review of Systems Unobtainable due to patient status. Objective:  
 
Vitals:  
 02/16/20 0800 02/16/20 0801 02/16/20 0815 02/16/20 0827 BP:  107/63 Pulse: 95 96 96 95 Resp: 20 19 19 20 Temp:      
SpO2: 93% 94% 94% 94% Weight:      
Height: Intake/Output Summary (Last 24 hours) at 2/16/2020 2396 Last data filed at 2/16/2020 0500 Gross per 24 hour Intake 5042.67 ml Output 2025 ml Net 3017.67 ml Physical Exam:         
Constitutional:  intubated and mechanically ventilated. EENMT:  Sclera clear, pupils equal, oral mucosa moist 
Respiratory:  clear Cardiovascular:  RRR with no M,G,R; 
Gastrointestinal:  soft with no tenderness; positive bowel sounds present Musculoskeletal:  warm with no cyanosis, no lower extremity edema Skin:  no jaundice or ecchymosis Neurologic: no gross neuro deficits Psychiatric:  Unresponsive- off sedation LINES:   
ETT, central line DRIPS:   
levophed CXR:   
 
 
Ventilator Settings Mode FIO2 Rate Tidal Volume Pressure PEEP Pressure control  65 %    416 ml     8 cm H20 Peak airway pressure: 18 cm H2O Minute ventilation: 7.3 l/min ABG:  
Recent Labs  
  02/16/20 
0408 02/15/20 
0322 02/14/20 
0818 PHI 7.274* 7.369 7.442 PCO2I 55.3* 40.7 34.8*  
PO2I 91 62* 142* HCO3I 25.6 23.5 23.8 LAB Recent Labs  
  02/14/20 
8829 GLUCPOC 169* Recent Labs  
  02/16/20 
0334 02/15/20 
0325 02/14/20 
1652 02/14/20 
1004 WBC 13.3* 6.5  --  8.7 HGB 11.6* 11.7*  --  13.3* HCT 36.5* 34.5*  --  38.4* PLT 81* 64*  --  71* INR  --   --  1.2 1.1 Recent Labs  
  02/16/20 
0334 02/15/20 
0324 02/14/20 
1004 02/14/20 
0406 * 146*  --  135* K 4.1 3.6  --  4.0  
* 114*  --  101 CO2 28 26  --  23 * 139*  --  135* BUN 12 12  --  9  
CREA 0.77* 0.51*  --  0.57* MG 2.0 2.0 1.9  --   
CA 8.7 8.6  --  9.1 ALB  --  2.1*  --   --   
SGOT  --  32  --   -- No results for input(s): LCAD, LAC in the last 72 hours. Patient Active Problem List  
Diagnosis Code  Hypotestosteronemia E34.9  Pure hypercholesterolemia E78.00  Chronic gout without tophus M1A. 9XX0  Peripheral polyneuropathy G62.9  Essential hypertension with goal blood pressure less than 140/90 I10  Obstructive sleep apnea syndrome G47.33  
 Obesity, morbid ([facility]) E66.01  
 Panlobular emphysema ([facility] J43.1  Malignant neoplasm of lower third of esophagus (HCC) C15.5  Chemotherapy-induced neutropenia (HCC) D70.1, T45.1X5A  
 COPD exacerbation (HCC) J44.1  Bacterial pneumonia J15.9  Dehydration E86.0  New onset seizure ([facility] R56.9  Hypokalemia E87.6  Other dysphagia R13.19  
 Intractable headache R51  Acute metabolic encephalopathy K95.27  
 Seizure ([facility] R56.9  Cardiac arrest ([facility] I46.9  Acute respiratory failure with hypoxia ([facility]) J96.01  
 Hypotension I95.9  Pulmonary infiltrate R91.8  Anoxic encephalopathy ([facility]) G93.1 Assessment:  (Medical Decision Making) Hospital Problems  Date Reviewed: 1/31/2020 Codes Class Noted POA Anoxic encephalopathy ([facility] ICD-10-CM: G93.1 ICD-9-CM: 348.1  2/16/2020 Unknown Unresponsive -now off sedation Pulmonary infiltrate ICD-10-CM: R91.8 ICD-9-CM: 793.19  2/15/2020 Unknown  
 r base Cardiac arrest [facility]) ICD-10-CM: I46.9 ICD-9-CM: 427.5  2/14/2020 Unknown Acute respiratory failure with hypoxia [facility]) ICD-10-CM: J96.01 
ICD-9-CM: 518.81  2/14/2020 Unknown Hypotension ICD-10-CM: I95.9 ICD-9-CM: 458.9  2/14/2020 Unknown On levophed * (Principal) Acute metabolic encephalopathy RVM-30-CI: G93.41 
ICD-9-CM: 348.31  2/11/2020 Unknown Seizure ([facility] ICD-10-CM: R56.9 ICD-9-CM: 780.39  2/11/2020 Unknown New onset seizure ([facility] ICD-10-CM: R56.9 ICD-9-CM: 780.39  2/5/2020 Yes Hypokalemia ICD-10-CM: E87.6 ICD-9-CM: 276.8  2/5/2020 Yes Malignant neoplasm of lower third of esophagus (HCC) ICD-10-CM: C15.5 ICD-9-CM: 150.5  2/19/2019 Yes Obesity, morbid ([facility] ICD-10-CM: E66.01 
ICD-9-CM: 278.01  12/13/2017 Yes  Essential hypertension with goal blood pressure less than 140/90 ICD-10-CM: I10 
ICD-9-CM: 401.9  8/17/2016 Yes Hypotestosteronemia ICD-10-CM: E34.9 ICD-9-CM: 259.9  12/29/2015 Yes Plan:  (Medical Decision Making) 1-- continue supportive care for now 2   Has been off sedation a few hours- needs more time then assess neurologic function 3   Levophed 4   Vent support Critical care time 40 minutes More than 50% of the time documented was spent in face-to-face contact with the patient and in the care of the patient on the floor/unit where the patient is located.  
 
Anuja Correa MD

## 2020-02-16 NOTE — PROGRESS NOTES
Patient unresponsive, no movement to any stimulus, no pupil reaction, no cough with suction. NSR on monitor, S1/S2 auscultated. Breath sounds diminished, symmetrical chest expansion on vent, FiO2 @ 65%. Bowel sounds hypoactive, abdomen obese and semi-soft. Skin with scattered abrasions. Patient is now normothermic on arctic sun. Lines: R fem art line, R CVC, L chest port Drips: levo, NS Drains: G tube, lopez Will continue to monitor.

## 2020-02-16 NOTE — PROGRESS NOTES
Patient is intubated Peaceful Spoke with family in the room Provided supportive presence Family is doing ok Javon Khan, staff [address], [id number] [facility]  /   [email or web address]

## 2020-02-17 NOTE — PROGRESS NOTES
Interdisciplinary team rounds were held 2/17/2020 with the following team members:Care Management, Nursing, Nurse Practitioner, Nutrition, Occupational Therapy, Outcomes Management, Palliative Care, Pastoral Care, Pharmacy, Physician, Respiratory Therapy and Clinical Coordinator and the patient. Plan of care discussed. See clinical pathway and/or care plan for interventions and desired outcomes.

## 2020-02-17 NOTE — PROGRESS NOTES
Patient had central line and art line removed per protocol. Patient compassionately extubated by RT per order. Ana called.

## 2020-02-17 NOTE — PROGRESS NOTES
Patient was asystole on monitor at 1613. Dr. Amarjit Delgado at bedside to pronounce. [place] and house supervisor notified. Support offered to family.

## 2020-02-17 NOTE — PROGRESS NOTES
Patient unresponsive, no cough reflex, pupils fixed and round, no movement to any stimulus. Breath sounds clear, symmetrical chest expansion on vent PRVC FiO2 @ 80%. NSR on monitor, S1/S2 auscultated. Bowel sounds hypoactive, abdomen semi-soft and obese. Skin wjth scattered abrasions. Lines: R fem art, R CVC, L chest port Drips: levo, D5 Drains: G tube, lopez Will continue to monitor.

## 2020-02-17 NOTE — DISCHARGE SUMMARY
. 
 
 
 
 
 
Death Summary [id number] Admission date:  2/11/2020 Discharge date:  2/17/2020 Admitting Diagnosis:  Acute metabolic encephalopathy [Z11.51] Seizure ([facility] [R56.9] Discharge Diagnosis:   
 
Problem List as of 2/17/2020 Date Reviewed: 2/17/2020 Codes Class Noted - Resolved Anoxic encephalopathy ([facility] ICD-10-CM: G93.1 ICD-9-CM: 348.1  2/16/2020 - Present Pulmonary infiltrate ICD-10-CM: R91.8 ICD-9-CM: 793.19  2/15/2020 - Present Cardiac arrest [facility]) ICD-10-CM: I46.9 ICD-9-CM: 427.5  2/14/2020 - Present Acute respiratory failure with hypoxia [facility]) ICD-10-CM: J96.01 
ICD-9-CM: 518.81  2/14/2020 - Present Hypotension ICD-10-CM: I95.9 ICD-9-CM: 458.9  2/14/2020 - Present * (Principal) Acute metabolic encephalopathy [id number]: G93.41 
ICD-9-CM: 348.31  2/11/2020 - Present Seizure ([place] [id number].) ICD-10-CM: R56.9 ICD-9-CM: 780.39  2/11/2020 - Present Intractable headache ICD-10-CM: R51 ICD-9-CM: 784.0  2/8/2020 - Present Other dysphagia ICD-10-CM: R13.19 ICD-9-CM: 787.29  2/6/2020 - Present New onset seizure ([facility] ICD-10-CM: R56.9 ICD-9-CM: 780.39  2/5/2020 - Present Hypokalemia ICD-10-CM: E87.6 ICD-9-CM: 276.8  2/5/2020 - Present Dehydration ICD-10-CM: E86.0 ICD-9-CM: 276.51  2/2/2020 - Present COPD exacerbation ([facility] ICD-10-CM: J44.1 ICD-9-CM: 491.21  11/27/2019 - Present Bacterial pneumonia ICD-10-CM: J15.9 ICD-9-CM: 482.9  11/27/2019 - Present Chemotherapy-induced neutropenia (HCC) ICD-10-CM: D70.1, T45.1X5A 
ICD-9-CM: 288.03, E933.1  11/25/2019 - Present Malignant neoplasm of lower third of esophagus (HCC) ICD-10-CM: C15.5 ICD-9-CM: 150.5  2/19/2019 - Present Panlobular emphysema ([facility] ICD-10-CM: J43.1 ICD-9-CM: 492.8  1/5/2018 - Present Obesity, morbid ([facility] ICD-10-CM: E66.01 
ICD-9-CM: 278.01  12/13/2017 - Present Obstructive sleep apnea syndrome ICD-10-CM: I27.10 
ICD-9-CM: 327.23  4/10/2017 - Present Pure hypercholesterolemia ICD-10-CM: E78.00 ICD-9-CM: 272.0  8/17/2016 - Present Chronic gout without tophus ICD-10-CM: M1A. 9XX0 
ICD-9-CM: 274.02  8/17/2016 - Present Peripheral polyneuropathy ICD-10-CM: G62.9 ICD-9-CM: 356.9  8/17/2016 - Present Essential hypertension with goal blood pressure less than 140/90 ICD-10-CM: I10 
ICD-9-CM: 401.9  8/17/2016 - Present Hypotestosteronemia ICD-10-CM: E34.9 ICD-9-CM: 259.9  12/29/2015 - Present RESOLVED: Acute respiratory failure with hypoxia ([facility] ICD-10-CM: J96.01 
ICD-9-CM: 518.81  11/27/2019 - 2/14/2020 RESOLVED: Hypertension, essential ICD-10-CM: I10 
ICD-9-CM: 401.9  12/29/2015 - 9/5/2017 RESOLVED: Hyperlipidemia ICD-10-CM: E78.5 ICD-9-CM: 272.4  12/29/2015 - 9/5/2017 Consultants: Neurology, Medical Oncology, Palliative Care Studies/Procedures:  CT head, CXR, MRI brain, Blood cultures Hospital course: 
 
60 y.o.  male, PMH HTN, COPD, and esophageal cancer getting chemo last treatment 4 weeks ago and radiation last treatment one week ago with tube feedings who presented to the ED for c/o headache and seizure like activity. Originally admitted to [facility] and transferred to [facility] on 2/11/20. EEG originally done that did not show actual seizures. Tele neuro recommended Keppra. 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 Patient will have episodes of unresponsiveness. Neuroimaging benign. The patient has continued to require mechanical ventilation. EEG was performed today and found to demonstrate brain death in absence of barbituates. Family decided to compassionately extubate patient. The patient passed away at [phone]. Final: 
 
--Pronounced dead at 4:13 pm 
 
--Total discharge greater than 30 minutes in duration.  
 
 
Ruben Evans MD

## 2020-02-17 NOTE — PROGRESS NOTES
Neurology daily progress note Assessment:  
 
60year old male who is unresponsive s/p cardiac arrest. The patient does not appear to have any brain stem reflexes. EEG demonstrates presence of diffuse suppression of all EEG activity after discontinuation of propofol. Patient is off sedation and s/p hypothermia protocol. Will repeat EEG. TCD pending. Prognosis appears poor. This has been discussed with family. Plan:  
 
Repeat EEG 
 
TCD pending Continue supportive measures. Subjective: Interval history: 
 
Patient remains unresponsive. Intubated. Off sedation. TCD pending. Repeat EEG ordered. History: 
 
Ana Haynes is a 60 y.o. male who is being seen for AMS. Unable to obtain ROS. Patient unresponsive. Objective:  
 
Vitals:  
 02/17/20 1115 02/17/20 1127 02/17/20 1130 02/17/20 1139 BP:    100/63 Pulse: 74 75 75 75 Resp: 20 20 20 20 Temp: 97.5 °F (36.4 °C)  97.7 °F (36.5 °C) 97.7 °F (36.5 °C) SpO2: 90% 91% 91% 91% Weight:      
Height:      
  
 
 
Current Facility-Administered Medications:  
  dextrose 5% infusion, 100 mL/hr, IntraVENous, CONTINUOUS, SineSally MD, Last Rate: 100 mL/hr at 02/17/20 0527, 100 mL/hr at 02/17/20 0527 
  fentaNYL in normal saline (pf) 25 mcg/mL infusion, 0-200 mcg/hr, IntraVENous, TITRATE, Anibal Langley MD, Stopped at 02/16/20 0406   acetaminophen (TYLENOL) solution 650 mg, 650 mg, Per NG tube, Q4H PRN, Anibal Langley MD 
  white petrolatum-mineral oil (AKWA TEARS) 83-15 % ophthalmic ointment, , Both Eyes, Q4H, Anibal Langley MD 
  NOREPINephrine (LEVOPHED) 4 mg in 5% dextrose 250 mL infusion, 0.5-30 mcg/min, IntraVENous, TITRATE, Anibal Langley MD, Last Rate: 15 mL/hr at 02/17/20 1129, 4 mcg/min at 02/17/20 1129 
  midazolam (VERSED) 100 mg in 0.9% sodium chloride 100 mL infusion, 0-10 mg/hr, IntraVENous, TITRATE, Anibal Langley MD, Stopped at 02/16/20 0400   allopurinoL (ZYLOPRIM) tablet 100 mg, 100 mg, Per Mohan Ditch, DAILY, Zaida Edmondson MD, 100 mg at 02/17/20 8053 
  NUTRITIONAL SUPPORT ELECTROLYTE PRN ORDERS, , Does Not Apply, PRN, Quinton Gloss, DO 
  0.9% sodium chloride infusion 250 mL, 250 mL, IntraVENous, PRN, Quinton Gloss, DO, Last Rate: 15 mL/hr at 02/14/20 0137, 250 mL at 02/14/20 0137   simethicone (MYLICON) tablet 80 mg, 80 mg, Per G Tube, QID PRN, Quinton Gloss, DO 
  cefTRIAXone (ROCEPHIN) 2 g in 0.9% sodium chloride (MBP/ADV) 50 mL, 2 g, IntraVENous, Q12H, Quinton Gloss, DO, Last Rate: 100 mL/hr at 02/17/20 0604, 2 g at 02/17/20 1660   ampicillin (OMNIPEN) 2 g in 0.9% sodium chloride (MBP/ADV) 100 mL, 2 g, IntraVENous, Q4H, AjVonda, DO, Last Rate: 200 mL/hr at 02/17/20 0904, 2 g at 02/17/20 6711   dexamethasone (DECADRON) injection 10 mg, 10 mg, IntraVENous, Q6H, Vonda Aj, DO, 10 mg at 02/17/20 0604 
  acyclovir (ZOVIRAX) 800 mg in 0.9% sodium chloride 250 mL IVPB, 800 mg, IntraVENous, Q8H, Vonda Aj, DO, 800 mg at 02/17/20 1129 
  lacosamide (VIMPAT) tablet 100 mg, 100 mg, Per G Tube, Q12H, McBurney, Reuel Perks, MD, 100 mg at 02/17/20 0904 
  HYDROmorphone (PF) (DILAUDID) injection 1 mg, 1 mg, IntraVENous, Q3H PRN, Quinton Gloss, DO, 1 mg at 02/14/20 9697   diphenhydrAMINE (BENADRYL) injection 25 mg, 25 mg, IntraVENous, Q4H PRN, Quinton Gloss, DO, 25 mg at 02/12/20 2100 
  magnesium hydroxide (MILK OF MAGNESIA) 400 mg/5 mL oral suspension 30 mL, 30 mL, Oral, DAILY PRN, Aneta Boyd MD, 30 mL at 02/12/20 1515 
  naloxone (NARCAN) injection 0.4 mg, 0.4 mg, IntraVENous, PRN, Aneta Boyd MD 
  nicotine (NICODERM CQ) 21 mg/24 hr patch 1 Patch, 1 Patch, TransDERmal, DAILY PRN, Aneta Boyd MD 
  ondansetron (ZOFRAN) injection 4 mg, 4 mg, IntraVENous, Q4H PRN, Aneta Boyd MD 
  sodium chloride (NS) flush 5-40 mL, 5-40 mL, IntraVENous, Q8H, Amphhenrietta, MD Aneta, 10 mL at 02/17/20 6808   sodium chloride (NS) flush 5-40 mL, 5-40 mL, IntraVENous, PRN, Aneta Byod MD 
  albuterol-ipratropium (DUO-NEB) 2.5 MG-0.5 MG/3 ML, 3 mL, Nebulization, Q4H PRN, Aneta Boyd MD 
  cloNIDine HCL (CATAPRES) tablet 0.2 mg, 0.2 mg, Oral, BID PRN, Aneta Boyd MD 
  LORazepam (ATIVAN) injection 1 mg, 1 mg, IntraVENous, Q4H PRN, Aneta Boyd MD, 1 mg at 02/14/20 7080   levETIRAcetam (KEPPRA) oral solution 1,000 mg, 1,000 mg, Per G Tube, BID, Mukesh Plata MD, 1,000 mg at 02/17/20 5496 Recent Results (from the past 12 hour(s)) METABOLIC PANEL, BASIC Collection Time: 02/17/20  3:37 AM  
Result Value Ref Range Sodium 160 (H) 136 - 145 mmol/L Potassium 3.7 3.5 - 5.1 mmol/L Chloride 129 (H) 98 - 107 mmol/L  
 CO2 28 21 - 32 mmol/L Anion gap 3 (L) 7 - 16 mmol/L Glucose 142 (H) 65 - 100 mg/dL BUN 13 6 - 23 MG/DL Creatinine 0.76 (L) 0.8 - 1.5 MG/DL  
 GFR est AA >60 >60 ml/min/1.73m2 GFR est non-AA >60 >60 ml/min/1.73m2 Calcium 8.9 8.3 - 10.4 MG/DL  
CBC W/O DIFF Collection Time: 02/17/20  3:37 AM  
Result Value Ref Range WBC 13.2 (H) 4.3 - 11.1 K/uL  
 RBC 3.60 (L) 4.23 - 5.6 M/uL  
 HGB 11.6 (L) 13.6 - 17.2 g/dL HCT 37.4 (L) 41.1 - 50.3 % .9 (H) 79.6 - 97.8 FL  
 MCH 32.2 26.1 - 32.9 PG  
 MCHC 31.0 (L) 31.4 - 35.0 g/dL  
 RDW 16.0 (H) 11.9 - 14.6 % PLATELET 61 (L) 984 - 450 K/uL MPV 11.0 9.4 - 12.3 FL ABSOLUTE NRBC 0.03 0.0 - 0.2 K/uL POC G3 Collection Time: 02/17/20  4:02 AM  
Result Value Ref Range Device: VENT    
 FIO2 (POC) 65 % pH (POC) 7.358 7.35 - 7.45    
 pCO2 (POC) 49.3 (H) 35 - 45 MMHG  
 pO2 (POC) 66 (L) 75 - 100 MMHG  
 HCO3 (POC) 27.8 (H) 22 - 26 MMOL/L  
 sO2 (POC) 92 (L) 95 - 98 % Base excess (POC) 2 mmol/L Mode ASSIST CONTROL Set Rate 20 bpm  
 PEEP/CPAP (POC) 8 cmH2O Allens test (POC) NOT APPLICABLE Inspiratory Time 0.85 sec Site DRAWN FROM ARTERIAL LINE Specimen type (POC) ARTERIAL Performed by JERONIMO Nguyen CO2, POC 29 MMOL/L Pressure control 10 Exhaled minute volume 8.10 L/min COLLECT TIME 403 Physical Exam: 
General - chronically ill, well nourished, in no apparent distress. HEENT - Normocephalic, atraumatic. Conjunctiva are clear. Neck - Supple without masses Extremities - Peripheral pulses intact. No edema and no rashes. Neurological examination Level of consciousness- comatose. Brain stem reflexes 
-Pupillary reflex to bright light: 7 mm and fixed bilaterally   
 -Ciliospinal reflexes: absent 
-Oculovestibular and/or oculocephalic reflex response: absent 
-Corneal reflex: absent  
-Facial movement to painful stimulus at supraorbital nerve, temporomandibular joint:absent 
-Cough/gag reflex: na 
 
Motor examination 
-Muscle tone: decreased tone throughout   
-Motor response to pain: no response to pain -Muscle stretch reflexes: areflexic in all extremities 
-Response to plantar stimulation: mute Signed By: Coralie Nageotte, NP February 17, 2020

## 2020-02-17 NOTE — PROGRESS NOTES
Ventilator check complete; patient has a #8. 0 ET tube secured at the 24 at the lip. Patient is sedated. Patient is not able to follow commands. Breath sounds are diminished. Trachea is midline, Negative for subcutaneous air, and chest excursion is symmetric. Patient is also Negative for cyanosis. All alarms are set and audible. Resuscitation bag is at the head of the bed. Ventilator Settings Mode FIO2 Rate Tidal Volume Pressure PEEP I:E Ratio Pressure control  80 %   20   10 cm H2O  10 cm H20  1:2   
 
Peak airway pressure: 20 cm H2O Minute ventilation: 8.5 l/min ABG: No results for input(s): PH, PCO2, PO2, HCO3 in the last 72 hours.  
 
 
Betty Cortez, RT

## 2020-02-17 NOTE — PROGRESS NOTES
A follow up visit was made to the patient. Emotional support, spiritual presence and  
prayer were provided for the patient and his wife, Paul Olivera. The patient was no alert Terry Stephens MDIV

## 2020-02-17 NOTE — PROGRESS NOTES
Chart reviewed and pt discussed in am IDR. Remains ICU intubated/vent. Neurology following. Per staff, another EEG. Wife possibly leaning toward comfort care pending results of test. CM will follow for any assist and d/c POC.

## 2020-02-17 NOTE — PROGRESS NOTES
Called to pronounce patient. He was compassionately extubated earlier. He is pulseless and apneic. Pupils are fixed and dilated. He is pronounced dead at 4:13 pm. Family is at bed side Brooke Coello MD

## 2020-02-17 NOTE — PROCEDURES
EEG 
 
 
 
 
Electrode taps, attempts at verbal and pain arousal etc. were all performed with no response Study demonstrates no electrocortical activity full report re technical details to follow Impression Brain Death in absence of Barbiturates The study is performed on a digital EEG device in the ICU with the patient unresponsive utilizing the International 10-20 electrode recording system and dermal electrodes Appropriate impedances were checked

## 2020-02-17 NOTE — PROGRESS NOTES
EKG interpreted as ST, call placed to MD, orders received to give pt one time dose of fentanyl 50mg.

## 2020-02-17 NOTE — PROGRESS NOTES
Critical Care Daily Progress Note: 2/17/2020 Admission Date: 2/11/2020 The patient's chart is reviewed and the patient is discussed with the staff. 
 
55 y.o.  male seen and evaluated at the request of Dr. Marjan Clay. PMH HTN, COPD, and esophageal cancer getting chemo last treatment 4 weeks ago and radiation last treatment one week ago with tube feedings who presented to the ED for c/o headache and seizure like activity. Originally admitted to [facility] and transferred to [facility] on 2/11/20. EEG originally done that did not show actual seizures. Tele neuro recommended Keppra. 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 Patient will have episodes of unresponsiveness. Neuroimaging benign. Still with severe headache. Subjective:  
 
Sedated, on mechanical ventilation. TCD completed, EEG pending. Still requiring warming blanket for low temp. Current Facility-Administered Medications Medication Dose Route Frequency  dextrose 5% infusion  100 mL/hr IntraVENous CONTINUOUS  
 vancomycin (VANCOCIN) 1500 mg in  ml infusion  1,500 mg IntraVENous Q12H  
 fentaNYL in normal saline (pf) 25 mcg/mL infusion  0-200 mcg/hr IntraVENous TITRATE  acetaminophen (TYLENOL) solution 650 mg  650 mg Per NG tube Q4H PRN  
 white petrolatum-mineral oil (AKWA TEARS) 83-15 % ophthalmic ointment   Both Eyes Q4H  
 NOREPINephrine (LEVOPHED) 4 mg in 5% dextrose 250 mL infusion  0.5-30 mcg/min IntraVENous TITRATE  midazolam (VERSED) 100 mg in 0.9% sodium chloride 100 mL infusion  0-10 mg/hr IntraVENous TITRATE  allopurinoL (ZYLOPRIM) tablet 100 mg  100 mg Per G Tube DAILY  NUTRITIONAL SUPPORT ELECTROLYTE PRN ORDERS   Does Not Apply PRN  
 0.9% sodium chloride infusion 250 mL  250 mL IntraVENous PRN  
 simethicone (MYLICON) tablet 80 mg  80 mg Per G Tube QID PRN  
  cefTRIAXone (ROCEPHIN) 2 g in 0.9% sodium chloride (MBP/ADV) 50 mL  2 g IntraVENous Q12H  
 ampicillin (OMNIPEN) 2 g in 0.9% sodium chloride (MBP/ADV) 100 mL  2 g IntraVENous Q4H  
 dexamethasone (DECADRON) injection 10 mg  10 mg IntraVENous Q6H  
 acyclovir (ZOVIRAX) 800 mg in 0.9% sodium chloride 250 mL IVPB  800 mg IntraVENous Q8H  
 lacosamide (VIMPAT) tablet 100 mg  100 mg Per G Tube Q12H  
 HYDROmorphone (PF) (DILAUDID) injection 1 mg  1 mg IntraVENous Q3H PRN  
 diphenhydrAMINE (BENADRYL) injection 25 mg  25 mg IntraVENous Q4H PRN  
 magnesium hydroxide (MILK OF MAGNESIA) 400 mg/5 mL oral suspension 30 mL  30 mL Oral DAILY PRN  
 naloxone (NARCAN) injection 0.4 mg  0.4 mg IntraVENous PRN  
 nicotine (NICODERM CQ) 21 mg/24 hr patch 1 Patch  1 Patch TransDERmal DAILY PRN  
 ondansetron (ZOFRAN) injection 4 mg  4 mg IntraVENous Q4H PRN  
 sodium chloride (NS) flush 5-40 mL  5-40 mL IntraVENous Q8H  
 sodium chloride (NS) flush 5-40 mL  5-40 mL IntraVENous PRN  
 albuterol-ipratropium (DUO-NEB) 2.5 MG-0.5 MG/3 ML  3 mL Nebulization Q4H PRN  
 cloNIDine HCL (CATAPRES) tablet 0.2 mg  0.2 mg Oral BID PRN  
 LORazepam (ATIVAN) injection 1 mg  1 mg IntraVENous Q4H PRN  
 levETIRAcetam (KEPPRA) oral solution 1,000 mg  1,000 mg Per G Tube BID Review of Systems Unobtainable due to patient status. Objective:  
 
Vitals:  
 02/17/20 1101 02/17/20 1108 02/17/20 1115 02/17/20 1127 BP: 116/73 Pulse: 74  74 75 Resp: 20  20 20 Temp: 97.3 °F (36.3 °C)  97.5 °F (36.4 °C) SpO2: (!) 89% (!) 89% 90% 91% Weight:      
Height:      
 
 
 
Intake/Output Summary (Last 24 hours) at 2/17/2020 1139 Last data filed at 2/17/2020 7165 Gross per 24 hour Intake 5797.42 ml Output 4225 ml Net 1572.42 ml Physical Exam:         
Constitutional:  intubated and mechanically ventilated.  
EENMT:  Sclera clear, pupils equal, oral mucosa moist 
 Respiratory: No wheezing, intubated on mechanical vent, PC 10 PEEP 10  FIO 80% Cardiovascular:  RRR with no M,G,R; 
Gastrointestinal:  soft with no tenderness; positive bowel sounds present Musculoskeletal:  warm with no cyanosis, generalized lower extremity edema Skin:  no jaundice or ecchymosis, wound to left chest and abdomen/right hand Neurologic: Sedated and mechanically ventilated Psychiatric:  Sedated and mechanically ventilated LINES:  ETT, left chest port, Right QLC, Right arterial line, PEG tube, lopez catheter DRIPS:  D5 @ 100 mL /hr, Levophed @ 5 mcg/min CXR:  2/17/2020 IMPRESSION: Unchanged bibasilar lung atelectasis or infiltrates, worse on the 
right Ventilator Settings Mode FIO2 Rate Tidal Volume Pressure PEEP Pressure control  90 %    416 ml     10 cm H20 Peak airway pressure: 22 cm H2O Minute ventilation: 10.9 l/min ABG:  
Recent Labs  
  02/17/20 
0402 02/16/20 
0408 02/15/20 
9343 PHI 7.358 7.274* 7.369 PCO2I 49.3* 55.3* 40.7 PO2I 66* 91 62* HCO3I 27.8* 25.6 23.5 LAB No results for input(s): GLUCPOC in the last 72 hours. No lab exists for component: [place] Recent Labs  
  02/17/20 
4420 02/16/20 
0334 02/15/20 
0325 02/14/20 
1652 WBC 13.2* 13.3* 6.5  --   
HGB 11.6* 11.6* 11.7*  --   
HCT 37.4* 36.5* 34.5*  --   
PLT 61* 81* 64*  --   
INR  --   --   --  1.2 Recent Labs  
  02/17/20 
4000 02/16/20 
0334 02/15/20 
0324 * 151* 146*  
K 3.7 4.1 3.6 * 119* 114* CO2 28 28 26 * 135* 139* BUN 13 12 12 CREA 0.76* 0.77* 0.51* MG  --  2.0 2.0  
CA 8.9 8.7 8.6 ALB  --   --  2.1*  
SGOT  --   --  32 No results for input(s): LCAD, LAC in the last 72 hours. Patient Active Problem List  
Diagnosis Code  Hypotestosteronemia E34.9  Pure hypercholesterolemia E78.00  Chronic gout without tophus M1A. 9XX0  Peripheral polyneuropathy G62.9  Essential hypertension with goal blood pressure less than 140/90 I10  Obstructive sleep apnea syndrome G47.33  
 Obesity, morbid ([facility]) E66.01  
 Panlobular emphysema ([facility] J43.1  Malignant neoplasm of lower third of esophagus (HCC) C15.5  Chemotherapy-induced neutropenia (HCC) D70.1, T45.1X5A  
 COPD exacerbation (HCC) J44.1  Bacterial pneumonia J15.9  Dehydration E86.0  New onset seizure ([facility] R56.9  Hypokalemia E87.6  Other dysphagia R13.19  
 Intractable headache R51  Acute metabolic encephalopathy R61.98  
 Seizure ([facility] R56.9  Cardiac arrest ([facility] I46.9  Acute respiratory failure with hypoxia ([facility]) J96.01  
 Hypotension I95.9  Pulmonary infiltrate R91.8  Anoxic encephalopathy ([facility]) G93.1 Assessment:  (Medical Decision Making) Hospital Problems  Date Reviewed: 2/17/2020 Codes Class Noted POA [facility] ICD-10-CM: G93.1 ICD-9-CM: 348.1  2/16/2020 Unknown Pulmonary infiltrate ICD-10-CM: R91.8 ICD-9-CM: 793.19  2/15/2020 Unknown Cardiac arrest [facility]) ICD-10-CM: I46.9 ICD-9-CM: 427.5  2/14/2020 Unknown Acute respiratory failure with hypoxia [facility]) ICD-10-CM: J96.01 
ICD-9-CM: 518.81  2/14/2020 Unknown On ventilator Hypotension ICD-10-CM: I95.9 ICD-9-CM: 458.9  2/14/2020 Unknown On Levophed * (Principal) Acute metabolic encephalopathy [id number]-35-SQ: G93.41 
ICD-9-CM: 348.31  2/11/2020 Unknown Seizure ([facility] ICD-10-CM: R56.9 ICD-9-CM: 780.39  2/11/2020 Unknown EEG pending New onset seizure ([facility] ICD-10-CM: R56.9 ICD-9-CM: 780.39  2/5/2020 Yes Hypokalemia ICD-10-CM: E87.6 ICD-9-CM: 276.8  2/5/2020 Yes Potassium 3.7 today Malignant neoplasm of lower third of esophagus (HCC) ICD-10-CM: C15.5 ICD-9-CM: 150.5  2/19/2019 Yes Obesity, morbid ([facility] ICD-10-CM: E66.01 
ICD-9-CM: 278.01  12/13/2017 Yes  Essential hypertension with goal blood pressure less than 140/90 ICD-10-CM: I10 
ICD-9-CM: 401.9  8/17/2016 Yes Hypotestosteronemia ICD-10-CM: E34.9 ICD-9-CM: 259.9  12/29/2015 Yes Plan:  (Medical Decision Making) --Continue mechanical ventilation support 
--Continue Levophed 
--Off sedation for neurologic assessment 
--Decadron 10 mg Q6H 
--Ceftriaxone day 5, Omnipen day 5, Vancomycin day 1 
--Blood cultures negative X 3 days (preliminary) --EEG pending More than 50% of the time documented was spent in face-to-face contact with the patient and in the care of the patient on the floor/unit where the patient is located. Dmitriy Albert, CHANTE Lungs:  Clear anteriorly Heart:  RRR with no Murmur/Rubs/Gallops Additional Comments:  Adjust vent support and ABX. Cont. Steroids and Levophed. TCD with minimal flow. Prognosis is grim await EEG. Discussed with his wife I have spoken with and examined the patient. I agree with the above assessment and plan as documented.  
 
Luisa Gonzalez MD

## 2020-02-17 NOTE — ADT AUTH CERT NOTES
Neurology [address] Day 3 (2/13/2020) by Efren Goldmann, RN  
 
   
Review Entered Review Status 2/14/2020 16:16 Completed  
   
Criteria Review Care Day: 3 Care Date: 2/13/2020 Level of Care: Inpatient Floor Guideline Day 3 Level Of Care ( ) * Activity level acceptable ( ) * Complete discharge planning Clinical Status ( ) * No infection, or status acceptable ( ) * Isolation not needed, or status acceptable   
(X) * Respiratory status acceptable 2/14/2020 16:16:40 EST by Seamus ji:  Clear to auscultation bilaterally. ( ) * Pain and nausea absent or adequately managed   
(X) * Ventilatory status acceptable 2/14/2020 16:16:40 EST by Seamus Lima   
  ungs:  Clear to auscultation bilaterally; stable on RA   
( ) * Neurologic problems absent or stabilized ( ) * Muscle or nerve damage absent or stable ( ) * General Discharge Criteria met Interventions ( ) * Intake acceptable ( ) * No inpatient interventions needed * Milestone Additional Notes 2- Subjective:  
Nursing staff state constantly having headaches and dilaudid will help with pain. Per wife, headaches started after head trauma two weeks ago. No issues with headaches prior to two weeks ago. No chest pain or SOB. Persistent headache diffusely. Objective:  
Lungs:   Clear to auscultation bilaterally. Heart: Regular rate and rhythm, S1, S2 normal  
Abdomen:   Soft, non-tender. Bowel sounds normal. No masses,  No organomegaly. Peg tube in place. Morbidly obese. Extremities: Ecchymosis to UE bilaterally Neurologic: Able to answer questions appropriately but short to answer. Sleepy Vitals- T 97.9, , RR 17, /112, spo2 92% RA Labs-  
WBC: 6.8  
RBC: 3.73 (L) HGB: 12.0 (L) HCT: 35.6 (L) MCV: 95.4 MCH: 32.2 MCHC: 33.7 RDW: 14.8 (H) PLATELET: 45 (L) MPV: 10.9 ABSOLUTE NRBC: 0.00 Sodium: 138 Potassium: 3.4 (L) Chloride: 102 CO2: 28 Anion gap: 8 Glucose: 91 BUN: 8 Creatinine: 0.49 (L) Calcium: 8.6 GFR est non-AA: >60  
GFR est AA: >60  
  
KUB- Unremarkable bowel gas pattern Meds- ns 75ml/hr iv cont, acyclovir 800mg iv q 8hr, ampicillin 2g iv q 4hr, Rocephin 2g iv q 12hr, decadron 10mg iv q 6hr, dilaudid 1mg iv q 3hr prn x 8, vimpat 100mg po q 12hr, keppra 1000mg po bid, Ativan 1mg iv q 4hr prn x 3, vancomycin 2500mg iv x 1, allopurinol 100mg po qd, potassium chloride 20meq iv x 2 Plan- DIET TUBE FEEDING Jevity Open order for details. Keep HOB > 30 degrees. Check residuals every 4 hours. Hold TF for > 500 ml x 1 or 250 ml x 2 consecutive checks. Also place patient on right side if residual is > 250 ml, SCDs, complete bedrest, cardiac monitoring IM ASSESSMENT/PLAN:  
Acute metabolic encephalopathy with seizure like activity - EEG in the past has been negative. On Vimpat and Keppra in case of seizures. Neurology following. Possible LP tomorrow if platelets improved. Since there is concern for possible meningitis, will order acyclovir, ceftriaxone, ampicillin, and vancomycin. Weight is not current so called pharmacy to place acyclovir order since weight based. Possible convulsive syncope or dissociative process? [address] neurology. Also order decadron 10mg q 6 hr. Monitor mentation.   
   
Esophageal cancer - High residuals so tube feedings held. Possibly from constipation? Nutrition following.   
   
Constipation - Official KUB report pending but can see gas and stool in colon. Order simethicone and enema.   
   
Thrombocytopenia - Likely from radiation. Transfuse 1 unit Platelets.

## 2020-02-17 NOTE — PROGRESS NOTES
Received call from RN, Elsa Brnenan, and informed that pt had been extubated. Pt is not expected to live very long. Conferred with RN prior to going into room and informed that wife was at bedside. Met pt's wife, along with several other visitors who were present. Wife appeared to be at peace, and she stated that she has \"ups and downs. \"  \"I'm just grateful that he's not in pain anymore, \" wife affirmed. [place] that pt had been diagnosed with cancer about a year ago and had been in treatment at our cancer center. Offered to pray and wife was receptive. (She stated that they are [facility] Prayer offered at bedside. Chaplains are available, as needed to support family. [phone] Nasreen Mattson MDiv, [facility]

## 2020-02-17 NOTE — PROGRESS NOTES
Nutrition F/U: 
TF was not resumed on 2/14 while on the hypothermic protocol. He has since been rewarmed with EEG today showing no electrocortical activity. Based on discussion this morning during IDT rounds, it is expected that the family will choose comfort care when they receive the results of his EEG. No further nutrition intervention is planned. Linsey Romeo. [facility] 
[phone]

## 2020-02-18 LAB
Lab: NORMAL
REFERENCE LAB,REFLB: NORMAL
TEST DESCRIPTION:,ATST: NORMAL

## 2020-02-19 LAB
BACTERIA SPEC CULT: NORMAL
BACTERIA SPEC CULT: NORMAL
SERVICE CMNT-IMP: NORMAL
SERVICE CMNT-IMP: NORMAL

## 2020-02-20 ENCOUNTER — HOSPITAL ENCOUNTER (OUTPATIENT)
Dept: INFUSION THERAPY | Age: 55
End: 2020-02-20
Payer: COMMERCIAL

## 2020-02-20 NOTE — ADT AUTH CERT NOTES
PATIENT TRANSFERRED TO: 
[address] [place], [place] [address]t:  Patient Demographics Patient Name Angel Clarke, [address] [facility] 
[id number] Sex Male  
1965 Address [address] [facility] Phone [phone] (Home) *Preferred* Discharge Information Discharge Provider 
 
Date/Time Disposition Destination Lester Mo MD / [phone] 20 1200  (none) Comments (none) Discharge Summary Notes Discharge Summary by Akil Vallecillo MD at 20 1613  Version 2 of 2 Author: Akil Vallecillo MD Service: Pulmonary Disease Author Type: Physician Filed: 20 165 Date of Service: 20 Status: Addendum : Akil Vallecillo MD (Physician) Related Notes: Original Note by Maritza Booker NP (Nurse Practitioner) filed at 20 6450 Manfred [place] untitled image Death Summary [address] [place] [address] Admission date:  2020 Discharge date:  2020 Admitting Diagnosis:  Acute metabolic encephalopathy [K35.23] [facility] [R56.9] Discharge Diagnosis:   
 
  
 
          
 
Problem List as of 2020 Date Reviewed: 2020 Codes Class Noted - Resolved Anoxic encephalopathy ([facility] ICD-10-CM: G93.1 ICD-9-CM: 348.1 2020 - Present Pulmonary infiltrate ICD-10-CM: R91.8 ICD-9-CM: 793.19  
 
   
 
2/15/2020 - Present Cardiac arrest ([facility] ICD-10-CM: I46.9 ICD-9-CM: 427.5 2020 - Present Acute respiratory failure with hypoxia ([facility] ICD-10-CM: J96.01 
 
ICD-9-CM: 518.81  
 
   
 
2020 - Present Hypotension ICD-10-CM: I95.9 ICD-9-CM: 458.9 2020 - Present * (Principal) Acute metabolic encephalopathy ICD-10-CM: G93.41 
 
ICD-9-CM: 348.31  
 
   
 
2/11/2020 - Present Seizure ([place] [address] ICD-10-CM: R56.9 ICD-9-CM: 780.39  
 
   
 
2/11/2020 - Present Intractable headache ICD-10-CM: R46 ICD-9-CM: 784.0  
 
   
 
2/8/2020 - Present Other dysphagia ICD-10-CM: R13.19 ICD-9-CM: 787.29  
 
   
 
2/6/2020 - Present New onset seizure ([place] [address] ICD-10-CM: R56.9 ICD-9-CM: 780.39  
 
   
 
2/5/2020 - Present Hypokalemia ICD-10-CM: E87.6 ICD-9-CM: 276.8  
 
   
 
2/5/2020 - Present Dehydration ICD-10-CM: E86.0 ICD-9-CM: 276.51  
 
   
 
2/2/2020 - Present COPD exacerbation ([place] [address] ICD-10-CM: J44.1 ICD-9-CM: 491.21  
 
   
 
11/27/2019 - Present Bacterial pneumonia ICD-10-CM: J15.9 ICD-9-CM: 482.9  
 
   
 
11/27/2019 - Present Chemotherapy-induced neutropenia (HCC) ICD-10-CM: D70.1, T45.1X5A 
 
ICD-9-CM: 288.03, E933.1  
 
   
 
11/25/2019 - Present Malignant neoplasm of lower third of esophagus (HCC) ICD-10-CM: C15.5 ICD-9-CM: 150.5 2/19/2019 - Present Panlobular emphysema ([place] [address] ICD-10-CM: J43.1 ICD-9-CM: 492.8  
 
   
 
1/5/2018 - Present Obesity, morbid ([place] [address] ICD-10-CM: E66.01 
 
ICD-9-CM: 278.01  
 
   
 
12/13/2017 - Present Obstructive sleep apnea syndrome ICD-10-CM: B59.22 
 
ICD-9-CM: 327.23  
 
   
 
4/10/2017 - Present Pure hypercholesterolemia ICD-10-CM: E78.00 ICD-9-CM: 272.0  
 
   
 
8/17/2016 - Present Chronic gout without tophus ICD-10-CM: M1A. 9XX0 
 
ICD-9-CM: 274.02  
 
 8/17/2016 - Present Peripheral polyneuropathy ICD-10-CM: G62.9 ICD-9-CM: 356.9 8/17/2016 - Present Essential hypertension with goal blood pressure less than 140/90 ICD-10-CM: I10 
 
ICD-9-CM: 401.9 8/17/2016 - Present Hypotestosteronemia ICD-10-CM: E34.9 ICD-9-CM: 259.9  
 
   
 
12/29/2015 - Present RESOLVED: Acute respiratory failure with hypoxia (HCC) ICD-10-CM: J96.01 
 
ICD-9-CM: 518.81  
 
   
 
11/27/2019 - 2/14/2020 RESOLVED: Hypertension, essential  
 
ICD-10-CM: I10 
 
ICD-9-CM: 401.9  
 
   
 
12/29/2015 - 9/5/2017 RESOLVED: Hyperlipidemia ICD-10-CM: E78.5 ICD-9-CM: 272.4  
 
   
 
12/29/2015 - 9/5/2017 Consultants: Neurology, Medical Oncology, Palliative Care Studies/Procedures:  CT head, CXR, MRI brain, Blood cultures Hospital course: 
 
  
 
60 y.o.  male, PMH HTN, COPD, and esophageal cancer getting chemo last treatment 4 weeks ago and radiation last treatment one week ago with tube feedings who presented to the ED for c/o headache and seizure like activity. Originally admitted to [facility] and transferred to [facility] on 2/11/20. EEG originally done that did not show actual seizures. Tele neuro recommended Keppra. 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 Patient will have episodes of unresponsiveness. Neuroimaging benign. The patient has continued to require mechanical ventilation. EEG was performed today and found to demonstrate brain death in absence of barbituates. Family decided to compassionately extubate patient. The patient passed away at [phone]. Final: 
 
  
 
--Pronounced dead at 4:13 pm 
 
  
 
--Total discharge greater than 30 minutes in duration. Dain Ornelas MD 
 
  
 
  
  
  
 
 
 
 
Discharge Summary by Teetee Vallecillo NP at 02/17/20 1613  Version 1 of 2 Author: Teetee Vallecillo NP Service: Pulmonary Disease Author Type: Nurse Practitioner Filed: 02/17/20 1620 Date of Service: 02/17/20 1613 Status: Signed : Teetee Vallecillo NP (Nurse Practitioner) Related Notes: Addendum by Khai Bergman MD (Physician) filed at 02/17/20 3125 Cosigner: Khai Bergman MD at 02/17/20 0837 Gisela  untitled image Death Summary Sesar Orlando Admission date:  2/11/2020 Discharge date:  2/17/2020 Admitting Diagnosis:  Acute metabolic encephalopathy [V64.58] Seizure ([facility] [R56.9] Discharge Diagnosis:   
 
          
 
Problem List as of 2/17/2020 Date Reviewed: 2/17/2020 Codes Class Noted - Resolved Anoxic encephalopathy ([facility] ICD-10-CM: G93.1 ICD-9-CM: 348.1 2/16/2020 - Present Pulmonary infiltrate ICD-10-CM: R91.8 ICD-9-CM: 793.19  
 
   
 
2/15/2020 - Present Cardiac arrest ([facility] ICD-10-CM: I46.9 ICD-9-CM: 427.5 2/14/2020 - Present Acute respiratory failure with hypoxia ([facility] ICD-10-CM: J96.01 
 
ICD-9-CM: 518.81  
 
   
 
2/14/2020 - Present Hypotension ICD-10-CM: I95.9 ICD-9-CM: 458.9 2/14/2020 - Present * (Principal) Acute metabolic encephalopathy ICD-10-CM: G93.41 
 
ICD-9-CM: 348.31  
 
   
 
2/11/2020 - Present Seizure ([facility] ICD-10-CM: R56.9 ICD-9-CM: 780.39  
 
   
 
2/11/2020 - Present Intractable headache ICD-10-CM: R46 ICD-9-CM: 784.0  
 
   
 
2/8/2020 - Present Other dysphagia ICD-10-CM: R13.19 ICD-9-CM: 787.29  
 2/6/2020 - Present New onset seizure ([place] [address] ICD-10-CM: R56.9 ICD-9-CM: 780.39  
 
   
 
2/5/2020 - Present Hypokalemia ICD-10-CM: E87.6 ICD-9-CM: 276.8  
 
   
 
2/5/2020 - Present Dehydration ICD-10-CM: E86.0 ICD-9-CM: 276.51  
 
   
 
2/2/2020 - Present COPD exacerbation ([place] [address] ICD-10-CM: J44.1 ICD-9-CM: 491.21  
 
   
 
11/27/2019 - Present Bacterial pneumonia ICD-10-CM: J15.9 ICD-9-CM: 482.9  
 
   
 
11/27/2019 - Present Chemotherapy-induced neutropenia (HCC) ICD-10-CM: D70.1, T45.1X5A 
 
ICD-9-CM: 288.03, E933.1  
 
   
 
11/25/2019 - Present Malignant neoplasm of lower third of esophagus (HCC) ICD-10-CM: C15.5 ICD-9-CM: 150.5 2/19/2019 - Present Panlobular emphysema ([place] [address] ICD-10-CM: J43.1 ICD-9-CM: 492.8  
 
   
 
1/5/2018 - Present Obesity, morbid ([place] [address] ICD-10-CM: E66.01 
 
ICD-9-CM: 278.01  
 
   
 
12/13/2017 - Present Obstructive sleep apnea syndrome ICD-10-CM: Q86.74 
 
ICD-9-CM: 327.23  
 
   
 
4/10/2017 - Present Pure hypercholesterolemia ICD-10-CM: E78.00 ICD-9-CM: 272.0  
 
   
 
8/17/2016 - Present Chronic gout without tophus ICD-10-CM: M1A. 9XX0 
 
ICD-9-CM: 274.02  
 
   
 
8/17/2016 - Present Peripheral polyneuropathy ICD-10-CM: G62.9 ICD-9-CM: 356.9 8/17/2016 - Present Essential hypertension with goal blood pressure less than 140/90 ICD-10-CM: I10 
 
ICD-9-CM: 401.9 8/17/2016 - Present Hypotestosteronemia ICD-10-CM: E34.9 ICD-9-CM: 259.9  
 
   
 
12/29/2015 - Present RESOLVED: Acute respiratory failure with hypoxia (HCC) ICD-10-CM: J96.01 
 
ICD-9-CM: 518.81  
 
   
 
11/27/2019 - 2/14/2020 RESOLVED: Hypertension, essential  
 
ICD-10-CM: I10 
 
ICD-9-CM: 401.9  
 
   
 
12/29/2015 - 9/5/2017 RESOLVED: Hyperlipidemia ICD-10-CM: E78.5 ICD-9-CM: 272.4  
 
   
 
12/29/2015 - 9/5/2017 Consultants: Neurology, Medical Oncology, Palliative Care Studies/Procedures:  CT head, CXR, MRI brain, Blood cultures Hospital course: 
 
  
 
60 y.o.  male, PMH HTN, COPD, and esophageal cancer getting chemo last treatment 4 weeks ago and radiation last treatment one week ago with tube feedings who presented to the ED for c/o headache and seizure like activity. Originally admitted to [facility] and transferred to [facility] on 2/11/20. EEG originally done that did not show actual seizures. Tele neuro recommended Keppra. 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 Patient will have episodes of unresponsiveness. Neuroimaging benign. The patient has continued to require mechanical ventilation. EEG was performed today and found to demonstrate brain death in absence of barbituates. Family decided to compassionately extubate patient. The patient passed away at [phone]. Final: 
 
--Pronounced dead at 1613. --Total discharge greater than 30 minutes in duration. Olla Lombard, NP Discharge Summary by Dez Noguera MD at 02/11/20 1140  Version 1 of 1 Author: Dez Noguera MD Service: Internal Medicine Author Type: Physician Filed: 02/11/20 1143 Date of Service: 02/11/20 1140 Status: Signed : Dez Noguera MD (Physician) Expand All Collapse All Discharge Summary Patient: Pauly Williamson MRN: [id number] SSN: [id number] YOB: 1965 Age: 60 y.o. Sex: male Admit Date: 2/5/2020 Discharge Date: 2/11/2020 Admission Diagnoses: New onset seizure ([facility] [R56.9] New onset seizure ([facility] [R56.9] Intractable headache [R51] Discharge Diagnoses:  
 
          
 
Problem List as of 2/11/2020 Date Reviewed: 1/31/2020 Codes Class Noted - Resolved Acute metabolic encephalopathy ICD-10-CM: G93.41 
 
ICD-9-CM: 348.31  
 
   
 
2/11/2020 - Present Intractable headache ICD-10-CM: R46 ICD-9-CM: 784.0  
 
   
 
2/8/2020 - Present Other dysphagia ICD-10-CM: R13.19 ICD-9-CM: 787.29  
 
   
 
2/6/2020 - Present * (Principal) New onset seizure ([facility] ICD-10-CM: R56.9 ICD-9-CM: 780.39  
 
   
 
2/5/2020 - Present Hypokalemia ICD-10-CM: E87.6 ICD-9-CM: 276.8  
 
   
 
2/5/2020 - Present Dehydration ICD-10-CM: E86.0 ICD-9-CM: 276.51  
 
   
 
2/2/2020 - Present COPD exacerbation ([facility] ICD-10-CM: J44.1 ICD-9-CM: 491.21  
 
   
 
11/27/2019 - Present Acute respiratory failure with hypoxia ([facility] ICD-10-CM: J96.01 
 
ICD-9-CM: 518.81  
 
   
 
11/27/2019 - Present Bacterial pneumonia ICD-10-CM: J15.9 ICD-9-CM: 482.9  
 
   
 
11/27/2019 - Present Chemotherapy-induced neutropenia (HCC) ICD-10-CM: D70.1, T45.1X5A 
 
ICD-9-CM: 288.03, E933.1  
 
   
 
11/25/2019 - Present Malignant neoplasm of lower third of esophagus (HCC) ICD-10-CM: C15.5 ICD-9-CM: 150.5 2/19/2019 - Present Panlobular emphysema ([facility] ICD-10-CM: J43.1 ICD-9-CM: 492.8  
 
   
 
1/5/2018 - Present Obesity, morbid ([address] ICD-10-CM: E66.01 
 
ICD-9-CM: 278.01  
 
   
 
12/13/2017 - Present Obstructive sleep apnea syndrome ICD-10-CM: J50.79 
 
ICD-9-CM: 327.23  
 
   
 
4/10/2017 - Present Pure hypercholesterolemia ICD-10-CM: E78.00 ICD-9-CM: 272.0  
 
   
 
8/17/2016 - Present Chronic gout without tophus ICD-10-CM: M1A. 9XX0 
 
ICD-9-CM: 274.02  
 
   
 
8/17/2016 - Present Peripheral polyneuropathy ICD-10-CM: G62.9 ICD-9-CM: 356.9 8/17/2016 - Present Essential hypertension with goal blood pressure less than 140/90 ICD-10-CM: I10 
 
ICD-9-CM: 401.9 8/17/2016 - Present Hypotestosteronemia ICD-10-CM: E34.9 ICD-9-CM: 259.9  
 
   
 
12/29/2015 - Present RESOLVED: Hypertension, essential  
 
ICD-10-CM: I10 
 
ICD-9-CM: 401.9  
 
   
 
12/29/2015 - 9/5/2017 RESOLVED: Hyperlipidemia ICD-10-CM: E78.5 ICD-9-CM: 272.4  
 
   
 
12/29/2015 - 9/5/2017 Discharge Condition: Stable Hospital Course:  
 
Patient originally was admitted to [facility] on 2/5/2020. He has history of esophageal cancer, on radiation therapy, COPD, on tube feeding. Admitted due to headache. His headache was severe at times. Patient lost a lot of weight. Patient also had episodes of seizure-like activities. Patient continues to have headache from time to time. Alternating with episodes of unresponsiveness. EEG was done which did not show seizure focus but telemetry neurologist recommended starting him on Keppra. Neurological imaging did not show lesions in the brain.   Although there is question about paraneoplastic syndrome . The plan now is for patient to have lumbar puncture to assess CSF As of yesterday, patient has episodes of unresponsiveness, did not respond to sternal rub,  rapid response was called. Code as was activated. Patient had CT scan. During CT scan, patient also had CODE BLUE called although upon verification patient did not lose pulse, but just had erratic breathing. CT head showing nonspecific cerebellar changes. Patient has been monitored overnight. Patient is being transferred to [facility]. Physical Exam: 
 
  
 
General:                    The patient is a middle aged male in no acute respiratory  distress. Patient is lethargic. Mostly sleeping. Responded to stimuli by opening eyes, no verbal responses. .  
 
Head:                                   bruise to back of head Eyes:                                   palpebral pallor, no scleral icterus. ENT:                                    External auricular and nasal exam within normal limits. Mucous membranes are moist. 
 
Neck:                                   Supple, non-tender, no JVD. Lungs:                       decreased to auscultation bilaterally without wheezes or crackles. No respiratory distress or accessory muscle use. Heart:                                  Regular rate and rhythm, without murmurs, rubs, or gallops. Abdomen:                  Soft, non-tender, non-distended with normoactive bowel sounds. Genitourinary:           No tenderness over the bladder or bilateral CVAs. Maxwell catheter in place Extremities:               Without clubbing, cyanosis, or edema. Skin:                                    Normal color, texture, and turgor. No rashes, lesions, or jaundice.  
 
Pulses:                      Radial and dorsalis pedis pulses present 2+ bilaterally. Capillary refill <2s. Neurologic:               lethargic. No obvious neuro motor deficit. Consults: Neurology Significant Diagnostic Studies: CT perfusion 2- IMPRESSION: Hypoperfusion within the cerebellar hemispheres. There is diffuse 
 
global Tmax>4s within the supra and infratentorial brain which may indicate 
 
chronic oligemia versus artifact. CTA  
 
2- IMPRESSION: 
 
1. Negative CTA exam of the major cervical arterial vasculature for significant 
 
stenosis or occlusion. 2. Negative CTA exam of the major intracranial arterial vasculature for 
 
significant stenosis, occlusion, or aneurysms. 3. Similar left cervical and visualized mediastinal adenopathy, suggesting 
 
progression of the patient's known esophageal cancer. CT brain 2- IMPRESSION: 
 
  
 
1. No definite noncontrast CT evidence of acute intracranial abnormality within 
 
the limits of patient motion. 2. If there are persistent or progressive symptoms, a repeat exam should be 
 
obtained when the patient is better able to lie still. CT brain 2-8-2020 Impression:  No acute intracranial abnormality. Other chronic findings as 
 
Above. XR chest  
 
2-5-2020 IMPRESSION:  Negative for acute change. MRI brain 2- Impression: 1. No evidence of acute infarction. 2. Mild chronic small vessel ischemic change. Recent Results Disposition: acute care facility Discharge Medications:  
 
     
 
Current Discharge Medication List  
 
 
   
 
 
     
 
CONTINUE these medications which have NOT CHANGED Details Lactose-Free Food with Fiber (JEVITY 1.5 MELITON) 0.06 gram-1.5 kcal/mL liqd 6 Bottles by PEG Tube route daily for 100 days. Bolus Feeds, goal 6/day to provide 2130 kcal, 91 gm pro, 1080 ml. Needs additional 36 oz/day for hydration. JESSICA > 3 months. Indications: blockage of the esophagus, dysphagia Qty: 180 Bottle, Refills: 4  
 
 
   
 
 
midodrine (PROAMITINE) 5 mg tablet Take 1 Tab by mouth two (2) times daily (with meals) for 30 days. Indications: a feeling of dizziness upon standing due to a drop in blood pressure Qty: 60 Tab, Refills: 1  
 
 
   
 
 
sucralfate (CARAFATE) 1 gram tablet Take 1 Tab by mouth four (4) times daily. Qty: 60 Tab, Refills: 0  
 
 
   
 
 
artificial saliva (MOUTH KOTE) spra Take 1 Spray by mouth as needed for Pain. Qty: 240 mL, Refills: 1  
 
 
   
 
 
acyclovir (ZOVIRAX) 5 % ointment Apply thin layer of cream to effected areas 4-5- times a day 
 
Qty: 5 g, Refills: 3  
 
 
   
 
 
potassium chloride (KAON 20%) 40 mEq/15 mL liqd Daily X 2 days through his feeding tube  Indications: prevention of low potassium in the blood Qty: 120 mL, Refills: 0  
 
 
   
 
 
cpap machine kit  
 
by Does Not Apply route. Oxygen  
 
at bedtime as directed for dose pack.  
 
 
   
 
 
ezetimibe (ZETIA) 10 mg tablet Take 10 mg by mouth daily. allopurinol (ZYLOPRIM) 100 mg tablet Take 1 Tab by mouth daily. Qty: 90 Tab, Refills: 3 Associated Diagnoses: Chronic gout without tophus, unspecified cause, unspecified site  
 
 
   
 
 
metoprolol tartrate (LOPRESSOR) 50 mg tablet Take 1 Tab by mouth two (2) times a day. Qty: 180 Tab, Refills: 3 cholecalciferol, VITAMIN D3, (VITAMIN D3) 5,000 unit tab tablet Take 5,000 Units by mouth daily. STOP taking these medications  
 
 
   
 
 
   
 
oxyCODONE IR (ROXICODONE) 10 mg tab immediate release tablet Comments:  
 
Reason for Stopping:   
 
 
   
 
   
 
 
   
 
 
  
 
  
 
Activity: Activity as tolerated Diet: feeding via tube Wound Care: Keep wound clean and dry No orders of the defined types were placed in this encounter. I have discussed the plan of care with patient and spouse at bedside. Time spent on discharge is 37 minutes. Signed By:  
 
Sarah Zepeda MD   
 
 
   
 
February 11, 2020

## 2020-02-21 NOTE — ADT AUTH CERT NOTES
Neurology [address] Day 6 (2/16/2020) by Aleisha Pedroza RN  
 
   
Review Entered Review Status 2/17/2020 13:57 Completed  
   
Criteria Review Care Day: 6 Care Date: 2/16/2020 Level of Care: ICU Guideline Day 3 Level Of Care ( ) * Activity level acceptable ( ) * Complete discharge planning Clinical Status ( ) * No infection, or status acceptable ( ) * Isolation not needed, or status acceptable   
(X) * Respiratory status acceptable ( ) * Pain and nausea absent or adequately managed   
(X) * Ventilatory status acceptable ( ) * Neurologic problems absent or stabilized ( ) * Muscle or nerve damage absent or stable ( ) * General Discharge Criteria met Interventions ( ) * Intake acceptable ( ) * No inpatient interventions needed * Milestone Additional Notes 2/16  
  
96.8 80 20 94% 103/66 on vent 2/16/2020 03:34 WBC 13.3 (H) RBC 3.61 (L) HGB 11.6 (L) HCT 36.5 (L) .1 (H) MCHC 31.8  
RDW 16.1 (H) PLATELET 81 (L)  
  
  
2/16/2020 03:34 Sodium 151 (H) Potassium 4.1 Chloride 119 (H)  
CO2 28 Anion gap 4 (L) Glucose 135 (H) BUN 12  
Creatinine 0.77 (L)  
  
  
2/16/2020 04:08  
pH (POC) 7.274 (L)  
pCO2 (POC) 55.3 (H) Woo Timothy Unchanged bibasilar lung atelectasis or infiltrates, worse on the  
Right. EKG Supraventricular tachycardia Low voltage QRS Nonspecific T wave abnormality Abnormal ECG Orders IP ICU, Full Code, VS, Cardiac Monitoring, Seizure Precautions, Maxwell, PROM, SCDS, NG, I&O Meds Zyloprim 100 mg per g tube x1, Omnipen 2 gm IV x6, Rocephin 2 gms IV x2, Fentanyl 100 mcg/hr IV cont gtt, Vimpat 100 mg per g tube x2, Keppra 1gm per g tube x2, Versed 4 mg/hr IV cont, Levophed 3-6mcg/min IV cont, NS @ 100ml/hr IV cont, Zovirax 800 mg IV x3, Vanc 2 gms IV x2, .  
   
Neuro Note   
have seen and examined the patient along with Merlin Otero NP.  I agree with the documentation as recorded. In addition I would add:  
   
I have seen the patient independently during which time I reviewed the history and performed a physical examination, reviewed the NP documentation and discussed with the NP .   
   
The interval history obtained is notable for: Remains on minimal therapeutic hypothermia.  Currently sedated with Versed and fentanyl  
  The physical examination performed is notable for: Pupils midposition poorly reactive.  No motor response to pain.  Oculocephalics absent  
   
The medical decision making including assessment and recommendations is notable for: Hypoxic ischemic encephalopathy following cardiac arrest.  Prognosis guarded.  Continue supportive measures with serial examinations. Recurrent episodic alteration of neurological function. Episodes do not sound like seizures.  
   
Status post cardiac arrest with hypoxic ischemic encephalopathy. EEG presence of diffuse suppression of all EEG activity after discontinuation of propofol. Recommend repeating EEG after completion of arctic/sun protocol.   
   
New onset headache possibly posttraumatic Recommend repeating EEG after completion of arctic/sun protocol.    
   
Continue supportive measures. Interval history:  
   
Intubated, sedated, and on pressors. S/p cardiac arrest yesterday. Rewarming phase of arctic/sun protocol. EEG presence of diffuse suppression of all EEG activity after discontinuation of propofol. Recommend repeating EEG after completion of arctic/sun protocol.    
   
   
History:  
   
Johnny Rosario is a 60 y.o. male who is being seen for AMS.    
Review of systems negative with exception of pertinent positives and negatives noted above Physical Exam:  
General - chronically ill, well nourished, in no apparent distress. HEENT - Normocephalic, atraumatic. Conjunctiva are clear. Neck - Supple without masses Cardiovascular - Regular rate and rhythm. Normal S1, S2 without murmurs, rubs, or gallops. Lungs - Clear to auscultation. Abdomen - Soft, nontender with normal bowel sounds. Extremities - Peripheral pulses intact. No edema and no rashes.   
   
Neurological examination  
   
Level of consciousness- intubated and sedated  
   
Brain stem reflexes  
-Pupillary reflex to bright light: 4 mm dilated bilaterally, nonresponsive  
 -Ciliospinal reflexes: absent  
-Oculovestibular and/or oculocephalic reflex response: absent  
-Corneal reflex: present  
-Facial movement to painful stimulus at supraorbital nerve, temporomandibular joint:absent  
-Cough/gag reflex: na  
   
Motor examination  
-Muscle tone: decreased tone  
-Motor response to pain: does not withdraw from pain  
-Muscle stretch reflexes: areflexic in all extremities  
-Response to plantar stimulation: mute  
   
Pulmonary Note Now off sedation and has been rewarmed to 37 degrees Remains on levophed-critically ill  
   
Physical Exam:          
Constitutional:  intubated and mechanically ventilated. EENMT:  Sclera clear, pupils equal, oral mucosa moist  
Respiratory: clear Cardiovascular:  RRR with no M,G,R;  
Gastrointestinal:  soft with no tenderness; positive bowel sounds present Musculoskeletal:  warm with no cyanosis, no lower extremity edema Skin:  no jaundice or ecchymosis Neurologic: no gross neuro deficits     
Psychiatric:  Unresponsive, pupils dilated, no gag Pulmonary infiltrate ICD-10-CM: R91.8 ICD-9-CM: 793.19 2/15/2020 Unknown   
  RLL  
  Cardiac arrest [facility]) ICD-10-CM: I46.9 ICD-9-CM: 427.5 2/14/2020 Unknown  
    
  Acute respiratory failure with hypoxia [facility]) ICD-10-CM: J96.01  
ICD-9-CM: 518.81 2/14/2020 Unknown  
  On vent support- critically ill  
  Hypotension ICD-10-CM: I95.9 ICD-9-CM: 458.9 2/14/2020 Unknown  
  Still  On levophed-critically ill   * (Principal) Acute metabolic encephalopathy [id number]: G93.41  
ICD-9-CM: 348.31 2/11/2020 Unknown  
    
  Seizure ([facility] [address] ICD-10-CM: R56.9 ICD-9-CM: 780.39 2/11/2020 Unknown  
    
  New onset seizure ([facility] [address] ICD-10-CM: R56.9 ICD-9-CM: 780.39 2/5/2020 Yes  
    
  Hypokalemia ICD-10-CM: E87.6 ICD-9-CM: 276.8 2/5/2020 Yes  
    
  Malignant neoplasm of lower third of esophagus (HCC) ICD-10-CM: C15.5 ICD-9-CM: 150.5 2/19/2019 Yes  
    
  Obesity, morbid ([facility] ICD-10-CM: E66.01  
ICD-9-CM: 278.01 12/13/2017 Yes  
    
  Essential hypertension with goal blood pressure less than 140/90 ICD-10-CM: I10  
ICD-9-CM: 401.9 8/17/2016 Yes  
    
  Hypotestosteronemia ICD-10-CM: E34.9 ICD-9-CM: 259.9 12/29/2015 Yes  
  encephalopathy  
   
  
1-- continue supportive care for now 2   Has been off sedation a few hours- needs more time then assess neurologic function 3   Levophed 4   Vent support Critical care time 40 minutes More than 50% of the time documented was spent in face-to-face contact with the patient and in the care of the patient on the floor/unit where the patient is located Neuro NOTE  
have seen and examined the patient along with Artie Orta NP. I agree with the documentation as recorded.  In addition I would add:  
   
I have seen the patient independently during which time I reviewed the history and performed a physical examination, reviewed the NP documentation and discussed with the NP .   
   
The interval history obtained is notable for: No change overnight.  Patient remains unresponsive.  Now off sedation since 4:00 this morning.  Has rewarmed to 37 °C  
   
The physical examination performed is notable for: Pupils 6 mm nonreactive.  Oculocephalics negative.  No gag.  No motor response to pain  
   
The medical decision making including assessment and recommendations is notable for: Hypoxic ischemic encephalopathy following cardiac arrest, severe, prognosis poor.  Recommend further evaluation including repeat EEG and possible brain death exam in 24 hours  
   
  
Assessment Recurrent episodic alteration of neurological function. Episodes do not sound like seizures.  
   
Status post cardiac arrest with hypoxic ischemic encephalopathy. EEG presence of diffuse suppression of all EEG activity after discontinuation of propofol. No sedation, completed arctic/sun protocol at 1 AM. No clinical change.   Discussed plan of care with family. Repeat EEG in AM for prognostication.  
   
New onset headache possibly posttraumatic  
   
Plan Repeat EEG in AM for prognostication.  
   
Continue supportive measures.   
   
Interval history:  
   
No clinical change. Remains intubated, on pressors. Rewarming phase of arctic/sun protocol completed. Repeat EEG in AM. Family updated on plan of care.     
   
   
History:  
   
Arline Hernandez is a 60 y.o. male who is being seen for AMS.    
Review of systems negative with exception of pertinent positives and negatives noted above.   
   
Physical Exam:  
General - chronically ill, well nourished, in no apparent distress. HEENT - Normocephalic, atraumatic. Conjunctiva are clear. Neck - Supple without masses Cardiovascular - Regular rate and rhythm. Normal S1, S2 without murmurs, rubs, or gallops. Lungs - Clear to auscultation. Abdomen - Soft, nontender with normal bowel sounds. Extremities - Peripheral pulses intact. No edema and no rashes.   
   
Neurological examination  
   
Level of consciousness- intubated and sedated  
   
Brain stem reflexes  
-Pupillary reflex to bright light: 4 mm dilated bilaterally, nonresponsive  
 -Ciliospinal reflexes: absent  
-Oculovestibular and/or oculocephalic reflex response: absent  
-Corneal reflex: present  
-Facial movement to painful stimulus at supraorbital nerve, temporomandibular joint:absent  
-Cough/gag reflex: na  
   
Motor examination -Muscle tone: decreased tone  
-Motor response to pain: does not withdraw from pain  
-Muscle stretch reflexes: areflexic in all extremities  
-Response to plantar stimulation: mute  
   
RT NOTE Ventilator check complete; patient has a #8. 0 ET tube secured at the 24 at the teeth.  Patient is sedated.  Patient is not able to follow commands.  Breath sounds are diminished. Randine Konig is midline, Negative for subcutaneous air, and chest excursion is symmetric. Patient is also Negative for cyanosis and is Positive for pitting edema.  All alarms are set and audible.  Resuscitation bag is at the head of the bed.    
   
Ventilator Settings Mode FIO2 Rate Tidal Volume Pressure PEEP I:E Ratio Pressure control 65 % 20 416 ml 10 8 cm H20 1:2.5   
   
Peak airway pressure: 18 cm H2O Minute ventilation: 7.3 l/min   
   
ABG: Results for Yamilet Pike (MRN [id number]) as of 2/16/2020 08:33  
  2/16/2020 04:08  
pH (POC) 7.274 (L)  
pCO2 (POC) 55.3 (H)  
pO2 (POC) 91 HCO3 (POC) 25.6  
sO2 (POC) 96 Base deficit (POC) 2 FIO2 (POC) 65 Specimen type (POC) ARTERIAL Set Rate 18  
  
  
  
   
Neurology [address] [facility] Day 5 (2/15/2020) by Citlali Grant RN  
 
   
Review Entered Review Status 2/17/2020 13:57 Completed  
   
Criteria Review Care Day: 5 Care Date: 2/15/2020 Level of Care: ICU Guideline Day 3 Level Of Care ( ) * Activity level acceptable ( ) * Complete discharge planning Clinical Status ( ) * No infection, or status acceptable ( ) * Isolation not needed, or status acceptable   
(X) * Respiratory status acceptable ( ) * Pain and nausea absent or adequately managed   
(X) * Ventilatory status acceptable ( ) * Neurologic problems absent or stabilized ( ) * Muscle or nerve damage absent or stable ( ) * General Discharge Criteria met Interventions ( ) * Intake acceptable ( ) * No inpatient interventions needed * Milestone Additional Notes 2/15 97.5 94 18 93% 99/63 on vent 2/15/2020 03:25 RBC 3.60 (L) HGB 11.7 (L) HCT 34.5 (L) MCV 95.8 MCHC 33.9 RDW 14.9 (H) PLATELET 64 (L)  
  
2/15/2020 03:24 Sodium 146 (H) Potassium 3.6 Chloride 114 (H)  
CO2 26 Anion gap 6 (L) Glucose 139 (H) BUN 12  
Creatinine 0.51 (L) Magnesium 2.0 Protein, total 5.4 (L) Albumin 2.1 (L) Globulin 3.3 A-G Ratio 0.6 (L)  
  
  
2/15/2020 03:22  
pO2 (POC) 62 (L) HCO3 (POC) 23.5  
sO2 (POC) 91 (L) Sailna Donning Unchanged bibasilar lung atelectasis or infiltrates, worse on the  
right. EKG Supraventricular tachycardia Low voltage QRS Nonspecific T wave abnormality Abnormal ECG Orders IP ICU, Full Code, VS, Cardiac Monitoring, Seizure Precautions, Maxwell, PROM, SCDS, NG, I&O Meds Zyloprim 100 mg per g tube x1, Omnipen 2 gm IV x6, Rocephin 2 gms IV x2, Fentanyl 100 mcg/hr IV cont gtt, Vimpat 100 mg per g tube x2, Keppra 1gm per g tube x2, Versed 4 mg/hr IV cont, Levophed 3-6mcg/min IV cont, NS @ 100ml/hr IV cont, Zovirax 800 mg IV x3, Vanc 2 gms IV x2, Neuro Note   
have seen and examined the patient along with Asenath Grandchild, NP. I agree with the documentation as recorded. In addition I would add:  
   
I have seen the patient independently during which time I reviewed the history and performed a physical examination, reviewed the NP documentation and discussed with the NP .   
   
The interval history obtained is notable for: Remains on minimal therapeutic hypothermia.  Currently sedated with Versed and fentanyl  
  The physical examination performed is notable for: Pupils midposition poorly reactive.  No motor response to pain.  Oculocephalics absent  
   
The medical decision making including assessment and recommendations is notable for: Hypoxic ischemic encephalopathy following cardiac arrest.  Prognosis guarded.  Continue supportive measures with serial examinations. Recurrent episodic alteration of neurological function. Episodes do not sound like seizures.  
   
Status post cardiac arrest with hypoxic ischemic encephalopathy. EEG presence of diffuse suppression of all EEG activity after discontinuation of propofol. Recommend repeating EEG after completion of arctic/sun protocol.   
   
New onset headache possibly posttraumatic Recommend repeating EEG after completion of arctic/sun protocol.    
   
Continue supportive measures. Interval history:  
   
Intubated, sedated, and on pressors. S/p cardiac arrest yesterday. Rewarming phase of arctic/sun protocol. EEG presence of diffuse suppression of all EEG activity after discontinuation of propofol. Recommend repeating EEG after completion of arctic/sun protocol.    
   
   
History:  
   
Ridge Barrera is a 60 y.o. male who is being seen for AMS.    
Review of systems negative with exception of pertinent positives and negatives noted above Physical Exam:  
General - chronically ill, well nourished, in no apparent distress. HEENT - Normocephalic, atraumatic. Conjunctiva are clear. Neck - Supple without masses Cardiovascular - Regular rate and rhythm. Normal S1, S2 without murmurs, rubs, or gallops. Lungs - Clear to auscultation. Abdomen - Soft, nontender with normal bowel sounds. Extremities - Peripheral pulses intact. No edema and no rashes.   
   
Neurological examination  
   
Level of consciousness- intubated and sedated  
   
Brain stem reflexes  
-Pupillary reflex to bright light: 4 mm dilated bilaterally, nonresponsive  
 -Ciliospinal reflexes: absent  
-Oculovestibular and/or oculocephalic reflex response: absent  
-Corneal reflex: present  
-Facial movement to painful stimulus at supraorbital nerve, temporomandibular joint:absent  
-Cough/gag reflex: na  
   
Motor examination  
-Muscle tone: decreased tone  
-Motor response to pain: does not withdraw from pain -Muscle stretch reflexes: areflexic in all extremities  
-Response to plantar stimulation: mute  
   
Pulmonary Note Cardiac arrest yesterday- targeted temperature treatment initiated with artic sun,currently rewarming  
   
Physical Exam:          
Constitutional:  intubated and mechanically ventilated. EENMT:  Sclera clear, pupils equal, oral mucosa moist  
Respiratory: clear Cardiovascular:  RRR with no M,G,R;  
Gastrointestinal:  soft with no tenderness; positive bowel sounds present Musculoskeletal:  warm with no cyanosis, no lower extremity edema Skin:  no jaundice or ecchymosis Neurologic: no gross neuro deficits     
Psychiatric:  Unresponsive, pupils dilated, no gag Pulmonary infiltrate ICD-10-CM: R91.8 ICD-9-CM: 793.19 2/15/2020 Unknown   
  RLL  
  Cardiac arrest [facility]) ICD-10-CM: I46.9 ICD-9-CM: 427.5 2/14/2020 Unknown  
    
  Acute respiratory failure with hypoxia [facility]) ICD-10-CM: J96.01  
ICD-9-CM: 518.81 2/14/2020 Unknown  
  On vent support- critically ill  
  Hypotension ICD-10-CM: I95.9 ICD-9-CM: 458.9 2/14/2020 Unknown  
  Still  On levophed-critically ill  
  * (Principal) Acute metabolic encephalopathy [facility]: G93.41  
ICD-9-CM: 348.31 2/11/2020 Unknown  
    
  Seizure ([facility] [address] ICD-10-CM: R56.9 ICD-9-CM: 780.39 2/11/2020 Unknown  
    
  New onset seizure ([facility] [address] ICD-10-CM: R56.9 ICD-9-CM: 780.39 2/5/2020 Yes  
    
  Hypokalemia ICD-10-CM: E87.6 ICD-9-CM: 276.8 2/5/2020 Yes  
    
  Malignant neoplasm of lower third of esophagus (HCC) ICD-10-CM: C15.5 ICD-9-CM: 150.5 2/19/2019 Yes  
    
  Obesity, morbid ([facility] [address] ICD-10-CM: E66.01  
ICD-9-CM: 278.01 12/13/2017 Yes  
    
  Essential hypertension with goal blood pressure less than 140/90 ICD-10-CM: I10  
ICD-9-CM: 401.9 8/17/2016 Yes  
    
  Hypotestosteronemia ICD-10-CM: E34.9 ICD-9-CM: 259.9 12/29/2015 Yes  
  encephalopathy  
   
  
1   Rewarming, then will decrease sedation 2   Levophed 3   Vent support 4   Zovirax, ampicillin, vanc,rocephine --  
Critical care time 43 minutes More than 50% of the time documented was spent in face-to-face contact with the patient and in the care of the patient on the floor/unit where the patient is located. Clinical Note Review 2/14 by Rosa Navarro RN  
 
   
Review Entered Review Status 2/21/2020 14:31 In Primary  
   
Criteria Review 2/14 
  
91.6 87 18 99% 124/82 on vent 
  
  2/14/2020 10:04 RBC 4.10 (L) HGB 13.3 (L) HCT 38.4 (L) MCV 93.7 MCHC 34.6  
RDW 14.8 (H) PLATELET 71 (L) MPV 11.0 NEUTROPHILS 90 (H) LYMPHOCYTES 2 (L) MONOCYTES 7  
EOSINOPHILS 0 (L) IMMATURE GRANULOCYTES 1  
ABS. NEUTROPHILS 7.8 ABS. LYMPHOCYTES 0.2 (L)   
  
  2/14/2020 16:52 Prothrombin time 15.4 (H)  
  
  2/14/2020 04:06 Sodium 135 (L) Potassium 4.0 Chloride 101 CO2 23 Anion gap 11 Glucose 135 (H) BUN 9 Creatinine 0.57 (L) C-Reactive protein 8.1 (H)  
  
Blood cx x2 
  
  2/14/2020 09:48  
pCO2 (POC) 34.8 (L)  
pO2 (POC) 142 (H) HCO3 (POC) 23.8  
sO2 (POC) 99 (H)  
  
PCXR 
  
1.  Support lines and tubes as described above. 
  
2. No pneumothorax. 
  
3. New mild basilar atelectasis. 
  
  
Orders IP ICU, Full Code, VS, Cardiac Monitoring, Seizure Precautions, Maxwell, PROM, SCDS, NG, I&O, train of four monitoring 
  
Meds Zyloprim 100 mg per g tube x1, Omnipen 2 gm IV x6, Rocephin 2 gms IV x2, Fentanyl 100 mcg/hr IV cont gtt, Vimpat 100 mg per g tube x2, Keppra 1gm per g tube x2, Versed 4 mg/hr IV cont, Levophed 3-6mcg/min IV cont, NS @ 100ml/hr IV cont, Zovirax 800 mg IV x3, Vanc 2 gms IV x2, Fentanyl 50 mcg IV x1,Fentanyl 100mcg IV x1, Fentanyl 75mcg IV x1,  Versed 3 mg IV x1, Decadron 10 mg IV x4, Dilaudid 1 mg IV x2,  
  
.RT NOTE 
  
Patient was intubated with a number 8.0 ET Tube. Tube placement verified by auscultation and ETCO2 monitor.  ET Tube is secured at the 24 cm obinna at the lip and on the right side. Patient was intubated by RT Herminia on the 2 attempt. Breath sounds are clear. Patient is Negative for subcutaneous air and chest excursion is symmetric. Trachea is midline.  Patient is also Negative for cyanosis and is Negative for pitting edema.  Patient placed on ventilator on documented settings. All alarms are set and audible. Resuscitation bag is at the head of the bed.   
  
Ventilator check complete; patient has a #8. 0 ET tube secured at the 24 at the lip.    Breath sounds are coarse.  Trachea is midline, Negative for subcutaneous air, and chest excursion is symmetric. Patient is also Positive for cyanosis and is Negative for pitting edema.  All alarms are set and audible.  Resuscitation bag is at the head of the bed.   
  
Ventilator Settings Mode FIO2 Rate Tidal Volume Pressure PEEP I:E Ratio Pressure control(mode changed for Synchrony)  100 %(increased until,  Pt stable)   500 ml    8 cm H20     
  
Peak airway pressure: 20 cm H2O  
Minute ventilation: 14.6 l/min  
  
ABG: No results for input(s): PH, PCO2, PO2, HCO3 in the last 72 hours. 
  
  
US GUIDED CENTRAL LINE PLACEMENT 
  
Indication:  
  
 Sepsis 
  
Summary: 
  
Informed consent was obtained from the patient/ patient's family. [facility] the  [place] with a 5-10 MHz vascular probe transducer and sterile seldinger technique, the RIJV vein was identified and under direct real time visualization was cannulated. The CVC kit guide wire was then inserted and its position within the subclavian vein verified in short and long axis views on vascular probe US. A quadruple lumen catheter was then placed in the right IJ  vein, after dilation of entry port  without difficulty.   
There was no significant bleeding during the procedure and good flush and drawback was noted. The CVC was then affixed with supplied 2.0 silk sutures via the proximal and distal limiters, with the insertion point affixed at 18 cm. A biostatic disc was applied to the entry port followed by a transparent dressing. 
  
A chest x-ray / is pending. 
  
There were no immediate complications. 
  
EBL: 2 ml 
  
Janeen Salmeron MD. 
  
  
   
  
  
  
  
Neuro Note Intubated, sedated, and on pressors. Patient found unresponsive and asystole. Code blue at 6448 started compressions, intubated, and 1 dose of epinephrine administered. Transferred to ICU.   
Recurrent episodic alteration of neurological function.  The episodes do not sound like seizures. Patient found unresponsive and asystole. Code blue at 3097 started compressions, intubated, and 1 dose of epinephrine administered. Will obtain EEG to assess neurologic function.  
  
Will obtain EEG for neurological function s/p cardiac arrest.  
  
Continue supportive measures.  
Physical Exam: 
General - chronically ill, well nourished, in no apparent distress. HEENT - Normocephalic, atraumatic. Conjunctiva are clear. Neck - Supple without masses Cardiovascular - Regular rate and rhythm. Normal S1, S2 without murmurs, rubs, or gallops. Lungs - Clear to auscultation. Abdomen - Soft, nontender with normal bowel sounds. Extremities - Peripheral pulses intact. No edema and no rashes.  
  
Neurological examination 
  
Level of consciousness- intubated and sedated 
  
Brain stem reflexes 
-Pupillary reflex to bright light: 4 mm dilated bilaterally 
 -Ciliospinal reflexes: absent 
-Oculovestibular and/or oculocephalic reflex response: absent 
-Corneal reflex: present 
-Facial movement to painful stimulus at supraorbital nerve, temporomandibular joint:absent 
-Cough/gag reflex: na 
  
Motor examination 
-Muscle tone: decreased tone 
-Motor response to pain: does not withdraw from pain 
-Muscle stretch reflexes: 1+ BUE, absent bilateral patellar and ankle jerks 
-Response to plantar stimulation: mute 
  
  
Pulmonary Note Pt found unresponsive with agonal respirations.  Code blue called at 5:27 am [facility] was intubated by RT and  Compressions were done. 1 epi  Given then rosc. He is now in icu on vent support and 16 mics of levophed- unresponsive but on versed and fentanyl 
  
  
  
  
         
Pulmonary infiltrate ICD-10-CM: R91.8 ICD-9-CM: 793.19   2/15/2020 Unknown     
  RLL  
  Cardiac arrest [facility]) ICD-10-CM: I46.9 ICD-9-CM: 427.5   2/14/2020 Unknown   
     
  Acute respiratory failure with hypoxia (HCC) ICD-10-CM: J96.01 
ICD-9-CM: 518.81   2/14/2020 Unknown   
  On vent support- critically ill  
  Hypotension ICD-10-CM: I95.9 ICD-9-CM: 437. 9   2/14/2020 Unknown   
  Still  On levophed-critically ill  
  * (Principal) Acute metabolic encephalopathy [facility]-55-CW: G93.41 
ICD-9-CM: 348.31   2/11/2020 Unknown   
     
  Seizure ([facility] [address] ICD-10-CM: R56.9 ICD-9-CM: 780.39   2/11/2020 Unknown   
     
  New onset seizure ([facility] [address] ICD-10-CM: R56.9 ICD-9-CM: 780.39   2/5/2020 Yes   
     
  Hypokalemia ICD-10-CM: E87.6 ICD-9-CM: 276.8   2/5/2020 Yes   
     
  Malignant neoplasm of lower third of esophagus (HCC) ICD-10-CM: C15.5 ICD-9-CM: 150. 5   2/19/2019 Yes   
     
  Obesity, morbid (HCC) ICD-10-CM: E66.01 
ICD-9-CM: 278.01   12/13/2017 Yes   
     
  Essential hypertension with goal blood pressure less than 140/90 ICD-10-CM: I10 
ICD-9-CM: 401. 9   8/17/2016 Yes   
     
  Hypotestosteronemia ICD-10-CM: E34.9 ICD-9-CM: 554. 9   12/29/2015 Yes   
  encephalopathy  
   
  
  
1    arctic sun - targetted temp manangement-post arrest 
2    levophed drip 3    Sedation and adding paralytics when bis is [id number] 4    Iv antibx- continue -- 
Critical care time 39 minutes More than 50% of the time documented was spent in face-to-face contact with the patient and in the care of the patient on the floor/unit where the patient is located. Cm NOTE Chart reviewed after tx to ICU post code blue. Currently intubated/vent.  Arctic sun per staff, levo gtt, sedation and paralytics.  Pt screen completed by [facility]. CM will continue to follow for any assist and d/c POC when stable Nutrition Nutrition Assessment for:  
Consult for tube feeding management (Hospitalist/Jean Marie) Assessment: 
Food/Nutrition Patient History: Patient transferred to ICU. Intubated. Patient PMH also significant for esophageal cancer currently on chemo/XRT, COPD, HTN, hyperlipidemia, morbid obesity. He is followed by outpatient oncology RD, per her notes bolus feedings of Jevity 1.5 and CIB w/ whole milk - goal 5-6 per day, but pt maintaining weight w/ current intake (3 Jevity 1.5 and 1 CIB). He initially had Jtube placed at diagnosis/start of treatment, but never used. It was replaced x1 and then fell out several months ago. Gtube was placed in November of last year as PO intake declined secondary to dysphagia. Patient seen in follow-up today for TF tolerance. Per nursing documentation, TF were held 2/12 due to gastric residuals >550 ml and no BM output and still on hold. No additional MOM given since 2/12.  Abdominal Status: Semi-soft, Obese abdomen with Hypoactive  bowel sounds. Last BM 2/11/20 
  
MAP- 86 Diet: DIET TUBE FEEDING Jevity DIET TUBE FEEDING Open order for details. Keep HOB > 30 degrees. Check residuals every 4 hours. Hold TF for > 500 ml x 1 or 250 ml x 2 consecutive checks. Also place patient on right side if residual is > 250 ml. DIET NPO   
Pertinent Medications: NS @ 100 ml/hr, fentanyl, versed, levo, dilaudid, keppra, Propofol (15-18 ml/hr), vanc Enteral nutrition access: Gtube Anthropometrics:Height: 6' 2\" (188 cm), Wt 102.5 kg (226#) per outpatient RD notes, Body mass index is 27.1 kg/m². BMI class of overweight. Macronutrient Needs: 95.9 kg (last recorded office weight (1/31/2020 per outpt RD) Estimated energy requirements:  5590-6174 kcal /day (20-25 kcal/kg) Estimated protein requirements:   grams protein/day (20% kcal grams/kg) CHO limit per day: 329 grams CHO/day (55% calories) Estimated fluid/day: 1.9-2.4 liters/day (~1ml/kcal/day) Intake/Comparative Standards: Tube feeds currently on hold. This does not meet kcal or protein needs. 
  
Intervention: 
Meals and snacks: Recommend full liquid diet order once mentation improves. May benefit from SLP consult prior to initiating oral diet. Enteral Nutrition: Hold TF for now until positive bowel movement.  
Once +BM would recommend re-initiating Jevity 1.2 via Gtube at 35 ml/hour, progress by 10 ml/hour every 4 hours to goal rate of 75ml/hour (as tolerated and pending BM). Water flush 30/hour. At goal will provide 2160 kcal (100% estimated calorie needs), 100 grams protein (100% estimated protein needs), 304 grams CHO/day (does not exceed maximum CHO/day) and 2173 ml free fluid (~1 ml/kcal). Can consider change to bolus feeds if/when advanced to goal and tolerating. Would recommend Ensure Plus, as Jevity 1.5 is not available on formulary, to closer mimic home regimen. Nutrition Supplement Therapy: If diet is able to be advanced, would likely benefit from Ensure Enlive based on diet history.  
Vitamin and Mineral Supplement Therapy: 
Nutrition support orders for electrolyte management replacement are activated and placed on the [facility]. Will replace K per nutrition support protocols. Coordination of Nutrition Care: ARTEM Falk Discharge Nutrition Plan: Too soon to determine. 
[place] [id number] Neurology Routine Electroencephalogram Report 
  
  
DATE: February 14, 2020 time Start: 1351 time CJE: [id number] 
  
EEG Number:  
  
Indication: Status post cardiac arrest 
  
Technique: The EEG was recorded on a 32 channel [employer] digital EEG machine. A full electrode headset was applied in accordance with the International 10-20 System of Electrode Placement. All impedances are less than 5000 Ohms. Time locked digital video of the patient was recorded and reviewed as needed for clinical correlation  
  
State of Consciousness: Unresponsive, sedated   
Description: The EEG demonstrates continuous suppression of all EEG amplitudes.  No EEG is recognizable at more than 10 µV in amplitude.  EMG activity is noted from the right temporal region.  Reactivity is poor.  Propofol is discontinued at 1407 but Versed and fentanyl continued.  No appreciable change in the EEG occurred with this pharmacological manipulation 
  
Activation Procedures: Hyperventilation: Not performed Photic Stimulation: Not performed 
  
 
Interpretation: This is a severely abnormal EEG due to the presence of diffuse suppression of all EEG activity.  This indicates severe diffuse cerebral dysfunction.  Confounding variables of ongoing sedation with multiple agents may confound the appearance of the EEG.   Recommend consideration of a repeat EEG in 24 to 48 hours off sedation.

## (undated) DEVICE — SUTURE MCRYL SZ 4-0 L27IN ABSRB UD L19MM PS-2 1/2 CIR PRIM Y426H

## (undated) DEVICE — BLADELESS OPTICAL TROCAR WITH FIXATION CANNULA: Brand: VERSAPORT

## (undated) DEVICE — SUTURE SZ 0 27IN 5/8 CIR UR-6  TAPER PT VIOLET ABSRB VICRYL J603H

## (undated) DEVICE — SUTURE VCRL SZ 3-0 L27IN ABSRB UD L26MM SH 1/2 CIR J416H

## (undated) DEVICE — CONTAINER PREFIL FRMLN 40ML --

## (undated) DEVICE — SUTURE PERMAHAND SZ 3-0 L18IN NONABSORBABLE BLK L26MM SH C013D

## (undated) DEVICE — LAP CHOLE: Brand: MEDLINE INDUSTRIES, INC.

## (undated) DEVICE — CONNECTOR TBNG OD5-7MM O2 END DISP

## (undated) DEVICE — Device

## (undated) DEVICE — REM POLYHESIVE ADULT PATIENT RETURN ELECTRODE: Brand: VALLEYLAB

## (undated) DEVICE — UNIVERSAL FIXATION CANNULA: Brand: VERSAONE

## (undated) DEVICE — LOGICUT SCISSOR LENGTH 320MM: Brand: LOGI - LAPAROSCOPIC INSTRUMENT SYSTEM

## (undated) DEVICE — INTENDED FOR TISSUE SEPARATION, AND OTHER PROCEDURES THAT REQUIRE A SHARP SURGICAL BLADE TO PUNCTURE OR CUT.: Brand: BARD-PARKER SAFETY BLADES SIZE 11, STERILE

## (undated) DEVICE — BLLN KT O RING ENDOSCP US --

## (undated) DEVICE — DRAPE XR C ARM 41X74IN LF --

## (undated) DEVICE — AMD ANTIMICROBIAL GAUZE SPONGES,12 PLY USP TYPE VII, 0.2% POLYHEXAMETHYLENE BIGUANIDE HCI (PHMB): Brand: CURITY

## (undated) DEVICE — SOL ANTI-FOG 6ML MEDC -- MEDICHOICE - CONVERT TO 358427

## (undated) DEVICE — SOLUTION IRRIG 1000ML H2O STRL BLT

## (undated) DEVICE — COVER,MAYO STAND,STERILE: Brand: MEDLINE

## (undated) DEVICE — (D)PREP SKN CHLRAPRP APPL 26ML -- CONVERT TO ITEM 371833

## (undated) DEVICE — TUBE FEED 18FR RETEN BLLN 7-10ML SIL J EXT RETEN RNG TRIM

## (undated) DEVICE — DRAPE SHT 3 QTR PROXIMA 53X77 --

## (undated) DEVICE — FORCEPS BX L240CM JAW DIA2.8MM L CAP W/ NDL MIC MESH TOOTH

## (undated) DEVICE — FCPS ENDOSCP 5MMX33CM -- ENDOPATH

## (undated) DEVICE — CANNULA NSL ORAL AD FOR CAPNOFLEX CO2 O2 AIRLFE

## (undated) DEVICE — KENDALL SCD EXPRESS SLEEVES, KNEE LENGTH, MEDIUM: Brand: KENDALL SCD

## (undated) DEVICE — 2000CC GUARDIAN II: Brand: GUARDIAN

## (undated) DEVICE — DRAPE,TOP,102X53,STERILE: Brand: MEDLINE

## (undated) DEVICE — LAPAROSCOPIC INTRODUCER KIT FOR GASTROSTOMY FEEDING TUBE: Brand: AVANOS

## (undated) DEVICE — INTENDED FOR TISSUE SEPARATION, AND OTHER PROCEDURES THAT REQUIRE A SHARP SURGICAL BLADE TO PUNCTURE OR CUT.: Brand: BARD-PARKER SAFETY BLADES SIZE 15, STERILE

## (undated) DEVICE — NDL SPNE QNCKE 18GX3.5IN LF --

## (undated) DEVICE — KENDALL RADIOLUCENT FOAM MONITORING ELECTRODE RECTANGULAR SHAPE: Brand: KENDALL

## (undated) DEVICE — BLOCK BITE AD 60FR W/ VELC STRP ADDRESSES MOST PT AND

## (undated) DEVICE — SUTURE PERMAHAND SZ 2-0 L18IN NONABSORBABLE BLK L26MM PS 1588H

## (undated) DEVICE — MASTISOL ADHESIVE LIQ 2/3ML

## (undated) DEVICE — SNARE POLYP SM W13MMXL240CM SHTH DIA2.4MM OVL FLX DISP

## (undated) DEVICE — 1200 GUARD II KIT W/5MM TUBE W/O VAC TUBE: Brand: GUARDIAN

## (undated) DEVICE — NEEDLE HYPO 25GA L1.5IN BLU POLYPR HUB S STL REG BVL STR

## (undated) DEVICE — [HIGH FLOW INSUFFLATOR,  DO NOT USE IF PACKAGE IS DAMAGED,  KEEP DRY,  KEEP AWAY FROM SUNLIGHT,  PROTECT FROM HEAT AND RADIOACTIVE SOURCES.]: Brand: PNEUMOSURE

## (undated) DEVICE — AIRLIFE™ OXYGEN TUBING 7 FEET (2.1 M) CRUSH RESISTANT OXYGEN TUBING, VINYL TIPPED: Brand: AIRLIFE™

## (undated) DEVICE — SYR 5ML 1/5 GRAD LL NSAF LF --

## (undated) DEVICE — MEDI-VAC YANKAUER SUCTION HANDLE W/BULBOUS TIP: Brand: CARDINAL HEALTH

## (undated) DEVICE — BLUNT TROCAR WITH THREADED ANCHOR: Brand: VERSAONE

## (undated) DEVICE — MOUTHPIECE ENDOSCP 20X27MM --

## (undated) DEVICE — NDL PRT INJ NSAF BLNT 18GX1.5 --

## (undated) DEVICE — SYR 3ML LL TIP 1/10ML GRAD --

## (undated) DEVICE — DISPOSABLE GRASPER CARTRIDGE: Brand: DIRECT DRIVE REPOSABLE GRASPERS

## (undated) DEVICE — SHEET, DRAPE, SPLIT, STERILE: Brand: MEDLINE

## (undated) DEVICE — DRAPE TWL SURG 16X26IN BLU ORB04] ALLCARE INC]

## (undated) DEVICE — (D)STRIP SKN CLSR 0.5X4IN WHT --